# Patient Record
Sex: FEMALE | Race: WHITE | NOT HISPANIC OR LATINO | Employment: OTHER | URBAN - METROPOLITAN AREA
[De-identification: names, ages, dates, MRNs, and addresses within clinical notes are randomized per-mention and may not be internally consistent; named-entity substitution may affect disease eponyms.]

---

## 2017-05-22 ENCOUNTER — OFFICE VISIT (OUTPATIENT)
Dept: SLEEP MEDICINE | Facility: CLINIC | Age: 72
End: 2017-05-22
Attending: INTERNAL MEDICINE
Payer: COMMERCIAL

## 2017-05-22 VITALS
SYSTOLIC BLOOD PRESSURE: 141 MMHG | WEIGHT: 220.3 LBS | HEIGHT: 63 IN | DIASTOLIC BLOOD PRESSURE: 60 MMHG | BODY MASS INDEX: 39.04 KG/M2 | HEART RATE: 89 BPM | OXYGEN SATURATION: 95 %

## 2017-05-22 DIAGNOSIS — G47.33 OSA ON CPAP: Primary | ICD-10-CM

## 2017-05-22 NOTE — PROGRESS NOTES
DATE OF SLEEP CLINIC VISIT:  05/22/2017      SLEEP MEDICINE FOLLOWUP VISIT NOTE      CHIEF COMPLAINT AND PURPOSE OF VISIT:  Followup of MAYKEL.      HISTORY OF PRESENT ILLNESS:  Ms. Reba Juarez is a 72-year-old female who presents to the Sleep Clinic today for a followup visit of MAYKEL.  She was last seen by me at the sleep clinic in 2015.      She reports using her CPAP device regularly during sleep. Her current CPAP device is her replacement equipment which she obtained through [facility] in 09/2013.  It has been at least 2 years that she has had her supplies replaced.  The current mask that she is using does not fit well.  She reports dryness in the mouth upon awakening.  She sleeps alone and hence there is not anyone to report whether or not she snores while she is using the CPAP device. She denies awakenings due to gasping for air or choking sensation with the CPAP.      She denies any concerns about drowsiness while driving.  She has had 2 episodes of pneumonia since she was last seen at our sleep clinic and for one of the episodes she was hospitalized at [facility].       She goes to bed between 9:00 and 9:30 p.m. and she wakes up between 5:30 and 7:30 a.m.  On an average she gets 7-1/2 to 8 hours of sleep and she does report feeling refreshed upon awakening from sleep and denies excessive daytime sleepiness or fatigue.  Her Nelson Sleepiness Score is 4/24.  She weighed 212 pounds during her previous sleep clinic visit and current weight is 220 pounds.     Downloadable compliance data from the CPAP could not be retrieved.     She was recently seen by her PCP at [facility] and also saw cardiologist . She underwent carotid USS and stress echo.    Carotid US:  4 vessel study done with mild to moderate diffuse plaque in carotid distribution without flow limiting stenosis.   Stress Echo: Poor effort tolerance secondary to profound deconditioning.  Adequate stress for interpretation with patient achieving  "100 percent of predicted maximum HR.  No subjective, hemodynamic or ECG evidence of inducible ischemia.  No echocardiographic evidence of ischemia.  Comments: There is a fall off in exercise tolerance since her previous stress echo in 2015. This is not due to her aortic valve, but rather significant deconditioning.       Current meds:  Current Outpatient Prescriptions   Medication Sig Dispense Refill     SIMVASTATIN PO        lisinopril (PRINIVIL,ZESTRIL) 10 MG tablet Take 1 tablet (10 mg) by mouth daily 90 tablet 3     ORDER FOR DME Use your CPAP device as directed by your provider.       aspirin 81 MG tablet Take 1 tablet by mouth daily.       Past medical history:  Past Medical History:   Diagnosis Date     Aortic valve sclerosis     echo 1-24-11 mild     Coronary artery disease 1995    PCI at [facility]. Repeat angiogram in 2007     Gastric ulcer 1990     H/O exercise stress test 3/21/2011    No ischemia     Hyperlipidemia LDL goal < 70     identified 1990's     Hypertension     onset age 50-55 approx     Sleep apnea      Patient Active Problem List   Diagnosis     Hyperlipidemia LDL goal <70     Coronary heart disease     Past surgical history:  Past Surgical History:   Procedure Laterality Date     COLONOSCOPY  2010     EYE SURGERY        Allergies:  Allergies   Allergen Reactions     Pravastatin      Myalgia       Simvastatin      myalgia     Atorvastatin Calcium      Myalgia        Social history:  Social History   Substance Use Topics     Smoking status: Former Smoker     Smokeless tobacco: Never Used     Alcohol use No      Family history:  Family History   Problem Relation Age of Onset     Myocardial Infarction Mother 51     HTN onset age 30, was a smoker     Myocardial Infarction Father 63     smoker     Myocardial Infarction Brother 39       PHYSICAL EXAMINATION:   VITAL SIGNS:/60  Pulse 89  Ht 1.6 m (5' 3\")  Wt 99.9 kg (220 lb 4.8 oz)  SpO2 95%  BMI 39.02 kg/m2  GENERAL " "APPEARANCE:  Above ideal body weight in no apparent cardiopulmonary distress.   CARDIOVASCULAR:  Regular S1, S2.  No gallops . Ejection systolic murmur of grade 2/6 audible at  the upper right sternal border.   RESPIRATORY:  Clear to auscultation bilaterally with no rales or rhonchi.   EXTREMITIES:  Trace bilateral lower extremity edema without any calf tenderness.      IMPRESSION/REPORT/ PLAN:  Previously diagnosed severe obstructive sleep apnea.  The patient reports that she is compliant with the CPAP device and continues to report positive benefit from the device usage; however,  objective evidence of CPAP compliance  was not available since  the compliance data could not be retrieved today.  A new SD card was inserted into the CPAP device and she was instructed to stop at our clinic in the next couple of weeks for which she does not need an appointment  , but she will bring her CPAP device with her to the clinic so that one of our staff can download the compliance measures for me to review.  I will communicate with the patient if there are any concerns or revisions that I need to make based on the compliance measures, but for now I have renewed the prescription for the CPAP supplies for the next ensuing year and discussed it with our DME provider, Ms. Annie Louie, who informed that the CPAP supplies will be mailed to her home address.  She was recommended to use the device regularly during sleep and I also have provided her with instruction sheet about how often each of the supplies have to be replaced.  She was instructed not to drive if drowsy or sleepy to prevent accidents. We discussed weight management via diet and exercise.    \"I spent a total of 25 minutes face to face with Reba Juarez during today's office  visit. Over 50% of this time was spent counseling the patient and coordinating care regarding sleep apnea and CPAP treatment\".         JAMES SAUCEDO MD             D: " 2017 17:06   T: 2017 18:03   MT:       Name:     NENA ORCKWELL   MRN:      [id number]        Account:      [id number]   :      1945           Visit Date:   2017      Document: [id number]

## 2017-05-22 NOTE — MR AVS SNAPSHOT
"              After Visit Summary   2017    Reba Juarez    MRN: [id number]           Patient Information     Date Of Birth          1945        Visit Information        Provider Department      2017 2:00 PM [facility]        Today's Diagnoses     MAYKEL on CPAP    -  1       Follow-ups after your visit        Follow-up notes from your care team     Return in about 1 year (around 2018).      Who to contact     If you have questions or need follow up information about today's clinic visit or your schedule please contact [facility] directly at [phone].  Normal or non-critical lab and imaging results will be communicated to you by MyChart, letter or phone within 4 business days after the clinic has received the results. If you do not hear from us within 7 days, please contact the clinic through [facility] or phone. If you have a critical or abnormal lab result, we will notify you by phone as soon as possible.  Submit refill requests through [facility] or call your pharmacy and they will forward the refill request to us. Please allow 3 business days for your refill to be completed.          Additional Information About Your Visit        MyChart Information     DianDian lets you send messages to your doctor, view your test results, renew your prescriptions, schedule appointments and more. To sign up, go to [email or web address] . Click on \"Log in\" on the left side of the screen, which will take you to the Welcome page. Then click on \"Sign up Now\" on the right side of the page.     You will be asked to enter the access code listed below, as well as some personal information. Please follow the directions to create your username and password.     Your access code is: [email or web address]  Expires: 2017  8:03 PM     Your access code will  in 90 days. If you need help or a new code, please call your [facility] clinic or " "[phone].        Care EveryWhere ID     This is your Care EveryWhere ID. This could be used by other organizations to access your [facility] medical records  [id number]        Your Vitals Were     Pulse Height Pulse Oximetry BMI (Body Mass Index)          89 1.6 m (5' 3\") 95% 39.02 kg/m2         Blood Pressure from Last 3 Encounters:   05/22/17 141/60   07/14/15 162/56   10/02/13 121/64    Weight from Last 3 Encounters:   05/22/17 99.9 kg (220 lb 4.8 oz)   07/14/15 96.2 kg (212 lb)   10/02/13 93.9 kg (207 lb)              We Performed the Following     Comprehensive DME          Today's Medication Changes          These changes are accurate as of: 5/22/17  8:03 PM.  If you have any questions, ask your nurse or doctor.               Stop taking these medicines if you haven't already. Please contact your care team if you have questions.     MELATONIN PO           Multi-vitamin Tabs tablet                    Primary Care Provider Office Phone # Fax #    ANGELA Samuel [phone]       [address] [address]        Thank you!     Thank you for choosing [facility], SLEEP STUDY  for your care. Our goal is always to provide you with excellent care. Hearing back from our patients is one way we can continue to improve our services. Please take a few minutes to complete the written survey that you may receive in the mail after your visit with us. Thank you!             Your Updated Medication List - Protect others around you: Learn how to safely use, store and throw away your medicines at www.disposemymeds.org.          This list is accurate as of: 5/22/17  8:03 PM.  Always use your most recent med list.                   Brand Name Dispense Instructions for use    aspirin 81 MG tablet      Take 1 tablet by mouth daily.       lisinopril 10 MG tablet    PRINIVIL/ZESTRIL    90 tablet    Take 1 tablet (10 mg) by mouth daily       order for DME      Use your CPAP " device as directed by your provider.       SIMVASTATIN PO

## 2017-05-23 NOTE — PROGRESS NOTES
DICTATED THE SLEEP CLINIC VISIT NOTE FOR TODAY.  Ines Jang MD   of Medicine,  Division of Pulmonary/Sleep Medicine  [facility].

## 2019-11-12 ENCOUNTER — AMBULATORY - HEALTHEAST (OUTPATIENT)
Dept: ADMINISTRATIVE | Facility: CLINIC | Age: 74
End: 2019-11-12

## 2019-11-12 DIAGNOSIS — G47.30 SLEEP APNEA: ICD-10-CM

## 2019-11-24 DIAGNOSIS — G47.33 OSA ON CPAP: Primary | ICD-10-CM

## 2020-03-05 ENCOUNTER — OFFICE VISIT (OUTPATIENT)
Dept: SLEEP MEDICINE | Facility: CLINIC | Age: 75
End: 2020-03-05
Payer: COMMERCIAL

## 2020-03-05 DIAGNOSIS — I10 ESSENTIAL HYPERTENSION: ICD-10-CM

## 2020-03-05 DIAGNOSIS — G47.33 OSA (OBSTRUCTIVE SLEEP APNEA): Primary | ICD-10-CM

## 2020-03-05 DIAGNOSIS — E66.01 CLASS 2 SEVERE OBESITY DUE TO EXCESS CALORIES WITH SERIOUS COMORBIDITY AND BODY MASS INDEX (BMI) OF 35.0 TO 35.9 IN ADULT (H): ICD-10-CM

## 2020-03-05 DIAGNOSIS — E66.812 CLASS 2 SEVERE OBESITY DUE TO EXCESS CALORIES WITH SERIOUS COMORBIDITY AND BODY MASS INDEX (BMI) OF 35.0 TO 35.9 IN ADULT (H): ICD-10-CM

## 2020-03-05 PROCEDURE — 99214 OFFICE O/P EST MOD 30 MIN: CPT | Performed by: INTERNAL MEDICINE

## 2020-03-05 RX ORDER — ROSUVASTATIN CALCIUM 40 MG/1
40 TABLET, COATED ORAL DAILY
COMMUNITY
Start: 2019-08-05

## 2020-03-05 RX ORDER — LISINOPRIL/HYDROCHLOROTHIAZIDE 10-12.5 MG
1 TABLET ORAL DAILY
COMMUNITY
Start: 2018-08-20 | End: 2020-10-14

## 2020-03-05 RX ORDER — NITROGLYCERIN 0.4 MG/1
0.4 TABLET SUBLINGUAL EVERY 5 MIN PRN
COMMUNITY
Start: 2018-06-04

## 2020-03-05 ASSESSMENT — MIFFLIN-ST. JEOR: SCORE: 1375.27

## 2020-03-05 NOTE — NURSING NOTE
Per request pt will be follow up in [place] 2021.  IAN reports and 2007 sleep study have been given to Ginger Shirley to scan into [facility] records.      GIBSON Lee

## 2020-03-05 NOTE — PATIENT INSTRUCTIONS
1.  Continue CPAP every night, for all hours that you are sleeping.  If you nap use CPAP.  As always, try to get at least 8 hours of sleep or more each day, and avoid sleep deprivation. Avoid alcohol.    2.  Reasons that you might need a change to your pressure therapy would be weight gain or loss, waking having inadvertently removed your CPAP overnight, having previously felt refreshed by sleep with CPAP use and now waking un-refreshed, and return of daytime sleepiness. Also, the development of new medical problems can sometimes affect breathing at night-heart failure, stroke, medications such as narcotics.    3.  Please bring CPAP with you if you are hospitalized.  If anticipating surgery be sure to discuss with your surgeon that you have sleep apnea and use PAP therapy.      4.  Maintain your equipment as recommended which includes routine cleaning and replacement of supplies.  Call DME for any questions regarding supplies or maintenance.    [facility], [facility] [phone]    5.  Do not drive on engage in potentially dangerous activities if feeling sleepy.    6.  Please see me again in 12 months and bring your machine and card to your follow-up visit.                    Tips for your CPAP and BIPAP use-    Mask fitting tips  Mask fitting exercise:    To improve your mask seal and your mobility at night, put mask on and secure in place.  Lie down in bed with full pressure and roll to one side, adjust headgear while in that position to eliminate any leaks. Repeat process rolling to other side.     The mask seal does not have to be perfect:   CPAP machines are designed to make up for small leaks. However, you will not tolerate leaks blowing in your eyes so you will need to adjust.   Any leak should only be near or at the bottom of the mask.  We expect your mask to leak slightly at night.    Do not over-tighten the headgear straps, tighter IS NOT better, we expect  minimal leak.    First try re-positioning the mask or headgear before tightening the headgear straps.  Mask leaks are expected due to changing sleeping positions. Try pulling the mask away from your skin allowing the cushion to re-inflate will minimize the leak.  If you struggle for a good fit, try turning the CPAP off and then readjust the mask by pulling it away from your face and then turning back on the CPAP.        Humidifier tips  Humidifiers can be adjusted to increase or decrease the amount of moisture according to your comfort level. You may need to adjust this frequently at first, but then might only change it with seasonal weather changes.     Try INCREASING the humidity if:  You experience a dry, irritated nasal passage or throat.  You have a runny, drippy nose or sneezing fits after using CPAP.  You experience nasal congestion during or after CPAP use.    Try DECREASING the humidity if:  You have excessive condensation or  rain out  in the tubing or mask.  Otherwise keep the tubing warm during the night by running it underneath the blankets or pillow.      Clinic visit after initial CPAP and BIPAP set-up   Bring your equipment with you to your 4 week follow up clinic visit.  We will be extracting your data from the machine.        Travel  Always take your equipment with you.  If you fly with your equipment bring it on with you as a carry on.  Medical equipment does not count as a carry on.    If you travel international the machines take 110-240.  The only adapter needed is the adapter that will fit into the receptacle (outlet).    You may also want to bring an extension cord as many hotel rooms have limited outlets at the bedside.  Do not travel with water in your humidifier chamber.     Cleaning and Maintenance Guidelines    Equipment Frequency Cleaning Method   Mask First Day    Daily      Weekly Soak mask in hot soapy water for 30 minutes, rinse and air dry.  Wipe nasal cushion with a hot soapy  (Ivory, baby shampoo) cloth and rinse.  Baby wipes may also be used.  Do not use anti-bacterial soaps,Cleo  liquid soap, rubbing alcohol, bleach or ammonia.  Wash frame in hot soapy water (Ivory, baby shampoo) rinse and let air dry   Headgear Biweekly Wash in hot soapy water, rinse and air dry   Reusable Gray Filter Weekly Wash in hot soapy water, rinse, put in towel squeeze moisture out, let air dry   Disposable White Filter Check Weekly Replace when brown or gray in color; at least every 2 to 3 months   Humidifier Chamber Daily    Weekly Empty distilled water from humidifier and let air dry    Hand wash in hot soapy water, rinse and air dry   Tubing Weekly Wash in hot soapy water, rinse and let air dry   Mask, Tubing and Humidifier Chamber As needed Disinfect: Soak in 1 part distilled white vinegar to 3 parts hot water for 30 minutes, rinse well and air dry  Not the material headgear        MASK AND SUPPLY REORDERING and EQUIPMENT NEEDS through your DME and per your insurance  Reminder: Most insurance companies will allow for a new mask, headgear, tubing, and reusable gray filter every six months.  Disposable white ultra-fine filters are covered monthly.      HOME AND SAFETY INSTRUCTIONS    Do not use frayed or cracked electrical cords, multi plug adaptors, or switched receptacles    Do not immerse electrical equipment into water    Assure that electrical cords do not become a tripping hazard

## 2020-03-05 NOTE — PROGRESS NOTES
Chief complaint: Follow-up of severe obstructive sleep apnea, concerns about CPAP    History of Present Illness: 75-year-old female with history of coronary artery disease, hypertension, obesity and severe obstructive sleep apnea.  She is here today for follow-up.  She reports that she has been noticing waking up with a sound.  She thinks that she might be snoring through the mask or is having an apnea.  She does not think it is leak because immediately upon awakening it seems to resolve.  She does not think she is dreaming.  She denies waking up with a sense of shortness of breath or gasping.  Later in the visit she admits fear about dying at night related to this.  Bedroom is otherwise dark and quiet.  It can be excessively warm however.  She denies significant issues with sleep initiation.  She typically gets into bed 10-11 listens to a book for half an hour or less.  She does not use any over-the-counter or prescribed sleep aids.  She usually gets up once a night or last to go to the bathroom.  Rise time varies from 4 30-6 30.  She is not taking any naps during the day denies daytime sleepiness.  She admits to long history of drinking coffee, typically 3 cups in the morning 1 in the afternoon and 1 in the evening.  She does not think it affects her sleep and is not interested in cutting back.    She has been having some right shoulder pain that can disrupt her sleep.  She may be sleeping more in her back.  She had some significant weight loss but is now gaining again and is frustrated by this.  She does not drink alcohol in the evenings.  She is does not use medical cannabis or recreational marijuana.  She is remote history of smoking.    There is been no major new medical problems or upcoming procedures planned.    Blood pressure is elevated today.  She usually takes her medications at night.  She states that she got lost on the way here.  She is interested in transferring care closer to home.  He gets her  supplies mailed out from [facility].  She brought a commercially available cleaning device.  She uses a nasal mask.      Total score - [place]: 6 (3/5/2020 11:00 AM) (Less than 10 normal)    RON Total Score: 6 (normal 0-7, mild 8-14, moderate 15-21, severe 22-28)    Past Medical History:   Diagnosis Date     Aortic valve sclerosis     echo 11 mild     Coronary artery disease     PCI at [facility]. Repeat angiogram in      Gastric ulcer      H/O exercise stress test 3/21/2011    No ischemia     Hyperlipidemia LDL goal < 70     identified      Hypertension     onset age 50-55 approx     MAYKEL (obstructive sleep apnea)        Allergies   Allergen Reactions     Pravastatin      Myalgia       Simvastatin      myalgia     Atorvastatin Calcium      Myalgia       Calcium Citrate Unknown       Current Outpatient Medications   Medication     aspirin 81 MG tablet     lisinopril-hydrochlorothiazide (ZESTORETIC) 10-12.5 MG tablet     metFORMIN (GLUCOPHAGE) 500 MG tablet     nitroGLYcerin (NITROSTAT) 0.4 MG sublingual tablet     ORDER FOR DME     rosuvastatin (CRESTOR) 40 MG tablet     No current facility-administered medications for this visit.        Social History     Socioeconomic History     Marital status:      Spouse name: Not on file     Number of children: Not on file     Years of education: Not on file     Highest education level: Not on file   Occupational History     Occupation: retired     Employer: [employer]     Comment:  retired full time, still works a few days as biochemical pharmacist   Social Needs     Financial resource strain: Not on file     Food insecurity:     Worry: Not on file     Inability: Not on file     Transportation needs:     Medical: Not on file     Non-medical: Not on file   Tobacco Use     Smoking status: Former Smoker     Last attempt to quit: 3/5/1992     Years since quittin.0     Smokeless tobacco: Never Used   Substance  "and Sexual Activity     Alcohol use: No     Drug use: No     Sexual activity: Not on file   Lifestyle     Physical activity:     Days per week: Not on file     Minutes per session: Not on file     Stress: Not on file   Relationships     Social connections:     Talks on phone: Not on file     Gets together: Not on file     Attends Ep[employer]alian service: Not on file     Active member of club or organization: Not on file     Attends meetings of clubs or organizations: Not on file     Relationship status: Not on file     Intimate partner violence:     Fear of current or ex partner: Not on file     Emotionally abused: Not on file     Physically abused: Not on file     Forced sexual activity: Not on file   Other Topics Concern     Parent/sibling w/ CABG, MI or angioplasty before 65F 55M? Not Asked   Social History Narrative    Single and lives with her son and brother.       Family History   Problem Relation Age of Onset     Myocardial Infarction Mother 51        HTN onset age 30, was a smoker     Myocardial Infarction Father 63        smoker     Myocardial Infarction Brother 39         EXAM: Pleasant and alert no distress  BP (!) (P) 155/77   Pulse (P) 80   Resp (P) 16   Ht (P) 1.6 m (5' 2.99\")   Wt (P) 91.6 kg (202 lb)   SpO2 (P) 94%   BMI (P) 35.79 kg/m    Psychiatric: Mood and affect appear normal    PSG 10/11/2007  BMI 39.1  AHI 55.2, REM AHI 96.6 Lowest O2 SAT 79%    Overnight oximetry performed on CPAP 10/2/2013  Time below 88% 0.9 minutes    Overnight oximetry performed on CPAP 7/22/2015  Time below 88% 0.6 minutes    PAP download from 2/4/2020 to 3/4/2020 reviewed:  Per cent of days used greater than 4 Hours 90 % (minimum goal greater than 70%)  Average use on days used: 6 hours 52 min  Settings: CPAP 11 cmH2O     Average AHI 1.1 events per hour (goal less than 5)  Leak acceptable    ASSESSMENT:  75-year-old female with history of severe obstructive sleep apnea overall getting good clinical benefit with the use " of CPAP.  She is experiencing middle the night awakenings associated with the sound that could be REM related apnea given her history of REM predominance.  Download however does not support inadequately treated sleep apnea.  She does have an auto titrating capable device.  Elevated blood pressure today on medications.  Remains obese.    PLAN:  We will try to put her device in auto titrating mode keep the min pressure at 11 max up to 14.  If this does not solve the problem patient should notify me consider titration study.  Prescription generated keep her supplies up-to-date.  Patient is interested in transferring care to [facility].  Informed her that at this point the medical records are not yet merged.  She should obtain records to assist in transfer.  Patient was counseled on the importance of taking her device with her to procedures.  She was counseled about the risks associated with untreated sleep apnea or the use of sedatives, anesthesia narcotics on sleep apnea.  I did try to reassure her that the risk of dying from sleep apnea with her current usage is low.  She is encouraged to continue her efforts at weight control.  She should follow-up with primary care regarding blood pressure management.  Please see patient structures for further details of counseling provided.    Twenty-five minutes spent with patient, >50% spent counseling and coordinating care.      Cindy Ness M.D.  Pulmonary/Critical Care/Sleep Medicine    [facility]   Floor 1, [address]   [address] [place] [address]. [place] [address]   Appointments: [phone]    The above note was dictated using voice recognition software and may include typographical errors. Please contact the author for any clarifications.

## 2020-03-10 ENCOUNTER — AMBULATORY - HEALTHEAST (OUTPATIENT)
Dept: SLEEP MEDICINE | Facility: CLINIC | Age: 75
End: 2020-03-10

## 2020-06-26 ENCOUNTER — TELEPHONE (OUTPATIENT)
Dept: SLEEP MEDICINE | Facility: CLINIC | Age: 75
End: 2020-06-26

## 2020-06-26 ENCOUNTER — TRANSFERRED RECORDS (OUTPATIENT)
Dept: HEALTH INFORMATION MANAGEMENT | Facility: CLINIC | Age: 75
End: 2020-06-26

## 2020-06-26 DIAGNOSIS — G47.33 OSA (OBSTRUCTIVE SLEEP APNEA): Primary | ICD-10-CM

## 2020-06-26 NOTE — TELEPHONE ENCOUNTER
Pt calling states she called [facility] to get new cpap machine and they state they only have orders for supplies, not new device. Please advise

## 2020-07-01 ENCOUNTER — TRANSFERRED RECORDS (OUTPATIENT)
Dept: HEALTH INFORMATION MANAGEMENT | Facility: CLINIC | Age: 75
End: 2020-07-01

## 2020-07-14 ENCOUNTER — TELEPHONE (OUTPATIENT)
Dept: SLEEP MEDICINE | Facility: CLINIC | Age: 75
End: 2020-07-14

## 2020-07-21 NOTE — TELEPHONE ENCOUNTER
CALLED PT TO INFORM HER OF NEEDING FOLLOW-UP VISIT DISCUSSING NEED FOR REPLACEMENT MACHINE. PT STATED SHE HAD A VISIT ALREADY IN MARCH. I INFORMED PT THAT INSURANCE REQUIRES FOLLOW-UP VISIT DISCUSSING WHY REPLACEMENT MACHINE IS NEEDED. PT STATED SHE WOULD CALL HER DR AND HAVE THEM CALL US TO TELL US WE HAD TO FILL THE RX. I TRIED TO EXPLAIN TO PT THAT INSURANCE WOULD NOT COVER A REPLACEMENT MACHINE WITHOUT A FOLLOW-UP VISIT. SHE STATED SHE WOULD HAVE DR CALL  US AND HUNG UP.

## 2020-07-29 ENCOUNTER — DOCUMENTATION ONLY (OUTPATIENT)
Dept: SLEEP MEDICINE | Facility: CLINIC | Age: 75
End: 2020-07-29

## 2020-07-29 NOTE — PROGRESS NOTES
Patient was offered choice of vendor and chose [facility].  Patient Reba Juarez was set up at [facility] on July 29, 2020. Patient received a Resmed AirSense 10 Auto. Pressures were set at 11-14 cm H2O.   Patient s ramp is 5 cm H2O for Auto and FLEX/EPR is 2.  Patient received a Resmed Mask name: MIR[employer] FX FOR HER  Nasal mask size Medium, heated tubing and heated humidifier.  Patient does need to meet compliance. Patient has a follow up on TBD with Dr. Ness.    Minnie Zambrano

## 2020-08-04 ENCOUNTER — DOCUMENTATION ONLY (OUTPATIENT)
Dept: SLEEP MEDICINE | Facility: CLINIC | Age: 75
End: 2020-08-04

## 2020-08-04 NOTE — PROGRESS NOTES
3 DAY STM VISIT    Diagnostic AHI: 55    PSG    Patient contacted for 3 day STM visit    Confirmed with patient at time of call- N/A Patient is still interested in STM service     Subjective measures:  Pt states things are going well and has no issues or complaints.  Pt is benefiting from therapy.      Replacement device: Yes  STM ordered by provider: Yes     Device type: Auto-CPAP  PAP settings from order::  CPAP min 11 cm  H20       CPAP max 14 cm  H20        Mask type:    Nasal Mask     Device settings from machine      Min CPAP 11.0            Max CPAP 14.0            Assessment: Nightly usage over four hours.  Action plan: Patient removed from STM, STM not required or ordered. . Patient has a follow up visit scheduled:   yes within 31-90 days of set up.     Total time spent on accessing and  interpreting remote patient PAP therapy data  10 minutes  Total time spent counseling, coaching  and reviewing PAP therapy data with patient  15 minutes  [id number] no

## 2020-10-14 RX ORDER — VALSARTAN AND HYDROCHLOROTHIAZIDE 80; 12.5 MG/1; MG/1
1 TABLET, FILM COATED ORAL DAILY
COMMUNITY
End: 2021-05-27 | Stop reason: ALTCHOICE

## 2020-10-14 RX ORDER — BUDESONIDE AND FORMOTEROL FUMARATE DIHYDRATE 80; 4.5 UG/1; UG/1
2 AEROSOL RESPIRATORY (INHALATION) 2 TIMES DAILY
COMMUNITY
End: 2022-12-22

## 2020-10-14 NOTE — PATIENT INSTRUCTIONS
For general sleep health questions: http://sleepeducation.org    For tips about PAP and COVID -19:  https://www.[facility]acic.org/patients/patient-resources/resources/covid-19-and-home-pap-therapy.pdf        Continue PAP therapy every night, for all hours that you are sleeping.  If you nap use CPAP.  As always, try to get at least 8 hours of sleep or more each day, keep a regular sleep schedule, and avoid sleep deprivation. Avoid alcohol.    Reasons that you might need a change to your pressure therapy would be weight gain or loss, waking having inadvertently removed your PAP overnight, having previously felt refreshed by sleep with CPAP use and now waking un-refreshed, and return of daytime sleepiness. Also, the development of new medical problems  (such as heart failure, stroke, medications such as narcotics) can sometimes affect breathing at night and change your PAP therapy needs.    Please bring PAP with you if you are hospitalized.  If anticipating surgery be sure to discuss with your surgeon that you have sleep apnea and use PAP therapy.      Maintain your equipment as recommended which includes routine cleaning and replacement of supplies.      Call DME for any questions regarding supplies or maintenance.    [facility], [facility] [phone]      Do not drive on engage in potentially dangerous activities if feeling sleepy.      Tips for your PAP use-    Mask fitting tips  Mask fitting exercise:    To improve your mask seal and your mobility at night, put mask on and secure in place.  Lie down in bed with full pressure and roll to one side, adjust headgear while in that position to eliminate any leaks. Repeat process rolling to other side.     The mask seal does not have to be perfect:   CPAP machines are designed to make up for small leaks. However, you will not tolerate leaks blowing in your eyes so you will need to adjust.   Any leak should only be near or at the  bottom of the mask.  We expect your mask to leak slightly at night.    Do not over-tighten the headgear straps, tighter IS NOT better, we expect minimal leak.    First try re-positioning the mask or headgear before tightening the headgear straps.  Mask leaks are expected due to changing sleeping positions. Try pulling the mask away from your skin allowing the cushion to re-inflate will minimize the leak.  If you struggle for a good fit, try turning the CPAP off and then readjust the mask by pulling it away from your face and then turning back on the CPAP.        Humidifier tips  Humidifiers can be adjusted to increase or decrease the amount of moisture according to your comfort level. You may need to adjust this frequently at first, but then might only change it with seasonal weather changes.     Try INCREASING the humidity if:  You experience a dry, irritated nasal passage or throat.  You have a runny, drippy nose or sneezing fits after using CPAP.  You experience nasal congestion during or after CPAP use.    Try DECREASING the humidity if:  You have excessive condensation or  rain out  in the tubing or mask.  Otherwise keep the tubing warm during the night by running it underneath the blankets or pillow.      Clinic visit after initial PAP set-up   Bring your equipment with you to your 4 week follow up clinic visit.  We will be extracting your data from the machine.        Travel  Always take your equipment with you.  If you fly with your equipment bring it on with you as a carry on.  Medical equipment does not count as a carry on.    If you travel international the machines take 110-240.  The only adapter needed is the adapter that will fit into the receptacle (outlet).    You may also want to bring an extension cord as many hotel rooms have limited outlets at the bedside.  Do not travel with water in your humidifier chamber.     Cleaning and Maintenance Guidelines    Equipment Frequency Cleaning Method   Mask  First Day    Daily      Weekly Soak mask in hot soapy water for 30 minutes, rinse and air dry.  Wipe nasal cushion with a hot soapy (Ivory, baby shampoo) cloth and rinse.  Baby wipes may also be used.  Do not use anti-bacterial soaps,Cleo  liquid soap, rubbing alcohol, bleach or ammonia.  Wash frame in hot soapy water (Ivory, baby shampoo) rinse and let air dry   Headgear Biweekly Wash in hot soapy water, rinse and air dry   Reusable Gray Filter Weekly Wash in hot soapy water, rinse, put in towel squeeze moisture out, let air dry   Disposable White Filter Check Weekly Replace when brown or gray in color; at least every 2 to 3 months   Humidifier Chamber Daily    Weekly Empty distilled water from humidifier and let air dry    Hand wash in hot soapy water, rinse and air dry   Tubing Weekly Wash in hot soapy water, rinse and let air dry   Mask, Tubing and Humidifier Chamber As needed Disinfect: Soak in 1 part distilled white vinegar to 3 parts hot water for 30 minutes, rinse well and air dry  Not the material headgear        MASK AND SUPPLY REORDERING and EQUIPMENT NEEDS through your DME and per your insurance  Reminder: Most insurance companies will allow for a new mask, headgear, tubing, and reusable gray filter every six months.  Disposable white ultra-fine filters are covered monthly.      HOME AND SAFETY INSTRUCTIONS    Do not use frayed or cracked electrical cords, multi plug adaptors, or switched receptacles    Do not immerse electrical equipment into water    Assure that electrical cords do not become a tripping hazard    Your BMI is There is no height or weight on file to calculate BMI.  Weight management is a personal decision.  If you are interested in exploring weight loss strategies, the following discussion covers the approaches that may be successful. Body mass index (BMI) is one way to tell whether you are at a healthy weight, overweight, or obese. It measures your weight in relation to your height.  A  BMI of 18.5 to 24.9 is in the healthy range. A person with a BMI of 25 to 29.9 is considered overweight, and someone with a BMI of 30 or greater is considered obese. More than two-thirds of American adults are considered overweight or obese.  Being overweight or obese increases the risk for further weight gain. Excess weight may lead to heart disease and diabetes.  Creating and following plans for healthy eating and physical activity may help you improve your health.  Weight control is part of healthy lifestyle and includes exercise, emotional health, and healthy eating habits. Careful eating habits lifelong are the mainstay of weight control. Though there are significant health benefits from weight loss, long-term weight loss with diet alone may be very difficult to achieve- studies show long-term success with dietary management in less than 10% of people. Attaining a healthy weight may be especially difficult to achieve in those with severe obesity. In some cases, medications, devices and surgical management might be considered.  What can you do?  If you are overweight or obese and are interested in methods for weight loss, you should discuss this with your provider.     Consider reducing daily calorie intake by 500 calories.     Keep a food journal.     Avoiding skipping meals, consider cutting portions instead.    Diet combined with exercise helps maintain muscle while optimizing fat loss. Strength training is particularly important for building and maintaining muscle mass. Exercise helps reduce stress, increase energy, and improves fitness. Increasing exercise without diet control, however, may not burn enough calories to loose weight.       Start walking three days a week 10-20 minutes at a time    Work towards walking thirty minutes five days a week     Eventually, increase the speed of your walking for 1-2 minutes at time      Weight management plan: Patient was referred to their PCP to discuss a diet and  exercise plan.

## 2020-10-14 NOTE — PROGRESS NOTES
"Reba Juarez is a 75 year old female who is being evaluated via a billable telephone visit.      The patient has been notified of following:     \"This telephone visit will be conducted via a call between you and your physician/provider. We have found that certain health care needs can be provided without the need for a physical exam.  This service lets us provide the care you need with a short phone conversation.  If a prescription is necessary we can send it directly to your pharmacy.  If lab work is needed we can place an order for that and you can then stop by our lab to have the test done at a later time.    Telephone visits are billed at different rates depending on your insurance coverage. During this emergency period, for some insurers they may be billed the same as an in-person visit.  Please reach out to your insurance provider with any questions.    If during the course of the call the physician/provider feels a telephone visit is not appropriate, you will not be charged for this service.\"    Patient has given verbal consent for Telephone visit?  Yes    What phone number would you like to be contacted at? [phone]    [facility]    Phone call duration: 28 minutes    Cindy Ness MD     Chief complaint: Follow-up new CPAP machine    History of Present Illness: 75-year-old female with history of coronary artery disease, hypertension, obstructive sleep apnea with hypoxemia.  She recently received a replacement CPAP machine.  She reports that she is getting more restful sleep.  She likes at current settings.  She is happy with the new machine.  She does have some nights where she has difficulty returning to sleep after middle of the night awakenings.  Is happening a couple times a month.  She does take naps on some days up to an hour in length.  She also drinks 3 caffeinated beverages in the morning and usually a fourth and the fifth in the afternoon and " evening.  She does not think it affects her sleep.  She is not using any over-the-counter or prescribed sleep aids.  She does not drink alcohol.      She has an oximeter at home and sometimes checks her oxygen saturations and finds they can go as low as 87%.  She has been suffering from dyspnea with exertion and cough.  Cough is improved after changing blood pressure medication.  Recently dyspnea improved after empiric trial of Symbicort.  She has been off of it now for a few days however.  She does have a remote history of smoking.    She has been using a ozone based commercial APAP supply .    Total score - [place]: 4 (10/14/2020 10:00 AM) (Less than 10 normal)    RON Total Score: 3 (normal 0-7, mild 8-14, moderate 15-21, severe 22-28)    Past Medical History:   Diagnosis Date     Aortic valve sclerosis     echo 1-24-11 mild     Coronary artery disease 1995    PCI at [facility]. Repeat angiogram in 2007     Gastric ulcer 1990     H/O exercise stress test 3/21/2011    No ischemia     Hyperlipidemia LDL goal < 70     identified 1990's     Hypertension     onset age 50-55 approx     MAYKEL (obstructive sleep apnea) 2007       Allergies   Allergen Reactions     Pravastatin      Myalgia       Simvastatin      myalgia     Atorvastatin Calcium      Myalgia       Calcium Citrate Unknown       Current Outpatient Medications   Medication     aspirin 81 MG tablet     budesonide-formoterol (SYMBICORT) 80-4.5 MCG/ACT Inhaler     metFORMIN (GLUCOPHAGE) 500 MG tablet     nitroGLYcerin (NITROSTAT) 0.4 MG sublingual tablet     ORDER FOR DME     rosuvastatin (CRESTOR) 40 MG tablet     valsartan-hydrochlorothiazide (DIOVAN HCT) 80-12.5 MG tablet     No current facility-administered medications for this visit.        Social History     Socioeconomic History     Marital status:      Spouse name: Not on file     Number of children: Not on file     Years of education: Not on file     Highest education level: Not  on file   Occupational History     Occupation: retired     Employer: [employer]     Comment:  retired full time, still works a few days as biochemical pharmacist   Social Needs     Financial resource strain: Not on file     Food insecurity     Worry: Not on file     Inability: Not on file     Transportation needs     Medical: Not on file     Non-medical: Not on file   Tobacco Use     Smoking status: Former Smoker     Quit date: 3/5/1992     Years since quittin.6     Smokeless tobacco: Never Used   Substance and Sexual Activity     Alcohol use: No     Drug use: No     Sexual activity: Not on file   Lifestyle     Physical activity     Days per week: Not on file     Minutes per session: Not on file     Stress: Not on file   Relationships     Social connections     Talks on phone: Not on file     Gets together: Not on file     Attends Sabianist service: Not on file     Active member of club or organization: Not on file     Attends meetings of clubs or organizations: Not on file     Relationship status: Not on file     Intimate partner violence     Fear of current or ex partner: Not on file     Emotionally abused: Not on file     Physically abused: Not on file     Forced sexual activity: Not on file   Other Topics Concern     Parent/sibling w/ CABG, MI or angioplasty before 65F 55M? Not Asked   Social History Narrative    Single and lives with her son and brother.       Family History   Problem Relation Age of Onset     Myocardial Infarction Mother 51        HTN onset age 30, was a smoker     Myocardial Infarction Father 63        smoker     Myocardial Infarction Brother 39         EXAM:  There were no vitals taken for this visit.  Pulmonary: Speaking full sentences, no audible wheezing and cough  Psych: Alert,  alert    PSG 10/11/2007  BMI 39.1  AHI 55.2, REM AHI 96.6 Lowest O2 SAT 79%     Overnight oximetry performed on CPAP 10/2/2013  Time below 88% 0.9 minutes     Overnight oximetry performed on CPAP  7/22/2015  Time below 88% 0.6 minutes    ResMed   Auto-PAP 11.0 - 14.0 cmH2O 30 day usage data:    90% of days with > 4 hours of use. 0/30 days with no use.   Average use 419 minutes per day.   95%ile Leak 25.2 L/min.   CPAP 95% pressure 12.9 cm.   AHI 1.08 events per hour.     ASSESSMENT:  75-year-old female with history of coronary artery disease, hypertension, obstructive sleep apnea with hypoxemia getting good clinical benefit and meeting compliance goals with her replacement machine.  Dyspnea with exertion improved with changing blood pressure medication and an empiric trial of Symbicort.  No pulmonary function testing has been performed.  Think would be important to further evaluate etiology of her dyspnea.  Patient has intermittent middle the night awakenings with sleep maintenance insomnia not yet become habitual.    PLAN:  Prescription generated to keep her CPAP supplies up-to-date, no pressure change recommended.  Patient is encouraged to avoid taking long naps and to consider cutting back on evening and then afternoon caffeine.  This may help improve sleep consolidation and ability to return to sleep after middle the night awakenings.  Recommended comprehensive pulmonary function testing while off of the inhalers to get a better assessment of underlying lung function and etiology of chronic dyspnea.  Once that has been completed okay to resume the trial of Symbicort as prescribed by primary care.  Patient should follow up with me to review those pulmonary function testing results however.  She is agreeable with this plan        Cindy Ness M.D.  Pulmonary/Critical Care/Sleep Medicine    [facility]   Floor [address], [address]   [address] [place], [place] [address]. [place] [address]   Appointments: [phone]    The above note was dictated using voice recognition software and may include typographical errors. Please contact the author for any  clarifications.

## 2020-10-15 ENCOUNTER — VIRTUAL VISIT (OUTPATIENT)
Dept: SLEEP MEDICINE | Facility: CLINIC | Age: 75
End: 2020-10-15
Payer: COMMERCIAL

## 2020-10-15 DIAGNOSIS — R05.9 COUGH: ICD-10-CM

## 2020-10-15 DIAGNOSIS — R06.09 DOE (DYSPNEA ON EXERTION): ICD-10-CM

## 2020-10-15 DIAGNOSIS — G47.33 OSA (OBSTRUCTIVE SLEEP APNEA): Primary | ICD-10-CM

## 2020-10-15 DIAGNOSIS — Z87.891 FORMER SMOKER: ICD-10-CM

## 2020-10-15 PROCEDURE — 99214 OFFICE O/P EST MOD 30 MIN: CPT | Mod: 95 | Performed by: INTERNAL MEDICINE

## 2020-10-22 NOTE — NURSING NOTE
Patient called and connected with the PFT lab to schedule PFT's and 6 minute walk test. Patient will call back to schedule follow once done.    Bianka Ochoa [facility] ~[place]

## 2021-01-29 NOTE — TELEPHONE ENCOUNTER
Action    Action Taken 1-29: Requested echo 7-1-20 from [facility]  1-29: Requested holter monitor 7-10-20 and EKG 6-28-20 from   2-2: sent EKG to scanning, sent holter to [facility] and urgent scanning

## 2021-02-02 ENCOUNTER — PRE VISIT (OUTPATIENT)
Dept: CARDIOLOGY | Facility: CLINIC | Age: 76
End: 2021-02-02

## 2021-02-02 ENCOUNTER — OFFICE VISIT (OUTPATIENT)
Dept: CARDIOLOGY | Facility: CLINIC | Age: 76
End: 2021-02-02
Attending: INTERNAL MEDICINE
Payer: COMMERCIAL

## 2021-02-02 ENCOUNTER — ANCILLARY PROCEDURE (OUTPATIENT)
Dept: CARDIOLOGY | Facility: CLINIC | Age: 76
End: 2021-02-02
Payer: COMMERCIAL

## 2021-02-02 VITALS
HEIGHT: 63 IN | HEART RATE: 87 BPM | SYSTOLIC BLOOD PRESSURE: 162 MMHG | BODY MASS INDEX: 37.03 KG/M2 | DIASTOLIC BLOOD PRESSURE: 80 MMHG | WEIGHT: 209 LBS | OXYGEN SATURATION: 96 %

## 2021-02-02 DIAGNOSIS — E66.812 CLASS 2 SEVERE OBESITY DUE TO EXCESS CALORIES WITH SERIOUS COMORBIDITY AND BODY MASS INDEX (BMI) OF 35.0 TO 35.9 IN ADULT (H): ICD-10-CM

## 2021-02-02 DIAGNOSIS — E11.59 TYPE 2 DIABETES MELLITUS WITH OTHER CIRCULATORY COMPLICATION, WITHOUT LONG-TERM CURRENT USE OF INSULIN (H): ICD-10-CM

## 2021-02-02 DIAGNOSIS — G47.33 OSA (OBSTRUCTIVE SLEEP APNEA): ICD-10-CM

## 2021-02-02 DIAGNOSIS — I25.10 CORONARY ARTERY DISEASE INVOLVING NATIVE CORONARY ARTERY OF NATIVE HEART WITHOUT ANGINA PECTORIS: ICD-10-CM

## 2021-02-02 DIAGNOSIS — R00.2 PALPITATIONS: ICD-10-CM

## 2021-02-02 DIAGNOSIS — E66.01 CLASS 2 SEVERE OBESITY DUE TO EXCESS CALORIES WITH SERIOUS COMORBIDITY AND BODY MASS INDEX (BMI) OF 35.0 TO 35.9 IN ADULT (H): ICD-10-CM

## 2021-02-02 DIAGNOSIS — R00.2 PALPITATIONS: Primary | ICD-10-CM

## 2021-02-02 DIAGNOSIS — I10 ESSENTIAL HYPERTENSION: ICD-10-CM

## 2021-02-02 DIAGNOSIS — I35.0 AORTIC VALVE STENOSIS, ETIOLOGY OF CARDIAC VALVE DISEASE UNSPECIFIED: ICD-10-CM

## 2021-02-02 DIAGNOSIS — C91.10 CLL (CHRONIC LYMPHOCYTIC LEUKEMIA) (H): ICD-10-CM

## 2021-02-02 LAB — INTERPRETATION ECG - MUSE: NORMAL

## 2021-02-02 PROCEDURE — 93005 ELECTROCARDIOGRAM TRACING: CPT

## 2021-02-02 PROCEDURE — 99204 OFFICE O/P NEW MOD 45 MIN: CPT | Performed by: INTERNAL MEDICINE

## 2021-02-02 PROCEDURE — G0463 HOSPITAL OUTPT CLINIC VISIT: HCPCS | Mod: 25

## 2021-02-02 ASSESSMENT — PAIN SCALES - GENERAL: PAINLEVEL: NO PAIN (0)

## 2021-02-02 ASSESSMENT — MIFFLIN-ST. JEOR: SCORE: 1407.15

## 2021-02-02 NOTE — LETTER
2/2/2021      RE: Reba Juarez  1974 [address] [facility] [id number]       Dear Colleague,    Thank you for the opportunity to participate in the care of your patient, Reba Juarez, at the [facility] [place] [facility]. Please see a copy of my visit note below.    I am delighted to see Reba Juarez in consultation.The primary encounter diagnosis was Palpitations. Diagnoses of Type 2 diabetes mellitus with other circulatory complication, without long-term current use of insulin (H), Coronary artery disease involving native coronary artery of native heart without angina pectoris, Essential hypertension, CLL (chronic lymphocytic leukemia) (H), Class 2 severe obesity due to excess calories with serious comorbidity and body mass index (BMI) of 35.0 to 35.9 in adult (H), MAYKEL (obstructive sleep apnea), and Aortic valve stenosis, etiology of cardiac valve disease unspecified were also pertinent to this visit.   As you know, the patient is a 75 year old  female. She   has a past medical history of Aortic valve sclerosis, Coronary artery disease (1995), Diabetes (H), Gastric ulcer (1990), H/O exercise stress test (3/21/2011), Hyperlipidemia, Hyperlipidemia LDL goal < 70, Hypertension, and MAYKEL (obstructive sleep apnea) (2007)..    On this visit, the patient states that she has occasional palpitations.  The patient denies chest pressure/discomfort, dyspnea, near-syncope, syncope, orthopnea, paroxysmal nocturnal dyspnea and lower extermity edema.    The patient's cardiovascular risk factors include known cardiac disease, arrhythmia, hypertension, high cholesterol, positive family history and diabetes mellitus.    The following portions of the patient's history were reviewed and updated as appropriate: allergies, current medications, past family history, past medical history, past social history, past surgical history, and the problem  list.    PMH: The patient's past medical history includes:    Past Medical History:   Diagnosis Date     Aortic valve sclerosis     echo 1-24-11 mild     Coronary artery disease 1995    PCI at [facility]. Repeat angiogram in 2007     Diabetes (H)      Gastric ulcer 1990     H/O exercise stress test 3/21/2011    No ischemia     Hyperlipidemia      Hyperlipidemia LDL goal < 70     identified 1990's     Hypertension     onset age 50-55 approx     MAYKEL (obstructive sleep apnea) 2007      Past Surgical History:   Procedure Laterality Date     ANGIOPLASTY  early 1990s     COLONOSCOPY  2010     EYE SURGERY         The patient's medications as of the current encounter are:     Current Outpatient Medications   Medication Sig Dispense Refill     aspirin 81 MG tablet Take 1 tablet by mouth daily.       metFORMIN (GLUCOPHAGE) 500 MG tablet Take 1,500 mg by mouth        nitroGLYcerin (NITROSTAT) 0.4 MG sublingual tablet Place 0.4 mg under the tongue       ORDER FOR DME Use your CPAP device as directed by your provider.       rosuvastatin (CRESTOR) 40 MG tablet Take 40 mg by mouth       valsartan-hydrochlorothiazide (DIOVAN HCT) 80-12.5 MG tablet Take 1 tablet by mouth daily       budesonide-formoterol (SYMBICORT) 80-4.5 MCG/ACT Inhaler Inhale 2 puffs into the lungs 2 times daily         Labs:     Orders Only on 05/15/2013   Component Date Value Ref Range Status     Cholesterol 05/15/2013 196  0 - 200 mg/dL Final    Comment: LDL Cholesterol is the primary guide to therapy.                            The NCEP recommends further evaluation of: patients with cholesterol greater                            than 200 mg/dL if additional risk factors are present, cholesterol greater                            than                            240 mg/dL, triglycerides greater than 150 mg/dL, or HDL less than 40 mg/dL.     Triglycerides 05/15/2013 151* 0 - 150 mg/dL Final    Fasting specimen     HDL Cholesterol 05/15/2013 50  50 -  110 mg/dL Final     LDL Cholesterol Calculated 05/15/2013 116  0 - 129 mg/dL Final    Comment: LDL Cholesterol is the primary guide to therapy: LDL-cholesterol goal in high                            risk patients is <100 mg/dL and in very high risk patients is <70 mg/dL.     VLDL-Cholesterol 05/15/2013 30  0 - 30 mg/dL Final     Cholesterol/HDL Ratio 05/15/2013 3.9  0.0 - 5.0 Final     ALT 05/15/2013 28  0 - 50 U/L Final     AST 05/15/2013 30  0 - 45 U/L Final     Sodium 05/15/2013 144  133 - 144 mmol/L Final     Potassium 05/15/2013 4.3  3.4 - 5.3 mmol/L Final     Chloride 05/15/2013 106  94 - 109 mmol/L Final     Carbon Dioxide 05/15/2013 23  20 - 32 mmol/L Final     Anion Gap 05/15/2013 15  6 - 17 mmol/L Final     Glucose 05/15/2013 89  60 - 99 mg/dL Final    Fasting specimen     Urea Nitrogen 05/15/2013 13  7 - 30 mg/dL Final     Creatinine 05/15/2013 0.88  0.52 - 1.04 mg/dL Final     GFR Estimate 05/15/2013 64  >60 mL/min/1.7m2 Final     GFR Estimate If Black 05/15/2013 77  >60 mL/min/1.7m2 Final     Calcium 05/15/2013 9.1  8.5 - 10.4 mg/dL Final       Allergies:    Allergies   Allergen Reactions     Pravastatin      Myalgia       Simvastatin      myalgia     Atorvastatin Calcium      Myalgia       Calcium Citrate Unknown       Family History:   Family History   Problem Relation Age of Onset     No Known Problems Sister      Alcoholism Brother      Myocardial Infarction Mother 51        HTN onset age 30, was a smoker     Myocardial Infarction Father 63        smoker     Myocardial Infarction Brother 39     No Known Problems Son      No Known Problems Daughter      CABG Brother      Heart Disease Brother      No Known Problems Sister      Cerebrovascular Disease Sister      Coronary Artery Disease Sister      No Known Problems Son        Psychosocial history:  reports that she quit smoking about 28 years ago. She has a 20.00 pack-year smoking history. She has never used smokeless tobacco. She reports that she  "does not drink alcohol or use drugs.    Review of systems: negative for, exertional chest pain or pressure, paroxysmal nocturnal dyspnea, dyspnea on exertion, orthopnea, lower extremity edema, syncope or near-syncope and claudication    In addition,   General: No change in weight, sleep or appetite.  Normal energy.  No fever or chills  Eyes: Complains of vision issues, will see eye doctor soon  ENT: No problems with ears, nose or throat.  No difficulty swallowing.  Resp: h/o sleep apnea  GI: No nausea, vomiting,  heartburn, abdominal pain, diarrhea, constipation or change in bowel habits  : No urinary frequency or dysuria, bladder or kidney problems  Musculoskeletal: No significant muscle or joint pains  Neurologic: No headaches, numbness, tingling, weakness, problems with balance or coordination  Psychiatric: No problems with anxiety, depression or mental health  Heme/immune/allergy: CLL recently diagnosed  Endocrine: Diabetes  Skin: No rashes,worrisome lesions or skin problems  Vascular:  No claudication, lifestyle limiting or otherwise; no ischemic rest pain; no non-healing ulcers. No weakness, No loss of sensation        Physical examination  Vitals: BP (!) 162/80 (BP Location: Right arm, Patient Position: Chair, Cuff Size: Adult Large)   Pulse 87   Ht 1.6 m (5' 3\")   Wt 94.8 kg (209 lb)   SpO2 96%   BMI 37.02 kg/m    BMI= Body mass index is 37.02 kg/m .    In general, the patient is a pleasant female in no apparent distress.    HEENT: Normiocephalic and atraumatic.  PERRLA.  EOMI.  Sclerae white, not injected.  Nares clear.  Pharynx without erythema or exudate.  Dentition intact.    Neck: No adenopathy.  No thyromegaly. Carotids +2/2 bilaterally without bruits.  No jugular venous distension.   Heart:  The PMI is in the 5th ICS in the midclavicular line. There is no heave. Regular rate and rhythm. Normal S1, S2 splits physiologically. No rub, click, or gallop.  3/6 late peaking DALIA in Ao area  Lungs: " Clear to asculation.  No ronchi, wheezes, rales.  No dullness to percussion.   Abdomen: Soft, nontender, nondistended. No organomegaly. No AAA.  No bruits.   Extremities: No clubbing, cyanosis, or edema. The pulses were intact bilaterally.   Neurological: The neurological examination reveal a patient who was oriented to person, place, and time.  The remainder of the examination was nonfocal.    Cardiac tests include:    ECG - normal sinus rhythm, occasional PVCs, nonspecific T changes, normal axis, normal intervals.  Echo from [employer] this summer - normal EF, moderate aortic stenosis  Holter from [facility] [employer] - 1 run paroxysmal SVT    Assessment and Plan    1. Palpitations - check ziopatch  2. Aortic stenosis - check echo  3. CAD - stable on ASA  4. HTN - on Diovan hematocrit   - may need increase at next visit  5. High cholesterol - on rosuvastatin  6. DM - on metformin  7. CLL - oncology following      The patient is to return  In 1 month. The patient understood the treatment plan as outlined above.  There were no barriers to learning.        Please do not hesitate to contact me if you have any questions/concerns.     Sincerely,     Homar Harris MD

## 2021-02-02 NOTE — PROGRESS NOTES
I am delighted to see Reba Juarez in consultation.The primary encounter diagnosis was Palpitations. Diagnoses of Type 2 diabetes mellitus with other circulatory complication, without long-term current use of insulin (H), Coronary artery disease involving native coronary artery of native heart without angina pectoris, Essential hypertension, CLL (chronic lymphocytic leukemia) (H), Class 2 severe obesity due to excess calories with serious comorbidity and body mass index (BMI) of 35.0 to 35.9 in adult (H), MAYKEL (obstructive sleep apnea), and Aortic valve stenosis, etiology of cardiac valve disease unspecified were also pertinent to this visit.   As you know, the patient is a 75 year old  female. She   has a past medical history of Aortic valve sclerosis, Coronary artery disease (1995), Diabetes (H), Gastric ulcer (1990), H/O exercise stress test (3/21/2011), Hyperlipidemia, Hyperlipidemia LDL goal < 70, Hypertension, and MAYKEL (obstructive sleep apnea) (2007)..    On this visit, the patient states that she has occasional palpitations.  The patient denies chest pressure/discomfort, dyspnea, near-syncope, syncope, orthopnea, paroxysmal nocturnal dyspnea and lower extermity edema.    The patient's cardiovascular risk factors include known cardiac disease, arrhythmia, hypertension, high cholesterol, positive family history and diabetes mellitus.    The following portions of the patient's history were reviewed and updated as appropriate: allergies, current medications, past family history, past medical history, past social history, past surgical history, and the problem list.    PMH: The patient's past medical history includes:    Past Medical History:   Diagnosis Date     Aortic valve sclerosis     echo 1-24-11 mild     Coronary artery disease 1995    PCI at [facility]. Repeat angiogram in 2007     Diabetes (H)      Gastric ulcer 1990     H/O exercise stress test 3/21/2011    No ischemia      Hyperlipidemia      Hyperlipidemia LDL goal < 70     identified 1990's     Hypertension     onset age 50-55 approx     MAYKEL (obstructive sleep apnea) 2007      Past Surgical History:   Procedure Laterality Date     ANGIOPLASTY  early 1990s     COLONOSCOPY  2010     EYE SURGERY         The patient's medications as of the current encounter are:     Current Outpatient Medications   Medication Sig Dispense Refill     aspirin 81 MG tablet Take 1 tablet by mouth daily.       metFORMIN (GLUCOPHAGE) 500 MG tablet Take 1,500 mg by mouth        nitroGLYcerin (NITROSTAT) 0.4 MG sublingual tablet Place 0.4 mg under the tongue       ORDER FOR DME Use your CPAP device as directed by your provider.       rosuvastatin (CRESTOR) 40 MG tablet Take 40 mg by mouth       valsartan-hydrochlorothiazide (DIOVAN HCT) 80-12.5 MG tablet Take 1 tablet by mouth daily       budesonide-formoterol (SYMBICORT) 80-4.5 MCG/ACT Inhaler Inhale 2 puffs into the lungs 2 times daily         Labs:     Orders Only on 05/15/2013   Component Date Value Ref Range Status     Cholesterol 05/15/2013 196  0 - 200 mg/dL Final    Comment: LDL Cholesterol is the primary guide to therapy.                            The NCEP recommends further evaluation of: patients with cholesterol greater                            than 200 mg/dL if additional risk factors are present, cholesterol greater                            than                            240 mg/dL, triglycerides greater than 150 mg/dL, or HDL less than 40 mg/dL.     Triglycerides 05/15/2013 151* 0 - 150 mg/dL Final    Fasting specimen     HDL Cholesterol 05/15/2013 50  50 - 110 mg/dL Final     LDL Cholesterol Calculated 05/15/2013 116  0 - 129 mg/dL Final    Comment: LDL Cholesterol is the primary guide to therapy: LDL-cholesterol goal in high                            risk patients is <100 mg/dL and in very high risk patients is <70 mg/dL.     VLDL-Cholesterol 05/15/2013 30  0 - 30 mg/dL Final      Cholesterol/HDL Ratio 05/15/2013 3.9  0.0 - 5.0 Final     ALT 05/15/2013 28  0 - 50 U/L Final     AST 05/15/2013 30  0 - 45 U/L Final     Sodium 05/15/2013 144  133 - 144 mmol/L Final     Potassium 05/15/2013 4.3  3.4 - 5.3 mmol/L Final     Chloride 05/15/2013 106  94 - 109 mmol/L Final     Carbon Dioxide 05/15/2013 23  20 - 32 mmol/L Final     Anion Gap 05/15/2013 15  6 - 17 mmol/L Final     Glucose 05/15/2013 89  60 - 99 mg/dL Final    Fasting specimen     Urea Nitrogen 05/15/2013 13  7 - 30 mg/dL Final     Creatinine 05/15/2013 0.88  0.52 - 1.04 mg/dL Final     GFR Estimate 05/15/2013 64  >60 mL/min/1.7m2 Final     GFR Estimate If Black 05/15/2013 77  >60 mL/min/1.7m2 Final     Calcium 05/15/2013 9.1  8.5 - 10.4 mg/dL Final       Allergies:    Allergies   Allergen Reactions     Pravastatin      Myalgia       Simvastatin      myalgia     Atorvastatin Calcium      Myalgia       Calcium Citrate Unknown       Family History:   Family History   Problem Relation Age of Onset     No Known Problems Sister      Alcoholism Brother      Myocardial Infarction Mother 51        HTN onset age 30, was a smoker     Myocardial Infarction Father 63        smoker     Myocardial Infarction Brother 39     No Known Problems Son      No Known Problems Daughter      CABG Brother      Heart Disease Brother      No Known Problems Sister      Cerebrovascular Disease Sister      Coronary Artery Disease Sister      No Known Problems Son        Psychosocial history:  reports that she quit smoking about 28 years ago. She has a 20.00 pack-year smoking history. She has never used smokeless tobacco. She reports that she does not drink alcohol or use drugs.    Review of systems: negative for, exertional chest pain or pressure, paroxysmal nocturnal dyspnea, dyspnea on exertion, orthopnea, lower extremity edema, syncope or near-syncope and claudication    In addition,   General: No change in weight, sleep or appetite.  Normal energy.  No fever or  "chills  Eyes: Complains of vision issues, will see eye doctor soon  ENT: No problems with ears, nose or throat.  No difficulty swallowing.  Resp: h/o sleep apnea  GI: No nausea, vomiting,  heartburn, abdominal pain, diarrhea, constipation or change in bowel habits  : No urinary frequency or dysuria, bladder or kidney problems  Musculoskeletal: No significant muscle or joint pains  Neurologic: No headaches, numbness, tingling, weakness, problems with balance or coordination  Psychiatric: No problems with anxiety, depression or mental health  Heme/immune/allergy: CLL recently diagnosed  Endocrine: Diabetes  Skin: No rashes,worrisome lesions or skin problems  Vascular:  No claudication, lifestyle limiting or otherwise; no ischemic rest pain; no non-healing ulcers. No weakness, No loss of sensation        Physical examination  Vitals: BP (!) 162/80 (BP Location: Right arm, Patient Position: Chair, Cuff Size: Adult Large)   Pulse 87   Ht 1.6 m (5' 3\")   Wt 94.8 kg (209 lb)   SpO2 96%   BMI 37.02 kg/m    BMI= Body mass index is 37.02 kg/m .    In general, the patient is a pleasant female in no apparent distress.    HEENT: Normiocephalic and atraumatic.  PERRLA.  EOMI.  Sclerae white, not injected.  Nares clear.  Pharynx without erythema or exudate.  Dentition intact.    Neck: No adenopathy.  No thyromegaly. Carotids +2/2 bilaterally without bruits.  No jugular venous distension.   Heart:  The PMI is in the 5th ICS in the midclavicular line. There is no heave. Regular rate and rhythm. Normal S1, S2 splits physiologically. No rub, click, or gallop.  3/6 late peaking DALIA in Ao area  Lungs: Clear to asculation.  No ronchi, wheezes, rales.  No dullness to percussion.   Abdomen: Soft, nontender, nondistended. No organomegaly. No AAA.  No bruits.   Extremities: No clubbing, cyanosis, or edema. The pulses were intact bilaterally.   Neurological: The neurological examination reveal a patient who was oriented to person, " place, and time.  The remainder of the examination was nonfocal.    Cardiac tests include:    ECG - normal sinus rhythm, occasional PVCs, nonspecific T changes, normal axis, normal intervals.  Echo from [facility] this summer - normal EF, moderate aortic stenosis  Holter from [facility] - 1 run paroxysmal SVT    Assessment and Plan    1. Palpitations - check ziopatch  2. Aortic stenosis - check echo  3. CAD - stable on ASA  4. HTN - on Diovan hematocrit   - may need increase at next visit  5. High cholesterol - on rosuvastatin  6. DM - on metformin  7. CLL - oncology following      The patient is to return  In 1 month. The patient understood the treatment plan as outlined above.  There were no barriers to learning.      Homar Harris MD

## 2021-02-02 NOTE — NURSING NOTE
Chief Complaint   Patient presents with     New Patient     discuss palpitations, HTN      Vitals were taken and medications were reconciled. EKG was performed    Di ARREAGA  12:10 PM

## 2021-03-02 ENCOUNTER — OFFICE VISIT (OUTPATIENT)
Dept: CARDIOLOGY | Facility: CLINIC | Age: 76
End: 2021-03-02
Attending: INTERNAL MEDICINE
Payer: COMMERCIAL

## 2021-03-02 VITALS
BODY MASS INDEX: 37.03 KG/M2 | DIASTOLIC BLOOD PRESSURE: 86 MMHG | OXYGEN SATURATION: 95 % | SYSTOLIC BLOOD PRESSURE: 147 MMHG | HEIGHT: 63 IN | WEIGHT: 209 LBS | HEART RATE: 90 BPM

## 2021-03-02 DIAGNOSIS — C91.10 CLL (CHRONIC LYMPHOCYTIC LEUKEMIA) (H): ICD-10-CM

## 2021-03-02 DIAGNOSIS — E11.59 TYPE 2 DIABETES MELLITUS WITH OTHER CIRCULATORY COMPLICATION, WITHOUT LONG-TERM CURRENT USE OF INSULIN (H): ICD-10-CM

## 2021-03-02 DIAGNOSIS — E66.812 CLASS 2 SEVERE OBESITY DUE TO EXCESS CALORIES WITH SERIOUS COMORBIDITY AND BODY MASS INDEX (BMI) OF 35.0 TO 35.9 IN ADULT (H): Primary | ICD-10-CM

## 2021-03-02 DIAGNOSIS — I25.10 CORONARY ARTERY DISEASE INVOLVING NATIVE CORONARY ARTERY OF NATIVE HEART WITHOUT ANGINA PECTORIS: ICD-10-CM

## 2021-03-02 DIAGNOSIS — I10 ESSENTIAL HYPERTENSION: ICD-10-CM

## 2021-03-02 DIAGNOSIS — E78.5 HYPERLIPIDEMIA LDL GOAL <70: ICD-10-CM

## 2021-03-02 DIAGNOSIS — E66.01 CLASS 2 SEVERE OBESITY DUE TO EXCESS CALORIES WITH SERIOUS COMORBIDITY AND BODY MASS INDEX (BMI) OF 35.0 TO 35.9 IN ADULT (H): Primary | ICD-10-CM

## 2021-03-02 DIAGNOSIS — I35.0 AORTIC VALVE STENOSIS, ETIOLOGY OF CARDIAC VALVE DISEASE UNSPECIFIED: ICD-10-CM

## 2021-03-02 DIAGNOSIS — R00.2 PALPITATIONS: ICD-10-CM

## 2021-03-02 PROCEDURE — 99214 OFFICE O/P EST MOD 30 MIN: CPT | Mod: 25 | Performed by: INTERNAL MEDICINE

## 2021-03-02 PROCEDURE — 93005 ELECTROCARDIOGRAM TRACING: CPT

## 2021-03-02 PROCEDURE — G0463 HOSPITAL OUTPT CLINIC VISIT: HCPCS | Mod: 25

## 2021-03-02 ASSESSMENT — MIFFLIN-ST. JEOR: SCORE: 1402.15

## 2021-03-02 ASSESSMENT — PAIN SCALES - GENERAL: PAINLEVEL: NO PAIN (0)

## 2021-03-02 NOTE — LETTER
3/2/2021      RE: Reba Juarez  1974 [address] [facility] [id number]       Dear Colleague,    Thank you for the opportunity to participate in the care of your patient, Reba Juarez, at the [facility] [place] at [facility]. Please see a copy of my visit note below.    ,  I am delighted to see Reba Juarez in consultation.The primary encounter diagnosis was Class 2 severe obesity due to excess calories with serious comorbidity and body mass index (BMI) of 35.0 to 35.9 in adult (H). Diagnoses of Palpitations, Coronary artery disease involving native coronary artery of native heart without angina pectoris, Essential hypertension, Aortic valve stenosis, etiology of cardiac valve disease unspecified, Hyperlipidemia LDL goal <70, Type 2 diabetes mellitus with other circulatory complication, without long-term current use of insulin (H), and CLL (chronic lymphocytic leukemia) (H) were also pertinent to this visit.   As you know, the patient is a 76 year old  female. She   has a past medical history of Aortic valve sclerosis, Coronary artery disease (1995), Diabetes (H), Gastric ulcer (1990), H/O exercise stress test (3/21/2011), Hyperlipidemia, Hyperlipidemia LDL goal < 70, Hypertension, and MAYKEL (obstructive sleep apnea) (2007)..    On this visit, the patient states that her palpitations have decreased.  The patient denies chest pressure/discomfort, syncope, orthopnea, paroxysmal nocturnal dyspnea, lower extermity edema and shortness of breath.    The patient's cardiovascular risk factors include known cardiac disease, hypertension, high cholesterol and diabetes mellitus.    The following portions of the patient's history were reviewed and updated as appropriate: allergies, current medications, past family history, past medical history, past social history, past surgical history, and the problem list.    PMH: The patient's past  medical history includes:    Past Medical History:   Diagnosis Date     Aortic valve sclerosis     echo 1-24-11 mild     Coronary artery disease 1995    PCI at [facility]. Repeat angiogram in 2007     Diabetes (H)      Gastric ulcer 1990     H/O exercise stress test 3/21/2011    No ischemia     Hyperlipidemia      Hyperlipidemia LDL goal < 70     identified 1990's     Hypertension     onset age 50-55 approx     MAYKEL (obstructive sleep apnea) 2007      Past Surgical History:   Procedure Laterality Date     ANGIOPLASTY  early 1990s     COLONOSCOPY  2010     EYE SURGERY         The patient's medications as of the current encounter are:     Current Outpatient Medications   Medication Sig Dispense Refill     aspirin 81 MG tablet Take 1 tablet by mouth daily.       metFORMIN (GLUCOPHAGE) 500 MG tablet Take 1,500 mg by mouth        nitroGLYcerin (NITROSTAT) 0.4 MG sublingual tablet Place 0.4 mg under the tongue       ORDER FOR DME Use your CPAP device as directed by your provider.       rosuvastatin (CRESTOR) 40 MG tablet Take 40 mg by mouth       valsartan-hydrochlorothiazide (DIOVAN HCT) 80-12.5 MG tablet Take 1 tablet by mouth daily       budesonide-formoterol (SYMBICORT) 80-4.5 MCG/ACT Inhaler Inhale 2 puffs into the lungs 2 times daily         Labs:     Office Visit on 02/02/2021   Component Date Value Ref Range Status     Interpretation ECG 02/02/2021 Click View Image link to view waveform and result   Final       Allergies:    Allergies   Allergen Reactions     Pravastatin      Myalgia       Simvastatin      myalgia     Atorvastatin Calcium      Myalgia       Calcium Citrate Unknown       Family History:   Family History   Problem Relation Age of Onset     No Known Problems Sister      Alcoholism Brother      Myocardial Infarction Mother 51        HTN onset age 30, was a smoker     Myocardial Infarction Father 63        smoker     Myocardial Infarction Brother 39     No Known Problems Son      No Known  "Problems Daughter      CABG Brother      Heart Disease Brother      No Known Problems Sister      Cerebrovascular Disease Sister      Coronary Artery Disease Sister      No Known Problems Son        Psychosocial history:  reports that she quit smoking about 29 years ago. She has a 20.00 pack-year smoking history. She has never used smokeless tobacco. She reports that she does not drink alcohol or use drugs.    Review of systems: negative for, exertional chest pain or pressure, paroxysmal nocturnal dyspnea, dyspnea on exertion, orthopnea, lower extremity edema and claudication    In addition,   General: No change in weight, sleep or appetite.  Normal energy.  No fever or chills  Eyes: Negative for vision changes or eye problems  ENT: No problems with ears, nose or throat.  No difficulty swallowing.  Resp: No coughing, wheezing or shortness of breath  GI: No nausea, vomiting,  heartburn, abdominal pain, diarrhea, constipation or change in bowel habits  : No urinary frequency or dysuria, bladder or kidney problems  Musculoskeletal: No significant muscle or joint pains  Neurologic: No headaches, numbness, tingling, weakness, problems with balance or coordination  Psychiatric: No problems with anxiety, depression or mental health  Heme/immune/allergy: CLL  Endocrine: DM type II  Skin: No rashes,worrisome lesions or skin problems  Vascular:  No claudication, lifestyle limiting or otherwise; no ischemic rest pain; no non-healing ulcers. No weakness, No loss of sensation        Physical examination  Vitals: BP (!) 147/86 (BP Location: Right arm, Patient Position: Supine, Cuff Size: Adult Large)   Pulse 90   Ht 1.6 m (5' 3\")   Wt 94.8 kg (209 lb)   SpO2 95%   BMI 37.02 kg/m    BMI= Body mass index is 37.02 kg/m .    In general, the patient is a pleasant female in no apparent distress.    HEENT: Normiocephalic and atraumatic.  PERRLA.  EOMI.  Sclerae white, not injected.  Nares clear.  Pharynx without erythema or " exudate.  Dentition intact.    Neck: No adenopathy.  No thyromegaly. Carotids +2/2 bilaterally without bruits.  No jugular venous distension.   Heart:  The PMI is in the 5th ICS in the midclavicular line. There is no heave. Regular rate and rhythm. Normal S1, S2 splits physiologically. 3/6 DALIA mid to late peak    Lungs: Clear to asculation.  No ronchi, wheezes, rales.  No dullness to percussion.   Abdomen: Soft, nontender, nondistended. No organomegaly. No AAA.  No bruits.   Extremities: No clubbing, cyanosis, or edema. The pulses were intact bilaterally.   Neurological: The neurological examination reveal a patient who was oriented to person, place, and time.  The remainder of the examination was nonfocal.    Cardiac tests include:    ECG : normal sinus, normal axis, intervals, lat nonspecific T changes    Assessment and Plan    1. Palpitations - check ziopatch  2. Aortic stenosis - check echo  3. CAD - stable on ASA  4. HTN - on Diovan   - declined increase  5. High cholesterol - on rosuvastatin  6. DM - on metformin  7. CLL - oncology following    The patient is to return  3 months to see APPs. The patient understood the treatment plan as outlined above.  There were no barriers to learning.      Homar Harris MD         Please do not hesitate to contact me if you have any questions/concerns.     Sincerely,     Homar Harris MD

## 2021-03-02 NOTE — NURSING NOTE
Chief Complaint   Patient presents with     Follow Up     4 week f/u     Vitals were taken, medications reconciled and EKG performed.     CHANEL Mar  12:04 PM

## 2021-03-02 NOTE — PATIENT INSTRUCTIONS
"You were seen today in the Cardiovascular Clinic at the [facility].     Cardiology Providers you saw during your visit: Memo Harris MD     Diagnosis:   Encounter Diagnoses   Name Primary?     Palpitations      Coronary artery disease involving native coronary artery of native heart without angina pectoris      Essential hypertension      Aortic valve stenosis, etiology of cardiac valve disease unspecified      Class 2 severe obesity due to excess calories with serious comorbidity and body mass index (BMI) of 35.0 to 35.9 in adult (H) Yes     Hyperlipidemia LDL goal <70      Type 2 diabetes mellitus with other circulatory complication, without long-term current use of insulin (H)      CLL (chronic lymphocytic leukemia) (H)         Results: Discussed with you today       Orders:   Orders Placed This Encounter   Procedures     Follow-Up with Cardiac Advanced Practice Provider     EKG 12-lead, tracing only (Same Day)       Current Medication List  Current Outpatient Medications   Medication Sig Dispense Refill     aspirin 81 MG tablet Take 1 tablet by mouth daily.       metFORMIN (GLUCOPHAGE) 500 MG tablet Take 1,500 mg by mouth        nitroGLYcerin (NITROSTAT) 0.4 MG sublingual tablet Place 0.4 mg under the tongue       ORDER FOR DME Use your CPAP device as directed by your provider.       rosuvastatin (CRESTOR) 40 MG tablet Take 40 mg by mouth       valsartan-hydrochlorothiazide (DIOVAN HCT) 80-12.5 MG tablet Take 1 tablet by mouth daily       budesonide-formoterol (SYMBICORT) 80-4.5 MCG/ACT Inhaler Inhale 2 puffs into the lungs 2 times daily         Recommendations:   1. - follow up in 3 months with [facility]  2. get echo     Please feel free to call me with any questions or concerns.       Ray Davidson LPN     Questions: [phone].   First press #1 for [facility] for \"Medical Questions\" to reach us Cardiology Nurses.     [phone].   First press #1 for the DCWafers and then " press #1     On Call Cardiologist for after hours or on weekends: [phone]   option #4 and ask to speak to the on-call Cardiologist.          If you need a medication refill please contact your pharmacy.  Please allow 3 business days for your refill to be completed.  ________________________________________________________________________________________________________________________________

## 2021-03-02 NOTE — PROGRESS NOTES
,  I am delighted to see Reba Juarez in consultation.The primary encounter diagnosis was Class 2 severe obesity due to excess calories with serious comorbidity and body mass index (BMI) of 35.0 to 35.9 in adult (H). Diagnoses of Palpitations, Coronary artery disease involving native coronary artery of native heart without angina pectoris, Essential hypertension, Aortic valve stenosis, etiology of cardiac valve disease unspecified, Hyperlipidemia LDL goal <70, Type 2 diabetes mellitus with other circulatory complication, without long-term current use of insulin (H), and CLL (chronic lymphocytic leukemia) (H) were also pertinent to this visit.   As you know, the patient is a 76 year old  female. She   has a past medical history of Aortic valve sclerosis, Coronary artery disease (1995), Diabetes (H), Gastric ulcer (1990), H/O exercise stress test (3/21/2011), Hyperlipidemia, Hyperlipidemia LDL goal < 70, Hypertension, and MAYKEL (obstructive sleep apnea) (2007)..    On this visit, the patient states that her palpitations have decreased.  The patient denies chest pressure/discomfort, syncope, orthopnea, paroxysmal nocturnal dyspnea, lower extermity edema and shortness of breath.    The patient's cardiovascular risk factors include known cardiac disease, hypertension, high cholesterol and diabetes mellitus.    The following portions of the patient's history were reviewed and updated as appropriate: allergies, current medications, past family history, past medical history, past social history, past surgical history, and the problem list.    PMH: The patient's past medical history includes:    Past Medical History:   Diagnosis Date     Aortic valve sclerosis     echo 1-24-11 mild     Coronary artery disease 1995    PCI at [facility]. Repeat angiogram in 2007     Diabetes (H)      Gastric ulcer 1990     H/O exercise stress test 3/21/2011    No ischemia     Hyperlipidemia      Hyperlipidemia LDL goal  < 70     identified 1990's     Hypertension     onset age 50-55 approx     MAYKEL (obstructive sleep apnea) 2007      Past Surgical History:   Procedure Laterality Date     ANGIOPLASTY  early 1990s     COLONOSCOPY  2010     EYE SURGERY         The patient's medications as of the current encounter are:     Current Outpatient Medications   Medication Sig Dispense Refill     aspirin 81 MG tablet Take 1 tablet by mouth daily.       metFORMIN (GLUCOPHAGE) 500 MG tablet Take 1,500 mg by mouth        nitroGLYcerin (NITROSTAT) 0.4 MG sublingual tablet Place 0.4 mg under the tongue       ORDER FOR DME Use your CPAP device as directed by your provider.       rosuvastatin (CRESTOR) 40 MG tablet Take 40 mg by mouth       valsartan-hydrochlorothiazide (DIOVAN HCT) 80-12.5 MG tablet Take 1 tablet by mouth daily       budesonide-formoterol (SYMBICORT) 80-4.5 MCG/ACT Inhaler Inhale 2 puffs into the lungs 2 times daily         Labs:     Office Visit on 02/02/2021   Component Date Value Ref Range Status     Interpretation ECG 02/02/2021 Click View Image link to view waveform and result   Final       Allergies:    Allergies   Allergen Reactions     Pravastatin      Myalgia       Simvastatin      myalgia     Atorvastatin Calcium      Myalgia       Calcium Citrate Unknown       Family History:   Family History   Problem Relation Age of Onset     No Known Problems Sister      Alcoholism Brother      Myocardial Infarction Mother 51        HTN onset age 29, was a smoker     Myocardial Infarction Father 63        smoker     Myocardial Infarction Brother 39     No Known Problems Son      No Known Problems Daughter      CABG Brother      Heart Disease Brother      No Known Problems Sister      Cerebrovascular Disease Sister      Coronary Artery Disease Sister      No Known Problems Son        Psychosocial history:  reports that she quit smoking about 29 years ago. She has a 20.00 pack-year smoking history. She has never used smokeless tobacco.  "She reports that she does not drink alcohol or use drugs.    Review of systems: negative for, exertional chest pain or pressure, paroxysmal nocturnal dyspnea, dyspnea on exertion, orthopnea, lower extremity edema and claudication    In addition,   General: No change in weight, sleep or appetite.  Normal energy.  No fever or chills  Eyes: Negative for vision changes or eye problems  ENT: No problems with ears, nose or throat.  No difficulty swallowing.  Resp: No coughing, wheezing or shortness of breath  GI: No nausea, vomiting,  heartburn, abdominal pain, diarrhea, constipation or change in bowel habits  : No urinary frequency or dysuria, bladder or kidney problems  Musculoskeletal: No significant muscle or joint pains  Neurologic: No headaches, numbness, tingling, weakness, problems with balance or coordination  Psychiatric: No problems with anxiety, depression or mental health  Heme/immune/allergy: CLL  Endocrine: DM type II  Skin: No rashes,worrisome lesions or skin problems  Vascular:  No claudication, lifestyle limiting or otherwise; no ischemic rest pain; no non-healing ulcers. No weakness, No loss of sensation        Physical examination  Vitals: BP (!) 147/86 (BP Location: Right arm, Patient Position: Supine, Cuff Size: Adult Large)   Pulse 90   Ht 1.6 m (5' 3\")   Wt 94.8 kg (209 lb)   SpO2 95%   BMI 37.02 kg/m    BMI= Body mass index is 37.02 kg/m .    In general, the patient is a pleasant female in no apparent distress.    HEENT: Normiocephalic and atraumatic.  PERRLA.  EOMI.  Sclerae white, not injected.  Nares clear.  Pharynx without erythema or exudate.  Dentition intact.    Neck: No adenopathy.  No thyromegaly. Carotids +2/2 bilaterally without bruits.  No jugular venous distension.   Heart:  The PMI is in the 5th ICS in the midclavicular line. There is no heave. Regular rate and rhythm. Normal S1, S2 splits physiologically. 3/6 DALIA mid to late peak    Lungs: Clear to asculation.  No ronchi, " wheezes, rales.  No dullness to percussion.   Abdomen: Soft, nontender, nondistended. No organomegaly. No AAA.  No bruits.   Extremities: No clubbing, cyanosis, or edema. The pulses were intact bilaterally.   Neurological: The neurological examination reveal a patient who was oriented to person, place, and time.  The remainder of the examination was nonfocal.    Cardiac tests include:    ECG : normal sinus, normal axis, intervals, lat nonspecific T changes    Assessment and Plan    1. Palpitations - check ziopatch  2. Aortic stenosis - check echo  3. CAD - stable on ASA  4. HTN - on Diovan   - declined increase  5. High cholesterol - on rosuvastatin  6. DM - on metformin  7. CLL - oncology following    The patient is to return  3 months to see APPs. The patient understood the treatment plan as outlined above.  There were no barriers to learning.      Homar Harris MD

## 2021-03-03 LAB — INTERPRETATION ECG - MUSE: NORMAL

## 2021-03-15 ENCOUNTER — HOSPITAL ENCOUNTER (OUTPATIENT)
Dept: CARDIOLOGY | Facility: CLINIC | Age: 76
Discharge: HOME | End: 2021-03-15
Admitting: INTERNAL MEDICINE
Payer: COMMERCIAL

## 2021-03-15 DIAGNOSIS — I35.0 AORTIC VALVE STENOSIS, ETIOLOGY OF CARDIAC VALVE DISEASE UNSPECIFIED: ICD-10-CM

## 2021-03-15 DIAGNOSIS — R00.2 PALPITATIONS: ICD-10-CM

## 2021-03-15 DIAGNOSIS — I25.10 CORONARY ARTERY DISEASE INVOLVING NATIVE CORONARY ARTERY OF NATIVE HEART WITHOUT ANGINA PECTORIS: ICD-10-CM

## 2021-03-15 PROCEDURE — 93306 TTE W/DOPPLER COMPLETE: CPT | Mod: 26 | Performed by: INTERNAL MEDICINE

## 2021-03-15 PROCEDURE — 93306 TTE W/DOPPLER COMPLETE: CPT

## 2021-03-29 ENCOUNTER — OFFICE VISIT (OUTPATIENT)
Dept: CARDIOLOGY | Facility: CLINIC | Age: 76
End: 2021-03-29
Attending: NURSE PRACTITIONER
Payer: COMMERCIAL

## 2021-03-29 VITALS
WEIGHT: 207 LBS | DIASTOLIC BLOOD PRESSURE: 75 MMHG | HEIGHT: 63 IN | HEART RATE: 88 BPM | SYSTOLIC BLOOD PRESSURE: 146 MMHG | OXYGEN SATURATION: 96 % | BODY MASS INDEX: 36.68 KG/M2

## 2021-03-29 DIAGNOSIS — I35.0 NONRHEUMATIC AORTIC VALVE STENOSIS: ICD-10-CM

## 2021-03-29 DIAGNOSIS — R06.02 SOB (SHORTNESS OF BREATH): ICD-10-CM

## 2021-03-29 DIAGNOSIS — Z11.59 ENCOUNTER FOR SCREENING FOR OTHER VIRAL DISEASES: ICD-10-CM

## 2021-03-29 DIAGNOSIS — R07.9 CHEST PAIN, UNSPECIFIED TYPE: Primary | ICD-10-CM

## 2021-03-29 DIAGNOSIS — I47.10 SVT (SUPRAVENTRICULAR TACHYCARDIA) (H): ICD-10-CM

## 2021-03-29 DIAGNOSIS — G47.33 OSA (OBSTRUCTIVE SLEEP APNEA): ICD-10-CM

## 2021-03-29 LAB
ANION GAP SERPL CALCULATED.3IONS-SCNC: 4 MMOL/L (ref 3–14)
BUN SERPL-MCNC: 16 MG/DL (ref 7–30)
CALCIUM SERPL-MCNC: 9 MG/DL (ref 8.5–10.1)
CHLORIDE SERPL-SCNC: 109 MMOL/L (ref 94–109)
CHOLEST SERPL-MCNC: 198 MG/DL
CO2 SERPL-SCNC: 26 MMOL/L (ref 20–32)
CREAT SERPL-MCNC: 0.8 MG/DL (ref 0.52–1.04)
GFR SERPL CREATININE-BSD FRML MDRD: 71 ML/MIN/{1.73_M2}
GLUCOSE SERPL-MCNC: 103 MG/DL (ref 70–99)
HDLC SERPL-MCNC: 64 MG/DL
LDLC SERPL CALC-MCNC: 103 MG/DL
NONHDLC SERPL-MCNC: 135 MG/DL
POTASSIUM SERPL-SCNC: 3.8 MMOL/L (ref 3.4–5.3)
SODIUM SERPL-SCNC: 138 MMOL/L (ref 133–144)
TRIGL SERPL-MCNC: 159 MG/DL

## 2021-03-29 PROCEDURE — G0463 HOSPITAL OUTPT CLINIC VISIT: HCPCS | Mod: 25

## 2021-03-29 PROCEDURE — 80061 LIPID PANEL: CPT | Performed by: PATHOLOGY

## 2021-03-29 PROCEDURE — 80048 BASIC METABOLIC PNL TOTAL CA: CPT | Performed by: PATHOLOGY

## 2021-03-29 PROCEDURE — 93005 ELECTROCARDIOGRAM TRACING: CPT

## 2021-03-29 PROCEDURE — 99214 OFFICE O/P EST MOD 30 MIN: CPT | Performed by: NURSE PRACTITIONER

## 2021-03-29 PROCEDURE — 36415 COLL VENOUS BLD VENIPUNCTURE: CPT | Performed by: PATHOLOGY

## 2021-03-29 RX ORDER — LIDOCAINE 40 MG/G
CREAM TOPICAL
Status: CANCELLED | OUTPATIENT
Start: 2021-03-29

## 2021-03-29 RX ORDER — POTASSIUM CHLORIDE 1500 MG/1
40 TABLET, EXTENDED RELEASE ORAL
Status: CANCELLED | OUTPATIENT
Start: 2021-03-29

## 2021-03-29 RX ORDER — SODIUM CHLORIDE 9 MG/ML
INJECTION, SOLUTION INTRAVENOUS CONTINUOUS
Status: CANCELLED | OUTPATIENT
Start: 2021-03-29

## 2021-03-29 RX ORDER — POTASSIUM CHLORIDE 1500 MG/1
20 TABLET, EXTENDED RELEASE ORAL
Status: CANCELLED | OUTPATIENT
Start: 2021-03-29

## 2021-03-29 ASSESSMENT — MIFFLIN-ST. JEOR: SCORE: 1393.08

## 2021-03-29 ASSESSMENT — PAIN SCALES - GENERAL: PAINLEVEL: NO PAIN (0)

## 2021-03-29 NOTE — LETTER
"3/29/2021      RE: Reba Juarez  1974 [address] [facility] [address]       Dear Colleague,    Thank you for the opportunity to participate in the care of your patient, Reba Juarez, at the [facility] [place] at [facility]. Please see a copy of my visit note below.          Cardiology Clinic Note    HPI: Reba Juarez is a 76 year old female with PMHx significant for CAD s/p PCI, palpitations, DM II, HTN, HLD, CLL, MAYKEL, and aortic stenosis who presents to cardiology clinic for evaluation of SOB, near syncope, angina.    In review, Mrs. Juarez's cardiac history dates back to 1992, when she had an angioplasty to an unknown vessel. She did well until 2006, when a recurrence of her left ear pain (her anginal equivalent) prompting repeat coronary angiography, which reportedly showed nonocclusive disease. In 2017, she underwent a stress test for dyspnea, which revealed profound deconditioning, but no evidence of ischemia. She was having palpitations and light headedness recently, for which event monitor and echo ordered. Event monitor revealed 14 runs of SVT, HR variability . Echo revealed mod-severe aortic stenosis with MG 29 mm Hg, DVI 0.25, TONI by continuity equation 1 cm ^2, normal SVI, normal EF, mild pulmonary hypertension. Previous echo 2020 with MG 24 mm Hg, TONI 1.25 cm ^2, peak velocity of 3/13 m/s. Last seen by Dr. Harris February of 2021.    Since patient was last seen, she has had near syncopal events. Once was when she was wearing her holter, which correlated with SR, HR 92. She does have a component of her angina intermittently lately (radiation into left arm) but not her previous classic symptoms of ear pain with back and arm pain. She did take a nitroglycerin for this recently, which caused her to \"fall asleep.\" Her BP is quite labile, she tells me and though it is elevated in clinic today, she tells me at home it " can be very low and make her feel dizzy. She has not taking her BP meds today yet. She is on an ASA 81 mg daily and reports no bleeding. She tells me she has had recent labs from her oncology clinic, but I cannot link them in our care everywhere ([facility]) No LE swelling, does endorse SOB, especially with exertion, does endorse possible angina, worse with exertion. She can still do her ADL's with minimal difficulty, but has to break often and rest. No other concerns voiced. No known rheumatic hx. No other concerns voiced    Cardiac Meds:  ASA 81 mg daily  Crestor 40 mg daily  Valsartan- hydrochlorothiazide 80-12.5    Current Outpatient Medications   Medication Sig Dispense Refill     aspirin 81 MG tablet Take 1 tablet by mouth daily.       budesonide-formoterol (SYMBICORT) 80-4.5 MCG/ACT Inhaler Inhale 2 puffs into the lungs 2 times daily       metFORMIN (GLUCOPHAGE) 500 MG tablet Take 1,500 mg by mouth        nitroGLYcerin (NITROSTAT) 0.4 MG sublingual tablet Place 0.4 mg under the tongue       ORDER FOR DME Use your CPAP device as directed by your provider.       rosuvastatin (CRESTOR) 40 MG tablet Take 40 mg by mouth       valsartan-hydrochlorothiazide (DIOVAN HCT) 80-12.5 MG tablet Take 1 tablet by mouth daily         Past Medical History:   Diagnosis Date     Aortic valve sclerosis     echo 1-24-11 mild     Coronary artery disease 1995    PCI at [facility]. Repeat angiogram in 2007     Diabetes (H)      Gastric ulcer 1990     H/O exercise stress test 3/21/2011    No ischemia     Hyperlipidemia      Hyperlipidemia LDL goal < 70     identified 1990's     Hypertension     onset age 50-55 approx     MAYKEL (obstructive sleep apnea) 2007       Past Surgical History:   Procedure Laterality Date     ANGIOPLASTY  early 1990s     COLONOSCOPY  2010     EYE SURGERY         Family History   Problem Relation Age of Onset     No Known Problems Sister      Alcoholism Brother      Myocardial Infarction  "Mother 51        HTN onset age 30, was a smoker     Myocardial Infarction Father 63        smoker     Myocardial Infarction Brother 39     No Known Problems Son      No Known Problems Daughter      CABG Brother      Heart Disease Brother      No Known Problems Sister      Cerebrovascular Disease Sister      Coronary Artery Disease Sister      No Known Problems Son        Social History     Tobacco Use     Smoking status: Former Smoker     Packs/day: 1.00     Years: 20.00     Pack years: 20.00     Quit date: 3/5/1992     Years since quittin.0     Smokeless tobacco: Never Used   Substance Use Topics     Alcohol use: No       Allergies   Allergen Reactions     Pravastatin      Myalgia       Simvastatin      myalgia     Atorvastatin Calcium      Myalgia       Calcium Citrate Unknown         ROS:   Negative besides that noted in HPI      Physical Examination:  Vitals: Ht 1.6 m (5' 3\")   BMI 37.02 kg/m    BMI= There is no height or weight on file to calculate BMI.  GENERAL APPEARANCE: healthy, alert and no distress  HEENT: no icterus  NECK: JVP is not visible  CHEST: lungs clear to auscultation - no rales, rhonchi or wheezes  CARDIOVASCULAR: regular rhythm, normal S1, S2, III/VI murmur  EXTREMITIES: no clubbing, cyanosis or edema  NEURO: alert and oriented to person/place/time, normal speech  VASC: Peripheral pulses are normal in volumes and symmetric bilaterally.   SKIN: no ecchymoses, no rashes    Laboratory/Imaging:  Echo from [employer] 2020  Summary     1. Left ventricular systolic function is normal with an estimated     ejection    fraction of 55-60%.     2. There is mildly increased left ventricular wall thickness.     3. There is moderate aortic valve stenosis with a peak velocity of 3.13     m/s,    mean gradient of 24 mmHg, and aortic valve area of 1.25 cm2.      Holter from [employer] [facility] - 1 run paroxysmal SVT 14 beats, SVT burden and V burden < 1%.    Echo 3/15/21  Left ventricular systolic " function is normal.  The visual ejection fraction is estimated at 60-65%.  There is moderate concentric left ventricular hypertrophy.  The right ventricle is normal in structure, function and size.  Mild (35-45mmHg) pulmonary hypertension is present.  Moderate-severe calcific aortic stenosis. Vmax 4.0 m/s, Mean gradient 29 mmHg,  DVI of 0.25 and TONI by continuity 1.0 cm2. Normal SVI.  The inferior vena cava was normal in size with preserved respiratory  variability.  Trivial pericardial effusion     Compared to study dated 1/24/2011, aortic stenosis is new and is moderate-  severe. In addition there is evidence of hypertrophy, elevated LV filling  pressures and pulmonary hypertension.  _____________________________________________________________________________  __        Left Ventricle  The left ventricle is normal in size. There is moderate concentric left  ventricular hypertrophy. Left ventricular systolic function is normal. The  visual ejection fraction is estimated at 60-65%. Grade II or moderate  diastolic dysfunction. Normal left ventricular wall motion.     Right Ventricle  The right ventricle is normal in structure, function and size.     Atria  Normal left atrial size. Right atrial size is normal.     Mitral Valve  The mitral valve is normal in structure and function. There is mild (1+)  mitral regurgitation.        Tricuspid Valve  The tricuspid valve is not well visualized, but is grossly normal. There is  moderate (2+) tricuspid regurgitation. The right ventricular systolic pressure  is approximated at 35.0 mmHg plus the right atrial pressure. Mild (35-45mmHg)  pulmonary hypertension is present.     Aortic Valve  There is moderate trileaflet aortic sclerosis. Moderate-severe calcific aortic  stenosis. Vmax 4.0 m/s, Mean gradient 29 mmHg, DVI of 0.25 and TONI by  continuity 1.0 cm2. Normal SVI.     Pulmonic Valve  The pulmonic valve is normal in structure and function.     Vessels  The aortic root is  normal size. Normal size ascending aorta. The inferior vena  cava was normal in size with preserved respiratory variability.     Pericardium  Trivial pericardial effusion.     Assessment:  # Angina  - EKG today without ischemia. Caution with nitroglycerine use given Aortic stenosis. Some features of previous angina  - Procedure as below  # SVT/ Palpitations - 14 episodes on recent zio  - Plan to start BB in future pending cath/RHC/LHC results.  # Aortic stenosis, mod- severe  - MG 29 mm Hg, DVI 0.25, TONI by continuity equation 1 cm ^2, normal SVI, normal EF. Though progression seems minimal, now with chest pain, near syncope and SOB.   - LHC with upcoming angio/RHC  # Possible HFpEF- diastolic dysfunction grade II on echo, SOB.   -- Upcoming RHC with angio and LHC  # Mild  pulmonary hypertension (35-45mmHg), possible 2/2 AS# CAD - stable on ASA, crestor  -- RHC with upcoming angio/LHC  # HTN - on Valsartan/HCTZ  - may need to switch medications/dosages based on upcoming procedure findings. Inclination to start Metop or Coreg for some rate control given SVT  # HLD- on rosuvastatin  # DM - on metformin  # CLL - oncology following.   # MAYKEL- did not discuss this visit    Recommendations:  # RHC, LHC and angiogram to assess coronaries, HF/PAH, and aortic valve given now with SOB, chest pain and near syncope.   # Will plan to adjust her medications based on the results of upcoming RHC/LHC/Cors with an inclination to start BB preferably for some HR blunting given SVT on recent monitor.   # Probable TAVR referral in future  # Needs updated labs  # Did discuss with patient that she needs to use caution with nitroglycerin/afterload reduction given her aortic stenosis. She understood this  # Follow up pending results of procedure    Mireille Edwards, APRN, CNP  [facility]  [phone]

## 2021-03-29 NOTE — PROGRESS NOTES
"      Cardiology Clinic Note    HPI: Reba Juarez is a 76 year old female with PMHx significant for CAD s/p PCI, palpitations, DM II, HTN, HLD, CLL, MAYKEL, and aortic stenosis who presents to cardiology clinic for evaluation of SOB, near syncope, angina.    In review, Mrs. Juarez's cardiac history dates back to 1992, when she had an angioplasty to an unknown vessel. She did well until 2006, when a recurrence of her left ear pain (her anginal equivalent) prompting repeat coronary angiography, which reportedly showed nonocclusive disease. In 2017, she underwent a stress test for dyspnea, which revealed profound deconditioning, but no evidence of ischemia. She was having palpitations and light headedness recently, for which event monitor and echo ordered. Event monitor revealed 14 runs of SVT, HR variability . Echo revealed mod-severe aortic stenosis with MG 29 mm Hg, DVI 0.25, TONI by continuity equation 1 cm ^2, normal SVI, normal EF, mild pulmonary hypertension. Previous echo 2020 with MG 24 mm Hg, TONI 1.25 cm ^2, peak velocity of 3/13 m/s. Last seen by Dr. Harris February of 2021.    Since patient was last seen, she has had near syncopal events. Once was when she was wearing her holter, which correlated with SR, HR 92. She does have a component of her angina intermittently lately (radiation into left arm) but not her previous classic symptoms of ear pain with back and arm pain. She did take a nitroglycerin for this recently, which caused her to \"fall asleep.\" Her BP is quite labile, she tells me and though it is elevated in clinic today, she tells me at home it can be very low and make her feel dizzy. She has not taking her BP meds today yet. She is on an ASA 81 mg daily and reports no bleeding. She tells me she has had recent labs from her oncology clinic, but I cannot link them in our care everywhere ([facility]) No LE swelling, does endorse SOB, especially with exertion, does endorse possible " angina, worse with exertion. She can still do her ADL's with minimal difficulty, but has to break often and rest. No other concerns voiced. No known rheumatic hx. No other concerns voiced    Cardiac Meds:  ASA 81 mg daily  Crestor 40 mg daily  Valsartan- hydrochlorothiazide 80-12.5    Current Outpatient Medications   Medication Sig Dispense Refill     aspirin 81 MG tablet Take 1 tablet by mouth daily.       budesonide-formoterol (SYMBICORT) 80-4.5 MCG/ACT Inhaler Inhale 2 puffs into the lungs 2 times daily       metFORMIN (GLUCOPHAGE) 500 MG tablet Take 1,500 mg by mouth        nitroGLYcerin (NITROSTAT) 0.4 MG sublingual tablet Place 0.4 mg under the tongue       ORDER FOR DME Use your CPAP device as directed by your provider.       rosuvastatin (CRESTOR) 40 MG tablet Take 40 mg by mouth       valsartan-hydrochlorothiazide (DIOVAN HCT) 80-12.5 MG tablet Take 1 tablet by mouth daily         Past Medical History:   Diagnosis Date     Aortic valve sclerosis     echo 1-24-11 mild     Coronary artery disease 1995    PCI at [facility]. Repeat angiogram in 2007     Diabetes (H)      Gastric ulcer 1990     H/O exercise stress test 3/21/2011    No ischemia     Hyperlipidemia      Hyperlipidemia LDL goal < 70     identified 1990's     Hypertension     onset age 50-55 approx     MAYKEL (obstructive sleep apnea) 2007       Past Surgical History:   Procedure Laterality Date     ANGIOPLASTY  early 1990s     COLONOSCOPY  2010     EYE SURGERY         Family History   Problem Relation Age of Onset     No Known Problems Sister      Alcoholism Brother      Myocardial Infarction Mother 51        HTN onset age 29, was a smoker     Myocardial Infarction Father 63        smoker     Myocardial Infarction Brother 39     No Known Problems Son      No Known Problems Daughter      CABG Brother      Heart Disease Brother      No Known Problems Sister      Cerebrovascular Disease Sister      Coronary Artery Disease Sister      No Known  "Problems Son        Social History     Tobacco Use     Smoking status: Former Smoker     Packs/day: 1.00     Years: 20.00     Pack years: 20.00     Quit date: 3/5/1992     Years since quittin.0     Smokeless tobacco: Never Used   Substance Use Topics     Alcohol use: No       Allergies   Allergen Reactions     Pravastatin      Myalgia       Simvastatin      myalgia     Atorvastatin Calcium      Myalgia       Calcium Citrate Unknown         ROS:   Negative besides that noted in HPI      Physical Examination:  Vitals: Ht 1.6 m (5' 3\")   BMI 37.02 kg/m    BMI= There is no height or weight on file to calculate BMI.  GENERAL APPEARANCE: healthy, alert and no distress  HEENT: no icterus  NECK: JVP is not visible  CHEST: lungs clear to auscultation - no rales, rhonchi or wheezes  CARDIOVASCULAR: regular rhythm, normal S1, S2, III/VI murmur  EXTREMITIES: no clubbing, cyanosis or edema  NEURO: alert and oriented to person/place/time, normal speech  VASC: Peripheral pulses are normal in volumes and symmetric bilaterally.   SKIN: no ecchymoses, no rashes    Laboratory/Imaging:  Echo from [employer] 2020  Summary     1. Left ventricular systolic function is normal with an estimated     ejection    fraction of 55-60%.     2. There is mildly increased left ventricular wall thickness.     3. There is moderate aortic valve stenosis with a peak velocity of 3.13     m/s,    mean gradient of 24 mmHg, and aortic valve area of 1.25 cm2.      Holter from [facility] - 1 run paroxysmal SVT 14 beats, SVT burden and V burden < 1%.    Echo 3/15/21  Left ventricular systolic function is normal.  The visual ejection fraction is estimated at 60-65%.  There is moderate concentric left ventricular hypertrophy.  The right ventricle is normal in structure, function and size.  Mild (35-45mmHg) pulmonary hypertension is present.  Moderate-severe calcific aortic stenosis. Vmax 4.0 m/s, Mean gradient 29 mmHg,  DVI of 0.25 and TONI by " continuity 1.0 cm2. Normal SVI.  The inferior vena cava was normal in size with preserved respiratory  variability.  Trivial pericardial effusion     Compared to study dated 1/24/2011, aortic stenosis is new and is moderate-  severe. In addition there is evidence of hypertrophy, elevated LV filling  pressures and pulmonary hypertension.  _____________________________________________________________________________  __        Left Ventricle  The left ventricle is normal in size. There is moderate concentric left  ventricular hypertrophy. Left ventricular systolic function is normal. The  visual ejection fraction is estimated at 60-65%. Grade II or moderate  diastolic dysfunction. Normal left ventricular wall motion.     Right Ventricle  The right ventricle is normal in structure, function and size.     Atria  Normal left atrial size. Right atrial size is normal.     Mitral Valve  The mitral valve is normal in structure and function. There is mild (1+)  mitral regurgitation.        Tricuspid Valve  The tricuspid valve is not well visualized, but is grossly normal. There is  moderate (2+) tricuspid regurgitation. The right ventricular systolic pressure  is approximated at 35.0 mmHg plus the right atrial pressure. Mild (35-45mmHg)  pulmonary hypertension is present.     Aortic Valve  There is moderate trileaflet aortic sclerosis. Moderate-severe calcific aortic  stenosis. Vmax 4.0 m/s, Mean gradient 29 mmHg, DVI of 0.25 and TONI by  continuity 1.0 cm2. Normal SVI.     Pulmonic Valve  The pulmonic valve is normal in structure and function.     Vessels  The aortic root is normal size. Normal size ascending aorta. The inferior vena  cava was normal in size with preserved respiratory variability.     Pericardium  Trivial pericardial effusion.     Assessment:  # Angina  - EKG today without ischemia. Caution with nitroglycerine use given Aortic stenosis. Some features of previous angina  - Procedure as below  # SVT/  Palpitations - 14 episodes on recent zio  - Plan to start BB in future pending cath/RHC/LHC results.  # Aortic stenosis, mod- severe  - MG 29 mm Hg, DVI 0.25, TONI by continuity equation 1 cm ^2, normal SVI, normal EF. Though progression seems minimal, now with chest pain, near syncope and SOB.   - LHC with upcoming angio/RHC  # Possible HFpEF- diastolic dysfunction grade II on echo, SOB.   -- Upcoming RHC with angio and LHC  # Mild  pulmonary hypertension (35-45mmHg), possible 2/2 AS# CAD - stable on ASA, crestor  -- RHC with upcoming angio/LHC  # HTN - on Valsartan/HCTZ  - may need to switch medications/dosages based on upcoming procedure findings. Inclination to start Metop or Coreg for some rate control given SVT  # HLD- on rosuvastatin  # DM - on metformin  # CLL - oncology following.   # MAYKEL- did not discuss this visit    Recommendations:  # RHC, LHC and angiogram to assess coronaries, HF/PAH, and aortic valve given now with SOB, chest pain and near syncope.   # Will plan to adjust her medications based on the results of upcoming RHC/LHC/Cors with an inclination to start BB preferably for some HR blunting given SVT on recent monitor.   # Probable TAVR referral in future  # Needs updated labs  # Did discuss with patient that she needs to use caution with nitroglycerin/afterload reduction given her aortic stenosis. She understood this  # Follow up pending results of procedure    Mireille Edwards, ANGELA, CNP  [facility]  [phone]

## 2021-03-29 NOTE — PATIENT INSTRUCTIONS
It was a pleasure to meet you today    Please schedule your procedure at your convenience    Please get your labs drawn today    I will follow up with your after your procedure

## 2021-03-30 LAB — INTERPRETATION ECG - MUSE: NORMAL

## 2021-04-05 ENCOUNTER — AMBULATORY - HEALTHEAST (OUTPATIENT)
Dept: LAB | Facility: CLINIC | Age: 76
End: 2021-04-05

## 2021-04-05 DIAGNOSIS — Z11.59 SCREENING EXAMINATION FOR ARTHROPOD-BORNE VIRAL DISEASE: ICD-10-CM

## 2021-04-06 ENCOUNTER — AMBULATORY - HEALTHEAST (OUTPATIENT)
Dept: LAB | Facility: CLINIC | Age: 76
End: 2021-04-06

## 2021-04-06 DIAGNOSIS — Z11.59 SCREENING EXAMINATION FOR ARTHROPOD-BORNE VIRAL DISEASE: ICD-10-CM

## 2021-04-07 LAB
SARS-COV-2 PCR COMMENT: NORMAL
SARS-COV-2 RNA SPEC QL NAA+PROBE: NEGATIVE
SARS-COV-2 VIRUS SPECIMEN SOURCE: NORMAL

## 2021-04-08 ENCOUNTER — TELEPHONE (OUTPATIENT)
Dept: CARDIOLOGY | Facility: CLINIC | Age: 76
End: 2021-04-08

## 2021-04-08 ENCOUNTER — COMMUNICATION - HEALTHEAST (OUTPATIENT)
Dept: SCHEDULING | Facility: CLINIC | Age: 76
End: 2021-04-08

## 2021-04-08 NOTE — TELEPHONE ENCOUNTER
Left voicemail for patient to complete Travel Screen for Cardiac Cath Lab appointment on 4/9/21 and inform patient of updated Visitor Policy.  COVID negative.  Rebekah Bui RN

## 2021-04-09 ENCOUNTER — APPOINTMENT (OUTPATIENT)
Dept: MEDSURG UNIT | Facility: CLINIC | Age: 76
End: 2021-04-09
Attending: INTERNAL MEDICINE
Payer: COMMERCIAL

## 2021-04-09 ENCOUNTER — APPOINTMENT (OUTPATIENT)
Dept: LAB | Facility: CLINIC | Age: 76
End: 2021-04-09
Attending: INTERNAL MEDICINE
Payer: COMMERCIAL

## 2021-04-09 ENCOUNTER — HOSPITAL ENCOUNTER (OUTPATIENT)
Facility: CLINIC | Age: 76
Discharge: HOME | End: 2021-04-09
Attending: INTERNAL MEDICINE | Admitting: INTERNAL MEDICINE
Payer: COMMERCIAL

## 2021-04-09 VITALS
SYSTOLIC BLOOD PRESSURE: 123 MMHG | RESPIRATION RATE: 18 BRPM | DIASTOLIC BLOOD PRESSURE: 59 MMHG | TEMPERATURE: 97.9 F | WEIGHT: 207 LBS | BODY MASS INDEX: 36.68 KG/M2 | HEART RATE: 76 BPM | HEIGHT: 63 IN | OXYGEN SATURATION: 95 %

## 2021-04-09 DIAGNOSIS — I35.0 NONRHEUMATIC AORTIC VALVE STENOSIS: Primary | ICD-10-CM

## 2021-04-09 DIAGNOSIS — I35.0 NONRHEUMATIC AORTIC VALVE STENOSIS: ICD-10-CM

## 2021-04-09 LAB
ANION GAP SERPL CALCULATED.3IONS-SCNC: 5 MMOL/L (ref 3–14)
BUN SERPL-MCNC: 15 MG/DL (ref 7–30)
CALCIUM SERPL-MCNC: 9.3 MG/DL (ref 8.5–10.1)
CHLORIDE SERPL-SCNC: 109 MMOL/L (ref 94–109)
CO2 SERPL-SCNC: 26 MMOL/L (ref 20–32)
CREAT SERPL-MCNC: 0.87 MG/DL (ref 0.52–1.04)
ERYTHROCYTE [DISTWIDTH] IN BLOOD BY AUTOMATED COUNT: 15 % (ref 10–15)
GFR SERPL CREATININE-BSD FRML MDRD: 64 ML/MIN/{1.73_M2}
GLUCOSE SERPL-MCNC: 92 MG/DL (ref 70–99)
HCT VFR BLD AUTO: 37.8 % (ref 35–47)
HGB BLD-MCNC: 11.9 G/DL (ref 11.7–15.7)
HGB BLD-MCNC: 12.4 G/DL (ref 11.7–15.7)
MCH RBC QN AUTO: 27.7 PG (ref 26.5–33)
MCHC RBC AUTO-ENTMCNC: 32.8 G/DL (ref 31.5–36.5)
MCV RBC AUTO: 85 FL (ref 78–100)
OXYHGB MFR BLD: 69 % (ref 92–100)
PLATELET # BLD AUTO: 219 10E9/L (ref 150–450)
POTASSIUM SERPL-SCNC: 4.6 MMOL/L (ref 3.4–5.3)
RBC # BLD AUTO: 4.47 10E12/L (ref 3.8–5.2)
SODIUM SERPL-SCNC: 139 MMOL/L (ref 133–144)
WBC # BLD AUTO: 12.4 10E9/L (ref 4–11)

## 2021-04-09 PROCEDURE — 36415 COLL VENOUS BLD VENIPUNCTURE: CPT | Performed by: NURSE PRACTITIONER

## 2021-04-09 PROCEDURE — 99152 MOD SED SAME PHYS/QHP 5/>YRS: CPT | Performed by: INTERNAL MEDICINE

## 2021-04-09 PROCEDURE — C1769 GUIDE WIRE: HCPCS | Performed by: INTERNAL MEDICINE

## 2021-04-09 PROCEDURE — C1894 INTRO/SHEATH, NON-LASER: HCPCS | Performed by: INTERNAL MEDICINE

## 2021-04-09 PROCEDURE — 250N000011 HC RX IP 250 OP 636: Performed by: INTERNAL MEDICINE

## 2021-04-09 PROCEDURE — 272N000001 HC OR GENERAL SUPPLY STERILE: Performed by: INTERNAL MEDICINE

## 2021-04-09 PROCEDURE — 258N000003 HC RX IP 258 OP 636: Performed by: NURSE PRACTITIONER

## 2021-04-09 PROCEDURE — 99153 MOD SED SAME PHYS/QHP EA: CPT | Performed by: INTERNAL MEDICINE

## 2021-04-09 PROCEDURE — 250N000009 HC RX 250: Performed by: NURSE PRACTITIONER

## 2021-04-09 PROCEDURE — 93460 R&L HRT ART/VENTRICLE ANGIO: CPT | Performed by: INTERNAL MEDICINE

## 2021-04-09 PROCEDURE — C1887 CATHETER, GUIDING: HCPCS | Performed by: INTERNAL MEDICINE

## 2021-04-09 PROCEDURE — 999N000142 HC STATISTIC PROCEDURE PREP ONLY

## 2021-04-09 PROCEDURE — 93010 ELECTROCARDIOGRAM REPORT: CPT | Performed by: INTERNAL MEDICINE

## 2021-04-09 PROCEDURE — 80048 BASIC METABOLIC PNL TOTAL CA: CPT | Performed by: NURSE PRACTITIONER

## 2021-04-09 PROCEDURE — 250N000009 HC RX 250: Performed by: INTERNAL MEDICINE

## 2021-04-09 PROCEDURE — 82810 BLOOD GASES O2 SAT ONLY: CPT

## 2021-04-09 PROCEDURE — 255N000002 HC RX 255 OP 636: Performed by: INTERNAL MEDICINE

## 2021-04-09 PROCEDURE — 85018 HEMOGLOBIN: CPT

## 2021-04-09 PROCEDURE — 999N000054 HC STATISTIC EKG NON-CHARGEABLE

## 2021-04-09 PROCEDURE — 85027 COMPLETE CBC AUTOMATED: CPT | Performed by: NURSE PRACTITIONER

## 2021-04-09 PROCEDURE — 93005 ELECTROCARDIOGRAM TRACING: CPT

## 2021-04-09 RX ORDER — LIDOCAINE 40 MG/G
CREAM TOPICAL
Status: DISCONTINUED | OUTPATIENT
Start: 2021-04-09 | End: 2021-04-09 | Stop reason: HOSPADM

## 2021-04-09 RX ORDER — LIDOCAINE 40 MG/G
CREAM TOPICAL ONCE
Status: COMPLETED | OUTPATIENT
Start: 2021-04-09 | End: 2021-04-09

## 2021-04-09 RX ORDER — POTASSIUM CHLORIDE 750 MG/1
20 TABLET, EXTENDED RELEASE ORAL
Status: DISCONTINUED | OUTPATIENT
Start: 2021-04-09 | End: 2021-04-09 | Stop reason: HOSPADM

## 2021-04-09 RX ORDER — TIROFIBAN HYDROCHLORIDE 50 UG/ML
0.07 INJECTION INTRAVENOUS CONTINUOUS PRN
Status: DISCONTINUED | OUTPATIENT
Start: 2021-04-09 | End: 2021-04-09 | Stop reason: HOSPADM

## 2021-04-09 RX ORDER — IODIXANOL 320 MG/ML
INJECTION, SOLUTION INTRAVASCULAR
Status: DISCONTINUED | OUTPATIENT
Start: 2021-04-09 | End: 2021-04-09 | Stop reason: HOSPADM

## 2021-04-09 RX ORDER — EPTIFIBATIDE 2 MG/ML
2 INJECTION, SOLUTION INTRAVENOUS CONTINUOUS PRN
Status: DISCONTINUED | OUTPATIENT
Start: 2021-04-09 | End: 2021-04-09 | Stop reason: HOSPADM

## 2021-04-09 RX ORDER — DOBUTAMINE HYDROCHLORIDE 200 MG/100ML
2-20 INJECTION INTRAVENOUS CONTINUOUS PRN
Status: DISCONTINUED | OUTPATIENT
Start: 2021-04-09 | End: 2021-04-09 | Stop reason: HOSPADM

## 2021-04-09 RX ORDER — NITROGLYCERIN 20 MG/100ML
10-200 INJECTION INTRAVENOUS CONTINUOUS PRN
Status: DISCONTINUED | OUTPATIENT
Start: 2021-04-09 | End: 2021-04-09 | Stop reason: HOSPADM

## 2021-04-09 RX ORDER — NITROGLYCERIN 5 MG/ML
VIAL (ML) INTRAVENOUS
Status: DISCONTINUED | OUTPATIENT
Start: 2021-04-09 | End: 2021-04-09 | Stop reason: HOSPADM

## 2021-04-09 RX ORDER — POTASSIUM CHLORIDE 750 MG/1
40 TABLET, EXTENDED RELEASE ORAL
Status: DISCONTINUED | OUTPATIENT
Start: 2021-04-09 | End: 2021-04-09 | Stop reason: HOSPADM

## 2021-04-09 RX ORDER — ATROPINE SULFATE 0.1 MG/ML
0.5 INJECTION INTRAVENOUS
Status: DISCONTINUED | OUTPATIENT
Start: 2021-04-09 | End: 2021-04-09 | Stop reason: HOSPADM

## 2021-04-09 RX ORDER — SODIUM CHLORIDE 9 MG/ML
INJECTION, SOLUTION INTRAVENOUS CONTINUOUS
Status: DISCONTINUED | OUTPATIENT
Start: 2021-04-09 | End: 2021-04-09 | Stop reason: HOSPADM

## 2021-04-09 RX ORDER — NALOXONE HYDROCHLORIDE 0.4 MG/ML
0.2 INJECTION, SOLUTION INTRAMUSCULAR; INTRAVENOUS; SUBCUTANEOUS
Status: CANCELLED | OUTPATIENT
Start: 2021-04-09

## 2021-04-09 RX ORDER — NALOXONE HYDROCHLORIDE 0.4 MG/ML
0.4 INJECTION, SOLUTION INTRAMUSCULAR; INTRAVENOUS; SUBCUTANEOUS
Status: CANCELLED | OUTPATIENT
Start: 2021-04-09

## 2021-04-09 RX ORDER — ARGATROBAN 1 MG/ML
150 INJECTION, SOLUTION INTRAVENOUS
Status: DISCONTINUED | OUTPATIENT
Start: 2021-04-09 | End: 2021-04-09 | Stop reason: HOSPADM

## 2021-04-09 RX ORDER — FENTANYL CITRATE 50 UG/ML
25-50 INJECTION, SOLUTION INTRAMUSCULAR; INTRAVENOUS
Status: ACTIVE | OUTPATIENT
Start: 2021-04-09 | End: 2021-04-09

## 2021-04-09 RX ORDER — HEPARIN SODIUM 1000 [USP'U]/ML
INJECTION, SOLUTION INTRAVENOUS; SUBCUTANEOUS
Status: DISCONTINUED | OUTPATIENT
Start: 2021-04-09 | End: 2021-04-09 | Stop reason: HOSPADM

## 2021-04-09 RX ORDER — FLUMAZENIL 0.1 MG/ML
0.2 INJECTION, SOLUTION INTRAVENOUS
Status: DISCONTINUED | OUTPATIENT
Start: 2021-04-09 | End: 2021-04-09 | Stop reason: HOSPADM

## 2021-04-09 RX ORDER — NALOXONE HYDROCHLORIDE 0.4 MG/ML
0.2 INJECTION, SOLUTION INTRAMUSCULAR; INTRAVENOUS; SUBCUTANEOUS
Status: DISCONTINUED | OUTPATIENT
Start: 2021-04-09 | End: 2021-04-09 | Stop reason: HOSPADM

## 2021-04-09 RX ORDER — NALOXONE HYDROCHLORIDE 0.4 MG/ML
0.4 INJECTION, SOLUTION INTRAMUSCULAR; INTRAVENOUS; SUBCUTANEOUS
Status: DISCONTINUED | OUTPATIENT
Start: 2021-04-09 | End: 2021-04-09 | Stop reason: HOSPADM

## 2021-04-09 RX ORDER — LIDOCAINE 40 MG/G
CREAM TOPICAL
Status: CANCELLED | OUTPATIENT
Start: 2021-04-09

## 2021-04-09 RX ORDER — EPTIFIBATIDE 2 MG/ML
180 INJECTION, SOLUTION INTRAVENOUS EVERY 10 MIN PRN
Status: DISCONTINUED | OUTPATIENT
Start: 2021-04-09 | End: 2021-04-09 | Stop reason: HOSPADM

## 2021-04-09 RX ORDER — ARGATROBAN 1 MG/ML
350 INJECTION, SOLUTION INTRAVENOUS
Status: DISCONTINUED | OUTPATIENT
Start: 2021-04-09 | End: 2021-04-09 | Stop reason: HOSPADM

## 2021-04-09 RX ORDER — HEPARIN SODIUM 10000 [USP'U]/100ML
100-1000 INJECTION, SOLUTION INTRAVENOUS CONTINUOUS PRN
Status: DISCONTINUED | OUTPATIENT
Start: 2021-04-09 | End: 2021-04-09 | Stop reason: HOSPADM

## 2021-04-09 RX ORDER — VERAPAMIL HYDROCHLORIDE 2.5 MG/ML
INJECTION, SOLUTION INTRAVENOUS
Status: DISCONTINUED | OUTPATIENT
Start: 2021-04-09 | End: 2021-04-09 | Stop reason: HOSPADM

## 2021-04-09 RX ORDER — DOPAMINE HYDROCHLORIDE 160 MG/100ML
2-20 INJECTION, SOLUTION INTRAVENOUS CONTINUOUS PRN
Status: DISCONTINUED | OUTPATIENT
Start: 2021-04-09 | End: 2021-04-09 | Stop reason: HOSPADM

## 2021-04-09 RX ORDER — FENTANYL CITRATE 50 UG/ML
INJECTION, SOLUTION INTRAMUSCULAR; INTRAVENOUS
Status: DISCONTINUED | OUTPATIENT
Start: 2021-04-09 | End: 2021-04-09 | Stop reason: HOSPADM

## 2021-04-09 RX ADMIN — LIDOCAINE: 40 CREAM TOPICAL at 13:46

## 2021-04-09 RX ADMIN — SODIUM CHLORIDE: 9 INJECTION, SOLUTION INTRAVENOUS at 13:24

## 2021-04-09 ASSESSMENT — EJECTION FRACTION
EF_VALUE: .23
EF_VALUE: .3

## 2021-04-09 ASSESSMENT — MIFFLIN-ST. JEOR: SCORE: 1393.08

## 2021-04-09 NOTE — PROGRESS NOTES
D/I/A: Pt roomed on 3C in bay 3A.  Arrived via litter and accompanied by CCL RN  On/Off: On monitor.  VSSA.  Rhythm upon arrival Sinus rhythm  on monitor.  Denies pain or sob.  Reviewed activity restrictions and when to notify RN, ie-changes to breathing or increased chest pressure or chest pain.  CCL access:  Right radial site. TR Band with 12 ml of air, will start deflating at 18:00.   P: Continue to monitor status.  Discharge to home once meeting criteria.

## 2021-04-09 NOTE — PROGRESS NOTES
Pt arrives to  for CORS/RHC/LHC. H&P is up to date. Consent needs to be signed. Pt took her daily 81mg aspirin last night, no need for additional aspirin pre procedure per GARO Ford. Labs completed, WBC 12.4 (Liliana NP aware).     Pt reports her daughter will be coming to pick her up post procedure, of note daughter is about 40 minutes away from hospital.

## 2021-04-09 NOTE — PROGRESS NOTES
D/I/A:  Patient is tolerating liquids and foods, ambulating, urinating, puncture sites are stable ( no bleeding and no hematoma) and patient has a .  A+O x4 and making needs known.  CCL access sites C/D/I; no bleeding or hematoma; CMS intact.  VSSA.  Sinus Rhythm  on monitor.  IV access removed.  Education completed and outlined in AVS or handout: medications reviewed with patient.  Questions answered prior to discharge.  Belongings returned to patient at discharge.    P: Discharged to self care.  Patient to follow up with appts as per discharge instruction.

## 2021-04-09 NOTE — PRE-PROCEDURE
GENERAL PRE-PROCEDURE:   Procedure:  Right heart catheterization, left heart catheterization, coronary angiogram with possible PCI  Date/Time:  4/9/2021 1:44 PM    Verbal consent obtained?: Yes    Written consent obtained?: Yes    Risks and benefits: Risks, benefits and alternatives were discussed    DC Plan: Appropriate discharge home plan in place for patients who are going home after procedure   Consent given by:  Patient  Patient states understanding of procedure being performed: Yes    Patient's understanding of procedure matches consent: Yes    Procedure consent matches procedure scheduled: Yes    Expected level of sedation:  Moderate  Appropriately NPO:  Yes  ASA Class:  Class 2- mild systemic disease, no acute problems, no functional limitations  Mallampati  :  Grade 3- soft palate visible, posterior pharyngeal wall not visible  Lungs:  Lungs clear with good breath sounds bilaterally  Heart:  Normal heart sounds and rate and systolic murmur  History & Physical reviewed:  History and physical reviewed and no updates needed  Statement of review:  I have reviewed the lab findings, diagnostic data, medications, and the plan for sedation    ANGELA Hoover, CNP  [facility]

## 2021-04-10 LAB — INTERPRETATION ECG - MUSE: NORMAL

## 2021-04-10 NOTE — DISCHARGE INSTRUCTIONS
Going Home after an Angioplasty or Stent Placement (Cardiac)  ______________________________________________      After you go home:    Have an adult stay with you for 24 hours.    Drink plenty of fluids.    You may eat your normal diet, unless your doctor tells you otherwise.    For 24 hours:    Relax and take it easy.    Do NOT smoke.    Do NOT make any important or legal decisions.    Do NOT drive or operate machines at home or at work.    Do NOT drink alcohol.    Remove the Band-Aid after 24 hours. If there is minor oozing, apply another Band-aid and remove it after 12 hours.    For 2 days, do NOT have sex or do any heavy exercise.    Do NOT take a bath, or use a hot tub or pool for at least 3 days. You may shower.    Care of wrist or arm site  It is normal to have soreness at the puncture site and mild tingling in your hand for up to 3 days.    For 2 days, do not use your hand or arm to support your weight (such as rising from a chair) or bend your wrist (such as lifting a garage door).    For 2 days, do not lift more than 5 pounds or exercise your arm (tennis, golf or bowling).    If you start bleeding from the site in your arm:    Sit down and press firmly on the site with your fingers for 10 minutes. Call your doctor as soon as you can.    If the bleeding stops, sit still and keep your wrist straight for 2 hours.    Medicines    If you have started taking Plavix or Effient, do not stop taking it until you talk to your heart doctor (cardiologist).    If you are on metformin (Glucophage), do not restart it until you have blood tests (within 2 to 3 days after discharge). When your doctor tells you it is safe, you may restart the metformin.    If you have stopped any other medicines, check with your nurse or provider about when to restart them.    Call 911 right away if you have bleeding that is heavy or does not stop.    Call your doctor if:    You have a large or growing hard lump around the site.    The  site is red, swollen, hot or tender.    Blood or fluid is draining from the site.    You have chills or a fever greater than 101 F (38 C).    Your leg or arm feels numb or cool.    You have hives, a rash or unusual itching.      [facility] [place]:  [phone] (7 days a week)

## 2021-04-15 ENCOUNTER — TELEPHONE (OUTPATIENT)
Dept: CARDIOLOGY | Facility: CLINIC | Age: 76
End: 2021-04-15

## 2021-04-15 NOTE — TELEPHONE ENCOUNTER
[facility] Call Center    Phone Message    May a detailed message be left on voicemail: yes     Reason for Call: Other: pt is wanting a call back to schedule atrial valve replacement procedure, please call merari, thank you     Action Taken: Message routed to:  [facility] heart    Travel Screening: Not Applicable

## 2021-04-16 DIAGNOSIS — R09.89 CAROTID BRUIT: ICD-10-CM

## 2021-04-16 DIAGNOSIS — I35.0 SEVERE AORTIC STENOSIS: Primary | ICD-10-CM

## 2021-04-16 NOTE — TELEPHONE ENCOUNTER
Message sent to rachael bowers requesting referral be placed to TAVR clinic then patient will be called by clinic coordinators.

## 2021-05-05 NOTE — PROGRESS NOTES
[facility] HEART CARE  CARDIOVASCULAR DIVISION    VALVE CLINIC CONSULTATION    PRIMARY CARDIOLOGIST: Mireille Edwards CNP      PERTINENT CLINICAL HISTORY & REASON FOR CONSULTATION:     Reba Juarez is a very pleasant 76 year old female with severe aortic valvular stenosis referred to our clinic for evaluation and consideration for potential transcatheter aortic valve replacement (TAVR).  Her severe aortic stenosis is characterized with an area of 1.0cm2, mean gradient 29 mmHg and peak velocity of 4.0 m/sec with LVEF 60% by echocardiogram 3/15/21. Her additional past medical history is notable for HTN, HLD, type II DM, CAD s/p POBA of unknown vessel in 1992 w/recurrent angina in 2006 s/p angiogram with nonobstructive disease and recent cath 4/9/21 w/no hemodynamically significant stenosis, MAYKEL, CLL and gastric ulcer.     Adamaris has a history of moderate aortic stenosis. She was recently evaluated in cardiology clinic with complaints of palpitations, presyncope, chest pain and exertional dyspnea. A ZioPatch showed brief runs of paroxysmal SVT, otherwise no significant arrhythmias. ECHO showed progression of her aortic stenosis from moderate to severe. She was referred for coronary angiogram and RHC/LHC which showed moderate nonobstructive coronary artery disease and severe aortic stenosis with a TONI of 0.83 cm2 and mean PG of 39 mmHg.      Over the past few months Adamaris has been experiencing lightheadedness, fatigue and exertional shortness of breath. She is still able to perform ADL's but has to rest often due to dyspnea and fatigue. She says her lightheadedness is worse with position changes and after eating big meals. She had a severe episode of presyncope about a month ago when she stood up out of bed in the morning and felt so lightheaded she fell back into her bed. She had another episode last weekend when she was driving her brother home after they had gone out to [facility]. She says she overall feels much  "better when she is fasting and has now been fasting for 90+ hours. She denies any recent chest pain or left arm pain radiating up the neck/jaw/ear which was her prior anginal equivalent. No orthopnea, PND, leg swelling or weight gain. She did feel like she was going to have to go to the ER a couple of nights last week due to a feeling of \"unease\". She is anxious and hoping to get her valve replaced sooner rather than later.      PAST MEDICAL HISTORY:     Past Medical History:   Diagnosis Date     Aortic valve sclerosis     echo 1-24-11 mild     Coronary artery disease 1995    PCI at [facility]. Repeat angiogram in 2007     Diabetes (H)      Gastric ulcer 1990     H/O exercise stress test 3/21/2011    No ischemia     Hyperlipidemia      Hyperlipidemia LDL goal < 70     identified 1990's     Hypertension     onset age 50-55 approx     MAYKEL (obstructive sleep apnea) 2007        PAST SURGICAL HISTORY:     Past Surgical History:   Procedure Laterality Date     ANGIOPLASTY  early 1990s     COLONOSCOPY  2010     CV CORONARY ANGIOGRAM N/A 4/9/2021    Procedure: CV CORONARY ANGIOGRAM;  Surgeon: Júnior Santos MD;  Location:  HEART CARDIAC CATH LAB     CV LEFT HEART CATH N/A 4/9/2021    Procedure: Left Heart Cath;  Surgeon: Júnior Santos MD;  Location:  HEART CARDIAC CATH LAB     CV RIGHT HEART CATH MEASUREMENTS RECORDED N/A 4/9/2021    Procedure: Right Heart Cath;  Surgeon: Júnior Santos MD;  Location:  HEART CARDIAC CATH LAB     EYE SURGERY          CURRENT MEDICATIONS:     Current Outpatient Medications   Medication Sig Dispense Refill     aspirin 81 MG tablet Take 1 tablet by mouth daily.       budesonide-formoterol (SYMBICORT) 80-4.5 MCG/ACT Inhaler Inhale 2 puffs into the lungs 2 times daily       metFORMIN (GLUCOPHAGE) 500 MG tablet Take 1,500 mg by mouth        nitroGLYcerin (NITROSTAT) 0.4 MG sublingual tablet Place 0.4 mg under the tongue       ORDER FOR DME Use your CPAP device as " directed by your provider.       rosuvastatin (CRESTOR) 40 MG tablet Take 40 mg by mouth       valsartan-hydrochlorothiazide (DIOVAN HCT) 80-12.5 MG tablet Take 1 tablet by mouth daily          ALLERGIES:     Allergies   Allergen Reactions     Pravastatin      Myalgia       Simvastatin      myalgia     Atorvastatin Calcium      Myalgia       Calcium Citrate Unknown     Bloody nose per pt        FAMILY HISTORY:     Family History   Problem Relation Age of Onset     No Known Problems Sister      Alcoholism Brother      Myocardial Infarction Mother 51        HTN onset age 30, was a smoker     Myocardial Infarction Father 63        smoker     Myocardial Infarction Brother 39     No Known Problems Son      No Known Problems Daughter      CABG Brother      Heart Disease Brother      No Known Problems Sister      Cerebrovascular Disease Sister      Coronary Artery Disease Sister      No Known Problems Son         SOCIAL HISTORY:     Social History     Socioeconomic History     Marital status:      Spouse name: Not on file     Number of children: Not on file     Years of education: Not on file     Highest education level: Not on file   Occupational History     Occupation: retired     Employer: [employer]     Comment:  retired full time, still works a few days as biochemical pharmacist   Social Needs     Financial resource strain: Not on file     Food insecurity     Worry: Not on file     Inability: Not on file     Transportation needs     Medical: Not on file     Non-medical: Not on file   Tobacco Use     Smoking status: Former Smoker     Packs/day: 1.00     Years: 20.00     Pack years: 20.00     Quit date: 3/5/1992     Years since quittin.1     Smokeless tobacco: Never Used   Substance and Sexual Activity     Alcohol use: No     Drug use: No     Sexual activity: Not on file   Lifestyle     Physical activity     Days per week: Not on file     Minutes per session: Not on file     Stress: Not on file  "  Relationships     Social connections     Talks on phone: Not on file     Gets together: Not on file     Attends Anabaptism service: Not on file     Active member of club or organization: Not on file     Attends meetings of clubs or organizations: Not on file     Relationship status: Not on file     Intimate partner violence     Fear of current or ex partner: Not on file     Emotionally abused: Not on file     Physically abused: Not on file     Forced sexual activity: Not on file   Other Topics Concern     Parent/sibling w/ CABG, MI or angioplasty before 65F 55M? Not Asked   Social History Narrative    Single and lives with her son and brother.        REVIEW OF SYSTEMS:     Constitutional: No fevers or chills  Skin: No new rash or itching  Eyes: No acute change in vision  Ears/Nose/Throat: No purulent rhinorrhea, new hearing loss, or new vertigo  Respiratory: No cough or hemoptysis  Cardiovascular: See HPI  Gastrointestinal: No change in appetite, vomiting, hematemesis or diarrhea  Genitourinary: No dysuria or hematuria  Musculoskeletal: No new back pain, neck pain or muscle pain  Neurologic: No new headaches, focal weakness or behavior changes  Psychiatric: No hallucinations, excessive alcohol consumption or illegal drug usage  Hematologic/Lymphatic/Immunologic: No bleeding, chills, fever, night sweats or weight loss  Endocrine: No new cold intolerance, heat intolerance, polyphagia, polydipsia or polyuria      PHYSICAL EXAMINATION:     /80 (BP Location: Right arm, Patient Position: Chair, Cuff Size: Adult Large)   Pulse 96   Ht 1.6 m (5' 3\")   Wt 90.9 kg (200 lb 4.8 oz)   SpO2 96%   BMI 35.48 kg/m      GENERAL: No acute distress.  HEENT: EOMI. Sclerae white, not injected. Nares clear. Pharynx without erythema or exudate.   Neck: No adenopathy. No thyromegaly. No jugular venous distension.   Heart: Regular rate and rhythm. 3/6 DALIA RUSB.   Lungs: Clear to auscultation. No ronchi, wheezes, rales.   Abdomen: " Soft, nontender, nondistended. Bowel sounds present.  Extremities: No clubbing, cyanosis, or edema.   Neurologic: Alert and oriented to person/place/time, normal speech and affect. No focal deficits.  Skin: No petechiae, purpura or rash.     LABORATORY DATA:   Personally reviewed and interpreted.   LIPID RESULTS:  Lab Results   Component Value Date    CHOL 198 03/29/2021    HDL 64 03/29/2021     (H) 03/29/2021    TRIG 159 (H) 03/29/2021    CHOLHDLRATIO 3.9 05/15/2013     CBC RESULTS:  Lab Results   Component Value Date    WBC 8.9 05/06/2021    RBC 4.85 05/06/2021    HGB 13.8 05/06/2021    HCT 41.6 05/06/2021    MCV 86 05/06/2021    MCH 28.5 05/06/2021    MCHC 33.2 05/06/2021    RDW 14.3 05/06/2021     05/06/2021       BMP RESULTS:  Lab Results   Component Value Date     05/06/2021    POTASSIUM 4.1 05/06/2021    CHLORIDE 103 05/06/2021    CO2 25 05/06/2021    ANIONGAP 7 05/06/2021    GLC 92 05/06/2021    BUN 13 05/06/2021    CR 0.87 05/06/2021    GFRESTIMATED 64 05/06/2021    GFRESTBLACK 74 05/06/2021    TERRI 9.4 05/06/2021        PROCEDURES & FURTHER ASSESSMENTS:     ECG dated today:   Personally reviewed and interpreted by me  NSR HR 92    TAVR CT today:  IMPRESSION:     1.  See below for TAVR related aortic annular and vascular  measurements.   2.  No acute aortic dissection, intramural hematoma or contained  rupture noted.  3.  Please review detailed radiology report, to follow separately, for  incidental non-cardiovascular findings.     FINDINGS:     1.  Severely calcified bicuspid aortic valve with partial fusion of  the right and left coronary cusps. Aortic valve calcium score is 2414.  The LVOT is not calcified. The ascending aorta is mildly calcified,  aortic arch is  moderately calcified, the descending thoracic aorta is  moderately calcified. There is no mitral annular calcification.  2.  There are no adverse aortic root features, ie coronary heights are  adequate and there is no  significant aortic root calcification.  3.  There are significant native coronary calcifications.   4.  The arch vessel branching pattern is normal.   5.  The suprarenal abdominal aorta is severely calcified; infrarenal  abdominal aorta is severely calcified  6.  Widely patent origins of celiac trunk, superior mesenteric artery  and inferior mesenteric artery.  Accessory right renal artery is  present, all renal arteries have widely patent origins.   7.  There is no acute aortic pathology, such as dissection, intramural  hematoma, or contained rupture.      MEASUREMENTS:   Representative dimensions of the thoracoabdominal aorta are as  follows:     1. AORTIC ANNULUS MEASUREMENTS:     1.  The average aortic annulus diameter is 22.6 mm, (long diameter is  24.9 mm and short diameter is 21.2 mm)  2.  Aortic annulus area is 4.02 cm2  3.  Aortic annulus perimeter is 73 mm  4.  Suggested 3-cusp coaxial angle for aortic valve is (Latvian 4 , CAU 0)  and alternate A-P coaxial angle is (LORD 0 , CAU 5 ), these  angles  will vary depending upon the patient's body position  5.  Aortic root angle is 48.2  6.  Left main - Annulus distance: 10.7 mm, RCA - Annulus distance:  12.8 mm     2. LVOT MEASUREMENTS:     1.  The average LVOT diameter (measured 4 mm below annular plane) is  23.4 mm  2.  LVOT area is 4.29 cm2  3.  LVOT perimeter is 74.6 mm     3. AORTA MEASUREMENTS     1.  The aortic root at the sinuses of Valsalva: 30.4 mm x  28.4 mm x  27.1 mm  2.  The sinotubular junction:  24 mm x 22 mm  3.  Sinotubular junction height: 20.6 mm x 18.3 mm  4.  Proximal ascending aorta: 30 mm x 29 mm  5.  Distal abdominal aorta proximal to the bifurcation: 14.2 mm x 12.4  mm  6.  Innominate artery 3 cm from ostium: 12 mm x 10.4 mm  7.  Left common carotid artery 3 cm from ostium: 7.9 mm x 7.3 mm     4. ILIOFEMORAL MEASUREMENTS:     RIGHT:  1.  Right common iliac artery: 9.9 mm x 8.3 mm   2.  Right external iliac artery: 6.8 mm x 5.5 mm   3.   Right common femoral artery: 7.7 mm x 5.7 mm  4.  Tortuosity: mild   5.  Calcification: severe      LEFT:  1.  Left common iliac artery: 7.9 mm x  6.9 mm  2.  Left external iliac artery: 7.7 mm x 6.9 mm  3.  Left common femoral artery: 6.0 mm x 4.0 mm, there is a densely  calcific plaque in the common femoral at the proposed site of access.  4.  Tortuosity: mild   5.  Calcification: severe      5. SUBCLAVIAN MEASUREMENTS:     RIGHT:  1. Minimal luminal diameter: 8.6 mm x  7.9 mm  2. Tortuosity: mild   3. Calcification: mild      LEFT:  1. Minimal luminal diameter: 9.1 mm x  8.1 mm  2. Tortuosity: mild   3. Calcification: mild      OTHER FINDINGS:   1.  Please review radiology review for incidental non-cardiovascular  findings including lungs, abdominal organs, bone, soft tissue, nodes  to follow separately      CORS/RHC/LHC 4/9/21:  Reba Juarez is a 76 year old female with a PMHx of moderate-severe aortic stenosis, CAD s/p remote PODA, DMII, HTN, HLD and MAYKEL who presents for RHC/LHC/coronary angiogram to assess etiology of pre syncope.   Pre Procedure Diagnosis    moderate-severe aortic stenosis   pre-syncope     Post Procedure Diagnosis    severe aortic stenosis   non-obstructive coronary artery disease       Conclusion      Severe aortic stenosis (Mean gradient 39 mm Hg, TONI 0.83 cm2)    No gradient across mitral valve (PCW-LVEDP)    Non-obstructive coronary artery disease    >>> 25% proximal LMCA stenosis    >>> 50% proximal LAD, 20% mid/distal LAD stenoses    >>> 25% proximal and mid LCx stenoses          Plan      Continued medical management and lifestyle modifications for cardiovascular risk factor optimizations.    Discharge today per protocol      Follow up for consideration of TAVR.  Case discussed with Dr. uQick.     Hemodynamics Right sided filling pressures are normal. Left sided filling pressures are normal. Mild elevated pulmonary hypertension. Normal cardiac output level.   Mitral Valve  Mitral mean gradient stenosis is 4 mm/Hg.   Aortic Valve AV mean gradient is 39 mmHg. AV peak gradient is 36 mmHg. There is severe aortic valve stenosis. No calcification found in the aortic valve. TONI 0.83 cm2.     ECHO 4/16/21:  Impression:     1. Right side:         Degree of stenosis of the internal carotid artery: Less than 50 %.  With predominantly echogenic and irregular plaque.     2. Left side:          Degree of stenosis of the internal carotid artery: 50 to 69%.  Velocities in the proximal and mid ICA have increased since the  comparison exam in 2011.       Echocardiogram dated 3/15/21:    Interpretation Summary     Left ventricular systolic function is normal.  The visual ejection fraction is estimated at 60-65%.  There is moderate concentric left ventricular hypertrophy.  The right ventricle is normal in structure, function and size.  Mild (35-45mmHg) pulmonary hypertension is present.  Moderate-severe calcific aortic stenosis. Vmax 4.0 m/s, Mean gradient 29 mmHg,  DVI of 0.25 and TONI by continuity 1.0 cm2. Normal SVI.  The inferior vena cava was normal in size with preserved respiratory  variability.  Trivial pericardial effusion     Compared to study dated 1/24/2011, aortic stenosis is new and is moderate-  severe. In addition there is evidence of hypertrophy, elevated LV filling  pressures and pulmonary hypertension.    STS RISK SCORE: 2.5%  FRAILTY SCORE: 0/4  NYHA CLASS: II-III     CLINICAL IMPRESSION:     Reba Juarez is a very pleasant 76 year old female with severe aortic valvular stenosis referred to our clinic for evaluation and consideration for potential transcatheter aortic valve replacement (TAVR).  Her severe aortic stenosis is characterized with an area of 0.83cm2 and mean gradient 39 mmHg by invasive valve assessment 4/9/21. CT TAVR today shows severely calcified bicuspid aortic valve with partial fusion of the right and left coronary cusps and aortic valve calcium score is 2414.  She is symptomatic with exertional dyspnea, lightheadedness and fatigue.     We discussed the natural history of severe aortic stenosis, associated symptoms and available treatment options including transcatheter aortic valve replacement vs surgical aortic valve replacement. Patient is a appropriate candidate for transcatheter aortic valve replacement based on age, frailty, co-morbidities and surgical mortality risk estimation of 2.5% based on the STS score.      Plan Summary:  1) Severe Aortic Stenosis:  - TAVR work-up is complete, patient wishes to proceed   - Will arrange TAVR in a fairly urgent manner given worsening symptoms  - She was seen by cardiovascular surgery today and is a candidate for open sternotomy in the event of a complication    Patient was seen and evaluated in clinic with IC Dr. Quick and CV surgeon Dr. Hwang.    ANGELA Elmore, CNP  [facility] Cardiology Team    CC  Patient Care Team:  Dora Chaap APRN CNP as PCP - General (Nurse Practitioner)  Cindy Ness MD as Assigned Pulmonology Provider  Homar Harris MD as Assigned Heart and Vascular Provider  SELF, REFERRED

## 2021-05-06 ENCOUNTER — HOSPITAL ENCOUNTER (OUTPATIENT)
Dept: CT IMAGING | Facility: CLINIC | Age: 76
End: 2021-05-06
Attending: NURSE PRACTITIONER
Payer: COMMERCIAL

## 2021-05-06 ENCOUNTER — OFFICE VISIT (OUTPATIENT)
Dept: CARDIOLOGY | Facility: CLINIC | Age: 76
End: 2021-05-06
Attending: NURSE PRACTITIONER
Payer: COMMERCIAL

## 2021-05-06 ENCOUNTER — HOSPITAL ENCOUNTER (OUTPATIENT)
Dept: ULTRASOUND IMAGING | Facility: CLINIC | Age: 76
End: 2021-05-06
Attending: NURSE PRACTITIONER
Payer: COMMERCIAL

## 2021-05-06 VITALS
HEART RATE: 96 BPM | DIASTOLIC BLOOD PRESSURE: 80 MMHG | OXYGEN SATURATION: 96 % | SYSTOLIC BLOOD PRESSURE: 135 MMHG | WEIGHT: 200.3 LBS | HEIGHT: 63 IN | BODY MASS INDEX: 35.49 KG/M2

## 2021-05-06 DIAGNOSIS — R06.02 SOB (SHORTNESS OF BREATH): ICD-10-CM

## 2021-05-06 DIAGNOSIS — I25.10 CORONARY ARTERY DISEASE INVOLVING NATIVE CORONARY ARTERY OF NATIVE HEART WITHOUT ANGINA PECTORIS: ICD-10-CM

## 2021-05-06 DIAGNOSIS — R55 PRE-SYNCOPE: ICD-10-CM

## 2021-05-06 DIAGNOSIS — I35.0 SEVERE AORTIC STENOSIS: ICD-10-CM

## 2021-05-06 DIAGNOSIS — R00.2 PALPITATIONS: ICD-10-CM

## 2021-05-06 DIAGNOSIS — I35.0 AORTIC VALVE STENOSIS, ETIOLOGY OF CARDIAC VALVE DISEASE UNSPECIFIED: Primary | ICD-10-CM

## 2021-05-06 DIAGNOSIS — I35.0 SEVERE AORTIC STENOSIS: Primary | ICD-10-CM

## 2021-05-06 DIAGNOSIS — R09.89 CAROTID BRUIT: ICD-10-CM

## 2021-05-06 LAB
ALBUMIN SERPL-MCNC: 4 G/DL (ref 3.4–5)
ALP SERPL-CCNC: 101 U/L (ref 40–150)
ALT SERPL W P-5'-P-CCNC: 34 U/L (ref 0–50)
ANION GAP SERPL CALCULATED.3IONS-SCNC: 7 MMOL/L (ref 3–14)
AST SERPL W P-5'-P-CCNC: 34 U/L (ref 0–45)
BILIRUB SERPL-MCNC: 0.5 MG/DL (ref 0.2–1.3)
BUN SERPL-MCNC: 13 MG/DL (ref 7–30)
CALCIUM SERPL-MCNC: 9.4 MG/DL (ref 8.5–10.1)
CHLORIDE SERPL-SCNC: 103 MMOL/L (ref 94–109)
CO2 SERPL-SCNC: 25 MMOL/L (ref 20–32)
CREAT SERPL-MCNC: 0.87 MG/DL (ref 0.52–1.04)
ERYTHROCYTE [DISTWIDTH] IN BLOOD BY AUTOMATED COUNT: 14.3 % (ref 10–15)
GFR SERPL CREATININE-BSD FRML MDRD: 64 ML/MIN/{1.73_M2}
GLUCOSE SERPL-MCNC: 92 MG/DL (ref 70–99)
HCT VFR BLD AUTO: 41.6 % (ref 35–47)
HGB BLD-MCNC: 13.8 G/DL (ref 11.7–15.7)
MCH RBC QN AUTO: 28.5 PG (ref 26.5–33)
MCHC RBC AUTO-ENTMCNC: 33.2 G/DL (ref 31.5–36.5)
MCV RBC AUTO: 86 FL (ref 78–100)
PLATELET # BLD AUTO: 246 10E9/L (ref 150–450)
POTASSIUM SERPL-SCNC: 4.1 MMOL/L (ref 3.4–5.3)
PROT SERPL-MCNC: 7.8 G/DL (ref 6.8–8.8)
RBC # BLD AUTO: 4.85 10E12/L (ref 3.8–5.2)
SODIUM SERPL-SCNC: 136 MMOL/L (ref 133–144)
WBC # BLD AUTO: 8.9 10E9/L (ref 4–11)

## 2021-05-06 PROCEDURE — 71275 CT ANGIOGRAPHY CHEST: CPT

## 2021-05-06 PROCEDURE — 80053 COMPREHEN METABOLIC PANEL: CPT | Performed by: NURSE PRACTITIONER

## 2021-05-06 PROCEDURE — 36415 COLL VENOUS BLD VENIPUNCTURE: CPT | Performed by: NURSE PRACTITIONER

## 2021-05-06 PROCEDURE — 71275 CT ANGIOGRAPHY CHEST: CPT | Mod: 26 | Performed by: STUDENT IN AN ORGANIZED HEALTH CARE EDUCATION/TRAINING PROGRAM

## 2021-05-06 PROCEDURE — 93880 EXTRACRANIAL BILAT STUDY: CPT

## 2021-05-06 PROCEDURE — 93005 ELECTROCARDIOGRAM TRACING: CPT

## 2021-05-06 PROCEDURE — 93880 EXTRACRANIAL BILAT STUDY: CPT | Mod: 26 | Performed by: RADIOLOGY

## 2021-05-06 PROCEDURE — 99215 OFFICE O/P EST HI 40 MIN: CPT | Performed by: NURSE PRACTITIONER

## 2021-05-06 PROCEDURE — 85027 COMPLETE CBC AUTOMATED: CPT | Performed by: NURSE PRACTITIONER

## 2021-05-06 PROCEDURE — G0463 HOSPITAL OUTPT CLINIC VISIT: HCPCS | Mod: 25

## 2021-05-06 PROCEDURE — 250N000011 HC RX IP 250 OP 636: Performed by: STUDENT IN AN ORGANIZED HEALTH CARE EDUCATION/TRAINING PROGRAM

## 2021-05-06 PROCEDURE — 74174 CTA ABD&PLVS W/CONTRAST: CPT | Mod: 26 | Performed by: STUDENT IN AN ORGANIZED HEALTH CARE EDUCATION/TRAINING PROGRAM

## 2021-05-06 PROCEDURE — 99204 OFFICE O/P NEW MOD 45 MIN: CPT | Performed by: THORACIC SURGERY (CARDIOTHORACIC VASCULAR SURGERY)

## 2021-05-06 RX ORDER — IOPAMIDOL 755 MG/ML
120 INJECTION, SOLUTION INTRAVASCULAR ONCE
Status: COMPLETED | OUTPATIENT
Start: 2021-05-06 | End: 2021-05-06

## 2021-05-06 RX ADMIN — IOPAMIDOL 120 ML: 755 INJECTION, SOLUTION INTRAVENOUS at 10:00

## 2021-05-06 ASSESSMENT — PAIN SCALES - GENERAL: PAINLEVEL: NO PAIN (0)

## 2021-05-06 ASSESSMENT — MIFFLIN-ST. JEOR: SCORE: 1362.68

## 2021-05-06 NOTE — NURSING NOTE
Chief Complaint   Patient presents with     Follow Up     Discuss TAVR procedure      Vitals were taken and medications were reconciled. EKG was performed    Di ARREAGA  11:58 AM

## 2021-05-06 NOTE — LETTER
5/6/2021       RE: Reba Juarez  1974 [address] [facility] [address]       Dear Colleague,    Thank you for the opportunity to participate in the care of your patient, Reba Juarez, at the [facility]ART [facility] [place] [facility]. Please see a copy of my visit note below.    [facility] CARDIOTHORACIC SURGERY CONSULT  Patient Name: Reba Juarez  Medical Record Number: [id number]  YOB: 1945  Room Number: Room/bed info not found  Referring Physician: Dr. Quick    CC: Severe aortic stenosis - evaluate for TAVR    History of Present Illness: Reba Juarez is a 76 year old female with HTN, HLD, DM, CAD, MAYKEL, gastric ulcer.  She was recently seen for palpitations, presyncope, chest pain and dyspnea with exertion.  ECHO showed moderate-severe aortic stenosis.  She was referred to TAVR clinic for evaluation.    Assessment and Plan:  Reba Juarez is a 76 year old female with severe aortic stenosis  1) Recommend TAVR  2) Surgical bailout - YES   3) Please call with questions or concerns.     Thank you for the opportunity to participate in the care of this patient.    Thom Hwang MD  Cardiothoracic Surgery  [phone]    Past Medical History:  Past Medical History:   Diagnosis Date     Aortic valve sclerosis     echo 1-24-11 mild     Coronary artery disease 1995    PCI at [facility]. Repeat angiogram in 2007     Diabetes (H)      Gastric ulcer 1990     H/O exercise stress test 3/21/2011    No ischemia     Hyperlipidemia      Hyperlipidemia LDL goal < 70     identified 1990's     Hypertension     onset age 50-55 approx     MAYKEL (obstructive sleep apnea) 2007       Past Surgical History:  Past Surgical History:   Procedure Laterality Date     ANGIOPLASTY  early 1990s     COLONOSCOPY  2010     CV CORONARY ANGIOGRAM N/A 4/9/2021    Procedure: CV CORONARY ANGIOGRAM;  Surgeon: Júnior Santos MD;  Location:  [facility]     CV LEFT HEART CATH N/A 2021    Procedure: Left Heart Cath;  Surgeon: Júnior Santos MD;  Location: [facility]ATH LAB     CV RIGHT HEART CATH MEASUREMENTS RECORDED N/A 2021    Procedure: Right Heart Cath;  Surgeon: Júnior Santos MD;  Location: [facility]ARDI[facility] CATH LAB     EYE SURGERY          Family History:   Family History   Problem Relation Age of Onset     No Known Problems Sister      Alcoholism Brother      Myocardial Infarction Mother 51        HTN onset age 30, was a smoker     Myocardial Infarction Father 63        smoker     Myocardial Infarction Brother 39     No Known Problems Son      No Known Problems Daughter      CABG Brother      Heart Disease Brother      No Known Problems Sister      Cerebrovascular Disease Sister      Coronary Artery Disease Sister      No Known Problems Son        Social History:  Social History     Socioeconomic History     Marital status:      Spouse name: Not on file     Number of children: Not on file     Years of education: Not on file     Highest education level: Not on file   Occupational History     Occupation: retired     Employer: [employer]     Comment:  retired full time, still works a few days as biochemical pharmacist   Social Needs     Financial resource strain: Not on file     Food insecurity     Worry: Not on file     Inability: Not on file     Transportation needs     Medical: Not on file     Non-medical: Not on file   Tobacco Use     Smoking status: Former Smoker     Packs/day: 1.00     Years: 20.00     Pack years: 20.00     Quit date: 3/5/1992     Years since quittin.1     Smokeless tobacco: Never Used   Substance and Sexual Activity     Alcohol use: No     Drug use: No     Sexual activity: Not on file   Lifestyle     Physical activity     Days per week: Not on file     Minutes per session: Not on file     Stress: Not on file   Relationships     Social connections     Talks on phone: Not  on file     Gets together: Not on file     Attends Yarsani service: Not on file     Active member of club or organization: Not on file     Attends meetings of clubs or organizations: Not on file     Relationship status: Not on file     Intimate partner violence     Fear of current or ex partner: Not on file     Emotionally abused: Not on file     Physically abused: Not on file     Forced sexual activity: Not on file   Other Topics Concern     Parent/sibling w/ CABG, MI or angioplasty before 65F 55M? Not Asked   Social History Narrative    Single and lives with her son and brother.       Allergies:     Allergies   Allergen Reactions     Pravastatin      Myalgia       Simvastatin      myalgia     Atorvastatin Calcium      Myalgia       Calcium Citrate Unknown     Bloody nose per pt       Medications:    Current Outpatient Medications   Medication     aspirin 81 MG tablet     budesonide-formoterol (SYMBICORT) 80-4.5 MCG/ACT Inhaler     metFORMIN (GLUCOPHAGE) 500 MG tablet     nitroGLYcerin (NITROSTAT) 0.4 MG sublingual tablet     ORDER FOR DME     rosuvastatin (CRESTOR) 40 MG tablet     valsartan-hydrochlorothiazide (DIOVAN HCT) 80-12.5 MG tablet     No current facility-administered medications for this visit.        Review of Systems:   A 10 point ROS was performed and is negative other than HPI.    Physical Exam:  Pulse:  [96] 96  BP: (135)/(80) 135/80  SpO2:  [96 %] 96 %    Gen: NAD, resting comfortably in chair   Lungs: CTAB, non-labored breathing   CV: regular rhythm, normal rate   Abd: Soft, not tender, not distended   Ext: Motor, sensation, pulses intact   Neuro: AOx3    Labs:  Lab Results   Component Value Date    WBC 8.9 05/06/2021     Lab Results   Component Value Date    RBC 4.85 05/06/2021     Lab Results   Component Value Date    HGB 13.8 05/06/2021     Lab Results   Component Value Date    HCT 41.6 05/06/2021     No components found for: MCT  Lab Results   Component Value Date    MCV 86 05/06/2021      Lab Results   Component Value Date    MCH 28.5 05/06/2021     Lab Results   Component Value Date    MCHC 33.2 05/06/2021     Lab Results   Component Value Date    RDW 14.3 05/06/2021     Lab Results   Component Value Date     05/06/2021     Last Comprehensive Metabolic Panel:  Sodium   Date Value Ref Range Status   05/06/2021 136 133 - 144 mmol/L Final     Potassium   Date Value Ref Range Status   05/06/2021 4.1 3.4 - 5.3 mmol/L Final     Chloride   Date Value Ref Range Status   05/06/2021 103 94 - 109 mmol/L Final     Carbon Dioxide   Date Value Ref Range Status   05/06/2021 25 20 - 32 mmol/L Final     Anion Gap   Date Value Ref Range Status   05/06/2021 7 3 - 14 mmol/L Final     Glucose   Date Value Ref Range Status   05/06/2021 92 70 - 99 mg/dL Final     Urea Nitrogen   Date Value Ref Range Status   05/06/2021 13 7 - 30 mg/dL Final     Creatinine   Date Value Ref Range Status   05/06/2021 0.87 0.52 - 1.04 mg/dL Final     GFR Estimate   Date Value Ref Range Status   05/06/2021 64 >60 mL/min/[1.73_m2] Final     Comment:     Non  GFR Calc  Starting 12/18/2018, serum creatinine based estimated GFR (eGFR) will be   calculated using the Chronic Kidney Disease Epidemiology Collaboration   (CKD-EPI) equation.       Calcium   Date Value Ref Range Status   05/06/2021 9.4 8.5 - 10.1 mg/dL Final       Imaging:  Transthoracic echocardiogram (3/15/21):  Interpretation Summary     Left ventricular systolic function is normal.  The visual ejection fraction is estimated at 60-65%.  There is moderate concentric left ventricular hypertrophy.  The right ventricle is normal in structure, function and size.  Mild (35-45mmHg) pulmonary hypertension is present.  Moderate-severe calcific aortic stenosis. Vmax 4.0 m/s, Mean gradient 29 mmHg,  DVI of 0.25 and TONI by continuity 1.0 cm2. Normal SVI.  The inferior vena cava was normal in size with preserved respiratory  variability.  Trivial pericardial effusion      Compared to study dated 1/24/2011, aortic stenosis is new and is moderate-  severe. In addition there is evidence of hypertrophy, elevated LV filling  pressures and pulmonary hypertension.    Coronary angiogram (4/9/21):  Conclusion      Severe aortic stenosis (Mean gradient 39 mm Hg, TONI 0.83 cm2)    No gradient across mitral valve (PCW-LVEDP)    Non-obstructive coronary artery disease    >>> 25% proximal LMCA stenosis    >>> 50% proximal LAD, 20% mid/distal LAD stenoses    >>> 25% proximal and mid LCx stenoses       Please do not hesitate to contact me if you have any questions/concerns.     Sincerely,     Thom Hwang MD

## 2021-05-06 NOTE — PATIENT INSTRUCTIONS
You were seen today in the Cardiovascular Clinic at the [facility].      Cardiology Providers you saw during your visit:  Dr. Quick and Carmel Weston CNP     Recommendations:  I will work on scheduling you for the TAVR procedure.    Check in to the hospital 3C at [facility].    Nothing to eat after midnight; water, apple juice or 7up/Sprite is OK up to two hours prior to your scheduled procedure.  You may take your medications in the morning with a sip of water.    Take a shower, with antibacterial soap, the night before the procedure, and the morning of the procedure.      Diagnosis: Severe aortic stenosis  Plan:  1. TAVR procedure scheduled for [facility].    2. Medications changes:  -- Please continue to take your Aspirin 81 mg daily.  -- Please hold your metformin    If any questions please contact:  Bianka Sutherland RN  Structural Heart Care Coordinator  [facility]  Office: [phone]      Thank you for your visit today!     Bianka Sutherland RN  Structural Heart Care Coordinator   TAVR, MitraClip and Watchman Programs  [facility]    Mckenna Reese, [facility] Office: [phone]      Clinics and Surgery Center  [address] [place] [address]  Cardiology Clinic [id number]  [place] [address]

## 2021-05-06 NOTE — PROGRESS NOTES
[facility] CARDIOTHORACIC SURGERY CONSULT  Patient Name: Reba Juarez  Medical Record Number: [id number]  YOB: 1945  Room Number: Room/bed info not found  Referring Physician: Dr. Quick    CC: Severe aortic stenosis - evaluate for TAVR    History of Present Illness: Reba Juarez is a 76 year old female with HTN, HLD, DM, CAD, MAYKEL, gastric ulcer.  She was recently seen for palpitations, presyncope, chest pain and dyspnea with exertion.  ECHO showed moderate-severe aortic stenosis.  She was referred to TAVR clinic for evaluation.    Assessment and Plan:  Reba Juarez is a 76 year old female with severe aortic stenosis  1) Recommend TAVR  2) Surgical bailout - YES   3) Please call with questions or concerns.     Thank you for the opportunity to participate in the care of this patient.    Thom Hwang MD  Cardiothoracic Surgery  [phone]    Past Medical History:  Past Medical History:   Diagnosis Date     Aortic valve sclerosis     echo 1-24-11 mild     Coronary artery disease 1995    PCI at [facility]. Repeat angiogram in 2007     Diabetes (H)      Gastric ulcer 1990     H/O exercise stress test 3/21/2011    No ischemia     Hyperlipidemia      Hyperlipidemia LDL goal < 70     identified 1990's     Hypertension     onset age 50-55 approx     MAYKEL (obstructive sleep apnea) 2007       Past Surgical History:  Past Surgical History:   Procedure Laterality Date     ANGIOPLASTY  early 1990s     COLONOSCOPY  2010     CV CORONARY ANGIOGRAM N/A 4/9/2021    Procedure: CV CORONARY ANGIOGRAM;  Surgeon: Júnior Santos MD;  Location:  HEART CARDIAC CATH LAB     CV LEFT HEART CATH N/A 4/9/2021    Procedure: Left Heart Cath;  Surgeon: Júnior Santos MD;  Location:  HEART CARDIAC CATH LAB     CV RIGHT HEART CATH MEASUREMENTS RECORDED N/A 4/9/2021    Procedure: Right Heart Cath;  Surgeon: Júnior Santos MD;  Location:  HEART CARDIAC CATH LAB     EYE SURGERY           Family History:   Family History   Problem Relation Age of Onset     No Known Problems Sister      Alcoholism Brother      Myocardial Infarction Mother 51        HTN onset age 30, was a smoker     Myocardial Infarction Father 63        smoker     Myocardial Infarction Brother 39     No Known Problems Son      No Known Problems Daughter      CABG Brother      Heart Disease Brother      No Known Problems Sister      Cerebrovascular Disease Sister      Coronary Artery Disease Sister      No Known Problems Son        Social History:  Social History     Socioeconomic History     Marital status:      Spouse name: Not on file     Number of children: Not on file     Years of education: Not on file     Highest education level: Not on file   Occupational History     Occupation: retired     Employer: [employer]     Comment:  retired full time, still works a few days as biochemical pharmacist   Social Needs     Financial resource strain: Not on file     Food insecurity     Worry: Not on file     Inability: Not on file     Transportation needs     Medical: Not on file     Non-medical: Not on file   Tobacco Use     Smoking status: Former Smoker     Packs/day: 1.00     Years: 20.00     Pack years: 20.00     Quit date: 3/5/1992     Years since quittin.1     Smokeless tobacco: Never Used   Substance and Sexual Activity     Alcohol use: No     Drug use: No     Sexual activity: Not on file   Lifestyle     Physical activity     Days per week: Not on file     Minutes per session: Not on file     Stress: Not on file   Relationships     Social connections     Talks on phone: Not on file     Gets together: Not on file     Attends Islam service: Not on file     Active member of club or organization: Not on file     Attends meetings of clubs or organizations: Not on file     Relationship status: Not on file     Intimate partner violence     Fear of current or ex partner: Not on file     Emotionally abused: Not on file      Physically abused: Not on file     Forced sexual activity: Not on file   Other Topics Concern     Parent/sibling w/ CABG, MI or angioplasty before 65F 55M? Not Asked   Social History Narrative    Single and lives with her son and brother.       Allergies:     Allergies   Allergen Reactions     Pravastatin      Myalgia       Simvastatin      myalgia     Atorvastatin Calcium      Myalgia       Calcium Citrate Unknown     Bloody nose per pt       Medications:    Current Outpatient Medications   Medication     aspirin 81 MG tablet     budesonide-formoterol (SYMBICORT) 80-4.5 MCG/ACT Inhaler     metFORMIN (GLUCOPHAGE) 500 MG tablet     nitroGLYcerin (NITROSTAT) 0.4 MG sublingual tablet     ORDER FOR DME     rosuvastatin (CRESTOR) 40 MG tablet     valsartan-hydrochlorothiazide (DIOVAN HCT) 80-12.5 MG tablet     No current facility-administered medications for this visit.        Review of Systems:   A 10 point ROS was performed and is negative other than HPI.    Physical Exam:  Pulse:  [96] 96  BP: (135)/(80) 135/80  SpO2:  [96 %] 96 %    Gen: NAD, resting comfortably in chair   Lungs: CTAB, non-labored breathing   CV: regular rhythm, normal rate   Abd: Soft, not tender, not distended   Ext: Motor, sensation, pulses intact   Neuro: AOx3    Labs:  Lab Results   Component Value Date    WBC 8.9 05/06/2021     Lab Results   Component Value Date    RBC 4.85 05/06/2021     Lab Results   Component Value Date    HGB 13.8 05/06/2021     Lab Results   Component Value Date    HCT 41.6 05/06/2021     No components found for: MCT  Lab Results   Component Value Date    MCV 86 05/06/2021     Lab Results   Component Value Date    MCH 28.5 05/06/2021     Lab Results   Component Value Date    MCHC 33.2 05/06/2021     Lab Results   Component Value Date    RDW 14.3 05/06/2021     Lab Results   Component Value Date     05/06/2021     Last Comprehensive Metabolic Panel:  Sodium   Date Value Ref Range Status   05/06/2021 136 133 - 144  mmol/L Final     Potassium   Date Value Ref Range Status   05/06/2021 4.1 3.4 - 5.3 mmol/L Final     Chloride   Date Value Ref Range Status   05/06/2021 103 94 - 109 mmol/L Final     Carbon Dioxide   Date Value Ref Range Status   05/06/2021 25 20 - 32 mmol/L Final     Anion Gap   Date Value Ref Range Status   05/06/2021 7 3 - 14 mmol/L Final     Glucose   Date Value Ref Range Status   05/06/2021 92 70 - 99 mg/dL Final     Urea Nitrogen   Date Value Ref Range Status   05/06/2021 13 7 - 30 mg/dL Final     Creatinine   Date Value Ref Range Status   05/06/2021 0.87 0.52 - 1.04 mg/dL Final     GFR Estimate   Date Value Ref Range Status   05/06/2021 64 >60 mL/min/[1.73_m2] Final     Comment:     Non  GFR Calc  Starting 12/18/2018, serum creatinine based estimated GFR (eGFR) will be   calculated using the Chronic Kidney Disease Epidemiology Collaboration   (CKD-EPI) equation.       Calcium   Date Value Ref Range Status   05/06/2021 9.4 8.5 - 10.1 mg/dL Final       Imaging:  Transthoracic echocardiogram (3/15/21):  Interpretation Summary     Left ventricular systolic function is normal.  The visual ejection fraction is estimated at 60-65%.  There is moderate concentric left ventricular hypertrophy.  The right ventricle is normal in structure, function and size.  Mild (35-45mmHg) pulmonary hypertension is present.  Moderate-severe calcific aortic stenosis. Vmax 4.0 m/s, Mean gradient 29 mmHg,  DVI of 0.25 and TONI by continuity 1.0 cm2. Normal SVI.  The inferior vena cava was normal in size with preserved respiratory  variability.  Trivial pericardial effusion     Compared to study dated 1/24/2011, aortic stenosis is new and is moderate-  severe. In addition there is evidence of hypertrophy, elevated LV filling  pressures and pulmonary hypertension.    Coronary angiogram (4/9/21):  Conclusion      Severe aortic stenosis (Mean gradient 39 mm Hg, TONI 0.83 cm2)    No gradient across mitral valve  (PCW-LVEDP)    Non-obstructive coronary artery disease    >>> 25% proximal LMCA stenosis    >>> 50% proximal LAD, 20% mid/distal LAD stenoses    >>> 25% proximal and mid LCx stenoses

## 2021-05-06 NOTE — LETTER
5/6/2021      RE: Reba Juarez  1974 [address] [facility] [address]       Dear Colleague,    Thank you for the opportunity to participate in the care of your patient, Reba Juarez, at the [facility] [place] at [facility]. Please see a copy of my visit note below.      [facility] HEART CARE  CARDIOVASCULAR DIVISION    VALVE CLINIC CONSULTATION    PRIMARY CARDIOLOGIST: Mireille Edwards CNP      PERTINENT CLINICAL HISTORY & REASON FOR CONSULTATION:     Reba Juarez is a very pleasant 76 year old female with severe aortic valvular stenosis referred to our clinic for evaluation and consideration for potential transcatheter aortic valve replacement (TAVR).  Her severe aortic stenosis is characterized with an area of 1.0cm2, mean gradient 29 mmHg and peak velocity of 4.0 m/sec with LVEF 60% by echocardiogram 3/15/21. Her additional past medical history is notable for HTN, HLD, type II DM, CAD s/p POBA of unknown vessel in 1992 w/recurrent angina in 2006 s/p angiogram with nonobstructive disease and recent cath 4/9/21 w/no hemodynamically significant stenosis, MAYKEL, CLL and gastric ulcer.     Adamaris has a history of moderate aortic stenosis. She was recently evaluated in cardiology clinic with complaints of palpitations, presyncope, chest pain and exertional dyspnea. A ZioPatch showed brief runs of paroxysmal SVT, otherwise no significant arrhythmias. ECHO showed progression of her aortic stenosis from moderate to severe. She was referred for coronary angiogram and RHC/LHC which showed moderate nonobstructive coronary artery disease and severe aortic stenosis with a TONI of 0.83 cm2 and mean PG of 39 mmHg.      Over the past few months Adamaris has been experiencing lightheadedness, fatigue and exertional shortness of breath. She is still able to perform ADL's but has to rest often due to dyspnea and fatigue. She says her lightheadedness is  "worse with position changes and after eating big meals. She had a severe episode of presyncope about a month ago when she stood up out of bed in the morning and felt so lightheaded she fell back into her bed. She had another episode last weekend when she was driving her brother home after they had gone out to [employer]. She says she overall feels much better when she is fasting and has now been fasting for 90+ hours. She denies any recent chest pain or left arm pain radiating up the neck/jaw/ear which was her prior anginal equivalent. No orthopnea, PND, leg swelling or weight gain. She did feel like she was going to have to go to the ER a couple of nights last week due to a feeling of \"unease\". She is anxious and hoping to get her valve replaced sooner rather than later.      PAST MEDICAL HISTORY:     Past Medical History:   Diagnosis Date     Aortic valve sclerosis     echo 1-24-11 mild     Coronary artery disease 1995    PCI at [facility]. Repeat angiogram in 2007     Diabetes (H)      Gastric ulcer 1990     H/O exercise stress test 3/21/2011    No ischemia     Hyperlipidemia      Hyperlipidemia LDL goal < 70     identified 1990's     Hypertension     onset age 50-55 approx     MAYKEL (obstructive sleep apnea) 2007        PAST SURGICAL HISTORY:     Past Surgical History:   Procedure Laterality Date     ANGIOPLASTY  early 1990s     COLONOSCOPY  2010     CV CORONARY ANGIOGRAM N/A 4/9/2021    Procedure: CV CORONARY ANGIOGRAM;  Surgeon: Júnior Santos MD;  Location:  HEART CARDIAC CATH LAB     CV LEFT HEART CATH N/A 4/9/2021    Procedure: Left Heart Cath;  Surgeon: Júnior Santos MD;  Location:  HEART CARDIAC CATH LAB     CV RIGHT HEART CATH MEASUREMENTS RECORDED N/A 4/9/2021    Procedure: Right Heart Cath;  Surgeon: Júnior Santos MD;  Location:  HEART CARDIAC CATH LAB     EYE SURGERY          CURRENT MEDICATIONS:     Current Outpatient Medications   Medication Sig Dispense Refill     " aspirin 81 MG tablet Take 1 tablet by mouth daily.       budesonide-formoterol (SYMBICORT) 80-4.5 MCG/ACT Inhaler Inhale 2 puffs into the lungs 2 times daily       metFORMIN (GLUCOPHAGE) 500 MG tablet Take 1,500 mg by mouth        nitroGLYcerin (NITROSTAT) 0.4 MG sublingual tablet Place 0.4 mg under the tongue       ORDER FOR DME Use your CPAP device as directed by your provider.       rosuvastatin (CRESTOR) 40 MG tablet Take 40 mg by mouth       valsartan-hydrochlorothiazide (DIOVAN HCT) 80-12.5 MG tablet Take 1 tablet by mouth daily          ALLERGIES:     Allergies   Allergen Reactions     Pravastatin      Myalgia       Simvastatin      myalgia     Atorvastatin Calcium      Myalgia       Calcium Citrate Unknown     Bloody nose per pt        FAMILY HISTORY:     Family History   Problem Relation Age of Onset     No Known Problems Sister      Alcoholism Brother      Myocardial Infarction Mother 51        HTN onset age 30, was a smoker     Myocardial Infarction Father 63        smoker     Myocardial Infarction Brother 39     No Known Problems Son      No Known Problems Daughter      CABG Brother      Heart Disease Brother      No Known Problems Sister      Cerebrovascular Disease Sister      Coronary Artery Disease Sister      No Known Problems Son         SOCIAL HISTORY:     Social History     Socioeconomic History     Marital status:      Spouse name: Not on file     Number of children: Not on file     Years of education: Not on file     Highest education level: Not on file   Occupational History     Occupation: retired     Employer: [employer]     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, still works a few days as biochemical pharmacist   Social Needs     Financial resource strain: Not on file     Food insecurity     Worry: Not on file     Inability: Not on file     Transportation needs     Medical: Not on file     Non-medical: Not on file   Tobacco Use     Smoking status: Former Smoker     Packs/day: 1.00     Years:  "20.00     Pack years: 20.00     Quit date: 3/5/1992     Years since quittin.1     Smokeless tobacco: Never Used   Substance and Sexual Activity     Alcohol use: No     Drug use: No     Sexual activity: Not on file   Lifestyle     Physical activity     Days per week: Not on file     Minutes per session: Not on file     Stress: Not on file   Relationships     Social connections     Talks on phone: Not on file     Gets together: Not on file     Attends [employer] service: Not on file     Active member of club or organization: Not on file     Attends meetings of clubs or organizations: Not on file     Relationship status: Not on file     Intimate partner violence     Fear of current or ex partner: Not on file     Emotionally abused: Not on file     Physically abused: Not on file     Forced sexual activity: Not on file   Other Topics Concern     Parent/sibling w/ CABG, MI or angioplasty before 65F 55M? Not Asked   Social History Narrative    Single and lives with her son and brother.        REVIEW OF SYSTEMS:     Constitutional: No fevers or chills  Skin: No new rash or itching  Eyes: No acute change in vision  Ears/Nose/Throat: No purulent rhinorrhea, new hearing loss, or new vertigo  Respiratory: No cough or hemoptysis  Cardiovascular: See HPI  Gastrointestinal: No change in appetite, vomiting, hematemesis or diarrhea  Genitourinary: No dysuria or hematuria  Musculoskeletal: No new back pain, neck pain or muscle pain  Neurologic: No new headaches, focal weakness or behavior changes  Psychiatric: No hallucinations, excessive alcohol consumption or illegal drug usage  Hematologic/Lymphatic/Immunologic: No bleeding, chills, fever, night sweats or weight loss  Endocrine: No new cold intolerance, heat intolerance, polyphagia, polydipsia or polyuria      PHYSICAL EXAMINATION:     /80 (BP Location: Right arm, Patient Position: Chair, Cuff Size: Adult Large)   Pulse 96   Ht 1.6 m (5' 3\")   Wt 90.9 kg (200 lb 4.8 " oz)   SpO2 96%   BMI 35.48 kg/m      GENERAL: No acute distress.  HEENT: EOMI. Sclerae white, not injected. Nares clear. Pharynx without erythema or exudate.   Neck: No adenopathy. No thyromegaly. No jugular venous distension.   Heart: Regular rate and rhythm. 3/6 DALIA RUSB.   Lungs: Clear to auscultation. No ronchi, wheezes, rales.   Abdomen: Soft, nontender, nondistended. Bowel sounds present.  Extremities: No clubbing, cyanosis, or edema.   Neurologic: Alert and oriented to person/place/time, normal speech and affect. No focal deficits.  Skin: No petechiae, purpura or rash.     LABORATORY DATA:   Personally reviewed and interpreted.   LIPID RESULTS:  Lab Results   Component Value Date    CHOL 198 03/29/2021    HDL 64 03/29/2021     (H) 03/29/2021    TRIG 159 (H) 03/29/2021    CHOLHDLRATIO 3.9 05/15/2013     CBC RESULTS:  Lab Results   Component Value Date    WBC 8.9 05/06/2021    RBC 4.85 05/06/2021    HGB 13.8 05/06/2021    HCT 41.6 05/06/2021    MCV 86 05/06/2021    MCH 28.5 05/06/2021    MCHC 33.2 05/06/2021    RDW 14.3 05/06/2021     05/06/2021       BMP RESULTS:  Lab Results   Component Value Date     05/06/2021    POTASSIUM 4.1 05/06/2021    CHLORIDE 103 05/06/2021    CO2 25 05/06/2021    ANIONGAP 7 05/06/2021    GLC 92 05/06/2021    BUN 13 05/06/2021    CR 0.87 05/06/2021    GFRESTIMATED 64 05/06/2021    GFRESTBLACK 74 05/06/2021    TERRI 9.4 05/06/2021        PROCEDURES & FURTHER ASSESSMENTS:     ECG dated today:   Personally reviewed and interpreted by me  NSR HR 92    TAVR CT today:  IMPRESSION:     1.  See below for TAVR related aortic annular and vascular  measurements.   2.  No acute aortic dissection, intramural hematoma or contained  rupture noted.  3.  Please review detailed radiology report, to follow separately, for  incidental non-cardiovascular findings.     FINDINGS:     1.  Severely calcified bicuspid aortic valve with partial fusion of  the right and left coronary cusps.  Aortic valve calcium score is 2414.  The LVOT is not calcified. The ascending aorta is mildly calcified,  aortic arch is  moderately calcified, the descending thoracic aorta is  moderately calcified. There is no mitral annular calcification.  2.  There are no adverse aortic root features, ie coronary heights are  adequate and there is no significant aortic root calcification.  3.  There are significant native coronary calcifications.   4.  The arch vessel branching pattern is normal.   5.  The suprarenal abdominal aorta is severely calcified; infrarenal  abdominal aorta is severely calcified  6.  Widely patent origins of celiac trunk, superior mesenteric artery  and inferior mesenteric artery.  Accessory right renal artery is  present, all renal arteries have widely patent origins.   7.  There is no acute aortic pathology, such as dissection, intramural  hematoma, or contained rupture.      MEASUREMENTS:   Representative dimensions of the thoracoabdominal aorta are as  follows:     1. AORTIC ANNULUS MEASUREMENTS:     1.  The average aortic annulus diameter is 22.6 mm, (long diameter is  24.9 mm and short diameter is 21.2 mm)  2.  Aortic annulus area is 4.02 cm2  3.  Aortic annulus perimeter is 73 mm  4.  Suggested 3-cusp coaxial angle for aortic valve is (Azeri 4 , CAU 0)  and alternate A-P coaxial angle is (LORD 0 , CAU 5 ), these  angles  will vary depending upon the patient's body position  5.  Aortic root angle is 48.2  6.  Left main - Annulus distance: 10.7 mm, RCA - Annulus distance:  12.8 mm     2. LVOT MEASUREMENTS:     1.  The average LVOT diameter (measured 4 mm below annular plane) is  23.4 mm  2.  LVOT area is 4.29 cm2  3.  LVOT perimeter is 74.6 mm     3. AORTA MEASUREMENTS     1.  The aortic root at the sinuses of Valsalva: 30.4 mm x  28.4 mm x  27.1 mm  2.  The sinotubular junction:  24 mm x 22 mm  3.  Sinotubular junction height: 20.6 mm x 18.3 mm  4.  Proximal ascending aorta: 30 mm x 29 mm  5.  Distal  abdominal aorta proximal to the bifurcation: 14.2 mm x 12.4  mm  6.  Innominate artery 3 cm from ostium: 12 mm x 10.4 mm  7.  Left common carotid artery 3 cm from ostium: 7.9 mm x 7.3 mm     4. ILIOFEMORAL MEASUREMENTS:     RIGHT:  1.  Right common iliac artery: 9.9 mm x 8.3 mm   2.  Right external iliac artery: 6.8 mm x 5.5 mm   3.  Right common femoral artery: 7.7 mm x 5.7 mm  4.  Tortuosity: mild   5.  Calcification: severe      LEFT:  1.  Left common iliac artery: 7.9 mm x  6.9 mm  2.  Left external iliac artery: 7.7 mm x 6.9 mm  3.  Left common femoral artery: 6.0 mm x 4.0 mm, there is a densely  calcific plaque in the common femoral at the proposed site of access.  4.  Tortuosity: mild   5.  Calcification: severe      5. SUBCLAVIAN MEASUREMENTS:     RIGHT:  1. Minimal luminal diameter: 8.6 mm x  7.9 mm  2. Tortuosity: mild   3. Calcification: mild      LEFT:  1. Minimal luminal diameter: 9.1 mm x  8.1 mm  2. Tortuosity: mild   3. Calcification: mild      OTHER FINDINGS:   1.  Please review radiology review for incidental non-cardiovascular  findings including lungs, abdominal organs, bone, soft tissue, nodes  to follow separately      CORS/RHC/LHC 4/9/21:  Reba Juarez is a 76 year old female with a PMHx of moderate-severe aortic stenosis, CAD s/p remote PODA, DMII, HTN, HLD and MAYKEL who presents for RHC/LHC/coronary angiogram to assess etiology of pre syncope.   Pre Procedure Diagnosis    moderate-severe aortic stenosis   pre-syncope     Post Procedure Diagnosis    severe aortic stenosis   non-obstructive coronary artery disease       Conclusion      Severe aortic stenosis (Mean gradient 39 mm Hg, TONI 0.83 cm2)    No gradient across mitral valve (PCW-LVEDP)    Non-obstructive coronary artery disease    >>> 25% proximal LMCA stenosis    >>> 50% proximal LAD, 20% mid/distal LAD stenoses    >>> 25% proximal and mid LCx stenoses          Plan      Continued medical management and lifestyle modifications for  cardiovascular risk factor optimizations.    Discharge today per protocol      Follow up for consideration of TAVR.  Case discussed with Dr. Quick.     Hemodynamics Right sided filling pressures are normal. Left sided filling pressures are normal. Mild elevated pulmonary hypertension. Normal cardiac output level.   Mitral Valve Mitral mean gradient stenosis is 4 mm/Hg.   Aortic Valve AV mean gradient is 39 mmHg. AV peak gradient is 36 mmHg. There is severe aortic valve stenosis. No calcification found in the aortic valve. TONI 0.83 cm2.     ECHO 4/16/21:  Impression:     1. Right side:         Degree of stenosis of the internal carotid artery: Less than 50 %.  With predominantly echogenic and irregular plaque.     2. Left side:          Degree of stenosis of the internal carotid artery: 50 to 69%.  Velocities in the proximal and mid ICA have increased since the  comparison exam in 2011.       Echocardiogram dated 3/15/21:    Interpretation Summary     Left ventricular systolic function is normal.  The visual ejection fraction is estimated at 60-65%.  There is moderate concentric left ventricular hypertrophy.  The right ventricle is normal in structure, function and size.  Mild (35-45mmHg) pulmonary hypertension is present.  Moderate-severe calcific aortic stenosis. Vmax 4.0 m/s, Mean gradient 29 mmHg,  DVI of 0.25 and TONI by continuity 1.0 cm2. Normal SVI.  The inferior vena cava was normal in size with preserved respiratory  variability.  Trivial pericardial effusion     Compared to study dated 1/24/2011, aortic stenosis is new and is moderate-  severe. In addition there is evidence of hypertrophy, elevated LV filling  pressures and pulmonary hypertension.    STS RISK SCORE: 2.5%  FRAILTY SCORE: Pending   NYHA CLASS: II-III     CLINICAL IMPRESSION:     Reba Juarez is a very pleasant 76 year old female with severe aortic valvular stenosis referred to our clinic for evaluation and consideration for  potential transcatheter aortic valve replacement (TAVR).  Her severe aortic stenosis is characterized with an area of 0.83cm2 and mean gradient 39 mmHg by invasive valve assessment 4/9/21. CT TAVR today shows severely calcified bicuspid aortic valve with partial fusion of the right and left coronary cusps and aortic valve calcium score is 2414. She is symptomatic with exertional dyspnea, lightheadedness and fatigue.     We discussed the natural history of severe aortic stenosis, associated symptoms and available treatment options including transcatheter aortic valve replacement vs surgical aortic valve replacement. Patient is a appropriate candidate for transcatheter aortic valve replacement based on age, frailty, co-morbidities and surgical mortality risk estimation of 2.5% based on the STS score.      Plan Summary:  1) Severe Aortic Stenosis:  - TAVR work-up is complete, patient wishes to proceed   - Will arrange TAVR in a fairly urgent manner given worsening symptoms  - She was seen by cardiovascular surgery today and is a candidate for open sternotomy in the event of a complication    Patient was seen and evaluated in clinic with IC Dr. Quick and CV surgeon Dr. Hwang.    ANGELA Elmore, CNP  [facility] Cardiology Team    CC  Patient Care Team:  Dora Chapa APRN CNP as PCP - General (Nurse Practitioner)  Cindy Ness MD as Assigned Pulmonology Provider  Homar Harris MD as Assigned Heart and Vascular Provider

## 2021-05-07 LAB — INTERPRETATION ECG - MUSE: NORMAL

## 2021-05-10 ENCOUNTER — VIRTUAL VISIT (OUTPATIENT)
Dept: CARDIOLOGY | Facility: CLINIC | Age: 76
End: 2021-05-10
Attending: NURSE PRACTITIONER
Payer: COMMERCIAL

## 2021-05-10 DIAGNOSIS — I35.0 NONRHEUMATIC AORTIC VALVE STENOSIS: ICD-10-CM

## 2021-05-10 DIAGNOSIS — R06.02 SOB (SHORTNESS OF BREATH): ICD-10-CM

## 2021-05-10 DIAGNOSIS — Z01.818 PRE-OPERATIVE EXAMINATION: Primary | ICD-10-CM

## 2021-05-10 LAB
ABO + RH BLD: NORMAL
ABO + RH BLD: NORMAL
ANION GAP SERPL CALCULATED.3IONS-SCNC: 6 MMOL/L (ref 3–14)
BLD GP AB SCN SERPL QL: NORMAL
BLOOD BANK CMNT PATIENT-IMP: NORMAL
BUN SERPL-MCNC: 14 MG/DL (ref 7–30)
CALCIUM SERPL-MCNC: 9 MG/DL (ref 8.5–10.1)
CHLORIDE SERPL-SCNC: 107 MMOL/L (ref 94–109)
CO2 SERPL-SCNC: 27 MMOL/L (ref 20–32)
CREAT SERPL-MCNC: 0.85 MG/DL (ref 0.52–1.04)
ERYTHROCYTE [DISTWIDTH] IN BLOOD BY AUTOMATED COUNT: 14.5 % (ref 10–15)
GFR SERPL CREATININE-BSD FRML MDRD: 66 ML/MIN/{1.73_M2}
GLUCOSE SERPL-MCNC: 107 MG/DL (ref 70–99)
HCT VFR BLD AUTO: 38.8 % (ref 35–47)
HGB BLD-MCNC: 12.3 G/DL (ref 11.7–15.7)
MCH RBC QN AUTO: 27.7 PG (ref 26.5–33)
MCHC RBC AUTO-ENTMCNC: 31.7 G/DL (ref 31.5–36.5)
MCV RBC AUTO: 87 FL (ref 78–100)
PLATELET # BLD AUTO: 211 10E9/L (ref 150–450)
POTASSIUM SERPL-SCNC: 4.1 MMOL/L (ref 3.4–5.3)
RBC # BLD AUTO: 4.44 10E12/L (ref 3.8–5.2)
SODIUM SERPL-SCNC: 140 MMOL/L (ref 133–144)
SPECIMEN EXP DATE BLD: NORMAL
WBC # BLD AUTO: 11.8 10E9/L (ref 4–11)

## 2021-05-10 PROCEDURE — 86900 BLOOD TYPING SEROLOGIC ABO: CPT | Performed by: PATHOLOGY

## 2021-05-10 PROCEDURE — 86850 RBC ANTIBODY SCREEN: CPT | Performed by: PATHOLOGY

## 2021-05-10 PROCEDURE — 85027 COMPLETE CBC AUTOMATED: CPT | Performed by: PATHOLOGY

## 2021-05-10 PROCEDURE — 36415 COLL VENOUS BLD VENIPUNCTURE: CPT | Performed by: PATHOLOGY

## 2021-05-10 PROCEDURE — 99214 OFFICE O/P EST MOD 30 MIN: CPT | Mod: 95 | Performed by: NURSE PRACTITIONER

## 2021-05-10 PROCEDURE — 86901 BLOOD TYPING SEROLOGIC RH(D): CPT | Performed by: PATHOLOGY

## 2021-05-10 PROCEDURE — 80048 BASIC METABOLIC PNL TOTAL CA: CPT | Performed by: PATHOLOGY

## 2021-05-10 NOTE — PATIENT INSTRUCTIONS
You were seen today in the Structural Heart Clinic at the [facility].    Cardiology provider you saw during your visit: Carmel Weston NP    Summary and Plan:  - Proceed with TAVR, date to be determined  - Our nurse coordinators will contact you with details regarding date/time and when to have labs/covid testing/frailty testing    Medication Recommendations pre-procedure:  -- Continue Aspirin 81 mg daily without interruption - take morning of procedure   -- Hold valsartan/HCTZ and metformin the night before and/or morning of procedure  -- Hold supplements the morning of procedure      Questions and scheduling:   First call: Structural Heart  Mckenna Reese [phone]    General scheduling line: [phone].   First press #1 for the University and then press #3 for Medical Questions to reach the Cardiology triage nurse.     On Call Cardiologist for after hours or on weekends: [phone], press option #4 and ask to speak to the on-call Cardiologist.

## 2021-05-10 NOTE — PROGRESS NOTES
"Adamaris is a 76 year old who is being evaluated via a billable video visit.      How would you like to obtain your AVS? MyChart  If the video visit is dropped, the invitation should be resent by: Text to cell phone: [phone]  Will anyone else be joining your video visit? No      Vitals - Patient Reported  Weight (Patient Reported): 90.7 kg (200 lb)  Height (Patient Reported): 160 cm (5' 3\")  BMI (Based on Pt Reported Ht/Wt): 35.43  Pain Score: No Pain (0)  Pain Loc: Chest        "

## 2021-05-10 NOTE — LETTER
5/10/2021      RE: Reba Juarez  1974 [address] [facility] [id number]       Dear Colleague,    Thank you for the opportunity to participate in the care of your patient, Reba Juarez, at the [facility] [place] at [facility]. Please see a copy of my visit note below.      STRUCTURAL HEART CLINIC  H&P    Referring Provider: Dr. Quick  Primary Cardiologist: Mireille Edwards NP     History of Present Illness  Reba Juarez is a 76 year old female with severe aortic stenosis who presents for pre-operative H&P in preparation for a transcatheter aortic valve replacement at [facility], date to be determined.     Her additional past medical history is notable for HTN, HLD, type II DM, CAD s/p POBA of unknown vessel in 1992 w/recurrent angina in 2006 s/p angiogram with nonobstructive disease and recent cath 4/9/21 w/no hemodynamically significant stenosis, CLL, MAYKEL and gastric ulcer.      Adamaris has a history of moderate aortic stenosis. She was recently evaluated in cardiology clinic with complaints of palpitations, presyncope, chest pain and exertional dyspnea. A ZioPatch showed brief runs of paroxysmal SVT, otherwise no significant arrhythmias. ECHO showed progression of her aortic stenosis from moderate to severe. She was referred for coronary angiogram and RHC/LHC which showed moderate nonobstructive coronary artery disease and severe aortic stenosis with a TONI of 0.83 cm2 and mean PG of 39 mmHg. She has been evaluated by our structural team has been deemed an appropriate TAVR candidate.    Today Adamaris says she is feeling pretty well. She has not had any severe episodes of lightheadedness or shortness of breath over the past few days. She did experience some fleeting lightheadedness and shortness of breath yesterday while working outdoors with her brother. Her symptoms were mild and resolved with rest. She  otherwise denies chest pain, palpitations, orthopnea, PND, leg swelling or weight gain. She is no longer fasting and has been eating small meals throughout the day. She says that eating big meals exacerbates her symptoms. She denies fever, chills, sputum production, dysuria. She has not received her COVID-19 vaccine.     History of blood transfusions/reactions: No  History of abnormal bleeding/anti-platelet use: No bleeding, currently on ASA 81 mg   Steroid use in the last year: No  Personal or family history with difficulty with anesthesia: No     Current Medications:  Current Outpatient Medications   Medication Sig Dispense Refill     aspirin 81 MG tablet Take 1 tablet by mouth daily.       budesonide-formoterol (SYMBICORT) 80-4.5 MCG/ACT Inhaler Inhale 2 puffs into the lungs 2 times daily       metFORMIN (GLUCOPHAGE) 500 MG tablet Take 1,500 mg by mouth        nitroGLYcerin (NITROSTAT) 0.4 MG sublingual tablet Place 0.4 mg under the tongue       ORDER FOR DME Use your CPAP device as directed by your provider.       rosuvastatin (CRESTOR) 40 MG tablet Take 40 mg by mouth       valsartan-hydrochlorothiazide (DIOVAN HCT) 80-12.5 MG tablet Take 1 tablet by mouth daily         Allergies:     Allergies   Allergen Reactions     Pravastatin      Myalgia       Simvastatin      myalgia     Atorvastatin Calcium      Myalgia       Calcium Citrate Unknown     Bloody nose per pt       Past Medical History:  Past Medical History:   Diagnosis Date     Aortic valve sclerosis     echo 1-24-11 mild     Coronary artery disease 1995    PCI at [facility]. Repeat angiogram in 2007     Diabetes (H)      Gastric ulcer 1990     H/O exercise stress test 3/21/2011    No ischemia     Hyperlipidemia      Hyperlipidemia LDL goal < 70     identified 1990's     Hypertension     onset age 50-55 approx     MAYKEL (obstructive sleep apnea) 2007       Past Surgical History:  Past Surgical History:   Procedure Laterality Date     ANGIOPLASTY   early      COLONOSCOPY       CV CORONARY ANGIOGRAM N/A 2021    Procedure: CV CORONARY ANGIOGRAM;  Surgeon: Júnior Santos MD;  Location: [facility]     CV LEFT HEART CATH N/A 2021    Procedure: Left Heart Cath;  Surgeon: Júnior Santos MD;  Location: [facility]     CV RIGHT HEART CATH MEASUREMENTS RECORDED N/A 2021    Procedure: Right Heart Cath;  Surgeon: Júnior Santos MD;  Location: [facility]     EYE SURGERY         Family History:  Family History   Problem Relation Age of Onset     No Known Problems Sister      Alcoholism Brother      Myocardial Infarction Mother 51        HTN onset age 30, was a smoker     Myocardial Infarction Father 63        smoker     Myocardial Infarction Brother 39     No Known Problems Son      No Known Problems Daughter      CABG Brother      Heart Disease Brother      No Known Problems Sister      Cerebrovascular Disease Sister      Coronary Artery Disease Sister      No Known Problems Son        Social History:  Social History     Socioeconomic History     Marital status:      Spouse name: Not on file     Number of children: Not on file     Years of education: Not on file     Highest education level: Not on file   Occupational History     Occupation: retired     Employer: [employer]     Comment:  retired full time, still works a few days as biochemical pharmacist   Social Needs     Financial resource strain: Not on file     Food insecurity     Worry: Not on file     Inability: Not on file     Transportation needs     Medical: Not on file     Non-medical: Not on file   Tobacco Use     Smoking status: Former Smoker     Packs/day: 1.00     Years: 20.00     Pack years: 20.00     Quit date: 3/5/1992     Years since quittin.2     Smokeless tobacco: Never Used   Substance and Sexual Activity     Alcohol use: No     Drug use: No     Sexual activity: Not on file   Lifestyle     Physical activity      Days per week: Not on file     Minutes per session: Not on file     Stress: Not on file   Relationships     Social connections     Talks on phone: Not on file     Gets together: Not on file     Attends [employer] service: Not on file     Active member of club or organization: Not on file     Attends meetings of clubs or organizations: Not on file     Relationship status: Not on file     Intimate partner violence     Fear of current or ex partner: Not on file     Emotionally abused: Not on file     Physically abused: Not on file     Forced sexual activity: Not on file   Other Topics Concern     Parent/sibling w/ CABG, MI or angioplasty before 65F 55M? Not Asked   Social History Narrative    Single and lives with her son and brother.       Review of Systems:    Functional status: Independent in ADL's. Unable to achieve METS > 4, limited by dyspnea, fatigue, lightheadedness.    Constitutional: No fever, chills, or sweats. No weight gain/loss   ENT: No visual disturbance, ear ache, epistaxis, sore throat  Allergies/Immunologic: Negative.   Respiratory: No cough, hemoptysia. + ESPINAL  Cardiovascular: As per HPI  GI: No nausea, vomiting, hematemesis, melena, or hematochezia  : No urinary frequency, dysuria, or hematuria  Integument: Negative  Psychiatric: Negative  Neuro: Negative  Endocrinology: Negative   Musculoskeletal: Negative      Physical Exam:  Vitals: There were no vitals taken for this visit. due to virtual visit.   General: NAD  HEENT:  Dentition intact.    Heart: No JVD  Lungs: No wheezing  Abdomen: Soft, nontender, nondistended.   Extremities: No clubbing, cyanosis, or edema.   Neurologic: Alert and oriented to person/place/time, normal speech, gait and affect  Skin: No petechiae, purpura or rash.    Diagnostic Studies:    ECG 5/6/21:   Personally reviewed and interpreted by me     NSR HR 92     CORS/RHC/LHC 4/9/21:      Reba Juarez is a 76 year old female with a PMHx of moderate-severe aortic  stenosis, CAD s/p remote PODA, DMII, HTN, HLD and MAYKEL who presents for RHC/LHC/coronary angiogram to assess etiology of pre syncope.   Pre Procedure Diagnosis     moderate-severe aortic stenosis   pre-syncope     Post Procedure Diagnosis     severe aortic stenosis   non-obstructive coronary artery disease       Conclusion           Severe aortic stenosis (Mean gradient 39 mm Hg, TONI 0.83 cm2)    No gradient across mitral valve (PCW-LVEDP)    Non-obstructive coronary artery disease    >>> 25% proximal LMCA stenosis    >>> 50% proximal LAD, 20% mid/distal LAD stenoses    >>> 25% proximal and mid LCx stenoses            Plan       Continued medical management and lifestyle modifications for cardiovascular risk factor optimizations.    Discharge today per protocol      Follow up for consideration of TAVR.  Case discussed with Dr. Quick.      Hemodynamics Right sided filling pressures are normal. Left sided filling pressures are normal. Mild elevated pulmonary hypertension. Normal cardiac output level.   Mitral Valve Mitral mean gradient stenosis is 4 mm/Hg.   Aortic Valve AV mean gradient is 39 mmHg. AV peak gradient is 36 mmHg. There is severe aortic valve stenosis. No calcification found in the aortic valve. TONI 0.83 cm2.      ECHO 4/16/21:  Impression:     1. Right side:         Degree of stenosis of the internal carotid artery: Less than 50 %.  With predominantly echogenic and irregular plaque.     2. Left side:          Degree of stenosis of the internal carotid artery: 50 to 69%.  Velocities in the proximal and mid ICA have increased since the  comparison exam in 2011.        Echocardiogram dated 3/15/21:     Interpretation Summary     Left ventricular systolic function is normal.  The visual ejection fraction is estimated at 60-65%.  There is moderate concentric left ventricular hypertrophy.  The right ventricle is normal in structure, function and size.  Mild (35-45mmHg) pulmonary hypertension is  present.  Moderate-severe calcific aortic stenosis. Vmax 4.0 m/s, Mean gradient 29 mmHg,  DVI of 0.25 and TONI by continuity 1.0 cm2. Normal SVI.  The inferior vena cava was normal in size with preserved respiratory  variability.  Trivial pericardial effusion     Compared to study dated 1/24/2011, aortic stenosis is new and is moderate-  severe. In addition there is evidence of hypertrophy, elevated LV filling  pressures and pulmonary hypertension.     Laboratory Studies:  Personally reviewed and interpreted by me.    LIPID RESULTS:  Lab Results   Component Value Date    CHOL 198 03/29/2021    HDL 64 03/29/2021     (H) 03/29/2021    TRIG 159 (H) 03/29/2021    CHOLHDLRATIO 3.9 05/15/2013       LIVER ENZYME RESULTS:  Lab Results   Component Value Date    AST 34 05/06/2021    ALT 34 05/06/2021       CBC RESULTS:  Lab Results   Component Value Date    WBC 8.9 05/06/2021    RBC 4.85 05/06/2021    HGB 13.8 05/06/2021    HCT 41.6 05/06/2021    MCV 86 05/06/2021    MCH 28.5 05/06/2021    MCHC 33.2 05/06/2021    RDW 14.3 05/06/2021     05/06/2021       BMP RESULTS:  Lab Results   Component Value Date     05/06/2021    POTASSIUM 4.1 05/06/2021    CHLORIDE 103 05/06/2021    CO2 25 05/06/2021    ANIONGAP 7 05/06/2021    GLC 92 05/06/2021    BUN 13 05/06/2021    CR 0.87 05/06/2021    GFRESTIMATED 64 05/06/2021    GFRESTBLACK 74 05/06/2021    TERRI 9.4 05/06/2021        Assessment and Plan     Reba Juarez is a 76 year old female with severe aortic stenosis who presents for pre-operative H&P in preparation for a transcatheter aortic valve replacement at [facility], date to be determined.     She has the following specific operative considerations:   - RCRI score: 2 corresponding with 2.1% perioperative risk of MACE  - MAYKEL: Has known MAYKEL on CPAP   - VTE risk = 5. If equal to or > 4, pharmacologic prophylaxis is indicated  - Risk of PONV score = 2.  If > 2, anti-emetic intervention  recommended    1. Severe aortic valve stenosis: Patient reports worsening lightheadedness and exertional dyspnea over the past 4 months. Her echo shows progression of her aortic stenosis from moderate to severe. This was confirmed by invasive valve assessment and CT TAVR. She has been evaluated by our structural heart team and has been deemed an approproate candidate for transcatheter aortic valve replacement. We discussed the procedure in detail, including pre and post-procedure care, restrictions and follow-up.    All questions were answered   Type and screen ordered  COVID test ordered  Supplies for scrubbing provided  No known contrast dye allergy  No trouble with anesthesia in the past  No s/s of infection    2. CAD: Hx of POBA of unknown vessel in 1992. Her pre-TAVR coronary angiogram showed nonobstructive CAD. She denies angina which previously presented as left arm pain radiating up to the jaw/neck/ear. Plan to continue ASA 81 mg and statin therapy.     3. HTN: Continue valsartan/HCTZ. Hold DOS.     4. HLD: Continue Crestor.    5. Type II DM: On metformin, hold DOS.     Patient is optimized and is acceptable candidate for the proposed procedure.  No further diagnostic evaluation is needed. Pre-procedure instructions provided in written format.     Medication Recommendations:  Acei/ARB: Hold valsartan DOS  Diuretic: Hold hydrochlorothiazide DOS  Antiplatelet: Continue ASA 81 mg perioperatively   Coumadin/NOAC: N/A  BB: N/A  Antidiabetics: Hold oral antidiabetics including metformin DOS  Supplements: Hold morning of procedure    ANGELA Elmore, CNP  Structural Heart Care Nurse Practitioner  [facility]  Clinic: [phone]  Pager: [phone]      Adamaris is a 76 year old who is being evaluated via a billable video visit.      How would you like to obtain your AVS? MyChart  If the video visit is dropped, the invitation should be resent by: Text to cell phone: [phone]  Will  "anyone else be joining your video visit? No      Vitals - Patient Reported  Weight (Patient Reported): 90.7 kg (200 lb)  Height (Patient Reported): 160 cm (5' 3\")  BMI (Based on Pt Reported Ht/Wt): 35.43  Pain Score: No Pain (0)  Pain Loc: Chest            Please do not hesitate to contact me if you have any questions/concerns.     Sincerely,     Carmel Weston NP    "

## 2021-05-10 NOTE — PROGRESS NOTES
Reba Juarez is a 76 year old year old who is being evaluated via a billable video visit.      Video-Visit Details    Type of service:  Video Visit    Video Start Time: 10:44 AM    Video End Time (time video stopped): 11:10    Originating Location (pt. Location): Home    Distant Location (provider location):  [facility]     Mode of Communication:  Video Conference via [facility] [place]          STRUCTURAL HEART CLINIC  H&P    Referring Provider: Dr. Quick  Primary Cardiologist: Mireille Edwards NP     History of Present Illness  Reba Juarez is a 76 year old female with severe aortic stenosis who presents for pre-operative H&P in preparation for a transcatheter aortic valve replacement at [facility], date to be determined.     Her additional past medical history is notable for HTN, HLD, type II DM, CAD s/p POBA of unknown vessel in 1992 w/recurrent angina in 2006 s/p angiogram with nonobstructive disease and recent cath 4/9/21 w/no hemodynamically significant stenosis, CLL, MAYKEL and gastric ulcer.      Adamaris has a history of moderate aortic stenosis. She was recently evaluated in cardiology clinic with complaints of palpitations, presyncope, chest pain and exertional dyspnea. A ZioPatch showed brief runs of paroxysmal SVT, otherwise no significant arrhythmias. ECHO showed progression of her aortic stenosis from moderate to severe. She was referred for coronary angiogram and RHC/LHC which showed moderate nonobstructive coronary artery disease and severe aortic stenosis with a TONI of 0.83 cm2 and mean PG of 39 mmHg. She has been evaluated by our structural team has been deemed an appropriate TAVR candidate.    Today Adamaris says she is feeling pretty well. She has not had any severe episodes of lightheadedness or shortness of breath over the past few days. She did experience some fleeting lightheadedness and shortness of breath yesterday while working outdoors with her brother.  Her symptoms were mild and resolved with rest. She otherwise denies chest pain, palpitations, orthopnea, PND, leg swelling or weight gain. She is no longer fasting and has been eating small meals throughout the day. She says that eating big meals exacerbates her symptoms. She denies fever, chills, sputum production, dysuria. She has not received her COVID-19 vaccine.     History of blood transfusions/reactions: No  History of abnormal bleeding/anti-platelet use: No bleeding, currently on ASA 81 mg   Steroid use in the last year: No  Personal or family history with difficulty with anesthesia: No     Current Medications:  Current Outpatient Medications   Medication Sig Dispense Refill     aspirin 81 MG tablet Take 1 tablet by mouth daily.       budesonide-formoterol (SYMBICORT) 80-4.5 MCG/ACT Inhaler Inhale 2 puffs into the lungs 2 times daily       metFORMIN (GLUCOPHAGE) 500 MG tablet Take 1,500 mg by mouth        nitroGLYcerin (NITROSTAT) 0.4 MG sublingual tablet Place 0.4 mg under the tongue       ORDER FOR DME Use your CPAP device as directed by your provider.       rosuvastatin (CRESTOR) 40 MG tablet Take 40 mg by mouth       valsartan-hydrochlorothiazide (DIOVAN HCT) 80-12.5 MG tablet Take 1 tablet by mouth daily         Allergies:     Allergies   Allergen Reactions     Pravastatin      Myalgia       Simvastatin      myalgia     Atorvastatin Calcium      Myalgia       Calcium Citrate Unknown     Bloody nose per pt       Past Medical History:  Past Medical History:   Diagnosis Date     Aortic valve sclerosis     echo 1-24-11 mild     Coronary artery disease 1995    PCI at [facility]. Repeat angiogram in 2007     Diabetes (H)      Gastric ulcer 1990     H/O exercise stress test 3/21/2011    No ischemia     Hyperlipidemia      Hyperlipidemia LDL goal < 70     identified 1990's     Hypertension     onset age 50-55 approx     MAYKEL (obstructive sleep apnea) 2007       Past Surgical History:  Past Surgical  History:   Procedure Laterality Date     ANGIOPLASTY  early      COLONOSCOPY       CV CORONARY ANGIOGRAM N/A 2021    Procedure: CV CORONARY ANGIOGRAM;  Surgeon: Júnior Santos MD;  [facility]     CV LEFT HEART CATH N/A 2021    Procedure: Left Heart Cath;  Surgeon: Júnior Santos MD;  [facility]     CV RIGHT HEART CATH MEASUREMENTS RECORDED N/A 2021    Procedure: Right Heart Cath;  Surgeon: Júnior Santos MD;  [facility]     EYE SURGERY         Family History:  Family History   Problem Relation Age of Onset     No Known Problems Sister      Alcoholism Brother      Myocardial Infarction Mother 51        HTN onset age 30, was a smoker     Myocardial Infarction Father 63        smoker     Myocardial Infarction Brother 39     No Known Problems Son      No Known Problems Daughter      CABG Brother      Heart Disease Brother      No Known Problems Sister      Cerebrovascular Disease Sister      Coronary Artery Disease Sister      No Known Problems Son        Social History:  Social History     Socioeconomic History     Marital status:      Spouse name: Not on file     Number of children: Not on file     Years of education: Not on file     Highest education level: Not on file   Occupational History     Occupation: retired     Employer: [employer]     Comment:  retired full time, still works a few days as biochemical pharmacist   Social Needs     Financial resource strain: Not on file     Food insecurity     Worry: Not on file     Inability: Not on file     Transportation needs     Medical: Not on file     Non-medical: Not on file   Tobacco Use     Smoking status: Former Smoker     Packs/day: 1.00     Years: 20.00     Pack years: 20.00     Quit date: 3/5/1992     Years since quittin.2     Smokeless tobacco: Never Used   Substance and Sexual Activity     Alcohol use: No     Drug use: No     Sexual  activity: Not on file   Lifestyle     Physical activity     Days per week: Not on file     Minutes per session: Not on file     Stress: Not on file   Relationships     Social connections     Talks on phone: Not on file     Gets together: Not on file     Attends [employer] service: Not on file     Active member of club or organization: Not on file     Attends meetings of clubs or organizations: Not on file     Relationship status: Not on file     Intimate partner violence     Fear of current or ex partner: Not on file     Emotionally abused: Not on file     Physically abused: Not on file     Forced sexual activity: Not on file   Other Topics Concern     Parent/sibling w/ CABG, MI or angioplasty before 65F 55M? Not Asked   Social History Narrative    Single and lives with her son and brother.       Review of Systems:    Functional status: Independent in ADL's. Unable to achieve METS > 4, limited by dyspnea, fatigue, lightheadedness.    Constitutional: No fever, chills, or sweats. No weight gain/loss   ENT: No visual disturbance, ear ache, epistaxis, sore throat  Allergies/Immunologic: Negative.   Respiratory: No cough, hemoptysia. + ESPINAL  Cardiovascular: As per HPI  GI: No nausea, vomiting, hematemesis, melena, or hematochezia  : No urinary frequency, dysuria, or hematuria  Integument: Negative  Psychiatric: Negative  Neuro: Negative  Endocrinology: Negative   Musculoskeletal: Negative      Physical Exam:  Vitals: There were no vitals taken for this visit. due to virtual visit.   General: NAD  HEENT:  Dentition intact.    Heart: No JVD  Lungs: No wheezing  Abdomen: Soft, nontender, nondistended.   Extremities: No clubbing, cyanosis, or edema.   Neurologic: Alert and oriented to person/place/time, normal speech, gait and affect  Skin: No petechiae, purpura or rash.    Diagnostic Studies:    ECG 5/6/21:   Personally reviewed and interpreted by me     NSR HR 92     CORS/RHC/LHC 4/9/21:      Reba Juarez is a 76  year old female with a PMHx of moderate-severe aortic stenosis, CAD s/p remote PODA, DMII, HTN, HLD and MAYKEL who presents for RHC/LHC/coronary angiogram to assess etiology of pre syncope.   Pre Procedure Diagnosis     moderate-severe aortic stenosis   pre-syncope     Post Procedure Diagnosis     severe aortic stenosis   non-obstructive coronary artery disease       Conclusion           Severe aortic stenosis (Mean gradient 39 mm Hg, TONI 0.83 cm2)    No gradient across mitral valve (PCW-LVEDP)    Non-obstructive coronary artery disease    >>> 25% proximal LMCA stenosis    >>> 50% proximal LAD, 20% mid/distal LAD stenoses    >>> 25% proximal and mid LCx stenoses            Plan       Continued medical management and lifestyle modifications for cardiovascular risk factor optimizations.    Discharge today per protocol      Follow up for consideration of TAVR.  Case discussed with Dr. Quick.      Hemodynamics Right sided filling pressures are normal. Left sided filling pressures are normal. Mild elevated pulmonary hypertension. Normal cardiac output level.   Mitral Valve Mitral mean gradient stenosis is 4 mm/Hg.   Aortic Valve AV mean gradient is 39 mmHg. AV peak gradient is 36 mmHg. There is severe aortic valve stenosis. No calcification found in the aortic valve. TONI 0.83 cm2.      ECHO 4/16/21:  Impression:     1. Right side:         Degree of stenosis of the internal carotid artery: Less than 50 %.  With predominantly echogenic and irregular plaque.     2. Left side:          Degree of stenosis of the internal carotid artery: 50 to 69%.  Velocities in the proximal and mid ICA have increased since the  comparison exam in 2011.        Echocardiogram dated 3/15/21:     Interpretation Summary     Left ventricular systolic function is normal.  The visual ejection fraction is estimated at 60-65%.  There is moderate concentric left ventricular hypertrophy.  The right ventricle is normal in structure, function and  size.  Mild (35-45mmHg) pulmonary hypertension is present.  Moderate-severe calcific aortic stenosis. Vmax 4.0 m/s, Mean gradient 29 mmHg,  DVI of 0.25 and TONI by continuity 1.0 cm2. Normal SVI.  The inferior vena cava was normal in size with preserved respiratory  variability.  Trivial pericardial effusion     Compared to study dated 1/24/2011, aortic stenosis is new and is moderate-  severe. In addition there is evidence of hypertrophy, elevated LV filling  pressures and pulmonary hypertension.     Laboratory Studies:  Personally reviewed and interpreted by me.    LIPID RESULTS:  Lab Results   Component Value Date    CHOL 198 03/29/2021    HDL 64 03/29/2021     (H) 03/29/2021    TRIG 159 (H) 03/29/2021    CHOLHDLRATIO 3.9 05/15/2013       LIVER ENZYME RESULTS:  Lab Results   Component Value Date    AST 34 05/06/2021    ALT 34 05/06/2021       CBC RESULTS:  Lab Results   Component Value Date    WBC 8.9 05/06/2021    RBC 4.85 05/06/2021    HGB 13.8 05/06/2021    HCT 41.6 05/06/2021    MCV 86 05/06/2021    MCH 28.5 05/06/2021    MCHC 33.2 05/06/2021    RDW 14.3 05/06/2021     05/06/2021       BMP RESULTS:  Lab Results   Component Value Date     05/06/2021    POTASSIUM 4.1 05/06/2021    CHLORIDE 103 05/06/2021    CO2 25 05/06/2021    ANIONGAP 7 05/06/2021    GLC 92 05/06/2021    BUN 13 05/06/2021    CR 0.87 05/06/2021    GFRESTIMATED 64 05/06/2021    GFRESTBLACK 74 05/06/2021    TERRI 9.4 05/06/2021        Assessment and Plan     Reba Juarez is a 76 year old female with severe aortic stenosis who presents for pre-operative H&P in preparation for a transcatheter aortic valve replacement at [facility], date to be determined.     She has the following specific operative considerations:   - RCRI score: 2 corresponding with 2.1% perioperative risk of MACE  - MAYKEL: Has known MAYKEL on CPAP   - VTE risk = 5. If equal to or > 4, pharmacologic prophylaxis is indicated  - Risk of  PONV score = 2.  If > 2, anti-emetic intervention recommended    1. Severe aortic valve stenosis: Patient reports worsening lightheadedness and exertional dyspnea over the past 4 months. Her echo shows progression of her aortic stenosis from moderate to severe. This was confirmed by invasive valve assessment and CT TAVR. She has been evaluated by our structural heart team and has been deemed an approproate candidate for transcatheter aortic valve replacement. We discussed the procedure in detail, including pre and post-procedure care, restrictions and follow-up.    All questions were answered   Type and screen ordered  COVID test ordered  Supplies for scrubbing provided  No known contrast dye allergy  No trouble with anesthesia in the past  No s/s of infection    2. CAD: Hx of POBA of unknown vessel in 1992. Her pre-TAVR coronary angiogram showed nonobstructive CAD. She denies angina which previously presented as left arm pain radiating up to the jaw/neck/ear. Plan to continue ASA 81 mg and statin therapy.     3. HTN: Continue valsartan/HCTZ. Hold DOS.     4. HLD: Continue Crestor.    5. Type II DM: On metformin, hold DOS.     Patient is optimized and is acceptable candidate for the proposed procedure.  No further diagnostic evaluation is needed. Pre-procedure instructions provided in written format.     Medication Recommendations:  Acei/ARB: Hold valsartan DOS  Diuretic: Hold hydrochlorothiazide DOS  Antiplatelet: Continue ASA 81 mg perioperatively   Coumadin/NOAC: N/A  BB: N/A  Antidiabetics: Hold oral antidiabetics including metformin DOS  Supplements: Hold morning of procedure    ANGELA Elmore, CNP  Structural Heart Care Nurse Practitioner  [facility]  Clinic: [phone]  Pager: [phone]

## 2021-05-13 DIAGNOSIS — Z11.59 ENCOUNTER FOR SCREENING FOR OTHER VIRAL DISEASES: ICD-10-CM

## 2021-05-13 DIAGNOSIS — I35.0 SEVERE AORTIC STENOSIS: Primary | ICD-10-CM

## 2021-05-13 NOTE — PROGRESS NOTES
Date: 5/13/2021    Time of Call: 2:42 PM     Diagnosis:  Severe aortic stenosis     [ TORB ] Ordering provider: Harvinder Quick MD  Order: TAVR scheduling orders, ECHO     Order received by: Bianka Sutherland RN     Follow-up/additional notes:

## 2021-05-14 DIAGNOSIS — Z01.818 PRE-OPERATIVE EXAMINATION: Primary | ICD-10-CM

## 2021-05-17 DIAGNOSIS — Z11.59 ENCOUNTER FOR SCREENING FOR OTHER VIRAL DISEASES: ICD-10-CM

## 2021-05-17 DIAGNOSIS — Z01.818 PRE-OPERATIVE EXAMINATION: ICD-10-CM

## 2021-05-17 DIAGNOSIS — I35.0 SEVERE AORTIC STENOSIS: ICD-10-CM

## 2021-05-17 DIAGNOSIS — Z01.818 PRE-OPERATIVE EXAMINATION: Primary | ICD-10-CM

## 2021-05-17 LAB
ALBUMIN SERPL-MCNC: 3.8 G/DL (ref 3.4–5)
ALP SERPL-CCNC: 87 U/L (ref 40–150)
ALT SERPL W P-5'-P-CCNC: 27 U/L (ref 0–50)
ANION GAP SERPL CALCULATED.3IONS-SCNC: 6 MMOL/L (ref 3–14)
AST SERPL W P-5'-P-CCNC: 17 U/L (ref 0–45)
BILIRUB SERPL-MCNC: 0.3 MG/DL (ref 0.2–1.3)
BUN SERPL-MCNC: 18 MG/DL (ref 7–30)
CALCIUM SERPL-MCNC: 9.5 MG/DL (ref 8.5–10.1)
CHLORIDE SERPL-SCNC: 110 MMOL/L (ref 94–109)
CO2 SERPL-SCNC: 26 MMOL/L (ref 20–32)
CREAT SERPL-MCNC: 0.88 MG/DL (ref 0.52–1.04)
ERYTHROCYTE [DISTWIDTH] IN BLOOD BY AUTOMATED COUNT: 14.6 % (ref 10–15)
GFR SERPL CREATININE-BSD FRML MDRD: 63 ML/MIN/{1.73_M2}
GLUCOSE SERPL-MCNC: 100 MG/DL (ref 70–99)
HCT VFR BLD AUTO: 38.2 % (ref 35–47)
HGB BLD-MCNC: 12.3 G/DL (ref 11.7–15.7)
INR PPP: 1.04 (ref 0.86–1.14)
LABORATORY COMMENT REPORT: NORMAL
MCH RBC QN AUTO: 27.2 PG (ref 26.5–33)
MCHC RBC AUTO-ENTMCNC: 32.2 G/DL (ref 31.5–36.5)
MCV RBC AUTO: 85 FL (ref 78–100)
PLATELET # BLD AUTO: 227 10E9/L (ref 150–450)
POTASSIUM SERPL-SCNC: 4.6 MMOL/L (ref 3.4–5.3)
PROT SERPL-MCNC: 7.6 G/DL (ref 6.8–8.8)
RBC # BLD AUTO: 4.52 10E12/L (ref 3.8–5.2)
SARS-COV-2 RNA RESP QL NAA+PROBE: NEGATIVE
SARS-COV-2 RNA RESP QL NAA+PROBE: NORMAL
SODIUM SERPL-SCNC: 142 MMOL/L (ref 133–144)
SPECIMEN SOURCE: NORMAL
SPECIMEN SOURCE: NORMAL
WBC # BLD AUTO: 12.5 10E9/L (ref 4–11)

## 2021-05-17 PROCEDURE — 85027 COMPLETE CBC AUTOMATED: CPT | Performed by: PATHOLOGY

## 2021-05-17 PROCEDURE — 86900 BLOOD TYPING SEROLOGIC ABO: CPT | Performed by: PATHOLOGY

## 2021-05-17 PROCEDURE — 86850 RBC ANTIBODY SCREEN: CPT | Performed by: PATHOLOGY

## 2021-05-17 PROCEDURE — 86901 BLOOD TYPING SEROLOGIC RH(D): CPT | Performed by: PATHOLOGY

## 2021-05-17 PROCEDURE — 86923 COMPATIBILITY TEST ELECTRIC: CPT | Mod: 90 | Performed by: PATHOLOGY

## 2021-05-17 PROCEDURE — U0003 INFECTIOUS AGENT DETECTION BY NUCLEIC ACID (DNA OR RNA); SEVERE ACUTE RESPIRATORY SYNDROME CORONAVIRUS 2 (SARS-COV-2) (CORONAVIRUS DISEASE [COVID-19]), AMPLIFIED PROBE TECHNIQUE, MAKING USE OF HIGH THROUGHPUT TECHNOLOGIES AS DESCRIBED BY CMS-2020-01-R: HCPCS | Mod: 90 | Performed by: PATHOLOGY

## 2021-05-17 PROCEDURE — 85610 PROTHROMBIN TIME: CPT | Performed by: PATHOLOGY

## 2021-05-17 PROCEDURE — 36415 COLL VENOUS BLD VENIPUNCTURE: CPT | Performed by: PATHOLOGY

## 2021-05-17 PROCEDURE — 80053 COMPREHEN METABOLIC PANEL: CPT | Performed by: PATHOLOGY

## 2021-05-17 PROCEDURE — U0005 INFEC AGEN DETEC AMPLI PROBE: HCPCS | Mod: 90 | Performed by: PATHOLOGY

## 2021-05-17 RX ORDER — ASPIRIN 81 MG/1
81 TABLET ORAL DAILY
Status: CANCELLED | OUTPATIENT
Start: 2021-05-17

## 2021-05-17 RX ORDER — SODIUM CHLORIDE 9 MG/ML
INJECTION, SOLUTION INTRAVENOUS CONTINUOUS
Status: CANCELLED | OUTPATIENT
Start: 2021-05-17

## 2021-05-17 RX ORDER — LIDOCAINE 40 MG/G
CREAM TOPICAL
Status: CANCELLED | OUTPATIENT
Start: 2021-05-17

## 2021-05-17 RX ORDER — CEFAZOLIN SODIUM 2 G/50ML
2 SOLUTION INTRAVENOUS
Status: CANCELLED | OUTPATIENT
Start: 2021-05-17

## 2021-05-17 NOTE — H&P
[facility] History and Physicial  Reba Juarez MRN: [id number]  1945  Date of Admission:(Not on file)  Primary care provider: Dora Chapa      Assessment and Plan:     Reba Juarez is a 76 year old female with a past medical history significant for HTN, HLD, T2DM, CAD with remote POBA history, non-obstructive CAD per angiogram 4/2021, and severe symptomatic aortic stenosis.     # Severe symptomatic aortic stenosis  ## Chronic diastolic heart failure secondary to valvular disease, resolved  Patient presenting with progressive dyspnea and lightheadedness. Found to have moderate-severe aortic stenosis by TTE, later confirmed as severe by invasive valve assessment w/mean gradient of 39 mmHg and TONI 0.83 cm2 and aortic valve calcium score of 2414. Now s/p Transcatheter Aortic Valve Replacement with a 23 mm Little Ellie 3 Ultra. Intraoperative LVEDP 16. Post-procedure echo with mean PG of 4 mmHg without PVL. Neuro's intact. Sheath sites soft without hematoma. No arrhythmias.   - Bedrest per protocol - 4 hours.    - Neuro checks, per protocol.  - Echocardiogram tomorrow.   - Monitor groin sites.   - EKG in morning.   - Ceballos can be removed once patient is out of bed.   - ASA 81 mg daily for anti-platelet therapy.   - Lifelong SBE prophylaxis  - Cardiac rehab/PT/OT.  - Daily weights and strict intake/output.  - Diurese as needed  - F/up structural KIN in 1 week and 1 month       #Non-obstructive CAD  #HTN  #HLD  - continue PTA aspirin  - continue PTA rosuvastatin 40mg   - likely resume PTA valsartan-hydrochlorothiazide 80-12.5mg in AM pending BP    #T2DM  - repeat A1C  - hold PTA metformin x 48 hours  - moderate intensity SSI for now    #MAYKEL  - CPAP with sleep    FEN: Cardiac/Diabetic diet  Prophylaxis:  DVT: Ambulation PPI: None   Lines: peripherals   Disposition: [facility].   Code Status: FULL CODE         Chief Complaint:   Shortness of breath         History of Present  Illness:   Reba Juarez is a 76 year old female with a past medical history significant for HTN, HLD, T2DM, CAD with remote POBA history, non-obstructive CAD per angiogram 4/2021, and severe symptomatic aortic stenosis.     Patient was recently seen in clinic and complained of accelerating episodes of lightheadedness, fatigue, and dyspnea on exertion. She also was experiencing recurrent pre-syncopal episodes. It was noted that her aortic stenosis progressed from moderate to severe, and thus was referred for TAVR.     Now status-post successful transfemoral TAVR with a 23 mm Little Ellie 3 Ultra. The TAVR and post-procedural course were notable for no complications or conduction abnormalities. Intra-procedure TTE showed mean PG of 4 mmHg without PVL. Right femoral arteriotomy closed with suture closure device and left closed with angioseal. Patient seen in PACU. VSS, neuro's intact, groin sites flat and dry.            Review of Systems:    10 point review of systems negative except for stated above in HPI.          Past Medical History:   Medical History reviewed.   Past Medical History:   Diagnosis Date     Aortic valve sclerosis     echo 1-24-11 mild     Coronary artery disease 1995    PCI at [facility]. Repeat angiogram in 2007     Diabetes (H)      Gastric ulcer 1990     H/O exercise stress test 3/21/2011    No ischemia     Hyperlipidemia      Hyperlipidemia LDL goal < 70     identified 1990's     Hypertension     onset age 50-55 approx     MAYKEL (obstructive sleep apnea) 2007             Past Surgical History:   Surgical History reviewed.   Past Surgical History:   Procedure Laterality Date     ANGIOPLASTY  early 1990s     COLONOSCOPY  2010     CV CORONARY ANGIOGRAM N/A 4/9/2021    Procedure: CV CORONARY ANGIOGRAM;  Surgeon: Júnior Santos MD;  Location:  HEART CARDIAC CATH LAB     CV LEFT HEART CATH N/A 4/9/2021    Procedure: Left Heart Cath;  Surgeon: Júnior Santos MD;  Location:   HEART CARDIAC CATH LAB     CV RIGHT HEART CATH MEASUREMENTS RECORDED N/A 2021    Procedure: Right Heart Cath;  Surgeon: Júnior Santos MD;  Location:  HEART CARDIAC CATH LAB     EYE SURGERY               Social History:   Social History reviewed.  Social History     Tobacco Use     Smoking status: Former Smoker     Packs/day: 1.00     Years: 20.00     Pack years: 20.00     Quit date: 3/5/1992     Years since quittin.2     Smokeless tobacco: Never Used   Substance Use Topics     Alcohol use: No             Family History:   Family History reviewed.   Family History   Problem Relation Age of Onset     No Known Problems Sister      Alcoholism Brother      Myocardial Infarction Mother 51        HTN onset age 30, was a smoker     Myocardial Infarction Father 63        smoker     Myocardial Infarction Brother 39     No Known Problems Son      No Known Problems Daughter      CABG Brother      Heart Disease Brother      No Known Problems Sister      Cerebrovascular Disease Sister      Coronary Artery Disease Sister      No Known Problems Son              Allergies:     Allergies   Allergen Reactions     Pravastatin      Myalgia       Simvastatin      myalgia     Atorvastatin Calcium      Myalgia       Calcium Citrate Unknown     Bloody nose per pt             Medications:   Medications Reviewed.   No current facility-administered medications for this encounter.      Current Outpatient Medications   Medication Sig     aspirin 81 MG tablet Take 1 tablet by mouth daily.     budesonide-formoterol (SYMBICORT) 80-4.5 MCG/ACT Inhaler Inhale 2 puffs into the lungs 2 times daily     metFORMIN (GLUCOPHAGE) 500 MG tablet Take 1,500 mg by mouth      nitroGLYcerin (NITROSTAT) 0.4 MG sublingual tablet Place 0.4 mg under the tongue     ORDER FOR DME Use your CPAP device as directed by your provider.     rosuvastatin (CRESTOR) 40 MG tablet Take 40 mg by mouth     valsartan-hydrochlorothiazide (DIOVAN HCT) 80-12.5 MG  "tablet Take 1 tablet by mouth daily             Physical Exam:   Vitals were reviewed.  Blood pressure 138/77, temperature 98.2  F (36.8  C), temperature source Oral, resp. rate 20, height 1.6 m (5' 3\"), weight 89.4 kg (197 lb 1.5 oz).    General: AAOx3, NAD  Skin: Not jaundiced, no rash, no ecchymoses  HEENT: MMM, PERRLA, EOM intact  CV: RRR, normal S1S2, no murmur, clicks, rubs  Resp: Clear to auscultation bilaterally, no wheezes, rhonchi  Abd: Soft, non-tender, BS+, no masses appreciated  Extremities: warm and well perfused, palpable pulses, no edema. Groin sites flat/dry.   Neuro: No lateralizing symptoms or focal neurologic deficits        Labs:   Routine Labs:  No results found for: TROPI, TROPONIN, TROPR, TROPN  CMP  Recent Labs   Lab 05/17/21  1246      POTASSIUM 4.6   CHLORIDE 110*   CO2 26   ANIONGAP 6   *   BUN 18   CR 0.88   GFRESTIMATED 63   GFRESTBLACK 74   TERRI 9.5   PROTTOTAL 7.6   ALBUMIN 3.8   BILITOTAL 0.3   ALKPHOS 87   AST 17   ALT 27     CBC  Recent Labs   Lab 05/17/21  1246   WBC 12.5*   RBC 4.52   HGB 12.3   HCT 38.2   MCV 85   MCH 27.2   MCHC 32.2   RDW 14.6        INR  Recent Labs   Lab 05/17/21  1246   INR 1.04           Diagnostics:      Echo result w/o MOPS: Interpretation Summary Left ventricular systolic function is normal.The visual ejection fraction is estimated at 60-65%.There is moderate concentric left ventricular hypertrophy.The right ventricle is normal in structure, function and size.Mild (35-45mmHg) pulmonary hypertension is present.Moderate-severe calcific aortic stenosis. Vmax 4.0 m/s, Mean gradient 29 mmHg,DVI of 0.25 and TONI by continuity 1.0 cm2. Normal SVI.The inferior vena cava was normal in size with preserved respiratoryvariability.Trivial pericardial effusion Compared to study dated 1/24/2011, aortic stenosis is new and is moderate-severe. In addition there is evidence of hypertrophy, elevated LV fillingpressures and pulmonary " hypertension.

## 2021-05-18 ENCOUNTER — ANESTHESIA EVENT (OUTPATIENT)
Dept: SURGERY | Facility: CLINIC | Age: 76
DRG: 267 | End: 2021-05-18
Payer: COMMERCIAL

## 2021-05-18 NOTE — ANESTHESIA PREPROCEDURE EVALUATION
Anesthesia Pre-Procedure Evaluation    Patient: Reba Juarez   MRN: [id number] : 1945        Preoperative Diagnosis: Severe aortic stenosis [I35.0]   Procedure : Procedure(s):  Transfemoral subclavian or transapical Transcatheter (Little) Aortic Valve Replacement  possible emergency open heart bypass and or balloon pump placement and any indicated procedure     Past Medical History:   Diagnosis Date     Aortic valve sclerosis     echo 11 mild     Coronary artery disease     PCI at [facility]. Repeat angiogram in      Diabetes (H)      Gastric ulcer      H/O exercise stress test 3/21/2011    No ischemia     Hyperlipidemia      Hyperlipidemia LDL goal < 70     identified      Hypertension     onset age 50-55 approx     MAYKEL (obstructive sleep apnea)       Past Surgical History:   Procedure Laterality Date     ANGIOPLASTY  early      COLONOSCOPY       CV CORONARY ANGIOGRAM N/A 2021    Procedure: CV CORONARY ANGIOGRAM;  Surgeon: Júnior Santos MD;  [facility]     CV LEFT HEART CATH N/A 2021    Procedure: Left Heart Cath;  Surgeon: Júnior Santos MD;  [facility]     CV RIGHT HEART CATH MEASUREMENTS RECORDED N/A 2021    Procedure: Right Heart Cath;  Surgeon: Júnior Santos MD;  [facility]     EYE SURGERY        Allergies   Allergen Reactions     Pravastatin      Myalgia       Simvastatin      myalgia     Atorvastatin Calcium      Myalgia       Calcium Citrate Unknown     Bloody nose per pt      Social History     Tobacco Use     Smoking status: Former Smoker     Packs/day: 1.00     Years: 20.00     Pack years: 20.00     Quit date: 3/5/1992     Years since quittin.2     Smokeless tobacco: Never Used   Substance Use Topics     Alcohol use: No      Wt Readings from Last 1 Encounters:   21 90.9 kg (200 lb 4.8 oz)        Anesthesia Evaluation             ROS/MED HX  ENT/Pulmonary:     (+) sleep apnea,     Neurologic:  - neg neurologic ROS     Cardiovascular:     (+) Dyslipidemia hypertension--CAD ---valvular problems/murmurs type: AS severe. Previous cardiac testing   Echo: Date: 3/2021 Results:  Left ventricular systolic function is normal.  The visual ejection fraction is estimated at 60-65%.  There is moderate concentric left ventricular hypertrophy.  The right ventricle is normal in structure, function and size.  Mild (35-45mmHg) pulmonary hypertension is present.  Moderate-severe calcific aortic stenosis. Vmax 4.0 m/s, Mean gradient 29 mmHg,  DVI of 0.25 and TONI by continuity 1.0 cm2. Normal SVI.  The inferior vena cava was normal in size with preserved respiratory  variability.  Trivial pericardial effusion  Stress Test: Date: Results:    ECG Reviewed: Date: Results:    Cath: Date: 4/2021 Results:  Severe aortic stenosis (Mean gradient 39 mm Hg, TONI 0.83 cm2)  No gradient across mitral valve (PCW-LVEDP)  Non-obstructive coronary artery disease  >>> 25% proximal LMCA stenosis  >>> 50% proximal LAD, 20% mid/distal LAD stenoses  >>> 25% proximal and mid LCx stenoses    METS/Exercise Tolerance:     Hematologic:     (+) anemia (12.5),     Musculoskeletal:  - neg musculoskeletal ROS     GI/Hepatic:  - neg GI/hepatic ROS     Renal/Genitourinary:  - neg Renal ROS     Endo:     (+) type II DM, Obesity,     Psychiatric/Substance Use:  - neg psychiatric ROS     Infectious Disease:  - neg infectious disease ROS     Malignancy:  - neg malignancy ROS     Other:            Physical Exam    Airway  airway exam normal           Respiratory Devices and Support         Dental  no notable dental history         Cardiovascular           (+) murmur       Pulmonary   pulmonary exam normal                OUTSIDE LABS:  CBC:   Lab Results   Component Value Date    WBC 12.5 (H) 05/17/2021    WBC 11.8 (H) 05/10/2021    HGB 12.3 05/17/2021    HGB 12.3 05/10/2021    HCT 38.2  05/17/2021    HCT 38.8 05/10/2021     05/17/2021     05/10/2021     BMP:   Lab Results   Component Value Date     05/17/2021     05/10/2021    POTASSIUM 4.6 05/17/2021    POTASSIUM 4.1 05/10/2021    CHLORIDE 110 (H) 05/17/2021    CHLORIDE 107 05/10/2021    CO2 26 05/17/2021    CO2 27 05/10/2021    BUN 18 05/17/2021    BUN 14 05/10/2021    CR 0.88 05/17/2021    CR 0.85 05/10/2021     (H) 05/17/2021     (H) 05/10/2021     COAGS:   Lab Results   Component Value Date    INR 1.04 05/17/2021     POC: No results found for: BGM, HCG, HCGS  HEPATIC:   Lab Results   Component Value Date    ALBUMIN 3.8 05/17/2021    PROTTOTAL 7.6 05/17/2021    ALT 27 05/17/2021    AST 17 05/17/2021    ALKPHOS 87 05/17/2021    BILITOTAL 0.3 05/17/2021     OTHER:   Lab Results   Component Value Date    A1C 5.6 09/20/2007    TERRI 9.5 05/17/2021    TSH 1.38 08/30/2007       Anesthesia Plan    ASA Status:  3      Anesthesia Type: MAC.         Techniques and Equipment:     - Lines/Monitors: 2nd IV     Consents    Anesthesia Plan(s) and associated risks, benefits, and realistic alternatives discussed. Questions answered and patient/representative(s) expressed understanding.     - Discussed with:  Patient      - Extended Intubation/Ventilatory Support Discussed: No.      - Patient is DNR/DNI Status: No    Use of blood products discussed: Yes.     - Discussed with: Patient.     - Consented: consented to blood products            Reason for refusal: other.     Postoperative Care            Comments:                Jose Franks DO

## 2021-05-19 ENCOUNTER — APPOINTMENT (OUTPATIENT)
Dept: GENERAL RADIOLOGY | Facility: CLINIC | Age: 76
DRG: 267 | End: 2021-05-19
Attending: INTERNAL MEDICINE
Payer: COMMERCIAL

## 2021-05-19 ENCOUNTER — HOSPITAL ENCOUNTER (INPATIENT)
Dept: CARDIOLOGY | Facility: CLINIC | Age: 76
Setting detail: SURGERY ADMIT
DRG: 267 | End: 2021-05-19
Attending: INTERNAL MEDICINE | Admitting: INTERNAL MEDICINE
Payer: COMMERCIAL

## 2021-05-19 ENCOUNTER — HOSPITAL ENCOUNTER (INPATIENT)
Facility: CLINIC | Age: 76
LOS: 1 days | Discharge: HOME | DRG: 267 | End: 2021-05-20
Attending: INTERNAL MEDICINE | Admitting: INTERNAL MEDICINE
Payer: COMMERCIAL

## 2021-05-19 ENCOUNTER — ANESTHESIA (OUTPATIENT)
Dept: SURGERY | Facility: CLINIC | Age: 76
DRG: 267 | End: 2021-05-19
Payer: COMMERCIAL

## 2021-05-19 DIAGNOSIS — I35.0 SEVERE AORTIC STENOSIS: ICD-10-CM

## 2021-05-19 DIAGNOSIS — R94.39 ABNORMAL RESULT OF OTHER CARDIOVASCULAR FUNCTION STUDY: Primary | ICD-10-CM

## 2021-05-19 DIAGNOSIS — Z95.2 S/P TAVR (TRANSCATHETER AORTIC VALVE REPLACEMENT): ICD-10-CM

## 2021-05-19 DIAGNOSIS — I10 ESSENTIAL HYPERTENSION: Primary | ICD-10-CM

## 2021-05-19 DIAGNOSIS — Z01.818 PRE-OPERATIVE EXAMINATION: ICD-10-CM

## 2021-05-19 DIAGNOSIS — R00.2 PALPITATIONS: ICD-10-CM

## 2021-05-19 PROBLEM — Z98.890 STATUS POST CORONARY ANGIOGRAM: Status: ACTIVE | Noted: 2021-05-19

## 2021-05-19 LAB
ABO + RH BLD: NORMAL
ABO + RH BLD: NORMAL
BLD GP AB SCN SERPL QL: NORMAL
BLD PROD TYP BPU: NORMAL
BLD UNIT ID BPU: 0
BLD UNIT ID BPU: 0
BLOOD BANK CMNT PATIENT-IMP: NORMAL
BLOOD PRODUCT CODE: NORMAL
BLOOD PRODUCT CODE: NORMAL
BPU ID: NORMAL
BPU ID: NORMAL
GLUCOSE BLDC GLUCOMTR-MCNC: 108 MG/DL (ref 70–99)
GLUCOSE BLDC GLUCOMTR-MCNC: 120 MG/DL (ref 70–99)
GLUCOSE BLDC GLUCOMTR-MCNC: 120 MG/DL (ref 70–99)
GLUCOSE BLDC GLUCOMTR-MCNC: 78 MG/DL (ref 70–99)
GLUCOSE BLDC GLUCOMTR-MCNC: 91 MG/DL (ref 70–99)
INTERPRETATION ECG - MUSE: NORMAL
NUM BPU REQUESTED: 4
SPECIMEN EXP DATE BLD: NORMAL
TRANSFUSION STATUS PATIENT QL: NORMAL

## 2021-05-19 PROCEDURE — 93005 ELECTROCARDIOGRAM TRACING: CPT

## 2021-05-19 PROCEDURE — 258N000003 HC RX IP 258 OP 636: Performed by: STUDENT IN AN ORGANIZED HEALTH CARE EDUCATION/TRAINING PROGRAM

## 2021-05-19 PROCEDURE — 250N000011 HC RX IP 250 OP 636: Performed by: NURSE PRACTITIONER

## 2021-05-19 PROCEDURE — 71045 X-RAY EXAM CHEST 1 VIEW: CPT

## 2021-05-19 PROCEDURE — 82962 GLUCOSE BLOOD TEST: CPT

## 2021-05-19 PROCEDURE — 250N000013 HC RX MED GY IP 250 OP 250 PS 637: Performed by: INTERNAL MEDICINE

## 2021-05-19 PROCEDURE — 71045 X-RAY EXAM CHEST 1 VIEW: CPT | Mod: 26 | Performed by: RADIOLOGY

## 2021-05-19 PROCEDURE — 710N000010 HC RECOVERY PHASE 1, LEVEL 2, PER MIN: Performed by: INTERNAL MEDICINE

## 2021-05-19 PROCEDURE — 250N000013 HC RX MED GY IP 250 OP 250 PS 637: Performed by: STUDENT IN AN ORGANIZED HEALTH CARE EDUCATION/TRAINING PROGRAM

## 2021-05-19 PROCEDURE — 370N000017 HC ANESTHESIA TECHNICAL FEE, PER MIN: Performed by: INTERNAL MEDICINE

## 2021-05-19 PROCEDURE — C1894 INTRO/SHEATH, NON-LASER: HCPCS | Performed by: INTERNAL MEDICINE

## 2021-05-19 PROCEDURE — 93306 TTE W/DOPPLER COMPLETE: CPT

## 2021-05-19 PROCEDURE — 250N000011 HC RX IP 250 OP 636: Performed by: STUDENT IN AN ORGANIZED HEALTH CARE EDUCATION/TRAINING PROGRAM

## 2021-05-19 PROCEDURE — 250N000009 HC RX 250: Performed by: STUDENT IN AN ORGANIZED HEALTH CARE EDUCATION/TRAINING PROGRAM

## 2021-05-19 PROCEDURE — C1769 GUIDE WIRE: HCPCS | Performed by: INTERNAL MEDICINE

## 2021-05-19 PROCEDURE — 999N000015 HC STATISTIC ARTERIAL MONITORING DAILY

## 2021-05-19 PROCEDURE — 410N000003 HC PER-PERFUSION 1ST 30 MIN: Performed by: INTERNAL MEDICINE

## 2021-05-19 PROCEDURE — 99222 1ST HOSP IP/OBS MODERATE 55: CPT | Mod: 25 | Performed by: NURSE PRACTITIONER

## 2021-05-19 PROCEDURE — 02RF38Z REPLACEMENT OF AORTIC VALVE WITH ZOOPLASTIC TISSUE, PERCUTANEOUS APPROACH: ICD-10-PCS | Performed by: INTERNAL MEDICINE

## 2021-05-19 PROCEDURE — 999N000054 HC STATISTIC EKG NON-CHARGEABLE

## 2021-05-19 PROCEDURE — 360N000079 HC SURGERY LEVEL 6, PER MIN: Performed by: INTERNAL MEDICINE

## 2021-05-19 PROCEDURE — 93306 TTE W/DOPPLER COMPLETE: CPT | Mod: 26 | Performed by: STUDENT IN AN ORGANIZED HEALTH CARE EDUCATION/TRAINING PROGRAM

## 2021-05-19 PROCEDURE — 272N000001 HC OR GENERAL SUPPLY STERILE: Performed by: INTERNAL MEDICINE

## 2021-05-19 PROCEDURE — 999N000157 HC STATISTIC RCP TIME EA 10 MIN

## 2021-05-19 PROCEDURE — 250N000013 HC RX MED GY IP 250 OP 250 PS 637: Performed by: NURSE PRACTITIONER

## 2021-05-19 PROCEDURE — 999N000141 HC STATISTIC PRE-PROCEDURE NURSING ASSESSMENT: Performed by: INTERNAL MEDICINE

## 2021-05-19 PROCEDURE — C1730 CATH, EP, 19 OR FEW ELECT: HCPCS | Performed by: INTERNAL MEDICINE

## 2021-05-19 PROCEDURE — 250N000011 HC RX IP 250 OP 636: Performed by: INTERNAL MEDICINE

## 2021-05-19 PROCEDURE — 250N000009 HC RX 250: Performed by: INTERNAL MEDICINE

## 2021-05-19 PROCEDURE — 93010 ELECTROCARDIOGRAM REPORT: CPT | Mod: 76 | Performed by: INTERNAL MEDICINE

## 2021-05-19 PROCEDURE — C1760 CLOSURE DEV, VASC: HCPCS | Performed by: INTERNAL MEDICINE

## 2021-05-19 DEVICE — VALVE AORTIC SAPEIN 3 UTLRA TAVR 23MM 9750TFX23A: Type: IMPLANTABLE DEVICE | Site: AORTA | Status: FUNCTIONAL

## 2021-05-19 RX ORDER — HEPARIN SODIUM 1000 [USP'U]/ML
INJECTION, SOLUTION INTRAVENOUS; SUBCUTANEOUS PRN
Status: DISCONTINUED | OUTPATIENT
Start: 2021-05-19 | End: 2021-05-19

## 2021-05-19 RX ORDER — ONDANSETRON 2 MG/ML
4 INJECTION INTRAMUSCULAR; INTRAVENOUS EVERY 30 MIN PRN
Status: DISCONTINUED | OUTPATIENT
Start: 2021-05-19 | End: 2021-05-19 | Stop reason: HOSPADM

## 2021-05-19 RX ORDER — LIDOCAINE 40 MG/G
CREAM TOPICAL
Status: DISCONTINUED | OUTPATIENT
Start: 2021-05-19 | End: 2021-05-19 | Stop reason: HOSPADM

## 2021-05-19 RX ORDER — NALOXONE HYDROCHLORIDE 0.4 MG/ML
0.2 INJECTION, SOLUTION INTRAMUSCULAR; INTRAVENOUS; SUBCUTANEOUS
Status: DISCONTINUED | OUTPATIENT
Start: 2021-05-19 | End: 2021-05-20 | Stop reason: HOSPADM

## 2021-05-19 RX ORDER — NITROGLYCERIN 10 MG/100ML
INJECTION INTRAVENOUS PRN
Status: DISCONTINUED | OUTPATIENT
Start: 2021-05-19 | End: 2021-05-19

## 2021-05-19 RX ORDER — DEXTROSE MONOHYDRATE 25 G/50ML
25-50 INJECTION, SOLUTION INTRAVENOUS
Status: CANCELLED | OUTPATIENT
Start: 2021-05-19

## 2021-05-19 RX ORDER — ASPIRIN 81 MG/1
81 TABLET ORAL DAILY
Status: DISCONTINUED | OUTPATIENT
Start: 2021-05-20 | End: 2021-05-20 | Stop reason: HOSPADM

## 2021-05-19 RX ORDER — ACETAMINOPHEN 325 MG/1
650 TABLET ORAL EVERY 4 HOURS PRN
Status: DISCONTINUED | OUTPATIENT
Start: 2021-05-19 | End: 2021-05-20 | Stop reason: HOSPADM

## 2021-05-19 RX ORDER — FENTANYL CITRATE 50 UG/ML
25-50 INJECTION, SOLUTION INTRAMUSCULAR; INTRAVENOUS
Status: DISCONTINUED | OUTPATIENT
Start: 2021-05-19 | End: 2021-05-19 | Stop reason: HOSPADM

## 2021-05-19 RX ORDER — SODIUM CHLORIDE, SODIUM GLUCONATE, SODIUM ACETATE, POTASSIUM CHLORIDE AND MAGNESIUM CHLORIDE 526; 502; 368; 37; 30 MG/100ML; MG/100ML; MG/100ML; MG/100ML; MG/100ML
INJECTION, SOLUTION INTRAVENOUS CONTINUOUS PRN
Status: DISCONTINUED | OUTPATIENT
Start: 2021-05-19 | End: 2021-05-19

## 2021-05-19 RX ORDER — NALOXONE HYDROCHLORIDE 0.4 MG/ML
0.2 INJECTION, SOLUTION INTRAMUSCULAR; INTRAVENOUS; SUBCUTANEOUS
Status: DISCONTINUED | OUTPATIENT
Start: 2021-05-19 | End: 2021-05-19

## 2021-05-19 RX ORDER — NALOXONE HYDROCHLORIDE 0.4 MG/ML
0.4 INJECTION, SOLUTION INTRAMUSCULAR; INTRAVENOUS; SUBCUTANEOUS
Status: DISCONTINUED | OUTPATIENT
Start: 2021-05-19 | End: 2021-05-20 | Stop reason: HOSPADM

## 2021-05-19 RX ORDER — IOPAMIDOL 755 MG/ML
INJECTION, SOLUTION INTRAVASCULAR PRN
Status: DISCONTINUED | OUTPATIENT
Start: 2021-05-19 | End: 2021-05-19 | Stop reason: HOSPADM

## 2021-05-19 RX ORDER — SODIUM CHLORIDE 9 MG/ML
INJECTION, SOLUTION INTRAVENOUS CONTINUOUS
Status: DISCONTINUED | OUTPATIENT
Start: 2021-05-19 | End: 2021-05-19 | Stop reason: HOSPADM

## 2021-05-19 RX ORDER — NICOTINE POLACRILEX 4 MG
15-30 LOZENGE BUCCAL
Status: CANCELLED | OUTPATIENT
Start: 2021-05-19

## 2021-05-19 RX ORDER — ONDANSETRON 2 MG/ML
4 INJECTION INTRAMUSCULAR; INTRAVENOUS EVERY 6 HOURS PRN
Status: DISCONTINUED | OUTPATIENT
Start: 2021-05-19 | End: 2021-05-20 | Stop reason: HOSPADM

## 2021-05-19 RX ORDER — NALOXONE HYDROCHLORIDE 0.4 MG/ML
0.4 INJECTION, SOLUTION INTRAMUSCULAR; INTRAVENOUS; SUBCUTANEOUS
Status: DISCONTINUED | OUTPATIENT
Start: 2021-05-19 | End: 2021-05-19

## 2021-05-19 RX ORDER — FENTANYL CITRATE 50 UG/ML
INJECTION, SOLUTION INTRAMUSCULAR; INTRAVENOUS PRN
Status: DISCONTINUED | OUTPATIENT
Start: 2021-05-19 | End: 2021-05-19

## 2021-05-19 RX ORDER — CEFAZOLIN SODIUM 2 G/100ML
2 INJECTION, SOLUTION INTRAVENOUS
Status: COMPLETED | OUTPATIENT
Start: 2021-05-19 | End: 2021-05-19

## 2021-05-19 RX ORDER — LIDOCAINE 40 MG/G
CREAM TOPICAL
Status: DISCONTINUED | OUTPATIENT
Start: 2021-05-19 | End: 2021-05-20 | Stop reason: HOSPADM

## 2021-05-19 RX ORDER — ASPIRIN 81 MG/1
81 TABLET ORAL DAILY
Status: DISCONTINUED | OUTPATIENT
Start: 2021-05-19 | End: 2021-05-19 | Stop reason: HOSPADM

## 2021-05-19 RX ORDER — ONDANSETRON 4 MG/1
4 TABLET, ORALLY DISINTEGRATING ORAL EVERY 30 MIN PRN
Status: DISCONTINUED | OUTPATIENT
Start: 2021-05-19 | End: 2021-05-19 | Stop reason: HOSPADM

## 2021-05-19 RX ORDER — NITROGLYCERIN 0.4 MG/1
0.4 TABLET SUBLINGUAL EVERY 5 MIN PRN
Status: DISCONTINUED | OUTPATIENT
Start: 2021-05-19 | End: 2021-05-20 | Stop reason: HOSPADM

## 2021-05-19 RX ORDER — PROPOFOL 10 MG/ML
INJECTION, EMULSION INTRAVENOUS PRN
Status: DISCONTINUED | OUTPATIENT
Start: 2021-05-19 | End: 2021-05-19

## 2021-05-19 RX ORDER — ROSUVASTATIN CALCIUM 40 MG/1
40 TABLET, COATED ORAL DAILY
Status: DISCONTINUED | OUTPATIENT
Start: 2021-05-20 | End: 2021-05-20 | Stop reason: HOSPADM

## 2021-05-19 RX ORDER — SODIUM CHLORIDE 9 MG/ML
INJECTION, SOLUTION INTRAVENOUS CONTINUOUS
Status: ACTIVE | OUTPATIENT
Start: 2021-05-19 | End: 2021-05-19

## 2021-05-19 RX ORDER — PROTAMINE SULFATE 10 MG/ML
INJECTION, SOLUTION INTRAVENOUS PRN
Status: DISCONTINUED | OUTPATIENT
Start: 2021-05-19 | End: 2021-05-19

## 2021-05-19 RX ORDER — HYDRALAZINE HYDROCHLORIDE 20 MG/ML
10 INJECTION INTRAMUSCULAR; INTRAVENOUS
Status: DISCONTINUED | OUTPATIENT
Start: 2021-05-19 | End: 2021-05-20 | Stop reason: HOSPADM

## 2021-05-19 RX ADMIN — ASPIRIN 81 MG: 81 TABLET, COATED ORAL at 07:01

## 2021-05-19 RX ADMIN — PHENYLEPHRINE HYDROCHLORIDE 0.3 MCG/KG/MIN: 10 INJECTION INTRAVENOUS at 08:07

## 2021-05-19 RX ADMIN — PROPOFOL 20 MG: 10 INJECTION, EMULSION INTRAVENOUS at 08:13

## 2021-05-19 RX ADMIN — NITROGLYCERIN 50 MCG: 10 INJECTION INTRAVENOUS at 08:22

## 2021-05-19 RX ADMIN — Medication 2 G: at 07:36

## 2021-05-19 RX ADMIN — SODIUM CHLORIDE, SODIUM GLUCONATE, SODIUM ACETATE, POTASSIUM CHLORIDE AND MAGNESIUM CHLORIDE: 526; 502; 368; 37; 30 INJECTION, SOLUTION INTRAVENOUS at 07:33

## 2021-05-19 RX ADMIN — NITROGLYCERIN 50 MCG: 10 INJECTION INTRAVENOUS at 08:41

## 2021-05-19 RX ADMIN — SODIUM CHLORIDE: 9 INJECTION, SOLUTION INTRAVENOUS at 09:53

## 2021-05-19 RX ADMIN — ACETAMINOPHEN 650 MG: 325 TABLET, FILM COATED ORAL at 17:58

## 2021-05-19 RX ADMIN — FENTANYL CITRATE 50 MCG: 50 INJECTION, SOLUTION INTRAMUSCULAR; INTRAVENOUS at 08:10

## 2021-05-19 RX ADMIN — PROTAMINE SULFATE 100 MG: 10 INJECTION, SOLUTION INTRAVENOUS at 08:47

## 2021-05-19 RX ADMIN — HEPARIN SODIUM 13500 UNITS: 1000 INJECTION INTRAVENOUS; SUBCUTANEOUS at 08:28

## 2021-05-19 RX ADMIN — DEXMEDETOMIDINE HYDROCHLORIDE 20 MCG: 100 INJECTION, SOLUTION INTRAVENOUS at 07:50

## 2021-05-19 RX ADMIN — FENTANYL CITRATE 25 MCG: 50 INJECTION, SOLUTION INTRAMUSCULAR; INTRAVENOUS at 07:55

## 2021-05-19 RX ADMIN — HYDRALAZINE HYDROCHLORIDE 10 MG: 20 INJECTION INTRAMUSCULAR; INTRAVENOUS at 17:22

## 2021-05-19 RX ADMIN — FENTANYL CITRATE 25 MCG: 50 INJECTION, SOLUTION INTRAMUSCULAR; INTRAVENOUS at 07:34

## 2021-05-19 RX ADMIN — HYDROCHLOROTHIAZIDE: 12.5 CAPSULE ORAL at 17:58

## 2021-05-19 RX ADMIN — DEXMEDETOMIDINE HYDROCHLORIDE 20 MCG: 100 INJECTION, SOLUTION INTRAVENOUS at 07:33

## 2021-05-19 RX ADMIN — NITROGLYCERIN 50 MCG: 10 INJECTION INTRAVENOUS at 08:24

## 2021-05-19 RX ADMIN — HYDRALAZINE HYDROCHLORIDE 10 MG: 20 INJECTION INTRAMUSCULAR; INTRAVENOUS at 16:42

## 2021-05-19 RX ADMIN — DEXMEDETOMIDINE HYDROCHLORIDE 0.4 MCG/KG/HR: 100 INJECTION, SOLUTION INTRAVENOUS at 07:33

## 2021-05-19 ASSESSMENT — ACTIVITIES OF DAILY LIVING (ADL)
DIFFICULTY_COMMUNICATING: NO
FALL_HISTORY_WITHIN_LAST_SIX_MONTHS: NO
DRESSING/BATHING_DIFFICULTY: NO
CONCENTRATING,_REMEMBERING_OR_MAKING_DECISIONS_DIFFICULTY: NO
EQUIPMENT_CURRENTLY_USED_AT_HOME: OTHER (SEE COMMENTS)
DOING_ERRANDS_INDEPENDENTLY_DIFFICULTY: NO
WALKING_OR_CLIMBING_STAIRS_DIFFICULTY: NO
HEARING_DIFFICULTY_OR_DEAF: NO
TOILETING_ISSUES: NO
DIFFICULTY_EATING/SWALLOWING: NO
PATIENT_/_FAMILY_COMMUNICATION_STYLE: SPOKEN LANGUAGE (ENGLISH OR BILINGUAL)
VISION_MANAGEMENT: READERS
WEAR_GLASSES_OR_BLIND: YES

## 2021-05-19 ASSESSMENT — MIFFLIN-ST. JEOR: SCORE: 1348.13

## 2021-05-19 NOTE — PLAN OF CARE
"Provider notification  Pt reporting visual symptoms \"sparkles and dripping paint.\" Symptoms resolved when pt layed down. No change in neuro assessment. /50.SR 90's. Discussed with Dr. Torres. Continue to monitor.  "

## 2021-05-19 NOTE — ANESTHESIA CARE TRANSFER NOTE
Patient: Reba Juarez    Procedure(s):  Transfemoral Transcatheter Aortic Valve Replacement with 23 mm Little Ellie 3 Ultra Transcatheter Heart Valve  cardiopulmonary bypass on standby    Diagnosis: Severe aortic stenosis  Diagnosis Additional Information: No value filed.    Anesthesia Type:   MAC     Note:    Oropharynx: spontaneously breathing  Level of Consciousness: awake  Oxygen Supplementation: face mask    Independent Airway: airway patency satisfactory and stable  Dentition: dentition unchanged  Vital Signs Stable: post-procedure vital signs reviewed and stable  Report to RN Given: handoff report given  Patient transferred to: PACU    Handoff Report: Identifed the Patient, Identified the Reponsible Provider, Reviewed the pertinent medical history, Discussed the surgical course, Reviewed Intra-OP anesthesia mangement and issues during anesthesia, Set expectations for post-procedure period and Allowed opportunity for questions and acknowledgement of understanding      Vitals: (Last set prior to Anesthesia Care Transfer)  CRNA VITALS  5/19/2021 0836 - 5/19/2021 0912      5/19/2021             NIBP:  97/48    Pulse:  67    SpO2:  100 %    EKG:  Sinus rhythm        Electronically Signed By: Jose Franks DO  May 19, 2021  9:12 AM

## 2021-05-19 NOTE — DISCHARGE SUMMARY
[address] [address]  p: [phone]    Discharge Summary: Cardiology Service    Reba Juarez MRN# [id number]   YOB: 1945 Age: 76 year old       Admission Date: 05/19/2021  Discharge Date: 05/20/2021    Discharge Diagnoses:  1. Hx severe aortic stenosis s/p TAVR 5/19/2021  2. Non obstructive CAD  3. HTN  4. HLD  5. T2DM  6. MAYKEL     Pertinent Procedures:  1. TAVR    Consults:  Cardiac rehab    Imaging with results:  Echocardiogram 5/20/2021:  Interpretation Summary  Global and regional left ventricular function is hyperkinetic with an EF >70%.  TAVR with Little S3 23 mm valve. No aortic regurgitation is present.The mean  gradient is elevated at 34 mmHg. The aortic valve area is normal at 1.7 cm^2.  IVC diameter <2.1 cm collapsing >50% with sniff suggests a normal RA pressure  of 3 mmHg.  No pericardial effusion is present.     TAVR gr is likely elevated due to hyperdynamic LV fxn. Valve appearance and  TONI are normal. Recommend short term echo follow-up.  ______________________________________________________________________________  Left Ventricle  Left ventricular wall thickness is normal. Left ventricular size is normal.  Global and regional left ventricular function is hyperkinetic with an EF >70%.  Left ventricular diastolic function is not assessable.     Right Ventricle  Right ventricular function, chamber size, wall motion, and thickness are  normal.     Atria  The atria cannot be assessed.     Mitral Valve  The mitral valve is normal.     Aortic Valve  No aortic regurgitation is present. The aortic valve area is 1.7 cm^2, by the  continuity equation. A bioprosthetic aortic valve is present. The mean  gradient is 34 mmHg. The peak velocity across the aortic valve is 3.8 m/sec.  Doppler interrogation of the aortic valve is abnormal.     Tricuspid Valve  The tricuspid valve is normal. The peak velocity of the tricuspid  regurgitant  jet is not obtainable.     Pulmonic Valve  The pulmonic valve cannot be assessed.     Vessels  The aorta root is normal. IVC diameter <2.1 cm collapsing >50% with sniff  suggests a normal RA pressure of 3 mmHg.     Pericardium  No pericardial effusion is present.  Brief HPI:  Reba Juarez is a 76 year old female with a past medical history significant for HTN, HLD, T2DM, CAD with remote POBA history, non-obstructive CAD per angiogram 4/2021, and severe symptomatic aortic stenosis.    Hospital Course by Diagnosis:  # Severe symptomatic aortic stenosis  ## Chronic diastolic heart failure secondary to valvular disease, resolved  Patient presenting with progressive dyspnea and lightheadedness. Found to have moderate-severe aortic stenosis by TTE, later confirmed as severe by invasive valve assessment w/mean gradient of 39 mmHg and TONI 0.83 cm2 and aortic valve calcium score of 2414. Now s/p Transcatheter Aortic Valve Replacement with a 23 mm Little Ellie 3 Ultra. Intraoperative LVEDP 16. Post-procedure echo with mean PG of 4 mmHg without PVL. Neuro's intact. Sheath sites soft without hematoma. No arrhythmias. POD 1 TTE with MG of 34mmHg, and PV of 3.8 m/s. Reviewed images with echo staff, and feel that these hemodynamics are due to hyperdynamic LV as the valve appears to open and close with ease. Patient given small IV fluid bolus, and will start metoprolol tartrate. Should get repeat echo in next 1-2 weeks.    Of note, patient did have episode of central chest burning with radiation to jaw, as well as sinus tachycardia overnight after TAVR. Cross cover evaluated, and noted improvement with reassurance and a bedside fan. EKG demonstrated sinus tach. She did not have a recurrence of this on day of discharge.  - ASA 81 mg daily for anti-platelet therapy.   - start metoprolol tartrate 12.5mg bid  - Lifelong SBE prophylaxis  - Cardiac rehab.  - F/up structural KIN in 1 week and 1 month  - repeat echo with  clinic visit       #Non-obstructive CAD  #HTN  #HLD  - continue PTA aspirin  - continue PTA rosuvastatin 40mg   - resume PTA valsartan-hydrochlorothiazide 80-12.5mg     #T2DM  - repeat A1C  - hold PTA metformin x 48 hours  - moderate intensity SSI for now     #MAYKEL  - CPAP with sleep    Condition on discharge  Temp:  [97.8  F (36.6  C)-98.2  F (36.8  C)] 98  F (36.7  C)  Pulse:  [61-70] 66  Resp:  [14-20] 14  BP: ()/(45-77) 124/64  SpO2:  [92 %-99 %] (P) 95 %  General: Alert, interactive, NAD  Eyes: sclera anicteric, EOMI  Neck: JVP not appreciably elevated  Cardiovascular: mildly tachy, normal rhythm, 1/6 DALIA loudest over aortic area  Resp: clear to auscultation bilaterally, no rales, wheezes, or rhonchi  GI: Soft, nontender, nondistended. +BS.  No HSM or masses, no rebound or guarding.  Extremities: no edema, no cyanosis or clubbing, dorsalis pedis and posterior tibialis pulses 2+ bilaterally  Skin: Warm and dry, no jaundice or rash  Neuro: CN 2-12 intact, moves all extremities equally  Psych: Alert & oriented x 3      Medication Changes:    Discharge medications:   Current Discharge Medication List      START taking these medications    Details   metoprolol tartrate (LOPRESSOR) 25 MG tablet Take 0.5 tablets (12.5 mg) by mouth 2 times daily  Qty: 30 tablet, Refills: 3    Associated Diagnoses: Essential hypertension; Palpitations         CONTINUE these medications which have NOT CHANGED    Details   aspirin 81 MG tablet Take 1 tablet by mouth daily.    Associated Diagnoses: Hyperlipidemia LDL goal <70      metFORMIN (GLUCOPHAGE) 500 MG tablet Take 1,500 mg by mouth       rosuvastatin (CRESTOR) 40 MG tablet Take 40 mg by mouth      valsartan-hydrochlorothiazide (DIOVAN HCT) 80-12.5 MG tablet Take 1 tablet by mouth daily      budesonide-formoterol (SYMBICORT) 80-4.5 MCG/ACT Inhaler Inhale 2 puffs into the lungs 2 times daily      nitroGLYcerin (NITROSTAT) 0.4 MG sublingual tablet Place 0.4 mg under the tongue       ORDER FOR DME Use your CPAP device as directed by your provider.             Labs or imaging requiring follow-up after discharge:  TTE in about 1 week      Follow-up:  Valve clinic in one week, then again in about 1 month    Code status:  FULL    55 minutes spent in discharge, including >50% in counseling and coordination of care, medication review and plan of care recommended on follow up. Questions were answered.   It was our pleasure to care for Adamaris Juarez during this hospitalization. Please do not hesitate to contact me should there be questions regarding the hospital course or discharge plan.      Moncho Louie PA-C  [facility] Cardiology Team

## 2021-05-19 NOTE — DISCHARGE INSTRUCTIONS
Going home after Transcatheter Aortic Valve Replacement (TAVR)  Femoral Access    You may have small amounts of bruising or discomfort, this is expected. If you develop worse swelling, redness and warmth that does not go away, fever and chills, Increasing numbness in your legs, worsening pain at the site then you need to contact your nurse coordinator.    You may shower, but do not soak in a bath tub or pool or apply lotions, ointments, powder, etc for 3-5 days or until sites look healed.     Activity: No lifting, pushing, pulling more than 10 pounds (examples to avoid: groceries, vacuuming, gardening, golfing): For one week with a procedure through the groin.    After the initial healing process of the access site, we recommend cardiac rehabilitation for all TAVR patients. Cardiac rehabilitation will help you:  - Rebuild stamina, strength and balance.  - Learn how to participate in activities safely, as well as help you regain confidence to do so.  - Return to activities of daily living and leisure.  Please contact their central scheduling to arrange at [phone]    We prefer you to follow up with your primary care provider within 2 weeks. You will be arranged to see the TAVR clinic in month. At that time you will have a repeat ultrasound of the heart to look at the valve.     Should you have any questions or concerns please contact your assigned TAVR nurse coordinator:  Maty [phone] (at the first prompt enter #1, second prompt enter #3, then ask the triage nurse if you can speak with Maty).

## 2021-05-19 NOTE — Clinical Note
Angio-Seal utilized for closure of sight.  Manual pressure applied by scrub person; hemostasis achieved.

## 2021-05-19 NOTE — Clinical Note
Potential access sites were evaluated for patency using ultrasound.   The right femoral artery was selected. Access was obtained under Sonosite and Fluoroscopic guidance using a standard 18 gauge needle with direct visualization of needle entry.

## 2021-05-19 NOTE — ANESTHESIA POSTPROCEDURE EVALUATION
Patient: Reba Juarez    Procedure(s):  Transfemoral Transcatheter Aortic Valve Replacement with 23 mm Little Ellie 3 Ultra Transcatheter Heart Valve  cardiopulmonary bypass on standby    Diagnosis:Severe aortic stenosis  Diagnosis Additional Information: No value filed.    Anesthesia Type:  MAC    Note:  Disposition: Admission   Postop Pain Control: Uneventful            Sign Out: Well controlled pain   PONV: No   Neuro/Psych: Uneventful            Sign Out: Acceptable/Baseline neuro status   Airway/Respiratory: Uneventful            Sign Out: Acceptable/Baseline resp. status   CV/Hemodynamics: Uneventful            Sign Out: Acceptable CV status; No obvious hypovolemia; No obvious fluid overload   Other NRE: NONE   DID A NON-ROUTINE EVENT OCCUR? No           Last vitals:  Vitals:    05/19/21 0945 05/19/21 1000 05/19/21 1015   BP: 105/53 91/50 (!) 82/54   Pulse: 64 61 63   Resp: 14 16    Temp:      SpO2: 98% 99% 99%       Last vitals prior to Anesthesia Care Transfer:  CRNA VITALS  5/19/2021 0836 - 5/19/2021 0936      5/19/2021             NIBP:  97/48    Pulse:  67    SpO2:  100 %    EKG:  Sinus rhythm          Electronically Signed By: Thom Jenkins MD  May 19, 2021  10:30 AM

## 2021-05-19 NOTE — OR NURSING
Patient off bedrest at 1300.  Assisted to BR and voided without difficulty.  Denies weakness or dizziness with ambulation.

## 2021-05-19 NOTE — OP NOTE
OPERATIVE DATE: 5/19/2021    PRE-OPERATIVE DIAGNOSIS:  1) Severe, sympotomatic aortic stenosis  2) Hypertension  3) Hyperlipidemia  4) Coronary artery disease s/p POBA  5) Obstructive sleep apnea  6) Peptic ulcer disease  7) Chronic lymphocytic leukemia    POST-OPERATIVE DIAGNOSIS:  1) Severe, sympotomatic aortic stenosis  2) Hypertension  3) Hyperlipidemia  4) Coronary artery disease s/p POBA  5) Obstructive sleep apnea  6) Peptic ulcer disease  7) Chronic lymphocytic leukemia    PROCEDURE:  1) Temporary pacemaker insertion  2) Iliofemoral artery angiography  3) Ascending aorta angiography  4) Left heart catheterization  5) Successful transfemoral transcatheter aortic valve replacement with 23 mm Little S3 Ultra valve  6) Arteriotomy closure    SURGEON: Riaz Fuller MD    CARDIOLOGIST: Harvinder Quick MD    STRUCTURAL FELLOW: Keny Duckworth MD    ANESTHESIA: Monitored anesthesia care with local analgesia    ESTIMATED BLOOD LOSS: 50 mL    INDICATIONS:  Ms. Reba Juarez is a 76 year old year old female admitted with symptoms of palpitations, pre-syncope, chest pain and dyspnea on exertion. She was found to have severe aortic stenosis on further work up. We were asked to evaluate for transcatheter aortic valve replacement. Risks and benefits of the operation were explained to the patient and their family including, but not limited to, bleeding, infection, stroke and even death. They understood these risks and agreed to proceed electively.    OPERATIVE REPORT:  The patient was transferred to the operating room and positioned supine on the OR table. Monitored anesthesia care was begun. The patients chest, abdomen and bilateral lower extremities were clipped, prepped and draped in sterile fashion. A pre-procedure time-out was performed confirming the correct patient, correct site and correct procedure.    Intravenous heparin was administered. Arterial access of the right and left femoral arteries and left  common femoral vein was performed by interventional cardiology. Two Perclose Proglide devices were deployed on the right side for future arteriotomy closure.    A temporary transvenous pacemaker was placed by the cardiology team.    A Lunderquist wire was advanced to support the Little sheath insertion. A pigtail catheter was advanced to the aortic root and angiogram performed to confirm optimal implant angle. The pigtail was placed in the non-coronary cusp.    The LV was accessed using an AL-1 catheter over a straight wire. The pigtail catheter was advanced into the LV. The pigtail catheter was used to advance a Lunderquist Safari wire into the LV and the pigtail catheter was removed.    The 23 mm S3 Ultra bioprosthetic valve was prepared on the back table, inserted and positioned across the native aortic valve and deployed using rapid pacing.    Transthoracic echocardiography showed no paravalvular insufficiency and a MG of 4 mmHg.    The deployment system and wires were removed. The femoral access was made hemostatic.    A final iliofemoral angiogram showed no extravasation or stenosis.    The patient was then transferred to the recovery area in stable condition.    All needle, sponge and instrument counts were correct at the end of the case.    Riaz Fuller MD  Cardiothoracic Surgery  [phone]

## 2021-05-19 NOTE — Clinical Note
Potential access sites were evaluated for patency using ultrasound.   The left femoral artery and left femoral vein were selected. Access was obtained under Sonosite and Fluoroscopic guidance using a standard 18 gauge needle with direct visualization of needle entry.

## 2021-05-19 NOTE — Clinical Note
Sheath prepped and inserted in the left femoral artery.   no abnormal vaginal bleeding/no vaginal discharge/no spotting

## 2021-05-20 ENCOUNTER — APPOINTMENT (OUTPATIENT)
Dept: OCCUPATIONAL THERAPY | Facility: CLINIC | Age: 76
DRG: 267 | End: 2021-05-20
Attending: STUDENT IN AN ORGANIZED HEALTH CARE EDUCATION/TRAINING PROGRAM
Payer: COMMERCIAL

## 2021-05-20 ENCOUNTER — PATIENT OUTREACH (OUTPATIENT)
Dept: CARE COORDINATION | Facility: CLINIC | Age: 76
End: 2021-05-20

## 2021-05-20 ENCOUNTER — APPOINTMENT (OUTPATIENT)
Dept: CARDIOLOGY | Facility: CLINIC | Age: 76
DRG: 267 | End: 2021-05-20
Attending: STUDENT IN AN ORGANIZED HEALTH CARE EDUCATION/TRAINING PROGRAM
Payer: COMMERCIAL

## 2021-05-20 VITALS
HEART RATE: 115 BPM | RESPIRATION RATE: 16 BRPM | HEIGHT: 63 IN | DIASTOLIC BLOOD PRESSURE: 65 MMHG | OXYGEN SATURATION: 94 % | WEIGHT: 197.1 LBS | TEMPERATURE: 99.3 F | BODY MASS INDEX: 34.92 KG/M2 | SYSTOLIC BLOOD PRESSURE: 123 MMHG

## 2021-05-20 PROBLEM — Z01.818 PRE-OPERATIVE EXAMINATION: Status: ACTIVE | Noted: 2021-05-20

## 2021-05-20 LAB
ANION GAP SERPL CALCULATED.3IONS-SCNC: 7 MMOL/L (ref 3–14)
BUN SERPL-MCNC: 15 MG/DL (ref 7–30)
CALCIUM SERPL-MCNC: 8.7 MG/DL (ref 8.5–10.1)
CHLORIDE SERPL-SCNC: 107 MMOL/L (ref 94–109)
CO2 SERPL-SCNC: 23 MMOL/L (ref 20–32)
CREAT SERPL-MCNC: 0.82 MG/DL (ref 0.52–1.04)
ERYTHROCYTE [DISTWIDTH] IN BLOOD BY AUTOMATED COUNT: 14.6 % (ref 10–15)
GFR SERPL CREATININE-BSD FRML MDRD: 69 ML/MIN/{1.73_M2}
GLUCOSE BLDC GLUCOMTR-MCNC: 117 MG/DL (ref 70–99)
GLUCOSE SERPL-MCNC: 121 MG/DL (ref 70–99)
HCT VFR BLD AUTO: 33.9 % (ref 35–47)
HGB BLD-MCNC: 11.3 G/DL (ref 11.7–15.7)
INTERPRETATION ECG - MUSE: NORMAL
MAGNESIUM SERPL-MCNC: 2.2 MG/DL (ref 1.6–2.3)
MCH RBC QN AUTO: 28.5 PG (ref 26.5–33)
MCHC RBC AUTO-ENTMCNC: 33.3 G/DL (ref 31.5–36.5)
MCV RBC AUTO: 85 FL (ref 78–100)
PHOSPHATE SERPL-MCNC: 3.4 MG/DL (ref 2.5–4.5)
PLATELET # BLD AUTO: 169 10E9/L (ref 150–450)
POTASSIUM SERPL-SCNC: 3.7 MMOL/L (ref 3.4–5.3)
RBC # BLD AUTO: 3.97 10E12/L (ref 3.8–5.2)
SODIUM SERPL-SCNC: 138 MMOL/L (ref 133–144)
WBC # BLD AUTO: 11.8 10E9/L (ref 4–11)

## 2021-05-20 PROCEDURE — 36415 COLL VENOUS BLD VENIPUNCTURE: CPT | Performed by: STUDENT IN AN ORGANIZED HEALTH CARE EDUCATION/TRAINING PROGRAM

## 2021-05-20 PROCEDURE — 93005 ELECTROCARDIOGRAM TRACING: CPT

## 2021-05-20 PROCEDURE — 93010 ELECTROCARDIOGRAM REPORT: CPT | Performed by: INTERNAL MEDICINE

## 2021-05-20 PROCEDURE — 83735 ASSAY OF MAGNESIUM: CPT | Performed by: STUDENT IN AN ORGANIZED HEALTH CARE EDUCATION/TRAINING PROGRAM

## 2021-05-20 PROCEDURE — 250N000011 HC RX IP 250 OP 636: Performed by: STUDENT IN AN ORGANIZED HEALTH CARE EDUCATION/TRAINING PROGRAM

## 2021-05-20 PROCEDURE — 250N000013 HC RX MED GY IP 250 OP 250 PS 637: Performed by: NURSE PRACTITIONER

## 2021-05-20 PROCEDURE — 214N000001 HC R&B CCU UMMC

## 2021-05-20 PROCEDURE — 97530 THERAPEUTIC ACTIVITIES: CPT | Mod: GO

## 2021-05-20 PROCEDURE — 80048 BASIC METABOLIC PNL TOTAL CA: CPT | Performed by: STUDENT IN AN ORGANIZED HEALTH CARE EDUCATION/TRAINING PROGRAM

## 2021-05-20 PROCEDURE — 85027 COMPLETE CBC AUTOMATED: CPT | Performed by: STUDENT IN AN ORGANIZED HEALTH CARE EDUCATION/TRAINING PROGRAM

## 2021-05-20 PROCEDURE — 97165 OT EVAL LOW COMPLEX 30 MIN: CPT | Mod: GO

## 2021-05-20 PROCEDURE — 93306 TTE W/DOPPLER COMPLETE: CPT | Mod: 26 | Performed by: INTERNAL MEDICINE

## 2021-05-20 PROCEDURE — 93306 TTE W/DOPPLER COMPLETE: CPT

## 2021-05-20 PROCEDURE — 250N000013 HC RX MED GY IP 250 OP 250 PS 637: Performed by: PHYSICIAN ASSISTANT

## 2021-05-20 PROCEDURE — 84100 ASSAY OF PHOSPHORUS: CPT | Performed by: STUDENT IN AN ORGANIZED HEALTH CARE EDUCATION/TRAINING PROGRAM

## 2021-05-20 PROCEDURE — 258N000003 HC RX IP 258 OP 636: Performed by: PHYSICIAN ASSISTANT

## 2021-05-20 PROCEDURE — 97110 THERAPEUTIC EXERCISES: CPT | Mod: GO

## 2021-05-20 PROCEDURE — 999N001017 HC STATISTIC GLUCOSE BY METER IP

## 2021-05-20 PROCEDURE — 250N000013 HC RX MED GY IP 250 OP 250 PS 637: Performed by: STUDENT IN AN ORGANIZED HEALTH CARE EDUCATION/TRAINING PROGRAM

## 2021-05-20 PROCEDURE — 99239 HOSP IP/OBS DSCHRG MGMT >30: CPT | Mod: 25 | Performed by: PHYSICIAN ASSISTANT

## 2021-05-20 RX ORDER — METOPROLOL TARTRATE 25 MG/1
12.5 TABLET, FILM COATED ORAL 2 TIMES DAILY
Qty: 30 TABLET | Refills: 3 | Status: SHIPPED | OUTPATIENT
Start: 2021-05-20 | End: 2021-05-27

## 2021-05-20 RX ADMIN — ROSUVASTATIN CALCIUM 40 MG: 40 TABLET, FILM COATED ORAL at 08:03

## 2021-05-20 RX ADMIN — ASPIRIN 81 MG: 81 TABLET, DELAYED RELEASE ORAL at 08:03

## 2021-05-20 RX ADMIN — HYDRALAZINE HYDROCHLORIDE 10 MG: 20 INJECTION INTRAMUSCULAR; INTRAVENOUS at 04:25

## 2021-05-20 RX ADMIN — SODIUM CHLORIDE 250 ML: 9 INJECTION, SOLUTION INTRAVENOUS at 12:58

## 2021-05-20 RX ADMIN — Medication 12.5 MG: at 12:31

## 2021-05-20 ASSESSMENT — MIFFLIN-ST. JEOR: SCORE: 1348.17

## 2021-05-20 ASSESSMENT — ACTIVITIES OF DAILY LIVING (ADL)
ADLS_ACUITY_SCORE: 15
ADLS_ACUITY_SCORE: 15
PREVIOUS_RESPONSIBILITIES: MEAL PREP;HOUSEKEEPING;LAUNDRY;SHOPPING;MEDICATION MANAGEMENT;FINANCES;DRIVING
ADLS_ACUITY_SCORE: 15

## 2021-05-20 NOTE — PLAN OF CARE
D: Admitted s/p TAVR on  5/19/2021  PMH of HTN, HLD, DM2, CAD, non-obstructive CAD per angiogram 4/2021, aortic stenosis      I: Monitored vitals and assessed pt status.     PRN: 250 ml Bolus NS     A: A0x4. Afebrile. Neuros intact. VSS. HRs 90s-110s. Sinus rhythm/tachycardia. Sats >92% on RA. Pt denies any shortness of breath, chest pain/palpitaions, nausea, dizziness, or chills. Right groin site ecchymotic, soft and tender. Left groin site WNL. Pt on regular diet. Pt up SBA. Ambulated in room.       P: Discharge this afternoon home. Continue to monitor pt status and notify CSI treatment team with any changes or concerns.

## 2021-05-20 NOTE — PROVIDER NOTIFICATION
Pt reports mid sternal burning in chest radiating to neck and L shoulder. HR in the 120's. Discussed with cardiology cross cover MD. Will obtain 12 lead EKG.

## 2021-05-20 NOTE — PROGRESS NOTES
"   05/20/21 1200   Quick Adds   Type of Visit Initial Occupational Therapy Evaluation       Present no   Language English    Living Environment   People in home child(steve), adult;sibling(s)  (brother who needs cognitive assistance )   Current Living Arrangements house   Home Accessibility stairs to enter home;stairs within home   Number of Stairs, Main Entrance 3   Stair Railings, Main Entrance railings safe and in good condition   Number of Stairs, Within Home, Primary other (see comments)  (full flight up/down to bedroom/basement )   Stair Railings, Within Home, Primary railings safe and in good condition   Transportation Anticipated family or friend will provide   Living Environment Comments Pt lives in a house w/adult son and brother and uses tub/shower combo. 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    Self-Care   Usual Activity Tolerance fair   Current Activity Tolerance moderate   Regular Exercise No   Equipment Currently Used at Home shower chair   Activity/Exercise/Self-Care Comment Pt reports IND w/ADLs at baseline    Disability/Function   Hearing Difficulty or Deaf no   Wear Glasses or Blind yes   Vision Management readers   Concentrating, Remembering or Making Decisions Difficulty no   Difficulty Communicating no   Difficulty Eating/Swallowing no   Walking or Climbing Stairs Difficulty no   Dressing/Bathing Difficulty no   Toileting issues no   Doing Errands Independently Difficulty (such as shopping) no   Fall history within last six months yes   Number of times patient has fallen within last six months 1  (LOB 2/2 fatigue)   Change in Functional Status Since Onset of Current Illness/Injury no   General Information   Onset of Illness/Injury or Date of Surgery 05/19/21   Referring Physician Carmel Weston NP   Patient/Family Therapy Goal Statement (OT) \"To go home\"   Additional Occupational Profile Info/Pertinent History of Current " Problem Per chart: Reba Juarez is a 76 year old female with a past medical history significant for HTN, HLD, T2DM, CAD with remote POBA history, non-obstructive CAD per angiogram 4/2021, and severe symptomatic aortic stenosis   Performance Patterns (Routines, Roles, 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   Existing Precautions/Restrictions other (see comments)  (TAVR)   Left Upper Extremity (Weight-bearing Status) other (see comments)  (#10 lb restriction )   Right Upper Extremity (Weight-bearing Status) other (see comments)  (#10 lb restriction )   Left Lower Extremity (Weight-bearing Status) full weight-bearing (FWB)   Right Lower Extremity (Weight-bearing Status) full weight-bearing (FWB)   Cognitive Status Examination   Orientation Status orientation to person, place and time   Affect/Mental Status (Cognitive) WFL   Visual Perception   Visual Impairment/Limitations WFL   Sensory   Sensory Quick Adds No deficits were identified   Pain Assessment   Patient Currently in Pain No   Integumentary/Edema   Integumentary/Edema no deficits were identifed   Posture   Posture not impaired   Range of Motion Comprehensive   Comment, General Range of Motion BUE grossly WFL   Strength Comprehensive (MMT)   Comment, General Manual Muscle Testing (MMT) Assessment jasmine hand  4/5   Balance   Balance Comments no LOB noted during dynamic standing    Instrumental Activities of Daily Living (IADL)   Previous Responsibilities meal prep;housekeeping;laundry;shopping;medication management;finances;driving  (caregiver of adult brother )   Clinical Impression   Criteria for Skilled Therapeutic Interventions Met (OT) yes   OT Diagnosis decreased functional endurance, which may impact ADL/IADL performance    OT Problem List-Impairments impacting ADL problems related to;activity tolerance impaired;other (see comments)   Assessment of Occupational Performance 1-3 Performance Deficits   Identified Performance Deficits home mgmt, functional  exercise   Planned Therapy Interventions (OT) progressive activity/exercise;home program guidelines;risk factor education   Clinical Decision Making Complexity (OT) low complexity   Therapy Frequency (OT) Daily   Predicted Duration of Therapy 1-2 days   Anticipated Equipment Needs Upon Discharge (OT)   (TBD)   Risk & Benefits of therapy have been explained evaluation/treatment results reviewed;care plan/treatment goals reviewed;risks/benefits reviewed;patient   Comment-Clinical Impression Pt to benefit from skilled OT to address above deficits to maximize IND in ADLs/IADls   OT Discharge Planning    OT Discharge Recommendation (DC Rec) Home with assist;home with outpatient cardiac rehab   OT Rationale for DC Rec Anticipate safe d/c home w/assist from son for heavier IADLs. WIll benefit from OP CR to maximize cardiopulmonary function and aerobic endurance.    OT Brief overview of current status  SBA   Total Evaluation Time (Minutes)   Total Evaluation Time (Minutes) 3

## 2021-05-20 NOTE — PLAN OF CARE
Admission  D-Admitted to 6C from PACU via litter, s/p TAVR for severe aortic stenosis. See flow sheets for vs and assessments. Off bedrest while in PACU at 1300. L groin ecchymotic on arrival. Post procedure pt had hypertension, visual changes, tachycardia, cp/burning radiating to neck and L shoulder and flushing. (See provider notification notes) See flow sheets for vs and assessments.  I-Pt instructed to call for assistance to get OOB. IV hydralazine X2. Home medication initiated per order. 12 lead EKG and chest xray obtained. MD at bedside to assess pt.  A-BP within parameters. Pain,visual changes and flushing resolved.  P-Continue with current poc. ECHO tomorrow. Anticipate. discharge to home tomorrow.

## 2021-05-20 NOTE — CONSULTS
Care Management Follow Up    Length of Stay (days): 1    Expected Discharge Date: 05/21/21     Concerns to be Addressed: discharge planning   Patient plan of care discussed at interdisciplinary rounds: Yes    Anticipated Discharge Disposition:  Home with assist and outpatient cardiac rehab     Anticipated Discharge Services:  Outpatient cardiac rehab  Anticipated Discharge DME:  none    Additional Information:  Pt is a 76-year-old female admitted to [facility] 5/19/21 for TAVR. CM/SW auto consulted for discharge planning. OT recommending home with assist and OP CR. No CM/SW needs identified. Will continue to follow for potential needs.    GAVI Reyna, LICSW  6C   [facility]  Pager [phone]  Phone [phone]

## 2021-05-20 NOTE — PLAN OF CARE
DISCHARGE     Discharged to: Home  Via: Automobile  Accompanied by: Family-Son  Discharge Instructions: diet, activity, medications, follow up appointments, when to call the MD, and what to watchout for (i.e. s/s of infection, increasing SOB, palpitations, chest pain,)  Prescriptions: To be filled by [facility] per pt's request; medication list reviewed & sent with pt  Follow Up Appointments: arranged; information given  Belongings: All sent with pt  IV: out  Telemetry: off  Pt exhibits understanding of above discharge instructions; all questions answered.  Discharge Paperwork: faxed

## 2021-05-20 NOTE — PROVIDER NOTIFICATION
Pt's cp returned. She is flushed and tachycardic, 120's. Dr. Pablo Marie at bedside to assess pt. Obtained order for chest xray. Continue to monitor.

## 2021-05-20 NOTE — PLAN OF CARE
D: s/p TAVR 5/19    PMHx HTN, HLD, DM2, CAD, MAYKEL, aortic stenosis     I: Monitored vitals and assessed pt status.   PRN: IV hydralazine  Tele: SR  O2: RA, home CPAP at night  Mobility: SBA     A: A0x4. Q2 neuros intact. Denied visual changes. VSS. Hypertensive at times. Afebrile. Encourage use of IS. Urinating adequately. + BM. L groin ecchymotic. R groin scant drainage on primipore. Both sites soft, palpable pulses, CMS intact. Denies pain. Able to make needs known.      P: Continue to monitor Pt status and report changes to CSI. Plan for ECHO this AM and discharge after.

## 2021-05-20 NOTE — PROGRESS NOTES
"Brief cross cover note    Called by RN given new onset of chest discomfort \" precordial burning sensation radiating to neck\" patient reported the symptoms are similar to the ones she had before revascularization.  Sensation started suddenly, and was not associated activity or nausea, vomiting.  Pain was transitory, and by the time of interview almost gone.     Patient s/p transcatheter aortic valve replacement in the morning.     Vital signs afebrile, heart rates 120s, maps over 65  Physical exam significant for euvolemic status, lungs clear to auscultation, patient oriented without focal deficits.  Noticed diaphoresis, extensive  erythema from chest to head, no individual lesions.     Of note, patient's room was warm.     A/P:   -Chest x-ray to rule out complications from procedure  -EKG  -Providing fan and reassurance      Follow-up:   Call RN for reported patient is no longer experiencing chest pain and feels better.   Review chest x-ray that shows increased in interstitial pattern likely related to atelectasis or mild pulmonary edema  EKG no ST changes    Providing incentive spirometry.     Melly Shah  IM PGY 2      "

## 2021-05-21 ENCOUNTER — TELEPHONE (OUTPATIENT)
Dept: CARDIOLOGY | Facility: CLINIC | Age: 76
End: 2021-05-21

## 2021-05-21 LAB
BLD PROD TYP BPU: NORMAL
BLD PROD TYP BPU: NORMAL
BLD UNIT ID BPU: 0
BLD UNIT ID BPU: 0
BLOOD PRODUCT CODE: NORMAL
BLOOD PRODUCT CODE: NORMAL
BPU ID: NORMAL
BPU ID: NORMAL
TRANSFUSION STATUS PATIENT QL: NORMAL

## 2021-05-21 NOTE — TELEPHONE ENCOUNTER
[facility] Call Center    Phone Message    May a detailed message be left on voicemail: yes     Reason for Call: Other: Pt called in stating that she is seeing her eye doctor today for a 'vague sensation' she has been having. The eye doctor wanted her to let her cardiology team know in case they had any questions for her.      Action Taken: Message routed to:  [facility] ([facility]): Cardio    Travel Screening: Not Applicable

## 2021-05-21 NOTE — PLAN OF CARE
Occupational Therapy Discharge Summary    Reason for therapy discharge:    Discharged to home.    Progress towards therapy goal(s). See goals on Care Plan in [facility] electronic health record for goal details.  Goals partially met.  Barriers to achieving goals:   discharge from facility.    Therapy recommendation(s):    Continued therapy is recommended.  Rationale/Recommendations:  WIll benefit from OP CR to maximize cardiopulmonary function and aerobic endurance.

## 2021-05-24 NOTE — TELEPHONE ENCOUNTER
Spoke with patient she stated that she is feeling better. The vague sensation has gone away.      All questions and concerns addressed

## 2021-05-25 DIAGNOSIS — Z95.2 S/P TAVR (TRANSCATHETER AORTIC VALVE REPLACEMENT): Primary | ICD-10-CM

## 2021-05-25 NOTE — PROGRESS NOTES
Date: 5/25/2021    Time of Call: 2:31 PM     Diagnosis:  S/p TAVR     [ TORB ] Ordering provider: Carmel Weston CNP  Order: EKG     Order received by: Bianka Sutherland RN     Follow-up/additional notes:

## 2021-05-26 ENCOUNTER — RECORDS - HEALTHEAST (OUTPATIENT)
Dept: ADMINISTRATIVE | Facility: CLINIC | Age: 76
End: 2021-05-26

## 2021-05-27 ENCOUNTER — OFFICE VISIT (OUTPATIENT)
Dept: CARDIOLOGY | Facility: CLINIC | Age: 76
End: 2021-05-27
Attending: NURSE PRACTITIONER
Payer: COMMERCIAL

## 2021-05-27 VITALS
DIASTOLIC BLOOD PRESSURE: 73 MMHG | WEIGHT: 206 LBS | HEIGHT: 62 IN | SYSTOLIC BLOOD PRESSURE: 128 MMHG | BODY MASS INDEX: 37.91 KG/M2 | HEART RATE: 63 BPM | OXYGEN SATURATION: 95 %

## 2021-05-27 DIAGNOSIS — R00.2 PALPITATIONS: ICD-10-CM

## 2021-05-27 DIAGNOSIS — I10 ESSENTIAL HYPERTENSION: ICD-10-CM

## 2021-05-27 DIAGNOSIS — Z95.2 S/P TAVR (TRANSCATHETER AORTIC VALVE REPLACEMENT): Primary | ICD-10-CM

## 2021-05-27 PROCEDURE — 93005 ELECTROCARDIOGRAM TRACING: CPT

## 2021-05-27 PROCEDURE — G0463 HOSPITAL OUTPT CLINIC VISIT: HCPCS | Mod: 25

## 2021-05-27 PROCEDURE — 99214 OFFICE O/P EST MOD 30 MIN: CPT | Performed by: NURSE PRACTITIONER

## 2021-05-27 RX ORDER — VALSARTAN 80 MG/1
80 TABLET ORAL DAILY
Qty: 90 TABLET | Refills: 0 | Status: SHIPPED | OUTPATIENT
Start: 2021-05-27 | End: 2021-06-01

## 2021-05-27 RX ORDER — AMOXICILLIN 500 MG/1
2000 CAPSULE ORAL
Qty: 4 CAPSULE | Refills: 3 | Status: SHIPPED | OUTPATIENT
Start: 2021-05-27

## 2021-05-27 RX ORDER — METOPROLOL TARTRATE 25 MG/1
25 TABLET, FILM COATED ORAL 2 TIMES DAILY
Qty: 90 TABLET | Refills: 0 | Status: SHIPPED | OUTPATIENT
Start: 2021-05-27 | End: 2021-06-07

## 2021-05-27 ASSESSMENT — PAIN SCALES - GENERAL: PAINLEVEL: NO PAIN (0)

## 2021-05-27 ASSESSMENT — MIFFLIN-ST. JEOR: SCORE: 1375.91

## 2021-05-27 NOTE — LETTER
5/27/2021      RE: Reba Juarez  [address] [facility] [id number]       Dear Colleague,    Thank you for the opportunity to participate in the care of your patient, Reba Juarez, at the [facility] [place] [facility]. Please see a copy of my visit note below.        [facility] HEART CARE  CARDIOVASCULAR DIVISION    VALVE CLINIC RETURN VISIT    PRIMARY CARDIOLOGIST: Dr. Harris/Mireille Edwards NP       PERTINENT CLINICAL HISTORY & REASON FOR CONSULTATION:     Reba Juarez is a very pleasant 76 year old female with HTN, HLD, T2DM, paroxysmal SVT, CAD with remote POBA history w/ non-obstructive CAD per angiogram 4/2021 and severe symptomatic aortic stenosis status-post TAVR with a 23 mm Little Ellie 3 Ultra on 5/19/21 by Gucci Meek and Jonny who presents for 1 week follow-up. The TAVR and post-procedural course were notable for no complications or development of conduction abnormalities. Her immediate post-TAVR echo showed mean PG of 4 mmHg without PVL; however, her POD#1 showed significantly increased mean PG of 34 mmHg thought related to hyperdynamic LV function as valve appearance and TONI were normal. She received IVF and was started on metoprolol with plans for repeat echo in 1-2 weeks. She was discharged home on ASA monotherapy.     She presents today with no specific cardiovascular concerns. She says she has been feeling well since her valve replacement. She is able to be more active and is doing things she would have avoided before. For example she is able to wash the kitchen floor, do laundry and take the dog for a walk twice a day without having to stop and rest. She denies any exertional chest pain or significant shortness of breath. Yesterday she was a little breathless w/overexertion. No orthopnea, PND, leg swelling. She has gained a few pounds and plans to start intermittent fasting again. She had a  "brief episode of palpitations last Friday and her home BP cuff read \"irregular heart beat\". This felt similar to palpitations she has experienced in the past when her cardiac monitor reported paroxysmal SVT, no a-fib. She denies any lightheadedness or syncope. She did have \"a shadow come over her eyes\" last Friday - she was considering going to her eye doctor but her symptoms resolved. No other neurological symptoms. Her home blood pressure have been labile 80s/40s - 160s/80s. Generally 120's-130s. Groin sites are tender but are starting to feel better.      PAST MEDICAL HISTORY:     Past Medical History:   Diagnosis Date     Aortic valve sclerosis     echo 1-24-11 mild     Coronary artery disease 1995    PCI at [facility]. Repeat angiogram in 2007     Diabetes (H)      Gastric ulcer 1990     H/O exercise stress test 3/21/2011    No ischemia     Hyperlipidemia      Hyperlipidemia LDL goal < 70     identified 1990's     Hypertension     onset age 50-55 approx     MAYKEL (obstructive sleep apnea) 2007        PAST SURGICAL HISTORY:     Past Surgical History:   Procedure Laterality Date     ANGIOPLASTY  early 1990s     COLONOSCOPY  2010     CV CORONARY ANGIOGRAM N/A 4/9/2021    Procedure: CV CORONARY ANGIOGRAM;  Surgeon: Júnior Santos MD;  Location:  HEART CARDIAC CATH LAB     CV LEFT HEART CATH N/A 4/9/2021    Procedure: Left Heart Cath;  Surgeon: Júnior Santos MD;  Location:  HEART CARDIAC CATH LAB     CV RIGHT HEART CATH MEASUREMENTS RECORDED N/A 4/9/2021    Procedure: Right Heart Cath;  Surgeon: Júnior Santos MD;  Location:  HEART CARDIAC CATH LAB     CV TRANSCATHETER AORTIC VALVE REPLACEMENT N/A 5/19/2021    Procedure: Transfemoral Transcatheter Aortic Valve Replacement with 23 mm Little Ellie 3 Ultra Transcatheter Heart Valve, Transthoracic echocardiogram read by Dr. Farfan;  Surgeon: Harvinder Quick MD;  Location: [facility] OR     EYE SURGERY       HEART CATH FEMORAL CANNULIZATION " WITH OPEN STANDBY REPAIR AORTIC VALVE N/A 5/19/2021    Procedure: cardiopulmonary bypass on standby;  Surgeon: Riaz Fuller MD;  Location:  OR        CURRENT MEDICATIONS:     Current Outpatient Medications   Medication Sig Dispense Refill     aspirin 81 MG tablet Take 1 tablet by mouth daily.       budesonide-formoterol (SYMBICORT) 80-4.5 MCG/ACT Inhaler Inhale 2 puffs into the lungs 2 times daily       metFORMIN (GLUCOPHAGE) 500 MG tablet Take 1,500 mg by mouth        metoprolol tartrate (LOPRESSOR) 25 MG tablet Take 0.5 tablets (12.5 mg) by mouth 2 times daily 30 tablet 3     nitroGLYcerin (NITROSTAT) 0.4 MG sublingual tablet Place 0.4 mg under the tongue       ORDER FOR DME Use your CPAP device as directed by your provider.       rosuvastatin (CRESTOR) 40 MG tablet Take 40 mg by mouth       valsartan-hydrochlorothiazide (DIOVAN HCT) 80-12.5 MG tablet Take 1 tablet by mouth daily          ALLERGIES:     Allergies   Allergen Reactions     Pravastatin      Myalgia       Simvastatin      myalgia     Calcium Citrate Unknown     Bloody nose per pt     Lipitor [Atorvastatin Calcium]      Myalgia          FAMILY HISTORY:     Family History   Problem Relation Age of Onset     No Known Problems Sister      Alcoholism Brother      Myocardial Infarction Mother 51        HTN onset age 30, was a smoker     Myocardial Infarction Father 63        smoker     Myocardial Infarction Brother 39     No Known Problems Son      No Known Problems Daughter      CABG Brother      Heart Disease Brother      No Known Problems Sister      Cerebrovascular Disease Sister      Coronary Artery Disease Sister      No Known Problems Son         SOCIAL HISTORY:     Social History     Socioeconomic History     Marital status:      Spouse name: Not on file     Number of children: Not on file     Years of education: Not on file     Highest education level: Not on file   Occupational History     Occupation: retired     Employer: [employer]      Comment:  retired full time, still works a few days as biochemical pharmacist   Social Needs     Financial resource strain: Not on file     Food insecurity     Worry: Not on file     Inability: Not on file     Transportation needs     Medical: Not on file     Non-medical: Not on file   Tobacco Use     Smoking status: Former Smoker     Packs/day: 1.00     Years: 20.00     Pack years: 20.00     Quit date: 3/5/1992     Years since quittin.2     Smokeless tobacco: Never Used   Substance and Sexual Activity     Alcohol use: No     Drug use: No     Sexual activity: Not on file   Lifestyle     Physical activity     Days per week: Not on file     Minutes per session: Not on file     Stress: Not on file   Relationships     Social connections     Talks on phone: Not on file     Gets together: Not on file     Attends Caodaism service: Not on file     Active member of club or organization: Not on file     Attends meetings of clubs or organizations: Not on file     Relationship status: Not on file     Intimate partner violence     Fear of current or ex partner: Not on file     Emotionally abused: Not on file     Physically abused: Not on file     Forced sexual activity: Not on file   Other Topics Concern     Parent/sibling w/ CABG, MI or angioplasty before 65F 55M? Not Asked   Social History Narrative    Single and lives with her son and brother.        REVIEW OF SYSTEMS:     Constitutional: No fevers or chills  Skin: No new rash or itching  Eyes: No acute change in vision  Ears/Nose/Throat: No purulent rhinorrhea, new hearing loss, or new vertigo  Respiratory: No cough or hemoptysis  Cardiovascular: See HPI  Gastrointestinal: No change in appetite, vomiting, hematemesis or diarrhea  Genitourinary: No dysuria or hematuria  Musculoskeletal: No new back pain, neck pain or muscle pain  Neurologic: No new headaches, focal weakness or behavior changes  Psychiatric: No hallucinations, excessive alcohol consumption or illegal  "drug usage  Hematologic/Lymphatic/Immunologic: No bleeding, chills, fever, night sweats or weight loss  Endocrine: No new cold intolerance, heat intolerance, polyphagia, polydipsia or polyuria      PHYSICAL EXAMINATION:     /73 (BP Location: Right arm, Patient Position: Chair, Cuff Size: Adult Large)   Pulse 63   Ht 1.58 m (5' 2.21\")   Wt 93.4 kg (206 lb)   SpO2 95%   BMI 37.43 kg/m      GENERAL: No acute distress.  HEENT: EOMI. Sclerae white, not injected. Nares clear. Pharynx without erythema or exudate.   Neck: No adenopathy. No thyromegaly. No jugular venous distension.   Heart: Regular rate and rhythm. No murmur.   Lungs: Clear to auscultation. No ronchi, wheezes, rales.   Abdomen: Soft, nontender, nondistended. Bowel sounds present.  Extremities: No clubbing, cyanosis, or edema.   Neurologic: Alert and oriented to person/place/time, normal speech and affect. No focal deficits.  Skin: No petechiae, purpura or rash.     LABORATORY DATA:   Personally reviewed and interpreted lab results.   CBC RESULTS:  Lab Results   Component Value Date    WBC 11.8 (H) 05/20/2021    RBC 3.97 05/20/2021    HGB 11.3 (L) 05/20/2021    HCT 33.9 (L) 05/20/2021    MCV 85 05/20/2021    MCH 28.5 05/20/2021    MCHC 33.3 05/20/2021    RDW 14.6 05/20/2021     05/20/2021       BMP RESULTS:  Lab Results   Component Value Date     05/20/2021    POTASSIUM 3.7 05/20/2021    CHLORIDE 107 05/20/2021    CO2 23 05/20/2021    ANIONGAP 7 05/20/2021     (H) 05/20/2021    BUN 15 05/20/2021    CR 0.82 05/20/2021    GFRESTIMATED 69 05/20/2021    GFRESTBLACK 80 05/20/2021    TERRI 8.7 05/20/2021         PROCEDURES & FURTHER ASSESSMENTS:     ECG 5/20/21:  Personally reviewed and interpreted by me  NSR HR 98, QRS 70 ms,     Echocardiogram 5/20/21:  Interpretation Summary  Global and regional left ventricular function is hyperkinetic with an EF >70%.  TAVR with Little S3 23 mm valve. No aortic regurgitation is present.The " mean  gradient is elevated at 34 mmHg. The aortic valve area is normal at 1.7 cm^2.  IVC diameter <2.1 cm collapsing >50% with sniff suggests a normal RA pressure  of 3 mmHg.  No pericardial effusion is present.     TAVR gr is likely elevated due to hyperdynamic LV fxn. Valve appearance and  TONI are normal. Recommend short term echo follow-up.    NYHA Class: I     CLINICAL IMPRESSION:     Reba Juarez is a very pleasant 76 year old female with HTN, HLD, T2DM, CAD with remote POBA history w/ non-obstructive CAD per angiogram 4/2021 and severe symptomatic aortic stenosis status-post TAVR with a 23 mm Little Ellie 3 Ultra on 5/19/21 by Gucci Meek and Jonny who presents for 1 week follow-up.     The TAVR and post-procedural course were notable for no complications or development of conduction abnormalities. Her immediate post-TAVR echo showed mean PG of 4 mmHg without PV; however, her POD#1 TTE showed significantly increased mean PG of 34 mmHg thought related to hyperdynamic LV function as valve leaflets opened well. She received IVF and was started on metoprolol with plans for repeat echo in 1-2 weeks.     Since discharge she reports feeling well with improvement in exertional dyspnea and exercise tolerance. She does continue to experience occasional palpitations related to paroxysmal SVT. Plan to increase metoprolol to 25 mg BID and discontinue hydrochlorothiazide to avoid hypotension. Plan to repeat echo next week to reassess mean PG.     Plan Summary:  1) Aspirin 81 mg daily lifelong  2) Continue valsartan 80 mg daily   3) Discontinue hydrochlorothiazide   4) Increase metoprolol to 25 mg BID   5) ECHO next week   6) Lifelong antibiotic prophylaxis prior to all dental procedures  7) F/up valve clinic 1 month with echo, CT, EKG and labs prior     Carmel ANGELA Weston, CNP  [facility] Cardiology Team      CC  Patient Care Team:  Dayana Kruse NP as PCP - General (Nurse Practitioner)  [place]  Cindy More MD as Assigned Pulmonology Provider  Homar Harris MD as Assigned Heart and Vascular Provider

## 2021-05-27 NOTE — PATIENT INSTRUCTIONS
You were seen today in the Structural Heart Clinic at the [facility].    Cardiology provider you saw during your visit: Carmel Weston NP    Instructions:   1. Continue aspirin 81 mg daily lifelong.  2. Repeat echo next week to reassess AV gradient   3. Delay any nonurgent dental procedures for the first 6 month post TAVR.  4. For all future dental cleanings and procedures you will need to take antibiotics prior - see instructions below.   5. Stop hydrochlorothiazide and increase metoprolol to 25 mg twice daily  6. Continue valsartan 80 mg daily  7. Continue to monitor home blood pressure - consider upper arm BP cuff as they tend to be more accurate  8. Contact cardiac rehab to arrange your evaluation   9. Follow-up in Valve Clinic in 1 month with echo, CT, labs and ECG prior     Prevention of Infective (Bacterial) Endocarditis:  You are at increased risk for developing adverse outcomes from infective endocarditis (IE), also known as bacterial endocarditis (BE) because of the new device in your heart. The guidelines for prevention of IE are to give patients antibiotics prior to any dental procedures that involve manipulation of gingival tissue or the periapical region of teeth, or perforation of the oral mucosa:      It is recommended to take Amoxicillin 2 gm by mouth as a single dose 30 to 60 minutes before procedure.     OR if allergic to Penicillin or Ampicillin:     Cephalexin 2 gm by mouth, or  Clindamycin 600 mg by mouth, or  Azithromycin or Clarithromycin 500 mg PO       Questions and scheduling:   First call: Structural Heart  Mckenna Hugh [phone]    General scheduling line: [phone].   First press #1 for the [place] and then press #3 for Medical Questions to reach the Cardiology triage nurse.     On Call Cardiologist for after hours or on weekends: [phone], press option #4 and ask to speak to the on-call Cardiologist.

## 2021-05-27 NOTE — NURSING NOTE
Chief Complaint   Patient presents with     Follow Up     1 week f/u s/p TAVR     Vitals were taken, medications reconciled, and EKG was performed.    Shay Gar, EMT  10:23 AM

## 2021-05-27 NOTE — PROGRESS NOTES
"    [facility] HEART CARE  CARDIOVASCULAR DIVISION    VALVE CLINIC RETURN VISIT    PRIMARY CARDIOLOGIST: Dr. Harris/Mireille Edwards NP       PERTINENT CLINICAL HISTORY & REASON FOR CONSULTATION:     Reba Juarez is a very pleasant 76 year old female with HTN, HLD, T2DM, paroxysmal SVT, CAD with remote POBA history w/ non-obstructive CAD per angiogram 4/2021 and severe symptomatic aortic stenosis status-post TAVR with a 23 mm Little Ellie 3 Ultra on 5/19/21 by Gucci Meek and Jonny who presents for 1 week follow-up. The TAVR and post-procedural course were notable for no complications or development of conduction abnormalities. Her immediate post-TAVR echo showed mean PG of 4 mmHg without PVL; however, her POD#1 showed significantly increased mean PG of 34 mmHg thought related to hyperdynamic LV function as valve appearance and TONI were normal. She received IVF and was started on metoprolol with plans for repeat echo in 1-2 weeks. She was discharged home on ASA monotherapy.     She presents today with no specific cardiovascular concerns. She says she has been feeling well since her valve replacement. She is able to be more active and is doing things she would have avoided before. For example she is able to wash the kitchen floor, do laundry and take the dog for a walk twice a day without having to stop and rest. She denies any exertional chest pain or significant shortness of breath. Yesterday she was a little breathless w/overexertion. No orthopnea, PND, leg swelling. She has gained a few pounds and plans to start intermittent fasting again. She had a brief episode of palpitations last Friday and her home BP cuff read \"irregular heart beat\". This felt similar to palpitations she has experienced in the past when her cardiac monitor reported paroxysmal SVT, no a-fib. She denies any lightheadedness or syncope. She did have \"a shadow come over her eyes\" last Friday - she was considering going to her " eye doctor but her symptoms resolved. No other neurological symptoms. Her home blood pressure have been labile 80s/40s - 160s/80s. Generally 120's-130s. Groin sites are tender but are starting to feel better.      PAST MEDICAL HISTORY:     Past Medical History:   Diagnosis Date     Aortic valve sclerosis     echo 1-24-11 mild     Coronary artery disease 1995    PCI at [facility]. Repeat angiogram in 2007     Diabetes (H)      Gastric ulcer 1990     H/O exercise stress test 3/21/2011    No ischemia     Hyperlipidemia      Hyperlipidemia LDL goal < 70     identified 1990's     Hypertension     onset age 50-55 approx     MAYKEL (obstructive sleep apnea) 2007        PAST SURGICAL HISTORY:     Past Surgical History:   Procedure Laterality Date     ANGIOPLASTY  early 1990s     COLONOSCOPY  2010     CV CORONARY ANGIOGRAM N/A 4/9/2021    Procedure: CV CORONARY ANGIOGRAM;  Surgeon: Júnior Santos MD;  Location:  HEART CARDIAC CATH LAB     CV LEFT HEART CATH N/A 4/9/2021    Procedure: Left Heart Cath;  Surgeon: Júnior Santos MD;  Location:  HEART CARDIAC CATH LAB     CV RIGHT HEART CATH MEASUREMENTS RECORDED N/A 4/9/2021    Procedure: Right Heart Cath;  Surgeon: Júnior Santos MD;  Location:  HEART CARDIAC CATH LAB     CV TRANSCATHETER AORTIC VALVE REPLACEMENT N/A 5/19/2021    Procedure: Transfemoral Transcatheter Aortic Valve Replacement with 23 mm Little Ellie 3 Ultra Transcatheter Heart Valve, Transthoracic echocardiogram read by Dr. Farfan;  Surgeon: Harvinder Quick MD;  Location:  OR     EYE SURGERY       HEART CATH FEMORAL CANNULIZATION WITH OPEN STANDBY REPAIR AORTIC VALVE N/A 5/19/2021    Procedure: cardiopulmonary bypass on standby;  Surgeon: Riaz Fuller MD;  Location:  OR        CURRENT MEDICATIONS:     Current Outpatient Medications   Medication Sig Dispense Refill     aspirin 81 MG tablet Take 1 tablet by mouth daily.       budesonide-formoterol (SYMBICORT) 80-4.5  MCG/ACT Inhaler Inhale 2 puffs into the lungs 2 times daily       metFORMIN (GLUCOPHAGE) 500 MG tablet Take 1,500 mg by mouth        metoprolol tartrate (LOPRESSOR) 25 MG tablet Take 0.5 tablets (12.5 mg) by mouth 2 times daily 30 tablet 3     nitroGLYcerin (NITROSTAT) 0.4 MG sublingual tablet Place 0.4 mg under the tongue       ORDER FOR DME Use your CPAP device as directed by your provider.       rosuvastatin (CRESTOR) 40 MG tablet Take 40 mg by mouth       valsartan-hydrochlorothiazide (DIOVAN HCT) 80-12.5 MG tablet Take 1 tablet by mouth daily          ALLERGIES:     Allergies   Allergen Reactions     Pravastatin      Myalgia       Simvastatin      myalgia     Calcium Citrate Unknown     Bloody nose per pt     Lipitor [Atorvastatin Calcium]      Myalgia          FAMILY HISTORY:     Family History   Problem Relation Age of Onset     No Known Problems Sister      Alcoholism Brother      Myocardial Infarction Mother 51        HTN onset age 30, was a smoker     Myocardial Infarction Father 63        smoker     Myocardial Infarction Brother 39     No Known Problems Son      No Known Problems Daughter      CABG Brother      Heart Disease Brother      No Known Problems Sister      Cerebrovascular Disease Sister      Coronary Artery Disease Sister      No Known Problems Son         SOCIAL HISTORY:     Social History     Socioeconomic History     Marital status:      Spouse name: Not on file     Number of children: Not on file     Years of education: Not on file     Highest education level: Not on file   Occupational History     Occupation: retired     Employer: [employer]     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, still works a few days as biochemical pharmacist   Social Needs     Financial resource strain: Not on file     Food insecurity     Worry: Not on file     Inability: Not on file     Transportation needs     Medical: Not on file     Non-medical: Not on file   Tobacco Use     Smoking status: Former Smoker      Packs/day: 1.00     Years: 20.00     Pack years: 20.00     Quit date: 3/5/1992     Years since quittin.2     Smokeless tobacco: Never Used   Substance and Sexual Activity     Alcohol use: No     Drug use: No     Sexual activity: Not on file   Lifestyle     Physical activity     Days per week: Not on file     Minutes per session: Not on file     Stress: Not on file   Relationships     Social connections     Talks on phone: Not on file     Gets together: Not on file     Attends Bahai service: Not on file     Active member of club or organization: Not on file     Attends meetings of clubs or organizations: Not on file     Relationship status: Not on file     Intimate partner violence     Fear of current or ex partner: Not on file     Emotionally abused: Not on file     Physically abused: Not on file     Forced sexual activity: Not on file   Other Topics Concern     Parent/sibling w/ CABG, MI or angioplasty before 65F 55M? Not Asked   Social History Narrative    Single and lives with her son and brother.        REVIEW OF SYSTEMS:     Constitutional: No fevers or chills  Skin: No new rash or itching  Eyes: No acute change in vision  Ears/Nose/Throat: No purulent rhinorrhea, new hearing loss, or new vertigo  Respiratory: No cough or hemoptysis  Cardiovascular: See HPI  Gastrointestinal: No change in appetite, vomiting, hematemesis or diarrhea  Genitourinary: No dysuria or hematuria  Musculoskeletal: No new back pain, neck pain or muscle pain  Neurologic: No new headaches, focal weakness or behavior changes  Psychiatric: No hallucinations, excessive alcohol consumption or illegal drug usage  Hematologic/Lymphatic/Immunologic: No bleeding, chills, fever, night sweats or weight loss  Endocrine: No new cold intolerance, heat intolerance, polyphagia, polydipsia or polyuria      PHYSICAL EXAMINATION:     /73 (BP Location: Right arm, Patient Position: Chair, Cuff Size: Adult Large)   Pulse 63   Ht 1.58 m (5'  "2.21\")   Wt 93.4 kg (206 lb)   SpO2 95%   BMI 37.43 kg/m      GENERAL: No acute distress.  HEENT: EOMI. Sclerae white, not injected. Nares clear. Pharynx without erythema or exudate.   Neck: No adenopathy. No thyromegaly. No jugular venous distension.   Heart: Regular rate and rhythm. No murmur.   Lungs: Clear to auscultation. No ronchi, wheezes, rales.   Abdomen: Soft, nontender, nondistended. Bowel sounds present.  Extremities: No clubbing, cyanosis, or edema.   Neurologic: Alert and oriented to person/place/time, normal speech and affect. No focal deficits.  Skin: No petechiae, purpura or rash.     LABORATORY DATA:   Personally reviewed and interpreted lab results.   CBC RESULTS:  Lab Results   Component Value Date    WBC 11.8 (H) 05/20/2021    RBC 3.97 05/20/2021    HGB 11.3 (L) 05/20/2021    HCT 33.9 (L) 05/20/2021    MCV 85 05/20/2021    MCH 28.5 05/20/2021    MCHC 33.3 05/20/2021    RDW 14.6 05/20/2021     05/20/2021       BMP RESULTS:  Lab Results   Component Value Date     05/20/2021    POTASSIUM 3.7 05/20/2021    CHLORIDE 107 05/20/2021    CO2 23 05/20/2021    ANIONGAP 7 05/20/2021     (H) 05/20/2021    BUN 15 05/20/2021    CR 0.82 05/20/2021    GFRESTIMATED 69 05/20/2021    GFRESTBLACK 80 05/20/2021    TERRI 8.7 05/20/2021         PROCEDURES & FURTHER ASSESSMENTS:     ECG 5/20/21:  Personally reviewed and interpreted by me  NSR HR 98, QRS 70 ms,     Echocardiogram 5/20/21:  Interpretation Summary  Global and regional left ventricular function is hyperkinetic with an EF >70%.  TAVR with Little S3 23 mm valve. No aortic regurgitation is present.The mean  gradient is elevated at 34 mmHg. The aortic valve area is normal at 1.7 cm^2.  IVC diameter <2.1 cm collapsing >50% with sniff suggests a normal RA pressure  of 3 mmHg.  No pericardial effusion is present.     TAVR gr is likely elevated due to hyperdynamic LV fxn. Valve appearance and  TONI are normal. Recommend short term echo " follow-up.    NYHA Class: I     CLINICAL IMPRESSION:     Reba Juarez is a very pleasant 76 year old female with HTN, HLD, T2DM, CAD with remote POBA history w/ non-obstructive CAD per angiogram 4/2021 and severe symptomatic aortic stenosis status-post TAVR with a 23 mm Little Ellie 3 Ultra on 5/19/21 by Gucci Meek and Jonny who presents for 1 week follow-up.     The TAVR and post-procedural course were notable for no complications or development of conduction abnormalities. Her immediate post-TAVR echo showed mean PG of 4 mmHg without PV; however, her POD#1 TTE showed significantly increased mean PG of 34 mmHg thought related to hyperdynamic LV function as valve leaflets opened well. She received IVF and was started on metoprolol with plans for repeat echo in 1-2 weeks.     Since discharge she reports feeling well with improvement in exertional dyspnea and exercise tolerance. She does continue to experience occasional palpitations related to paroxysmal SVT. Plan to increase metoprolol to 25 mg BID and discontinue hydrochlorothiazide to avoid hypotension. Plan to repeat echo next week to reassess mean PG.     Plan Summary:  1) Aspirin 81 mg daily lifelong  2) Continue valsartan 80 mg daily   3) Discontinue hydrochlorothiazide   4) Increase metoprolol to 25 mg BID   5) ECHO next week   6) Lifelong antibiotic prophylaxis prior to all dental procedures  7) F/up valve clinic 1 month with echo, CT, EKG and labs prior     Carmel ANGELA Weston, CNP  [facility] Cardiology Team      CC  Patient Care Team:  Dayana Kruse NP as PCP - General (Nurse Practitioner)  Cindy Ness MD as Assigned Pulmonology Provider  Homar Harris MD as Assigned Heart and Vascular Provider

## 2021-05-28 LAB — INTERPRETATION ECG - MUSE: NORMAL

## 2021-05-29 ENCOUNTER — RECORDS - HEALTHEAST (OUTPATIENT)
Dept: ADMINISTRATIVE | Facility: CLINIC | Age: 76
End: 2021-05-29

## 2021-05-29 ENCOUNTER — HEALTH MAINTENANCE LETTER (OUTPATIENT)
Age: 76
End: 2021-05-29

## 2021-05-29 DIAGNOSIS — I10 ESSENTIAL HYPERTENSION: ICD-10-CM

## 2021-05-29 DIAGNOSIS — R00.2 PALPITATIONS: ICD-10-CM

## 2021-05-30 ENCOUNTER — RECORDS - HEALTHEAST (OUTPATIENT)
Dept: ADMINISTRATIVE | Facility: CLINIC | Age: 76
End: 2021-05-30

## 2021-06-01 DIAGNOSIS — I10 ESSENTIAL HYPERTENSION: Primary | ICD-10-CM

## 2021-06-01 RX ORDER — VALSARTAN AND HYDROCHLOROTHIAZIDE 80; 12.5 MG/1; MG/1
1 TABLET, FILM COATED ORAL DAILY
Qty: 90 TABLET | Refills: 0 | Status: SHIPPED | OUTPATIENT
Start: 2021-06-01 | End: 2021-06-07 | Stop reason: ALTCHOICE

## 2021-06-02 RX ORDER — METOPROLOL TARTRATE 25 MG/1
TABLET, FILM COATED ORAL
Qty: 180 TABLET | Refills: 1 | OUTPATIENT
Start: 2021-06-02

## 2021-06-03 ENCOUNTER — RECORDS - HEALTHEAST (OUTPATIENT)
Dept: ADMINISTRATIVE | Facility: CLINIC | Age: 76
End: 2021-06-03

## 2021-06-03 ENCOUNTER — ANCILLARY PROCEDURE (OUTPATIENT)
Dept: CARDIOLOGY | Facility: CLINIC | Age: 76
End: 2021-06-03
Attending: NURSE PRACTITIONER
Payer: COMMERCIAL

## 2021-06-03 DIAGNOSIS — Z95.2 S/P TAVR (TRANSCATHETER AORTIC VALVE REPLACEMENT): ICD-10-CM

## 2021-06-03 PROCEDURE — 93306 TTE W/DOPPLER COMPLETE: CPT | Performed by: STUDENT IN AN ORGANIZED HEALTH CARE EDUCATION/TRAINING PROGRAM

## 2021-06-07 DIAGNOSIS — R00.2 PALPITATIONS: ICD-10-CM

## 2021-06-07 DIAGNOSIS — I10 ESSENTIAL HYPERTENSION: ICD-10-CM

## 2021-06-07 RX ORDER — VALSARTAN 80 MG/1
80 TABLET ORAL 2 TIMES DAILY
Qty: 180 TABLET | Refills: 1 | Status: SHIPPED | OUTPATIENT
Start: 2021-06-07 | End: 2021-08-11

## 2021-06-07 RX ORDER — METOPROLOL TARTRATE 25 MG/1
12.5 TABLET, FILM COATED ORAL 2 TIMES DAILY
Qty: 90 TABLET | Refills: 0 | Status: SHIPPED | OUTPATIENT
Start: 2021-06-07 | End: 2021-07-05

## 2021-06-07 RX ORDER — HYDROCHLOROTHIAZIDE 12.5 MG/1
12.5 TABLET ORAL DAILY
Qty: 90 TABLET | Refills: 0 | Status: SHIPPED | OUTPATIENT
Start: 2021-06-07 | End: 2022-01-03 | Stop reason: ALTCHOICE

## 2021-06-07 NOTE — PROGRESS NOTES
Contacted patient to review response to recent medication changes.     She said she did not tolerate metoprolol 25 mg BID due to fatigue and therefore went back down to 12.5 mg BID.    She also states that when her hydrochlorothiazide was discontinued her blood pressure increased significantly and legs swelled. She resumed hydrochlorothiazide and is currently taking Diovan/HCTZ  mg twice daily and BP have improved to 140s. Legs remains swollen, though no heart failure symptoms.     Will have patient continue Valsartan 80 mg BID, but decrease hydrochlorothiazide to 12.5 mg daily due to hyperdynamic LV function on echo and increased AV gradient. I recommended compression, elevation and Na restriction for leg swelling.     Patient agrees with the plan and will contact us if swelling worsens.

## 2021-06-08 ENCOUNTER — RECORDS - HEALTHEAST (OUTPATIENT)
Dept: ADMINISTRATIVE | Facility: OTHER | Age: 76
End: 2021-06-08

## 2021-06-08 ENCOUNTER — RECORDS - HEALTHEAST (OUTPATIENT)
Dept: ADMINISTRATIVE | Facility: REHABILITATION | Age: 76
End: 2021-06-08

## 2021-06-08 DIAGNOSIS — I35.0 SEVERE AORTIC STENOSIS: ICD-10-CM

## 2021-06-26 ENCOUNTER — HOSPITAL ENCOUNTER (EMERGENCY)
Dept: EMERGENCY MEDICINE | Facility: HOSPITAL | Age: 76
Discharge: HOME | End: 2021-06-26
Attending: EMERGENCY MEDICINE
Payer: COMMERCIAL

## 2021-06-26 DIAGNOSIS — S42.292A HUMERAL HEAD FRACTURE, LEFT, CLOSED, INITIAL ENCOUNTER: ICD-10-CM

## 2021-06-26 ASSESSMENT — MIFFLIN-ST. JEOR: SCORE: 1365.86

## 2021-07-01 NOTE — PROGRESS NOTES
[facility]  CARDIOVASCULAR DIVISION    VALVE CLINIC RETURN VISIT    PRIMARY CARDIOLOGIST: Dr. Harris/Mireille Edwards NP       PERTINENT CLINICAL HISTORY:     Reba Juarez is a very pleasant 76 year old female with HTN, HLD, T2DM, paroxysmal SVT, CAD with remote POBA history w/ non-obstructive CAD per angiogram 4/2021, HFpEF and severe aortic stenosis status-post TAVR with a 23 mm Little Ellie 3 Ultra on 5/19/21 by Alba Quick, Gucci and Jonny who presents for 1 month follow-up. The TAVR and post-procedural course were notable for no complications or development of conduction abnormalities. Her POD#1 ECHO showed significantly increased mean PG of 34 mmHg thought related to hyperdynamic LV function. She received IVF and was started on metoprolol with improvement in mean PG to 24 mmHg on follow-up echo. She was last evaluated 1 week post TAVR and reported palpitations thought related to SVT. Metoprolol was increased with improvement in palpitations.     She says she has been feeling well since her valve replacement with improvement in fatigue and shortness of breath. She says she is able to be more active. She has been working out in her garden and is walking her dog twice a day without having to stop and rest. She has also been busy caring for her brother who was recently diagnosed with renal cell carcinoma. She denies any chest pain, shortness of breath, orthopnea, PND, leg swelling. She has not had any recent palpitations since increasing her metoprolol. Her heart rate does spike briefly at night on her smart watch, but she is not aware of this. She denies lightheadedness or syncope. Her blood pressure have been fairly steady 140-150s systolic. She is only taking valsartan 80 mg once daily, not twice daily as prescribed.      PAST MEDICAL HISTORY:     Past Medical History:   Diagnosis Date     Aortic valve sclerosis     echo 1-24-11 mild     Coronary artery disease 1995    PCI at [facility]  [facility]. Repeat angiogram in 2007     Diabetes (H)      Gastric ulcer 1990     H/O exercise stress test 3/21/2011    No ischemia     Hyperlipidemia      Hyperlipidemia LDL goal < 70     identified 1990's     Hypertension     onset age 50-55 approx     MAYKEL (obstructive sleep apnea) 2007        PAST SURGICAL HISTORY:     Past Surgical History:   Procedure Laterality Date     ANGIOPLASTY  early 1990s     COLONOSCOPY  2010     CV CORONARY ANGIOGRAM N/A 4/9/2021    Procedure: CV CORONARY ANGIOGRAM;  Surgeon: Júnior Santos MD;  Location:  HEART CARDIAC CATH LAB     CV LEFT HEART CATH N/A 4/9/2021    Procedure: Left Heart Cath;  Surgeon: Júnior Santos MD;  Location:  HEART CARDIAC CATH LAB     CV RIGHT HEART CATH MEASUREMENTS RECORDED N/A 4/9/2021    Procedure: Right Heart Cath;  Surgeon: Júnior Santos MD;  Location:  HEART CARDIAC CATH LAB     CV TRANSCATHETER AORTIC VALVE REPLACEMENT N/A 5/19/2021    Procedure: Transfemoral Transcatheter Aortic Valve Replacement with 23 mm Little Ellie 3 Ultra Transcatheter Heart Valve, Transthoracic echocardiogram read by Dr. Farfan;  Surgeon: Harvinder Quick MD;  Location:  OR     EYE SURGERY       HEART CATH FEMORAL CANNULIZATION WITH OPEN STANDBY REPAIR AORTIC VALVE N/A 5/19/2021    Procedure: cardiopulmonary bypass on standby;  Surgeon: Riaz Fuller MD;  Location:  OR        CURRENT MEDICATIONS:     Current Outpatient Medications   Medication Sig Dispense Refill     amoxicillin (AMOXIL) 500 MG capsule Take 4 capsules (2,000 mg) by mouth once as needed (SBE prophylaxis - take 30-60 minutes prior to dental procedure or cleaning) 4 capsule 3     aspirin 81 MG tablet Take 1 tablet by mouth daily.       budesonide-formoterol (SYMBICORT) 80-4.5 MCG/ACT Inhaler Inhale 2 puffs into the lungs 2 times daily       hydrochlorothiazide (HYDRODIURIL) 12.5 MG tablet Take 1 tablet (12.5 mg) by mouth daily 90 tablet 0     metFORMIN (GLUCOPHAGE) 500 MG  tablet Take 1,500 mg by mouth        metoprolol tartrate (LOPRESSOR) 25 MG tablet Take 0.5 tablets (12.5 mg) by mouth 2 times daily 90 tablet 0     nitroGLYcerin (NITROSTAT) 0.4 MG sublingual tablet Place 0.4 mg under the tongue       ORDER FOR DME Use your CPAP device as directed by your provider.       rosuvastatin (CRESTOR) 40 MG tablet Take 40 mg by mouth       valsartan (DIOVAN) 80 MG tablet Take 1 tablet (80 mg) by mouth 2 times daily 180 tablet 1        ALLERGIES:     Allergies   Allergen Reactions     Pravastatin      Myalgia       Simvastatin      myalgia     Calcium Citrate Unknown     Bloody nose per pt     Lipitor [Atorvastatin Calcium]      Myalgia          FAMILY HISTORY:     Family History   Problem Relation Age of Onset     No Known Problems Sister      Alcoholism Brother      Myocardial Infarction Mother 51        HTN onset age 30, was a smoker     Myocardial Infarction Father 63        smoker     Myocardial Infarction Brother 39     No Known Problems Son      No Known Problems Daughter      CABG Brother      Heart Disease Brother      No Known Problems Sister      Cerebrovascular Disease Sister      Coronary Artery Disease Sister      No Known Problems Son         SOCIAL HISTORY:     Social History     Socioeconomic History     Marital status:      Spouse name: Not on file     Number of children: Not on file     Years of education: Not on file     Highest education level: Not on file   Occupational History     Occupation: retired     Employer: [employer]     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, still works a few days as biochemical pharmacist   Social Needs     Financial resource strain: Not on file     Food insecurity     Worry: Not on file     Inability: Not on file     Transportation needs     Medical: Not on file     Non-medical: Not on file   Tobacco Use     Smoking status: Former Smoker     Packs/day: 1.00     Years: 20.00     Pack years: 20.00     Quit date: 3/5/1992     Years since  "quittin.2     Smokeless tobacco: Never Used   Substance and Sexual Activity     Alcohol use: No     Drug use: No     Sexual activity: Not on file   Lifestyle     Physical activity     Days per week: Not on file     Minutes per session: Not on file     Stress: Not on file   Relationships     Social connections     Talks on phone: Not on file     Gets together: Not on file     Attends Orthodox service: Not on file     Active member of club or organization: Not on file     Attends meetings of clubs or organizations: Not on file     Relationship status: Not on file     Intimate partner violence     Fear of current or ex partner: Not on file     Emotionally abused: Not on file     Physically abused: Not on file     Forced sexual activity: Not on file   Other Topics Concern     Parent/sibling w/ CABG, MI or angioplasty before 65F 55M? Not Asked   Social History Narrative    Single and lives with her son and brother.        REVIEW OF SYSTEMS:     Constitutional: No fevers or chills  Skin: No new rash or itching  Eyes: No acute change in vision  Ears/Nose/Throat: No purulent rhinorrhea, new hearing loss, or new vertigo  Respiratory: No cough or hemoptysis  Cardiovascular: See HPI  Gastrointestinal: No change in appetite, vomiting, hematemesis or diarrhea  Genitourinary: No dysuria or hematuria  Musculoskeletal: No new back pain, neck pain or muscle pain  Neurologic: No new headaches, focal weakness or behavior changes  Psychiatric: No hallucinations, excessive alcohol consumption or illegal drug usage  Hematologic/Lymphatic/Immunologic: No bleeding, chills, fever, night sweats or weight loss  Endocrine: No new cold intolerance, heat intolerance, polyphagia, polydipsia or polyuria      PHYSICAL EXAMINATION:     /66 (BP Location: Right arm, Patient Position: Chair, Cuff Size: Adult Regular)   Pulse 72   Ht 1.588 m (5' 2.5\")   Wt 91.6 kg (202 lb)   SpO2 94%   BMI 36.36 kg/m      GENERAL: No acute " distress.  HEENT: EOMI. Sclerae white, not injected. Nares clear. Pharynx without erythema or exudate.   Neck: No adenopathy. No thyromegaly. No jugular venous distension.   Heart: Regular rate and rhythm. Soft 2/6 DALIA murmur RUSB.   Lungs: Clear to auscultation. No ronchi, wheezes, rales.   Abdomen: Soft, nontender, nondistended. Bowel sounds present.  Extremities: No clubbing, cyanosis, or edema.   Neurologic: Alert and oriented to person/place/time, normal speech and affect. No focal deficits.  Skin: No petechiae, purpura or rash.     LABORATORY DATA:   Personally reviewed and interpreted lab results.   CBC RESULTS:  Lab Results   Component Value Date    WBC 12.8 (H) 07/05/2021    RBC 4.25 07/05/2021    HGB 11.7 07/05/2021    HCT 35.9 07/05/2021    MCV 85 07/05/2021    MCH 27.5 07/05/2021    MCHC 32.6 07/05/2021    RDW 14.8 07/05/2021     07/05/2021       BMP RESULTS:  Lab Results   Component Value Date     05/20/2021    POTASSIUM 3.7 05/20/2021    CHLORIDE 107 05/20/2021    CO2 23 05/20/2021    ANIONGAP 7 05/20/2021     (H) 05/20/2021    BUN 15 05/20/2021    CR 0.82 05/20/2021    GFRESTIMATED 69 05/20/2021    GFRESTBLACK 80 05/20/2021    TERRI 8.7 05/20/2021         PROCEDURES & FURTHER ASSESSMENTS:   EKG today:  Personally reviewed and interpreted by me  NSR HR 76, QRS duration 76    ECHO today:  Mean gradient 11 mmHg no AI  Mean gradient 24 on previous study    CT today:  Normal leaflet opening no HALT     ECG 5/20/21:  Personally reviewed and interpreted by me  NSR HR 98, QRS 70 ms,     Echocardiogram 5/20/21:  Interpretation Summary  Global and regional left ventricular function is hyperkinetic with an EF >70%.  TAVR with Little S3 23 mm valve. No aortic regurgitation is present.The mean  gradient is elevated at 34 mmHg. The aortic valve area is normal at 1.7 cm^2.  IVC diameter <2.1 cm collapsing >50% with sniff suggests a normal RA pressure  of 3 mmHg.  No pericardial effusion is  present.     TAVR gr is likely elevated due to hyperdynamic LV fxn. Valve appearance and  TONI are normal. Recommend short term echo follow-up.    NYHA Class: I     CLINICAL IMPRESSION:     Reba Juarez is a very pleasant 76 year old female with HTN, HLD, T2DM, CAD with remote POBA history w/ non-obstructive CAD per angiogram 4/2021, HFpEF, and severe symptomatic aortic stenosis status-post TAVR with a 23 mm Little Ellie 3 Ultra on 5/19/21 by Gucci Meek and Jonny who presents for 1 month follow-up.     Aortic stenosis s/p TAVR: Doing well clinically with improvement in dyspnea, fatigue and exercise tolerance. She had significantly increased gradient of 34 mmHg on POD#1 thought related to hyperdynamic LV function, has since improved to 11 mmHg on today's echo following initiation of metoprolol. CT today shows no evidence of HALT. EKG without conduction delay. Continue ASA 81 mg lifelong and SBE prophylaxis lifelong.     CAD: No angina, continue ASA and statin.     HFpED: Likely related to aortic stenosis, now resolved. Euvolemic on exam today.     HTN; Remains above goal, increase valsartan to 80 mg BID. Continue low dose hydrochlorothiazide and metoprolol.     HLD: Continue high intensity statin therapy.    Paroxysmal SVT: Metoprolol BID.       RTC: Follow-up with Dr. Harris in 4-6 months and valve clinic 1 year with echo, EKG and labs prior     ANGELA Elmore CNP  [facility] Cardiology Team      CC  Patient Care Team:  Dayana Kruse NP as PCP - General (Nurse Practitioner)  Cindy Ness MD as Assigned Pulmonology Provider  Carmel Weston NP as Assigned Heart and Vascular Provider

## 2021-07-05 ENCOUNTER — OFFICE VISIT (OUTPATIENT)
Dept: CARDIOLOGY | Facility: CLINIC | Age: 76
End: 2021-07-05
Attending: NURSE PRACTITIONER
Payer: COMMERCIAL

## 2021-07-05 ENCOUNTER — HOSPITAL ENCOUNTER (OUTPATIENT)
Dept: CT IMAGING | Facility: CLINIC | Age: 76
End: 2021-07-05
Attending: NURSE PRACTITIONER
Payer: COMMERCIAL

## 2021-07-05 VITALS
SYSTOLIC BLOOD PRESSURE: 137 MMHG | WEIGHT: 202 LBS | OXYGEN SATURATION: 94 % | DIASTOLIC BLOOD PRESSURE: 66 MMHG | BODY MASS INDEX: 35.79 KG/M2 | HEIGHT: 63 IN | HEART RATE: 72 BPM

## 2021-07-05 DIAGNOSIS — Z95.2 S/P TAVR (TRANSCATHETER AORTIC VALVE REPLACEMENT): ICD-10-CM

## 2021-07-05 DIAGNOSIS — R00.2 PALPITATIONS: ICD-10-CM

## 2021-07-05 DIAGNOSIS — Z95.2 S/P TAVR (TRANSCATHETER AORTIC VALVE REPLACEMENT): Primary | ICD-10-CM

## 2021-07-05 DIAGNOSIS — I10 ESSENTIAL HYPERTENSION: ICD-10-CM

## 2021-07-05 DIAGNOSIS — R94.39 ABNORMAL RESULT OF OTHER CARDIOVASCULAR FUNCTION STUDY: ICD-10-CM

## 2021-07-05 DIAGNOSIS — I47.10 PAROXYSMAL SUPRAVENTRICULAR TACHYCARDIA (H): ICD-10-CM

## 2021-07-05 LAB
ANION GAP SERPL CALCULATED.3IONS-SCNC: 3 MMOL/L (ref 3–14)
BUN SERPL-MCNC: 14 MG/DL (ref 7–30)
CALCIUM SERPL-MCNC: 8.6 MG/DL (ref 8.5–10.1)
CHLORIDE SERPL-SCNC: 108 MMOL/L (ref 94–109)
CO2 SERPL-SCNC: 27 MMOL/L (ref 20–32)
CREAT SERPL-MCNC: 0.86 MG/DL (ref 0.52–1.04)
ERYTHROCYTE [DISTWIDTH] IN BLOOD BY AUTOMATED COUNT: 14.8 % (ref 10–15)
GFR SERPL CREATININE-BSD FRML MDRD: 65 ML/MIN/{1.73_M2}
GLUCOSE SERPL-MCNC: 105 MG/DL (ref 70–99)
HCT VFR BLD AUTO: 35.9 % (ref 35–47)
HGB BLD-MCNC: 11.7 G/DL (ref 11.7–15.7)
MCH RBC QN AUTO: 27.5 PG (ref 26.5–33)
MCHC RBC AUTO-ENTMCNC: 32.6 G/DL (ref 31.5–36.5)
MCV RBC AUTO: 85 FL (ref 78–100)
PLATELET # BLD AUTO: 204 10E9/L (ref 150–450)
POTASSIUM SERPL-SCNC: 4.2 MMOL/L (ref 3.4–5.3)
RADIOLOGIST FLAGS: NORMAL
RBC # BLD AUTO: 4.25 10E12/L (ref 3.8–5.2)
SODIUM SERPL-SCNC: 138 MMOL/L (ref 133–144)
WBC # BLD AUTO: 12.8 10E9/L (ref 4–11)

## 2021-07-05 PROCEDURE — 75572 CT HRT W/3D IMAGE: CPT | Mod: 26 | Performed by: STUDENT IN AN ORGANIZED HEALTH CARE EDUCATION/TRAINING PROGRAM

## 2021-07-05 PROCEDURE — 255N000002 HC RX 255 OP 636: Performed by: INTERNAL MEDICINE

## 2021-07-05 PROCEDURE — G0463 HOSPITAL OUTPT CLINIC VISIT: HCPCS | Mod: 25

## 2021-07-05 PROCEDURE — 80048 BASIC METABOLIC PNL TOTAL CA: CPT | Performed by: NURSE PRACTITIONER

## 2021-07-05 PROCEDURE — 250N000011 HC RX IP 250 OP 636: Performed by: STUDENT IN AN ORGANIZED HEALTH CARE EDUCATION/TRAINING PROGRAM

## 2021-07-05 PROCEDURE — 93306 TTE W/DOPPLER COMPLETE: CPT | Mod: 26 | Performed by: INTERNAL MEDICINE

## 2021-07-05 PROCEDURE — 93005 ELECTROCARDIOGRAM TRACING: CPT | Mod: XU

## 2021-07-05 PROCEDURE — 99214 OFFICE O/P EST MOD 30 MIN: CPT | Mod: 25 | Performed by: NURSE PRACTITIONER

## 2021-07-05 PROCEDURE — 999N000208 ECHOCARDIOGRAM COMPLETE

## 2021-07-05 PROCEDURE — 36415 COLL VENOUS BLD VENIPUNCTURE: CPT | Performed by: NURSE PRACTITIONER

## 2021-07-05 PROCEDURE — 75572 CT HRT W/3D IMAGE: CPT

## 2021-07-05 PROCEDURE — 85027 COMPLETE CBC AUTOMATED: CPT | Performed by: NURSE PRACTITIONER

## 2021-07-05 RX ORDER — IOPAMIDOL 755 MG/ML
120 INJECTION, SOLUTION INTRAVASCULAR ONCE
Status: COMPLETED | OUTPATIENT
Start: 2021-07-05 | End: 2021-07-05

## 2021-07-05 RX ORDER — METOPROLOL TARTRATE 25 MG/1
25 TABLET, FILM COATED ORAL 2 TIMES DAILY
Qty: 90 TABLET | Refills: 3 | Status: SHIPPED | OUTPATIENT
Start: 2021-07-05 | End: 2021-11-15

## 2021-07-05 RX ADMIN — IOPAMIDOL 120 ML: 755 INJECTION, SOLUTION INTRAVENOUS at 10:18

## 2021-07-05 RX ADMIN — HUMAN ALBUMIN MICROSPHERES AND PERFLUTREN 6 ML: 10; .22 INJECTION, SOLUTION INTRAVENOUS at 11:34

## 2021-07-05 ASSESSMENT — PAIN SCALES - GENERAL: PAINLEVEL: NO PAIN (0)

## 2021-07-05 ASSESSMENT — MIFFLIN-ST. JEOR: SCORE: 1362.46

## 2021-07-05 NOTE — PATIENT INSTRUCTIONS
You were seen today in the Structural Heart Clinic at the [facility].    Cardiology provider you saw during your visit: Carmel Weston NP    Summary and Plan:  1. Your ECHO shows improvement in gradient across valve and CT shows normal valve function   2. Continue aspirin 81 mg daily lifelong.  3. Delay any nonurgent dental procedures for the first 6 month post TAVR.  4. For all future dental cleanings and procedures you will need to take antibiotics prior - see instructions below.  5. Your blood pressure remains above goal, increase valsartan 80 mg twice daily  6. Continue hydrochlorothiazide 12.5 mg daily and metoprolol 25 mg twice daily  7. Continue to monitor home blood pressure - call if consistently > 130/80  8. Follow-up with Dr. Harris in 4-6 months   9. Follow-up in Valve Clinic in 1 year with echo, labs and ECG prior     Prevention of Infective (Bacterial) Endocarditis:  You are at increased risk for developing adverse outcomes from infective endocarditis (IE), also known as bacterial endocarditis (BE) because of the new device in your heart. The guidelines for prevention of IE are to give patients antibiotics prior to any dental procedures that involve manipulation of gingival tissue or the periapical region of teeth, or perforation of the oral mucosa:      It is recommended to take Amoxicillin 2 gm by mouth as a single dose 30 to 60 minutes before procedure.     OR if allergic to Penicillin or Ampicillin:     Cephalexin 2 gm by mouth, or  Clindamycin 600 mg by mouth, or  Azithromycin or Clarithromycin 500 mg PO       Questions and scheduling:   First call: Structural Heart  Mckenna Reese [phone]    General scheduling line: [phone].   First press #1 for the Compliance Science and then press #3 for Medical Questions to reach the Cardiology triage nurse.     On Call Cardiologist for after hours or on weekends: [phone], press option #4 and ask to speak to the on-call Cardiologist.

## 2021-07-05 NOTE — LETTER
7/5/2021      RE: Reba Juarez  1974 [address] [facility] [address]       Dear Colleague,    Thank you for the opportunity to participate in the care of your patient, Reba Juarez, at the [facility] HEART CLINIC [place] [facility]. Please see a copy of my visit note below.        [facility] HEART CARE  CARDIOVASCULAR DIVISION    VALVE CLINIC RETURN VISIT    PRIMARY CARDIOLOGIST: Dr. Harris/Mireille Edwards NP       PERTINENT CLINICAL HISTORY:     Reba Juarez is a very pleasant 76 year old female with HTN, HLD, T2DM, paroxysmal SVT, CAD with remote POBA history w/ non-obstructive CAD per angiogram 4/2021, HFpEF and severe aortic stenosis status-post TAVR with a 23 mm Little Ellie 3 Ultra on 5/19/21 by Gucci Meek and Jonny who presents for 1 month follow-up. The TAVR and post-procedural course were notable for no complications or development of conduction abnormalities. Her POD#1 ECHO showed significantly increased mean PG of 34 mmHg thought related to hyperdynamic LV function. She received IVF and was started on metoprolol with improvement in mean PG to 24 mmHg on follow-up echo. She was last evaluated 1 week post TAVR and reported palpitations thought related to SVT. Metoprolol was increased with improvement in palpitations.     She says she has been feeling well since her valve replacement with improvement in fatigue and shortness of breath. She says she is able to be more active. She has been working out in her garden and is walking her dog twice a day without having to stop and rest. She has also been busy caring for her brother who was recently diagnosed with renal cell carcinoma. She denies any chest pain, shortness of breath, orthopnea, PND, leg swelling. She has not had any recent palpitations since increasing her metoprolol. Her heart rate does spike briefly at night on her smart watch, but she is not aware of this. She  denies lightheadedness or syncope. Her blood pressure have been fairly steady 140-150s systolic. She is only taking valsartan 80 mg once daily, not twice daily as prescribed.      PAST MEDICAL HISTORY:     Past Medical History:   Diagnosis Date     Aortic valve sclerosis     echo 1-24-11 mild     Coronary artery disease 1995    PCI at [facility]. Repeat angiogram in 2007     Diabetes (H)      Gastric ulcer 1990     H/O exercise stress test 3/21/2011    No ischemia     Hyperlipidemia      Hyperlipidemia LDL goal < 70     identified 1990's     Hypertension     onset age 50-55 approx     MAYKEL (obstructive sleep apnea) 2007        PAST SURGICAL HISTORY:     Past Surgical History:   Procedure Laterality Date     ANGIOPLASTY  early 1990s     COLONOSCOPY  2010     CV CORONARY ANGIOGRAM N/A 4/9/2021    Procedure: CV CORONARY ANGIOGRAM;  Surgeon: Júnior Santos MD;  Location:  HEART CARDIAC CATH LAB     CV LEFT HEART CATH N/A 4/9/2021    Procedure: Left Heart Cath;  Surgeon: Júnior Santos MD;  Location:  HEART CARDIAC CATH LAB     CV RIGHT HEART CATH MEASUREMENTS RECORDED N/A 4/9/2021    Procedure: Right Heart Cath;  Surgeon: Júnior Santos MD;  Location:  HEART CARDIAC CATH LAB     CV TRANSCATHETER AORTIC VALVE REPLACEMENT N/A 5/19/2021    Procedure: Transfemoral Transcatheter Aortic Valve Replacement with 23 mm Little Ellie 3 Ultra Transcatheter Heart Valve, Transthoracic echocardiogram read by Dr. Farfan;  Surgeon: Harvinder Quick MD;  Location:  OR     EYE SURGERY       HEART CATH FEMORAL CANNULIZATION WITH OPEN STANDBY REPAIR AORTIC VALVE N/A 5/19/2021    Procedure: cardiopulmonary bypass on standby;  Surgeon: Riaz Fuller MD;  Location:  OR        CURRENT MEDICATIONS:     Current Outpatient Medications   Medication Sig Dispense Refill     amoxicillin (AMOXIL) 500 MG capsule Take 4 capsules (2,000 mg) by mouth once as needed (SBE prophylaxis - take 30-60 minutes prior to  dental procedure or cleaning) 4 capsule 3     aspirin 81 MG tablet Take 1 tablet by mouth daily.       budesonide-formoterol (SYMBICORT) 80-4.5 MCG/ACT Inhaler Inhale 2 puffs into the lungs 2 times daily       hydrochlorothiazide (HYDRODIURIL) 12.5 MG tablet Take 1 tablet (12.5 mg) by mouth daily 90 tablet 0     metFORMIN (GLUCOPHAGE) 500 MG tablet Take 1,500 mg by mouth        metoprolol tartrate (LOPRESSOR) 25 MG tablet Take 0.5 tablets (12.5 mg) by mouth 2 times daily 90 tablet 0     nitroGLYcerin (NITROSTAT) 0.4 MG sublingual tablet Place 0.4 mg under the tongue       ORDER FOR DME Use your CPAP device as directed by your provider.       rosuvastatin (CRESTOR) 40 MG tablet Take 40 mg by mouth       valsartan (DIOVAN) 80 MG tablet Take 1 tablet (80 mg) by mouth 2 times daily 180 tablet 1        ALLERGIES:     Allergies   Allergen Reactions     Pravastatin      Myalgia       Simvastatin      myalgia     Calcium Citrate Unknown     Bloody nose per pt     Lipitor [Atorvastatin Calcium]      Myalgia          FAMILY HISTORY:     Family History   Problem Relation Age of Onset     No Known Problems Sister      Alcoholism Brother      Myocardial Infarction Mother 51        HTN onset age 30, was a smoker     Myocardial Infarction Father 63        smoker     Myocardial Infarction Brother 39     No Known Problems Son      No Known Problems Daughter      CABG Brother      Heart Disease Brother      No Known Problems Sister      Cerebrovascular Disease Sister      Coronary Artery Disease Sister      No Known Problems Son         SOCIAL HISTORY:     Social History     Socioeconomic History     Marital status:      Spouse name: Not on file     Number of children: Not on file     Years of education: Not on file     Highest education level: Not on file   Occupational History     Occupation: retired     Employer: [employer]     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, still works a few days as biochemical pharmacist   Social Needs      Financial resource strain: Not on file     Food insecurity     Worry: Not on file     Inability: Not on file     Transportation needs     Medical: Not on file     Non-medical: Not on file   Tobacco Use     Smoking status: Former Smoker     Packs/day: 1.00     Years: 20.00     Pack years: 20.00     Quit date: 3/5/1992     Years since quittin.2     Smokeless tobacco: Never Used   Substance and Sexual Activity     Alcohol use: No     Drug use: No     Sexual activity: Not on file   Lifestyle     Physical activity     Days per week: Not on file     Minutes per session: Not on file     Stress: Not on file   Relationships     Social connections     Talks on phone: Not on file     Gets together: Not on file     Attends Jehovah's witness service: Not on file     Active member of club or organization: Not on file     Attends meetings of clubs or organizations: Not on file     Relationship status: Not on file     Intimate partner violence     Fear of current or ex partner: Not on file     Emotionally abused: Not on file     Physically abused: Not on file     Forced sexual activity: Not on file   Other Topics Concern     Parent/sibling w/ CABG, MI or angioplasty before 65F 55M? Not Asked   Social History Narrative    Single and lives with her son and brother.        REVIEW OF SYSTEMS:     Constitutional: No fevers or chills  Skin: No new rash or itching  Eyes: No acute change in vision  Ears/Nose/Throat: No purulent rhinorrhea, new hearing loss, or new vertigo  Respiratory: No cough or hemoptysis  Cardiovascular: See HPI  Gastrointestinal: No change in appetite, vomiting, hematemesis or diarrhea  Genitourinary: No dysuria or hematuria  Musculoskeletal: No new back pain, neck pain or muscle pain  Neurologic: No new headaches, focal weakness or behavior changes  Psychiatric: No hallucinations, excessive alcohol consumption or illegal drug usage  Hematologic/Lymphatic/Immunologic: No bleeding, chills, fever, night sweats or  "weight loss  Endocrine: No new cold intolerance, heat intolerance, polyphagia, polydipsia or polyuria      PHYSICAL EXAMINATION:     /66 (BP Location: Right arm, Patient Position: Chair, Cuff Size: Adult Regular)   Pulse 72   Ht 1.588 m (5' 2.5\")   Wt 91.6 kg (202 lb)   SpO2 94%   BMI 36.36 kg/m      GENERAL: No acute distress.  HEENT: EOMI. Sclerae white, not injected. Nares clear. Pharynx without erythema or exudate.   Neck: No adenopathy. No thyromegaly. No jugular venous distension.   Heart: Regular rate and rhythm. Soft 2/6 DALIA murmur RUSB.   Lungs: Clear to auscultation. No ronchi, wheezes, rales.   Abdomen: Soft, nontender, nondistended. Bowel sounds present.  Extremities: No clubbing, cyanosis, or edema.   Neurologic: Alert and oriented to person/place/time, normal speech and affect. No focal deficits.  Skin: No petechiae, purpura or rash.     LABORATORY DATA:   Personally reviewed and interpreted lab results.   CBC RESULTS:  Lab Results   Component Value Date    WBC 12.8 (H) 07/05/2021    RBC 4.25 07/05/2021    HGB 11.7 07/05/2021    HCT 35.9 07/05/2021    MCV 85 07/05/2021    MCH 27.5 07/05/2021    MCHC 32.6 07/05/2021    RDW 14.8 07/05/2021     07/05/2021       BMP RESULTS:  Lab Results   Component Value Date     05/20/2021    POTASSIUM 3.7 05/20/2021    CHLORIDE 107 05/20/2021    CO2 23 05/20/2021    ANIONGAP 7 05/20/2021     (H) 05/20/2021    BUN 15 05/20/2021    CR 0.82 05/20/2021    GFRESTIMATED 69 05/20/2021    GFRESTBLACK 80 05/20/2021    TERRI 8.7 05/20/2021         PROCEDURES & FURTHER ASSESSMENTS:   EKG today:  Personally reviewed and interpreted by me  NSR HR 76, QRS duration 76    ECHO today:  Mean gradient 11 mmHg no AI  Mean gradient 24 on previous study    CT today:  Normal leaflet opening no HALT     ECG 5/20/21:  Personally reviewed and interpreted by me  NSR HR 98, QRS 70 ms,     Echocardiogram 5/20/21:  Interpretation Summary  Global and regional left " ventricular function is hyperkinetic with an EF >70%.  TAVR with Little S3 23 mm valve. No aortic regurgitation is present.The mean  gradient is elevated at 34 mmHg. The aortic valve area is normal at 1.7 cm^2.  IVC diameter <2.1 cm collapsing >50% with sniff suggests a normal RA pressure  of 3 mmHg.  No pericardial effusion is present.     TAVR gr is likely elevated due to hyperdynamic LV fxn. Valve appearance and  TONI are normal. Recommend short term echo follow-up.    NYHA Class: I     CLINICAL IMPRESSION:     Reba Juarez is a very pleasant 76 year old female with HTN, HLD, T2DM, CAD with remote POBA history w/ non-obstructive CAD per angiogram 4/2021, HFpEF, and severe symptomatic aortic stenosis status-post TAVR with a 23 mm Little Ellie 3 Ultra on 5/19/21 by Gucci Meek and Jonny who presents for 1 month follow-up.     Aortic stenosis s/p TAVR: Doing well clinically with improvement in dyspnea, fatigue and exercise tolerance. She had significantly increased gradient of 34 mmHg on POD#1 thought related to hyperdynamic LV function, has since improved to 11 mmHg on today's echo following initiation of metoprolol. CT today shows no evidence of HALT. EKG without conduction delay. Continue ASA 81 mg lifelong and SBE prophylaxis lifelong.     CAD: No angina, continue ASA and statin.     HFpED: Likely related to aortic stenosis, now resolved. Euvolemic on exam today.     HTN; Remains above goal, increase valsartan to 80 mg BID. Continue low dose hydrochlorothiazide and metoprolol.     HLD: Continue high intensity statin therapy.    Paroxysmal SVT: Metoprolol BID.       RTC: Follow-up with Dr. Harris in 4-6 months and valve clinic 1 year with echo, EKG and labs prior     ANGELA Elmore, CNP  [facility] Cardiology Team      CC  Patient Care Team:  Dayana Kruse NP as PCP - General (Nurse Practitioner)  Cindy Ness MD as Assigned Pulmonology Provider  Carmel Weston NP as  Assigned Heart and Vascular Provider

## 2021-07-05 NOTE — NURSING NOTE
Chief Complaint   Patient presents with     Follow Up     30 day s/p tavr follow up     Vitals were taken and medications where reconciled.   Siddhartha William, EMT  12:44 PM

## 2021-07-06 VITALS — WEIGHT: 201 LBS | BODY MASS INDEX: 35.61 KG/M2 | HEIGHT: 63 IN

## 2021-07-06 LAB — INTERPRETATION ECG - MUSE: NORMAL

## 2021-07-07 NOTE — ED TRIAGE NOTES
Patient presents here for evaluation of left shoulder pain/injury incurred by a trip and fall. She fell sideways, landing directly on the shoulder. The injury occurred yesterday. She is not able to abduct at the joint at all.

## 2021-07-07 NOTE — ED PROVIDER NOTES
Emergency Department Encounter   NAME: Reba Juarez ; AGE: 76 y.o. female ; YOB: 1945 ; MRN: [id number] ; EVALUATION DATE & TIME: No admission date for patient encounter. ; PCP: Provider, No Primary Care   ED PROVIDER: Dahlia Tijerina PA-C    Chief Complaint   Patient presents with     Shoulder Pain       Medical Decision Making & Final Diagnosis     1. Humeral head fracture, left, closed, initial encounter     Reba Is a 76-year-old F with PMH of s/p TAVR, HTN, HLD, CAD, and COPD who presents the ED for evaluation of left shoulder pain after mechanical fall yesterday.  At rest pain is manageable but significantly worsens with abduction.  Denies paresthesias or weakness  Exam: Notable for /80 and otherwise normal vital signs in a well-appearing woman.  TTP over proximal humerus.  Radial pulse 2+ and equal bilaterally.  Sensation intact to light touch in radial, ulnar, and median nerve distribution.  No other TTP to left arm, left-sided rib cage, left hip.  No midline TTP to bony spine.  Normal gait.  No skin changes over the patient's shoulder.  Labs/EKG/Imaging: X-ray reveals a humeral head fracture with no significant dislocation.  Impression: Humeral head fracture; patient presents the ED following a mechanical fall.  She denies hitting her head or loss of consciousness.  Clearly tripped on something prior to this.  Denies presyncopal symptoms, chest pain, severe headache, or other concerning history components to suggest a pathologic source of this fall.  She is well-appearing here and has normal vital signs outside of HTN.  I do not believe there is indication to conduct any further work-up in regards to this fall.  She had no midline tenderness to bony spine.  No other evidence of significant musculoskeletal injury that would require imaging.  She does note some soreness to her thigh and calf on the left but this is reproducible and muscular in nature I believe.  No circumferential  swelling, erythema, or tenderness to suggest DVT or cellulitis or compartment syndrome with this leg pain.  She has good perfusion.  On left arm exam there is no neurovascular compromise.  She had no other bony tenderness throughout.  She is right-handed and did feel comfortable with discharge only using her right hand so I do not think there is indication to admit for ADL help.  Plan: I went over the results of the imaging at the bedside.  Patient was given instructions for symptomatic care for home with OTC anti-inflammatories, oxycodone, ice, and sling.  We did reveal needing to move at least a few times a day to avoid frozen shoulder.. We reviewed strict return precautions that would merit further evaluation in the emergency department.  Discussed the provisional nature of the diagnosis and need for close follow-up with orthopedics.  Patient was given adequate time to ask questions, received appropriate answers, and agrees with the plan as written.      ED Course   8:25 AM I met and introduced myself to the patient. I gathered initial history and performed my physical exam. We discussed plan for initial workup.   8:34 AM I discussed the plan for discharge with the patient, and patient is agreeable. We discussed supportive cares at home and reasons for return to the ER including new or worsening symptoms - all questions and concerns addressed. Patient to be discharged by RN.      PPE: Provider wore gloves, N95 mask, eye protection, surgical cap, and paper mask.   MEDICATIONS GIVEN IN THE EMERGENCY:   Medications - No data to display   NEW PRESCRIPTIONS STARTED AT TODAY'S ER VISIT:  Current Discharge Medication List      START taking these medications    Details   oxyCODONE (ROXICODONE) 5 MG immediate release tablet Take 1 tablet (5 mg total) by mouth every 6 (six) hours as needed for pain.  Qty: 12 tablet, Refills: 0    Associated Diagnoses: Humeral head fracture, left, closed, initial encounter           "  =================================================================   History   Patient information was obtained from: The patient   Use of Intrepreter: N/A  Reba Juarez is a 76 y.o. female with a relevant PMH of COPD, hyperlipidemia, hypertension, and type 2 diabetes mellitus who presents to the ED via walk-in for evaluation of a fall and associated left shoulder pain.     The patient reports that yesterday (6/25) she was in her backyard and tripped and fell sideways. She fell onto her left shoulder and side and heard a \"snap.\" She did not hit her head or lose consciousness. She is not on blood thinners but does take a baby aspirin. She currently endorses left shoulder pain and left leg muscle pain. When she is sitting still the pain is tolerable, however movement is provocative feature. She took 3 Ibuprofen at [id number] this morning without relief. She is right handed. The patient denies numbness and tingling, light headedness, diaphoresis, syncope, fever, chills, sore throat, coughing, chest pain, difficulty breathing, abdominal pain, nausea, vomiting, diarrhea, urinary symptoms, and any other symptoms or complaints at this time.     Of note, patient is concerned about how she will take care of her brother who was recently diagnosed with cancer.  ______________________________________________________________________  Past Medical History:   Diagnosis Date     CAD (coronary artery disease)      Hypertension        Relevant past surgical history reviewed with patient, unless otherwise stated in HPI, history not pertinent to this visit.    History reviewed. No pertinent family history.    Social History     Tobacco Use     Smoking status: Never Smoker   Substance Use Topics     Alcohol use: No     Drug use: No       REVIEW OF SYSTEMS:    Review of Systems   Constitutional: Negative for chills, diaphoresis and fever.   HENT: Negative for sore throat.    Eyes: Negative for visual disturbance.   Respiratory: " "Negative for cough and shortness of breath.    Cardiovascular: Negative for chest pain.   Gastrointestinal: Negative for abdominal pain, blood in stool, diarrhea, nausea and vomiting.   Genitourinary: Negative.    Musculoskeletal: Positive for arthralgias (left shoulder) and myalgias (left leg, left shoulder).   Neurological: Negative for syncope, light-headedness, numbness and headaches.   All other systems reviewed and are negative.        Physical Exam   /80 (Patient Position: Sitting)   Pulse 67   Temp 98.2  F (36.8  C) (Oral)   Resp 16   Ht 5' 3\" (1.6 m)   Wt 201 lb (91.2 kg)   SpO2 95%   BMI 35.61 kg/m      Physical Exam   Constitutional: She appears well-developed and well-nourished. No distress.   Notable for /80 and otherwise normal vital signs in a well-appearing woman.    HENT:   Head: Normocephalic and atraumatic.   Eyes: Conjunctivae are normal.   Neck: Normal range of motion. Neck supple.   Cardiovascular: Normal rate, regular rhythm, normal heart sounds and intact distal pulses.   Pulses:       Radial pulses are 2+ on the right side and 2+ on the left side.   Radial pulse 2+ and equal bilaterally.    Pulmonary/Chest: Effort normal and breath sounds normal. No respiratory distress. She has no wheezes. She has no rales.   Abdominal: Soft. She exhibits no distension. There is no abdominal tenderness. There is no rebound and no guarding.   Musculoskeletal: Normal range of motion.         General: No deformity or edema (bilateral lower extremities).      Comments: TTP over proximal humerus.  No other TTP to left arm, left-sided rib cage, left hip.  No midline TTP to bony spine.   Neurological: She is alert.   Sensation intact to light touch in radial, ulnar, and median nerve distribution. Normal gait.    Skin: Skin is warm and dry.   No skin changes over the patient's shoulder.   Psychiatric: She has a normal mood and affect. Judgment normal.   Nursing note and vitals reviewed.      Lab " Work (Reviewed and Interpreted):   No results found for this visit on 06/26/21.    Imaging (Reviewed and Interpreted):   Xr Shoulder Left 2 Or More Vws    Result Date: 6/26/2021  EXAM: XR SHOULDER LEFT 2 OR MORE VWS LOCATION: [facility] DATE/TIME: 6/26/2021 7:52 AM INDICATION: injury COMPARISON: None.     Subtle increased density at the lateral aspect of the humeral head suspicious for a nondisplaced fracture. No dislocation. Moderate left acromioclavicular joint osteoarthritis. Adjacent punctate chronic ossification.     I, Nghia Matias, am serving as a scribe to document services personally performed by Dahlia Tijerina PA-C, based on my observation and the provider's statements to me. I, Dahlia Tijerina PA-C attest that Nghia Matias is acting in a scribe capacity, has observed my performance of the services and has documented them in accordance with my direction.     Dahlia Tijerina PA-C   Emergency Medicine   [facility] EMERGENCY DEPARTMENT  [address]  [facility] [address]  Dept: [phone]  Loc: [phone]        Dahlia Tijerina PA-C  06/26/21 [id number]

## 2021-08-11 DIAGNOSIS — I10 ESSENTIAL HYPERTENSION: ICD-10-CM

## 2021-08-11 RX ORDER — VALSARTAN 80 MG/1
120 TABLET ORAL 2 TIMES DAILY
Qty: 180 TABLET | Refills: 1 | Status: SHIPPED | OUTPATIENT
Start: 2021-08-11 | End: 2021-08-20

## 2021-08-17 DIAGNOSIS — I10 ESSENTIAL HYPERTENSION: ICD-10-CM

## 2021-08-20 NOTE — TELEPHONE ENCOUNTER
VALSARTAN 80 MG TABLET      Take 1.5 tablets (120 mg) by mouth 2 times daily  Last Written Prescription Date:  8-11-21  Last Fill Quantity: 180,   # refills: 1  Last Office Visit : 7-5-21  Future Office visit:  11-16-21    Routing refill request to provider for review/approval because:  Request for 90 day: last fill dosage change, 60 day:1  ? 90 day RF

## 2021-08-26 RX ORDER — VALSARTAN 80 MG/1
120 TABLET ORAL 2 TIMES DAILY
Qty: 270 TABLET | Refills: 0 | Status: SHIPPED | OUTPATIENT
Start: 2021-08-26 | End: 2022-01-03

## 2021-09-18 ENCOUNTER — HEALTH MAINTENANCE LETTER (OUTPATIENT)
Age: 76
End: 2021-09-18

## 2021-11-13 DIAGNOSIS — R00.2 PALPITATIONS: ICD-10-CM

## 2021-11-13 DIAGNOSIS — I10 ESSENTIAL HYPERTENSION: ICD-10-CM

## 2021-11-15 RX ORDER — METOPROLOL TARTRATE 25 MG/1
25 TABLET, FILM COATED ORAL 2 TIMES DAILY
Qty: 180 TABLET | Refills: 2 | Status: SHIPPED | OUTPATIENT
Start: 2021-11-15 | End: 2022-12-07

## 2021-11-15 NOTE — TELEPHONE ENCOUNTER
METOPROLOL TARTRATE 25 MG TAB  180 Tabs 2 Refills sent to pharm   Last visit:  7/5/2021    Vera Rose RN  Central Triage Red Flags/Med Refills

## 2021-11-26 NOTE — Clinical Note
dry, intact, no bleeding and no hematoma. 7 Fr sheath removed from the RIJ vein. Manual pressure held. Hemostasis obtained. Band aid applied.  No

## 2022-01-02 NOTE — PROGRESS NOTES
[facility]  CARDIOVASCULAR DIVISION    VALVE CLINIC RETURN VISIT    PRIMARY CARDIOLOGIST: Dr. Harris/Mireille Edwards NP       PERTINENT CLINICAL HISTORY:     Reba Juarez is a very pleasant 76 year old female who presents for 6 month follow-up. She has a history of HTN, HLD, T2DM, paroxysmal SVT, CAD with remote POBA history w/ non-obstructive CAD per angiogram 4/2021, HFpEF and severe aortic stenosis status-post TAVR with a 23 mm Little Ellie 3 Ultra on 5/19/21 by Alba Quick, Gucci and Jonny. Her TAVR went well without complication. Her POD#1 ECHO showed elevated mean PG of 34 mmHg thought related to hyperdynamic LV function. She received IVF and was started on metoprolol with improvement in mean PG to 24 mmHg on follow-up echo. She was last evaluated 1 month post TAVR and echo showed mean PG 11 mmHg and Cardiac CT showed normal valve function.     Reba says she has had a stressful past few months. Her brother passed away due to kidney cancer, she was his primary caretaker. She gained quite a bit of weight during this time frame due to emotional eating and being less active. Since he passed she is feeling less stressed and less anxious. She is active around the house cooking and cleaning. She denies any chest pain, shortness of breath, orthopnea, lightheadedness, syncope, palpitations. She notes some leg swelling but is unsure if it is fluid or due to her weight gain. Her blood pressures have been 150s on average. She increased her valsartan to 160 mg twice daily. She is also taking hydrochlorothiazide 12.5 mg daily, metoprolol 25 mg twice daily, ASA and crestor.     PAST MEDICAL HISTORY:     Past Medical History:   Diagnosis Date     Aortic valve sclerosis     echo 1-24-11 mild     CAD (coronary artery disease)      Coronary artery disease 1995    PCI at [facility]. Repeat angiogram in 2007     Diabetes (H)      Gastric ulcer 1990     H/O exercise stress test 3/21/2011     No ischemia     Hyperlipidemia      Hyperlipidemia LDL goal < 70     identified 1990's     Hypertension     onset age 50-55 approx     Hypertension      MAYKEL (obstructive sleep apnea) 2007        PAST SURGICAL HISTORY:     Past Surgical History:   Procedure Laterality Date     ANGIOPLASTY  early 1990s     COLONOSCOPY  2010     CV CORONARY ANGIOGRAM N/A 4/9/2021    Procedure: CV CORONARY ANGIOGRAM;  Surgeon: Júnior Santos MD;  Location:  HEART CARDIAC CATH LAB     CV LEFT HEART CATH N/A 4/9/2021    Procedure: Left Heart Cath;  Surgeon: Júnior Santos MD;  Location:  HEART CARDIAC CATH LAB     CV RIGHT HEART CATH MEASUREMENTS RECORDED N/A 4/9/2021    Procedure: Right Heart Cath;  Surgeon: Júnior Santos MD;  Location:  HEART CARDIAC CATH LAB     CV TRANSCATHETER AORTIC VALVE REPLACEMENT N/A 5/19/2021    Procedure: Transfemoral Transcatheter Aortic Valve Replacement with 23 mm Little Ellie 3 Ultra Transcatheter Heart Valve, Transthoracic echocardiogram read by Dr. Farfan;  Surgeon: Harvinder Quick MD;  Location:  OR     EYE SURGERY       HEART CATH FEMORAL CANNULIZATION WITH OPEN STANDBY REPAIR AORTIC VALVE N/A 5/19/2021    Procedure: cardiopulmonary bypass on standby;  Surgeon: Riaz Fuller MD;  Location:  OR        CURRENT MEDICATIONS:     Current Outpatient Medications   Medication Sig Dispense Refill     amoxicillin (AMOXIL) 500 MG capsule Take 4 capsules (2,000 mg) by mouth once as needed (SBE prophylaxis - take 30-60 minutes prior to dental procedure or cleaning) 4 capsule 3     aspirin 81 MG tablet Take 1 tablet by mouth daily.       budesonide-formoterol (SYMBICORT) 80-4.5 MCG/ACT Inhaler Inhale 2 puffs into the lungs 2 times daily       hydrochlorothiazide (HYDRODIURIL) 12.5 MG tablet Take 1 tablet (12.5 mg) by mouth daily 90 tablet 0     metFORMIN (GLUCOPHAGE) 500 MG tablet Take 1,500 mg by mouth        metoprolol tartrate (LOPRESSOR) 25 MG tablet Take 1 tablet (25 mg) by  mouth 2 times daily 180 tablet 2     nitroGLYcerin (NITROSTAT) 0.4 MG sublingual tablet Place 0.4 mg under the tongue       ORDER FOR DME Use your CPAP device as directed by your provider.       rosuvastatin (CRESTOR) 40 MG tablet Take 40 mg by mouth       valsartan (DIOVAN) 80 MG tablet TAKE 1.5 TABLETS (120 MG) BY MOUTH 2 TIMES DAILY 270 tablet 0        ALLERGIES:     Allergies   Allergen Reactions     Pravastatin      Myalgia       Simvastatin      myalgia     Calcium Citrate Unknown     Bloody nose per pt     Lipitor [Atorvastatin Calcium]      Myalgia          FAMILY HISTORY:     Family History   Problem Relation Age of Onset     No Known Problems Sister      Alcoholism Brother      Myocardial Infarction Mother 51        HTN onset age 30, was a smoker     Myocardial Infarction Father 63        smoker     Myocardial Infarction Brother 39     No Known Problems Son      No Known Problems Daughter      CABG Brother      Heart Disease Brother      No Known Problems Sister      Cerebrovascular Disease Sister      Coronary Artery Disease Sister      No Known Problems Son         SOCIAL HISTORY:     Social History     Socioeconomic History     Marital status:      Spouse name: Not on file     Number of children: Not on file     Years of education: Not on file     Highest education level: Not on file   Occupational History     Occupation: retired     Employer: [employer]     Comment:  retired full time, still works a few days as biochemical pharmacist   Social Needs     Financial resource strain: Not on file     Food insecurity     Worry: Not on file     Inability: Not on file     Transportation needs     Medical: Not on file     Non-medical: Not on file   Tobacco Use     Smoking status: Former Smoker     Packs/day: 1.00     Years: 20.00     Pack years: 20.00     Quit date: 3/5/1992     Years since quittin.2     Smokeless tobacco: Never Used   Substance and Sexual Activity     Alcohol use: No     Drug use:  No     Sexual activity: Not on file   Lifestyle     Physical activity     Days per week: Not on file     Minutes per session: Not on file     Stress: Not on file   Relationships     Social connections     Talks on phone: Not on file     Gets together: Not on file     Attends [employer] service: Not on file     Active member of club or organization: Not on file     Attends meetings of clubs or organizations: Not on file     Relationship status: Not on file     Intimate partner violence     Fear of current or ex partner: Not on file     Emotionally abused: Not on file     Physically abused: Not on file     Forced sexual activity: Not on file   Other Topics Concern     Parent/sibling w/ CABG, MI or angioplasty before 65F 55M? Not Asked   Social History Narrative    Single and lives with her son and brother.        REVIEW OF SYSTEMS:     Constitutional: No fevers or chills  Skin: No new rash or itching  Eyes: No acute change in vision  Ears/Nose/Throat: No purulent rhinorrhea, new hearing loss, or new vertigo  Respiratory: No cough or hemoptysis  Cardiovascular: See HPI  Gastrointestinal: No change in appetite, vomiting, hematemesis or diarrhea  Genitourinary: No dysuria or hematuria  Musculoskeletal: No new back pain, neck pain or muscle pain  Neurologic: No new headaches, focal weakness or behavior changes  Psychiatric: No hallucinations, excessive alcohol consumption or illegal drug usage  Hematologic/Lymphatic/Immunologic: No bleeding, chills, fever, night sweats or weight loss  Endocrine: No new cold intolerance, heat intolerance, polyphagia, polydipsia or polyuria      PHYSICAL EXAMINATION:     BP (!) 150/74 (BP Location: Left arm, Patient Position: Chair, Cuff Size: Adult Large)   Pulse 64   Wt 99.2 kg (218 lb 9.6 oz)   SpO2 94%   BMI 39.35 kg/m      GENERAL: No acute distress.  HEENT: EOMI. Sclerae white, not injected. Nares clear. Pharynx without erythema or exudate.   Neck: No adenopathy. No thyromegaly. No  jugular venous distension.   Heart: Regular rate and rhythm. Soft 2/6 DALIA murmur RUSB.   Lungs: Clear to auscultation. No ronchi, wheezes, rales.   Abdomen: Soft, nontender, nondistended. Bowel sounds present.  Extremities: No clubbing, cyanosis, or edema.   Neurologic: Alert and oriented to person/place/time, normal speech and affect. No focal deficits.  Skin: No petechiae, purpura or rash.     LABORATORY DATA:   Personally reviewed and interpreted lab results.   CBC RESULTS:  Lab Results   Component Value Date    WBC 12.8 (H) 07/05/2021    RBC 4.25 07/05/2021    HGB 11.7 07/05/2021    HCT 35.9 07/05/2021    MCV 85 07/05/2021    MCH 27.5 07/05/2021    MCHC 32.6 07/05/2021    RDW 14.8 07/05/2021     07/05/2021       BMP RESULTS:  Lab Results   Component Value Date     07/05/2021    POTASSIUM 4.2 07/05/2021    CHLORIDE 108 07/05/2021    CO2 27 07/05/2021    ANIONGAP 3 07/05/2021     (H) 07/05/2021    BUN 14 07/05/2021    CR 0.86 07/05/2021    GFRESTIMATED 65 07/05/2021    GFRESTBLACK 76 07/05/2021    TERRI 8.6 07/05/2021         PROCEDURES & FURTHER ASSESSMENTS:   EKG 7/5/21:  Personally reviewed and interpreted by me  NSR HR 76, QRS duration 76    ECHO 7/5/21:  Mean gradient 11 mmHg no AI  Mean gradient 24 on previous study    CT 7/5/21:  Normal leaflet opening no HALT     ECG 5/20/21:  Personally reviewed and interpreted by me  NSR HR 98, QRS 70 ms,     Echocardiogram 5/20/21:  Interpretation Summary  Global and regional left ventricular function is hyperkinetic with an EF >70%.  TAVR with Little S3 23 mm valve. No aortic regurgitation is present.The mean  gradient is elevated at 34 mmHg. The aortic valve area is normal at 1.7 cm^2.  IVC diameter <2.1 cm collapsing >50% with sniff suggests a normal RA pressure  of 3 mmHg.  No pericardial effusion is present.     TAVR gr is likely elevated due to hyperdynamic LV fxn. Valve appearance and  TONI are normal. Recommend short term echo  follow-up.    NYHA Class: I     CLINICAL IMPRESSION:     Reba Juarez is a very pleasant 76 year old female with HTN, HLD, T2DM, CAD with remote POBA history w/ non-obstructive CAD per angiogram 4/2021, HFpEF, and severe symptomatic aortic stenosis status-post TAVR with a 23 mm Little Ellie 3 Ultra on 5/19/21 by Gucci Meek and Jonny who presents for 6 month follow-up. She is active without cardiac symptoms.     Aortic stenosis s/p TAVR: Doing well clinically with improvement in dyspnea, fatigue and exercise tolerance. She had significantly increased gradient of 34 mmHg on POD#1 thought related to hyperdynamic LV function, has since improved to 11 mmHg on today's echo following initiation of metoprolol. CT today shows no evidence of HALT. EKG without conduction delay. Continue ASA 81 mg lifelong and SBE prophylaxis lifelong.     CAD: No angina, continue ASA and statin.     HFpEF: Slightly hypervolemic today. Start Chlorthalidone as below.     HTN; Remains above goal. Continue valsartan to 160 mg BID. Stop hydrochlorothiazide and start chlorthalidone 12.5 mg. Repeat BMP in 1 week. Patient will send BP log in 2 weeks if still above goal will increase to 25 mg daily. If additional medication is needed would start amlodipine.     HLD: Continue high intensity statin therapy.    Paroxysmal SVT: Metoprolol BID.     RTC: Follow-up valve clinic in 6 months with echo, EKG and labs prior     ANGELA Elmore CNP  [facility] Cardiology Team      CC  Patient Care Team:  Dayana Kruse NP as PCP - General (Nurse Practitioner)  Cindy Ness MD as Assigned Pulmonology Provider  Carmel Weston NP as Assigned Heart and Vascular Provider

## 2022-01-03 ENCOUNTER — OFFICE VISIT (OUTPATIENT)
Dept: CARDIOLOGY | Facility: CLINIC | Age: 77
End: 2022-01-03
Attending: NURSE PRACTITIONER
Payer: COMMERCIAL

## 2022-01-03 VITALS
BODY MASS INDEX: 39.35 KG/M2 | OXYGEN SATURATION: 94 % | DIASTOLIC BLOOD PRESSURE: 74 MMHG | WEIGHT: 218.6 LBS | HEART RATE: 64 BPM | SYSTOLIC BLOOD PRESSURE: 150 MMHG

## 2022-01-03 DIAGNOSIS — I10 ESSENTIAL HYPERTENSION: ICD-10-CM

## 2022-01-03 DIAGNOSIS — Z95.2 S/P TAVR (TRANSCATHETER AORTIC VALVE REPLACEMENT): Primary | ICD-10-CM

## 2022-01-03 PROCEDURE — G0463 HOSPITAL OUTPT CLINIC VISIT: HCPCS

## 2022-01-03 PROCEDURE — 99214 OFFICE O/P EST MOD 30 MIN: CPT | Performed by: NURSE PRACTITIONER

## 2022-01-03 RX ORDER — VALSARTAN 160 MG/1
160 TABLET ORAL 2 TIMES DAILY
Qty: 180 TABLET | Refills: 1 | Status: SHIPPED | OUTPATIENT
Start: 2022-01-03 | End: 2022-08-15

## 2022-01-03 RX ORDER — CHLORTHALIDONE 25 MG/1
12.5 TABLET ORAL DAILY
Qty: 90 TABLET | Refills: 0 | Status: SHIPPED | OUTPATIENT
Start: 2022-01-03 | End: 2022-05-11

## 2022-01-03 ASSESSMENT — PAIN SCALES - GENERAL: PAINLEVEL: NO PAIN (0)

## 2022-01-03 NOTE — PATIENT INSTRUCTIONS
You were seen today in the Structural Heart Clinic at the [facility].    Cardiology provider you saw during your visit: Carmel Weston NP    Summary and Plan:  1. Continue aspirin 81 mg daily lifelong.  2. Continue valsartan 160 mg twice daily.  3. Stop hydrochlorothiazide and start chlorthalidone 12.5 mg daily  4. Have kidney function drawn next week.  5. Contact me with BP reading in 2 weeks, we may need to go up on chlorthalidone.  6. Continue heart healthy diet, watch sodium intake.  7. Call me if leg swelling worsens or weight gain continues.  8. For all future dental cleanings and procedures you will need to take antibiotics prior - see instructions below.  9. Follow-up in Valve Clinic in 4-5 months with echo, labs and ECG prior     Prevention of Infective (Bacterial) Endocarditis:  You are at increased risk for developing adverse outcomes from infective endocarditis (IE), also known as bacterial endocarditis (BE) because of the new device in your heart. The guidelines for prevention of IE are to give patients antibiotics prior to any dental procedures that involve manipulation of gingival tissue or the periapical region of teeth, or perforation of the oral mucosa:      It is recommended to take Amoxicillin 2 gm by mouth as a single dose 30 to 60 minutes before procedure.     OR if allergic to Penicillin or Ampicillin:     Cephalexin 2 gm by mouth, or  Clindamycin 600 mg by mouth, or  Azithromycin or Clarithromycin 500 mg PO       Questions and scheduling:   First call: Structural Heart  Mckenna Reese [phone]    General scheduling line: [phone].   First press #1 for the [employer] and then press #3 for Medical Questions to reach the Cardiology triage nurse.     On Call Cardiologist for after hours or on weekends: [phone], press option #4 and ask to speak to the on-call Cardiologist.

## 2022-01-03 NOTE — NURSING NOTE
Chief Complaint   Patient presents with     Follow Up     75 yo FM, here for follow-up s/p TAVR procedure in May 2021.     Vitals were taken and medications reconciled.    Kendall Hyatt, EMT  11:14 AM

## 2022-01-03 NOTE — LETTER
1/3/2022      RE: Reba Juarez  1974 [address] [facility] [id number]       Dear Colleague,    Thank you for the opportunity to participate in the care of your patient, Reba Juarez, at the [facility] [place] at [facility]. Please see a copy of my visit note below.        [facility] HEART CARE  CARDIOVASCULAR DIVISION    VALVE CLINIC RETURN VISIT    PRIMARY CARDIOLOGIST: Dr. Harris/Mireille Edwards NP       PERTINENT CLINICAL HISTORY:     Reba Juarez is a very pleasant 76 year old female who presents for 6 month follow-up. She has a history of HTN, HLD, T2DM, paroxysmal SVT, CAD with remote POBA history w/ non-obstructive CAD per angiogram 4/2021, HFpEF and severe aortic stenosis status-post TAVR with a 23 mm Little Ellie 3 Ultra on 5/19/21 by Alba Quick, Gucci and Jonny. Her TAVR went well without complication. Her POD#1 ECHO showed elevated mean PG of 34 mmHg thought related to hyperdynamic LV function. She received IVF and was started on metoprolol with improvement in mean PG to 24 mmHg on follow-up echo. She was last evaluated 1 month post TAVR and echo showed mean PG 11 mmHg and Cardiac CT showed normal valve function.     Reba says she has had a stressful past few months. Her brother passed away due to kidney cancer, she was his primary caretaker. She gained quite a bit of weight during this time frame due to emotional eating and being less active. Since he passed she is feeling less stressed and less anxious. She is active around the house cooking and cleaning. She denies any chest pain, shortness of breath, orthopnea, lightheadedness, syncope, palpitations. She notes some leg swelling but is unsure if it is fluid or due to her weight gain. Her blood pressures have been 150s on average. She increased her valsartan to 160 mg twice daily. She is also taking hydrochlorothiazide 12.5 mg daily, metoprolol 25 mg twice  daily, ASA and crestor.     PAST MEDICAL HISTORY:     Past Medical History:   Diagnosis Date     Aortic valve sclerosis     echo 1-24-11 mild     CAD (coronary artery disease)      Coronary artery disease 1995    PCI at [facility]. Repeat angiogram in 2007     Diabetes (H)      Gastric ulcer 1990     H/O exercise stress test 3/21/2011    No ischemia     Hyperlipidemia      Hyperlipidemia LDL goal < 70     identified 1990's     Hypertension     onset age 50-55 approx     Hypertension      MAYKEL (obstructive sleep apnea) 2007        PAST SURGICAL HISTORY:     Past Surgical History:   Procedure Laterality Date     ANGIOPLASTY  early 1990s     COLONOSCOPY  2010     CV CORONARY ANGIOGRAM N/A 4/9/2021    Procedure: CV CORONARY ANGIOGRAM;  Surgeon: Júnior Santos MD;  Location:  HEART CARDIAC CATH LAB     CV LEFT HEART CATH N/A 4/9/2021    Procedure: Left Heart Cath;  Surgeon: Júnior Santos MD;  Location:  HEART CARDIAC CATH LAB     CV RIGHT HEART CATH MEASUREMENTS RECORDED N/A 4/9/2021    Procedure: Right Heart Cath;  Surgeon: Júnior Santos MD;  Location:  HEART CARDIAC CATH LAB     CV TRANSCATHETER AORTIC VALVE REPLACEMENT N/A 5/19/2021    Procedure: Transfemoral Transcatheter Aortic Valve Replacement with 23 mm Little Ellie 3 Ultra Transcatheter Heart Valve, Transthoracic echocardiogram read by Dr. Farfan;  Surgeon: Harvinder Quick MD;  Location: [facility] OR     EYE SURGERY       HEART CATH FEMORAL CANNULIZATION WITH OPEN STANDBY REPAIR AORTIC VALVE N/A 5/19/2021    Procedure: cardiopulmonary bypass on standby;  Surgeon: Riaz Fuller MD;  Location: [facility]        CURRENT MEDICATIONS:     Current Outpatient Medications   Medication Sig Dispense Refill     amoxicillin (AMOXIL) 500 MG capsule Take 4 capsules (2,000 mg) by mouth once as needed (SBE prophylaxis - take 30-60 minutes prior to dental procedure or cleaning) 4 capsule 3     aspirin 81 MG tablet Take 1 tablet by mouth daily.        budesonide-formoterol (SYMBICORT) 80-4.5 MCG/ACT Inhaler Inhale 2 puffs into the lungs 2 times daily       hydrochlorothiazide (HYDRODIURIL) 12.5 MG tablet Take 1 tablet (12.5 mg) by mouth daily 90 tablet 0     metFORMIN (GLUCOPHAGE) 500 MG tablet Take 1,500 mg by mouth        metoprolol tartrate (LOPRESSOR) 25 MG tablet Take 1 tablet (25 mg) by mouth 2 times daily 180 tablet 2     nitroGLYcerin (NITROSTAT) 0.4 MG sublingual tablet Place 0.4 mg under the tongue       ORDER FOR DME Use your CPAP device as directed by your provider.       rosuvastatin (CRESTOR) 40 MG tablet Take 40 mg by mouth       valsartan (DIOVAN) 80 MG tablet TAKE 1.5 TABLETS (120 MG) BY MOUTH 2 TIMES DAILY 270 tablet 0        ALLERGIES:     Allergies   Allergen Reactions     Pravastatin      Myalgia       Simvastatin      myalgia     Calcium Citrate Unknown     Bloody nose per pt     Lipitor [Atorvastatin Calcium]      Myalgia          FAMILY HISTORY:     Family History   Problem Relation Age of Onset     No Known Problems Sister      Alcoholism Brother      Myocardial Infarction Mother 51        HTN onset age 30, was a smoker     Myocardial Infarction Father 63        smoker     Myocardial Infarction Brother 39     No Known Problems Son      No Known Problems Daughter      CABG Brother      Heart Disease Brother      No Known Problems Sister      Cerebrovascular Disease Sister      Coronary Artery Disease Sister      No Known Problems Son         SOCIAL HISTORY:     Social History     Socioeconomic History     Marital status:      Spouse name: Not on file     Number of children: Not on file     Years of education: Not on file     Highest education level: Not on file   Occupational History     Occupation: retired     Employer: [employer]     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, still works a few days as biochemical pharmacist   Social Needs     Financial resource strain: Not on file     Food insecurity     Worry: Not on file     Inability: Not  on file     Transportation needs     Medical: Not on file     Non-medical: Not on file   Tobacco Use     Smoking status: Former Smoker     Packs/day: 1.00     Years: 20.00     Pack years: 20.00     Quit date: 3/5/1992     Years since quittin.2     Smokeless tobacco: Never Used   Substance and Sexual Activity     Alcohol use: No     Drug use: No     Sexual activity: Not on file   Lifestyle     Physical activity     Days per week: Not on file     Minutes per session: Not on file     Stress: Not on file   Relationships     Social connections     Talks on phone: Not on file     Gets together: Not on file     Attends Orthodoxy service: Not on file     Active member of club or organization: Not on file     Attends meetings of clubs or organizations: Not on file     Relationship status: Not on file     Intimate partner violence     Fear of current or ex partner: Not on file     Emotionally abused: Not on file     Physically abused: Not on file     Forced sexual activity: Not on file   Other Topics Concern     Parent/sibling w/ CABG, MI or angioplasty before 65F 55M? Not Asked   Social History Narrative    Single and lives with her son and brother.        REVIEW OF SYSTEMS:     Constitutional: No fevers or chills  Skin: No new rash or itching  Eyes: No acute change in vision  Ears/Nose/Throat: No purulent rhinorrhea, new hearing loss, or new vertigo  Respiratory: No cough or hemoptysis  Cardiovascular: See HPI  Gastrointestinal: No change in appetite, vomiting, hematemesis or diarrhea  Genitourinary: No dysuria or hematuria  Musculoskeletal: No new back pain, neck pain or muscle pain  Neurologic: No new headaches, focal weakness or behavior changes  Psychiatric: No hallucinations, excessive alcohol consumption or illegal drug usage  Hematologic/Lymphatic/Immunologic: No bleeding, chills, fever, night sweats or weight loss  Endocrine: No new cold intolerance, heat intolerance, polyphagia, polydipsia or polyuria       PHYSICAL EXAMINATION:     BP (!) 150/74 (BP Location: Left arm, Patient Position: Chair, Cuff Size: Adult Large)   Pulse 64   Wt 99.2 kg (218 lb 9.6 oz)   SpO2 94%   BMI 39.35 kg/m      GENERAL: No acute distress.  HEENT: EOMI. Sclerae white, not injected. Nares clear. Pharynx without erythema or exudate.   Neck: No adenopathy. No thyromegaly. No jugular venous distension.   Heart: Regular rate and rhythm. Soft 2/6 DALIA murmur RUSB.   Lungs: Clear to auscultation. No ronchi, wheezes, rales.   Abdomen: Soft, nontender, nondistended. Bowel sounds present.  Extremities: No clubbing, cyanosis, or edema.   Neurologic: Alert and oriented to person/place/time, normal speech and affect. No focal deficits.  Skin: No petechiae, purpura or rash.     LABORATORY DATA:   Personally reviewed and interpreted lab results.   CBC RESULTS:  Lab Results   Component Value Date    WBC 12.8 (H) 07/05/2021    RBC 4.25 07/05/2021    HGB 11.7 07/05/2021    HCT 35.9 07/05/2021    MCV 85 07/05/2021    MCH 27.5 07/05/2021    MCHC 32.6 07/05/2021    RDW 14.8 07/05/2021     07/05/2021       BMP RESULTS:  Lab Results   Component Value Date     07/05/2021    POTASSIUM 4.2 07/05/2021    CHLORIDE 108 07/05/2021    CO2 27 07/05/2021    ANIONGAP 3 07/05/2021     (H) 07/05/2021    BUN 14 07/05/2021    CR 0.86 07/05/2021    GFRESTIMATED 65 07/05/2021    GFRESTBLACK 76 07/05/2021    TERRI 8.6 07/05/2021         PROCEDURES & FURTHER ASSESSMENTS:   EKG 7/5/21:  Personally reviewed and interpreted by me  NSR HR 76, QRS duration 76    ECHO 7/5/21:  Mean gradient 11 mmHg no AI  Mean gradient 24 on previous study    CT 7/5/21:  Normal leaflet opening no HALT     ECG 5/20/21:  Personally reviewed and interpreted by me  NSR HR 98, QRS 70 ms,     Echocardiogram 5/20/21:  Interpretation Summary  Global and regional left ventricular function is hyperkinetic with an EF >70%.  TAVR with Little S3 23 mm valve. No aortic regurgitation is  present.The mean  gradient is elevated at 34 mmHg. The aortic valve area is normal at 1.7 cm^2.  IVC diameter <2.1 cm collapsing >50% with sniff suggests a normal RA pressure  of 3 mmHg.  No pericardial effusion is present.     TAVR gr is likely elevated due to hyperdynamic LV fxn. Valve appearance and  TONI are normal. Recommend short term echo follow-up.    NYHA Class: I     CLINICAL IMPRESSION:     Reba Juarez is a very pleasant 76 year old female with HTN, HLD, T2DM, CAD with remote POBA history w/ non-obstructive CAD per angiogram 4/2021, HFpEF, and severe symptomatic aortic stenosis status-post TAVR with a 23 mm Little Ellie 3 Ultra on 5/19/21 by Gucci Meek and Jonny who presents for 6 month follow-up. She is active without cardiac symptoms.     Aortic stenosis s/p TAVR: Doing well clinically with improvement in dyspnea, fatigue and exercise tolerance. She had significantly increased gradient of 34 mmHg on POD#1 thought related to hyperdynamic LV function, has since improved to 11 mmHg on today's echo following initiation of metoprolol. CT today shows no evidence of HALT. EKG without conduction delay. Continue ASA 81 mg lifelong and SBE prophylaxis lifelong.     CAD: No angina, continue ASA and statin.     HFpEF: Slightly hypervolemic today. Start Chlorthalidone as below.     HTN; Remains above goal. Continue valsartan to 160 mg BID. Stop hydrochlorothiazide and start chlorthalidone 12.5 mg. Repeat BMP in 1 week. Patient will send BP log in 2 weeks if still above goal will increase to 25 mg daily. If additional medication is needed would start amlodipine.     HLD: Continue high intensity statin therapy.    Paroxysmal SVT: Metoprolol BID.     RTC: Follow-up valve clinic in 6 months with echo, EKG and labs prior     Carmel ANGELA Weston, CNP  [facility] Cardiology Team    CC  Patient Care Team:  Dayana Kruse NP as PCP - General (Nurse Practitioner)  Cindy Ness MD as Assigned  Pulmonology Provider

## 2022-01-08 ENCOUNTER — HEALTH MAINTENANCE LETTER (OUTPATIENT)
Age: 77
End: 2022-01-08

## 2022-01-24 ENCOUNTER — TELEPHONE (OUTPATIENT)
Dept: SLEEP MEDICINE | Facility: CLINIC | Age: 77
End: 2022-01-24
Payer: COMMERCIAL

## 2022-02-02 ENCOUNTER — RESEARCH ENCOUNTER (OUTPATIENT)
Dept: CARDIOLOGY | Facility: CLINIC | Age: 77
End: 2022-02-02
Payer: COMMERCIAL

## 2022-02-02 NOTE — PROGRESS NOTES
Research Consent Note:    Study Title: Aortic Annulus Dilatation after Variable Pressure Inflation on Multi Slice ComputedTomography for the balloon expandable Transcatheter Aortic Valve Replacement (ADaPT - TAVR)  IRB # [id number]    : Delgado Shine, [phone]  : Kelley Almanzar RN, [phone]     Estimated duration of study: 12 months    The IRB approved consent form was reviewed with the patient over the phone. The purpose, risks, and benefits of study participation were discussed and study requirements for participation. Confidentiality issues and release of Personal Health Information were reviewed. It was discussed that study participation is voluntary and that refusal to participate will involve no penalty or decrease benefits. The patient had the opportunity to read the entire electronic consent, ask questions, and offered sufficient time to consider the research trial. Subject verbalized understanding and was able to state what study participation involved. Patient signed the electronic consent/HIPAA form prior to study participation and was provided an electronic copy.    I attest that patient verbally reported signing consent.

## 2022-02-25 ENCOUNTER — HOME INFUSION (PRE-WILLOW HOME INFUSION) (OUTPATIENT)
Dept: PHARMACY | Facility: CLINIC | Age: 77
End: 2022-02-25
Payer: COMMERCIAL

## 2022-03-03 DIAGNOSIS — G47.33 OBSTRUCTIVE SLEEP APNEA: Primary | ICD-10-CM

## 2022-03-14 ENCOUNTER — DOCUMENTATION ONLY (OUTPATIENT)
Dept: SLEEP MEDICINE | Facility: CLINIC | Age: 77
End: 2022-03-14
Payer: COMMERCIAL

## 2022-03-14 NOTE — PROGRESS NOTES
Patient came to [place]  for mask fitting appointment on March 14, 2022. Patient requested to switch masks because oral breathing and her old (nasal) mask is leaking air. Discussed the following masks: F&P Vitera and AirFit F20.  Patient selected a Pinocular & Synapse Mask name: Vitera Full Face mask size Medium.

## 2022-03-17 ENCOUNTER — DOCUMENTATION ONLY (OUTPATIENT)
Dept: SLEEP MEDICINE | Facility: CLINIC | Age: 77
End: 2022-03-17
Payer: COMMERCIAL

## 2022-03-23 NOTE — PROGRESS NOTES
This is a recent snapshot of the patient's [facility] medical record.  For current drug dose and complete information and questions, call [phone] or In Basket pool, [facility] home infusion ([id number])  CSN Number:  [id number]

## 2022-03-28 ENCOUNTER — NURSE TRIAGE (OUTPATIENT)
Dept: NURSING | Facility: CLINIC | Age: 77
End: 2022-03-28
Payer: COMMERCIAL

## 2022-03-29 ENCOUNTER — CARE COORDINATION (OUTPATIENT)
Dept: CARDIOLOGY | Facility: CLINIC | Age: 77
End: 2022-03-29
Payer: COMMERCIAL

## 2022-03-29 NOTE — PROGRESS NOTES
Return telephone call made to Adamaris. She reviewed the BP's she had been getting on her home BP cuff.  Also, discussed the amount of fluid retention that she has been experiencing.      After speaking with Adamaris, she was going to see if she could see her PCP today and have her BP checked there.    I told her that I would save a clinic spot for her Friday in case it was needed.  Will follow-up with her tomorrow to see if she was able to get her BP's re-checked at her PCP.

## 2022-03-29 NOTE — TELEPHONE ENCOUNTER
"Pt reports high blood pressure. Pt reports \"automatic home monitor\" reading 226/106 a half hour ago. At time of call pulse 97 and oxygen 98%. Pt states her pulse feels regular but monitor is showing irregular. Pt is wondering if her monitor is working properly. Pt denies difficulty walking, vision problems, headache, new onset shortness of breath or other acute symptoms. Pt rechecks blood pressure with wrist cuff and reading is 160/86 at time of call. Pt reports she took all her blood pressure medication today. Pt sounds anxious but is able to speak in full sentences.     Advised pt to contact PCP in the morning per protocol. Call back if new or worsening symptoms. Check home blood pressure monitor at clinic.     Pt verbalizes understanding and agrees to plan.     Reason for Disposition    Systolic BP  >= 180 OR Diastolic >= 110    Additional Information    Negative: Difficult to awaken or acting confused (e.g., disoriented, slurred speech)    Negative: Severe difficulty breathing (e.g., struggling for each breath, speaks in single words)    Negative: [1] Weakness of the face, arm or leg on one side of the body AND [2] new onset    Negative: [1] Numbness (i.e., loss of sensation) of the face, arm or leg on one side of the body AND [2] new onset    Negative: [1] Chest pain lasts > 5 minutes AND [2] history of heart disease  (i.e., heart attack, bypass surgery, angina, angioplasty, CHF)    Negative: [1] Chest pain AND [2] took nitrogylcerin AND [3] pain was not relieved    Negative: Sounds like a life-threatening emergency to the triager    Negative: Symptom is main concern  (e.g., headache, chest pain)    Negative: Low blood pressure is main concern    Negative: [1] Systolic BP  >= 160 OR Diastolic >= 100 AND [2] cardiac or neurologic symptoms (e.g., chest pain, difficulty breathing, unsteady gait, blurred vision)    Negative: [1] Pregnant > 20 weeks (or postpartum < 6 weeks) AND [2] new hand or face swelling    " Negative: [1] Pregnant > 20 weeks AND [2] BP Systolic BP  >= 140 OR Diastolic >= 90    Negative: [1] Systolic BP  >= 180 OR Diastolic >= 110 AND [2] missed most recent dose of blood pressure medication    Negative: [1] Postpartum < 6 weeks AND [2] BP Systolic BP  >= 140 OR Diastolic >= 90    Negative: [1] Systolic BP  >= 200 OR Diastolic >= 120  AND [2] having NO cardiac or neurologic symptoms    Protocols used: HIGH BLOOD PRESSURE-A-AH

## 2022-04-30 ENCOUNTER — HEALTH MAINTENANCE LETTER (OUTPATIENT)
Age: 77
End: 2022-04-30

## 2022-05-10 DIAGNOSIS — I10 ESSENTIAL HYPERTENSION: ICD-10-CM

## 2022-05-11 RX ORDER — CHLORTHALIDONE 25 MG/1
TABLET ORAL
Qty: 45 TABLET | Refills: 0 | Status: SHIPPED | OUTPATIENT
Start: 2022-05-11 | End: 2022-05-16

## 2022-05-15 NOTE — PROGRESS NOTES
[facility]  CARDIOVASCULAR DIVISION    VALVE CLINIC RETURN VISIT    PRIMARY CARDIOLOGIST: Dr. Harris/Mireille Edwards NP       PERTINENT CLINICAL HISTORY:     Reba Juarez is a very pleasant 77 year old female who presents for 1 year TAVR follow-up. She has a history of HTN, HLD, T2DM, MAYKEL on CPAP, paroxysmal SVT, CAD with remote POBA history, non-obstructive CAD per angiogram 4/2021, HFpEF and severe aortic stenosis status-post TAVR with a 23 mm Little Ellie 3 Ultra on 5/19/21 by Alba Quick, Gucci and Jonny. Her TAVR went well without complication. Her POD#1 ECHO showed elevated mean PG of 34 mmHg thought related to hyperdynamic LV function. She received IVF and was started on metoprolol with improvement in mean PG to 24 mmHg on follow-up echo. She was last evaluated 1 month post TAVR and echo showed mean PG 11 mmHg and Cardiac CT showed normal valve function.     Patient says she had COVID about 2 months ago and continues to feel fatigued. She has also been struggling with her weight and has been attempting to increase her activity level. She denies any significant chest pain and pressure with activity. She reports mild shortness of breath and palpitations with activity which is better than it was pre TAVR. She attributes this to deconditioning. She has leg swelling worse since starting aleve for arthritis. No orthopnea, PND. She denies any lightheadedness, syncope, palpitations.     Last month she had a major spike in blood pressure and increased leg swelling after starting Aleve for knee pain. She saw her PCP who increased valsartan to 160 mg TID, also calibrated her BP cuff. Found that her bicep cuff was inaccurate, though wrist cuff was accurate. Has since been using her wrist cuff and BP has been 114//60s. She says her blood pressure was too low on valsartan TID so she reduced to once a day. Additional medications chlorthalidone 25 mg daily, metoprolol 25 mg BID, ASA and  statin.       PAST MEDICAL HISTORY:     Past Medical History:   Diagnosis Date     Aortic valve sclerosis     echo 1-24-11 mild     CAD (coronary artery disease)      Coronary artery disease 1995    PCI at [facility]. Repeat angiogram in 2007     Diabetes (H)      Gastric ulcer 1990     H/O exercise stress test 3/21/2011    No ischemia     Hyperlipidemia      Hyperlipidemia LDL goal < 70     identified 1990's     Hypertension     onset age 50-55 approx     Hypertension      MAYKEL (obstructive sleep apnea) 2007        PAST SURGICAL HISTORY:     Past Surgical History:   Procedure Laterality Date     ANGIOPLASTY  early 1990s     COLONOSCOPY  2010     CV CORONARY ANGIOGRAM N/A 4/9/2021    Procedure: CV CORONARY ANGIOGRAM;  Surgeon: Júnior Santos MD;  Location:  HEART CARDIAC CATH LAB     CV LEFT HEART CATH N/A 4/9/2021    Procedure: Left Heart Cath;  Surgeon: Júnior Santos MD;  Location:  HEART CARDIAC CATH LAB     CV RIGHT HEART CATH MEASUREMENTS RECORDED N/A 4/9/2021    Procedure: Right Heart Cath;  Surgeon: Júnior Santos MD;  Location:  HEART CARDIAC CATH LAB     CV TRANSCATHETER AORTIC VALVE REPLACEMENT N/A 5/19/2021    Procedure: Transfemoral Transcatheter Aortic Valve Replacement with 23 mm Little Ellie 3 Ultra Transcatheter Heart Valve, Transthoracic echocardiogram read by Dr. Farfan;  Surgeon: Harvinder Quick MD;  Location: [facility] OR     EYE SURGERY       HEART CATH FEMORAL CANNULIZATION WITH OPEN STANDBY REPAIR AORTIC VALVE N/A 5/19/2021    Procedure: cardiopulmonary bypass on standby;  Surgeon: Riaz Fuller MD;  Location:  OR        CURRENT MEDICATIONS:     Current Outpatient Medications   Medication Sig Dispense Refill     amoxicillin (AMOXIL) 500 MG capsule Take 4 capsules (2,000 mg) by mouth once as needed (SBE prophylaxis - take 30-60 minutes prior to dental procedure or cleaning) 4 capsule 3     aspirin 81 MG tablet Take 1 tablet by mouth daily.        budesonide-formoterol (SYMBICORT) 80-4.5 MCG/ACT Inhaler Inhale 2 puffs into the lungs 2 times daily       chlorthalidone (HYGROTON) 25 MG tablet Take 1 tablet (25 mg) by mouth daily 90 tablet 1     metFORMIN (GLUCOPHAGE) 500 MG tablet Take 1,500 mg by mouth        metoprolol tartrate (LOPRESSOR) 25 MG tablet Take 1 tablet (25 mg) by mouth 2 times daily 180 tablet 2     nitroGLYcerin (NITROSTAT) 0.4 MG sublingual tablet Place 0.4 mg under the tongue       ORDER FOR DME Use your CPAP device as directed by your provider.       rosuvastatin (CRESTOR) 40 MG tablet Take 40 mg by mouth       valsartan (DIOVAN) 160 MG tablet Take 1 tablet (160 mg) by mouth 2 times daily (Patient not taking: Reported on 5/16/2022) 180 tablet 1        ALLERGIES:     Allergies   Allergen Reactions     Pravastatin      Myalgia       Simvastatin      myalgia     Calcium Citrate Unknown     Bloody nose per pt     Lipitor [Atorvastatin Calcium]      Myalgia          FAMILY HISTORY:     Family History   Problem Relation Age of Onset     No Known Problems Sister      Alcoholism Brother      Myocardial Infarction Mother 51        HTN onset age 30, was a smoker     Myocardial Infarction Father 63        smoker     Myocardial Infarction Brother 39     No Known Problems Son      No Known Problems Daughter      CABG Brother      Heart Disease Brother      No Known Problems Sister      Cerebrovascular Disease Sister      Coronary Artery Disease Sister      No Known Problems Son         SOCIAL HISTORY:     Social History     Socioeconomic History     Marital status:      Spouse name: Not on file     Number of children: Not on file     Years of education: Not on file     Highest education level: Not on file   Occupational History     Occupation: retired     Employer: [employer]     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, still works a few days as biochemical pharmacist   Social Needs     Financial resource strain: Not on file     Food insecurity     Worry:  Not on file     Inability: Not on file     Transportation needs     Medical: Not on file     Non-medical: Not on file   Tobacco Use     Smoking status: Former Smoker     Packs/day: 1.00     Years: 20.00     Pack years: 20.00     Quit date: 3/5/1992     Years since quittin.2     Smokeless tobacco: Never Used   Substance and Sexual Activity     Alcohol use: No     Drug use: No     Sexual activity: Not on file   Lifestyle     Physical activity     Days per week: Not on file     Minutes per session: Not on file     Stress: Not on file   Relationships     Social connections     Talks on phone: Not on file     Gets together: Not on file     Attends Mandaen service: Not on file     Active member of club or organization: Not on file     Attends meetings of clubs or organizations: Not on file     Relationship status: Not on file     Intimate partner violence     Fear of current or ex partner: Not on file     Emotionally abused: Not on file     Physically abused: Not on file     Forced sexual activity: Not on file   Other Topics Concern     Parent/sibling w/ CABG, MI or angioplasty before 65F 55M? Not Asked   Social History Narrative    Single and lives with her son and brother.        REVIEW OF SYSTEMS:     Constitutional: No fevers or chills  Skin: No new rash or itching  Eyes: No acute change in vision  Ears/Nose/Throat: No purulent rhinorrhea, new hearing loss, or new vertigo  Respiratory: No cough or hemoptysis  Cardiovascular: See HPI  Gastrointestinal: No change in appetite, vomiting, hematemesis or diarrhea  Genitourinary: No dysuria or hematuria  Musculoskeletal: No new back pain, neck pain or muscle pain  Neurologic: No new headaches, focal weakness or behavior changes  Psychiatric: No hallucinations, excessive alcohol consumption or illegal drug usage  Hematologic/Lymphatic/Immunologic: No bleeding, chills, fever, night sweats or weight loss  Endocrine: No new cold intolerance, heat intolerance, polyphagia,  polydipsia or polyuria      PHYSICAL EXAMINATION:     BP (!) 164/77 (BP Location: Right arm, Patient Position: Chair, Cuff Size: Adult Large)   Pulse 67   Wt 101.4 kg (223 lb 9.6 oz)   SpO2 97%   BMI 40.25 kg/m      GENERAL: No acute distress.  HEENT: EOMI. Sclerae white, not injected. Nares clear. Pharynx without erythema or exudate.   Neck: No adenopathy. No thyromegaly. No jugular venous distension.   Heart: Regular rate and rhythm. Soft 2/6 DALIA murmur RUSB.   Lungs: Clear to auscultation. No ronchi, wheezes, rales.   Abdomen: Soft, nontender, nondistended. Bowel sounds present.  Extremities: No clubbing, cyanosis, or edema.   Neurologic: Alert and oriented to person/place/time, normal speech and affect. No focal deficits.  Skin: No petechiae, purpura or rash.     LABORATORY DATA:   Personally reviewed and interpreted lab results.   CBC RESULTS:  Lab Results   Component Value Date    WBC 11.7 (H) 05/16/2022    WBC 12.8 (H) 07/05/2021    RBC 3.97 05/16/2022    RBC 4.25 07/05/2021    HGB 11.2 (L) 05/16/2022    HGB 11.7 07/05/2021    HCT 35.1 05/16/2022    HCT 35.9 07/05/2021    MCV 88 05/16/2022    MCV 85 07/05/2021    MCH 28.2 05/16/2022    MCH 27.5 07/05/2021    MCHC 31.9 05/16/2022    MCHC 32.6 07/05/2021    RDW 15.8 (H) 05/16/2022    RDW 14.8 07/05/2021     05/16/2022     07/05/2021       BMP RESULTS:  Lab Results   Component Value Date     05/16/2022     07/05/2021    POTASSIUM 4.1 05/16/2022    POTASSIUM 4.2 07/05/2021    CHLORIDE 112 (H) 05/16/2022    CHLORIDE 108 07/05/2021    CO2 24 05/16/2022    CO2 27 07/05/2021    ANIONGAP 7 05/16/2022    ANIONGAP 3 07/05/2021     (H) 05/16/2022     (H) 07/05/2021    BUN 14 05/16/2022    BUN 14 07/05/2021    CR 0.96 05/16/2022    CR 0.86 07/05/2021    GFRESTIMATED 60 (L) 05/16/2022    GFRESTIMATED 65 07/05/2021    GFRESTBLACK 76 07/05/2021    TERRI 9.0 05/16/2022    TERRI 8.6 07/05/2021         PROCEDURES & FURTHER ASSESSMENTS:    ECHO 5/16/22:  Interpretation Summary  S/P TAVR with 23 ES3 Ultra valve. Mean aortic gradient 18mmHg..Trivial  paravalvular AI.Aortic mean gradient was 11mmHg during previous study but the  study before that was 24mmHg.  ______________________________________________________________________________  Left Ventricle  Global and regional left ventricular function is normal with an EF of 60-65%.  Left ventricular size is normal. Mild concentric wall thickening consistent  with left ventricular hypertrophy is present. Left ventricular diastolic  function is normal. No regional wall motion abnormalities are seen.     Right Ventricle  Right ventricular function, chamber size, wall motion, and thickness are  normal.     Atria  Both atria appear normal.     Mitral Valve  Mild mitral annular calcification is present. Mild mitral insufficiency is  present.     Aortic Valve  S/P TAVR with 23 ES3 Ultra valve. Mean aortic gradient 18mmHg..Trivial  paravalvular AI.Aortic mean gradient was 11mmHg during previous study but the  study before that was 24mmHg. A bioprosthetic aortic valve is present.     Tricuspid Valve  The tricuspid valve is normal. Mild tricuspid insufficiency is present. The  right ventricular systolic pressure is approximated at 28.0 mmHg plus the  right atrial pressure.     Pulmonic Valve  The pulmonic valve is normal.     Vessels  The thoracic aorta is normal. The pulmonary artery and bifurcation cannot be  assessed. The inferior vena cava is normal.     Pericardium  No pericardial effusion is present.     EKG 5/16/22:  Personally reviewed and interpreted  NSR HR 72    EKG 7/5/21:  Personally reviewed and interpreted by me  NSR HR 76, QRS duration 76    ECHO 7/5/21:  Mean gradient 11 mmHg no AI  Mean gradient 24 on previous study    CT 7/5/21:  Normal leaflet opening no HALT     ECG 5/20/21:  Personally reviewed and interpreted by me  NSR HR 98, QRS 70 ms,     Echocardiogram 5/20/21:  Interpretation  Summary  Global and regional left ventricular function is hyperkinetic with an EF >70%.  TAVR with Little S3 23 mm valve. No aortic regurgitation is present.The mean  gradient is elevated at 34 mmHg. The aortic valve area is normal at 1.7 cm^2.  IVC diameter <2.1 cm collapsing >50% with sniff suggests a normal RA pressure  of 3 mmHg.  No pericardial effusion is present.     TAVR gr is likely elevated due to hyperdynamic LV fxn. Valve appearance and  TONI are normal. Recommend short term echo follow-up.    NYHA Class: I     CLINICAL IMPRESSION:     Reba Juarez is a very pleasant 77 year old female with HTN, HLD, T2DM, CAD with remote POBA history w/ non-obstructive CAD per angiogram 4/2021, HFpEF, and severe symptomatic aortic stenosis status-post TAVR with a 23 mm Little Ellie 3 Ultra on 5/19/21 by Alba Quick, Gucci and Jonny who presents for 1 year f/up.     1. Aortic stenosis s/p TAVR: Doing well clinically with improvement in dyspnea, fatigue and exercise tolerance. She had significantly increased gradient of 34 mmHg on POD#1 thought related to hyperdynamic LV function --> improved to 24 mmHg 1 week post TAVR--> down to 11 mmHg at 1 month post TAVR.  ECHO today shows slightly elevated mean PG of 18 mmHg when compared to prior, but is not much different than gradients immediately post TAVR. Unlikely HALT given CT at 1 month post TAVR showed normal leaflet opening. Continue ASA 81 mg lifelong and SBE prophylaxis lifelong. Repeat echo in 1 year.     2. CAD: Remote history of POBA. No angina, continue ASA and statin.     3. HFpEF: Continue Chlorthalidone 25 mg daily. Recommend avoid use of Aleve, follow 2.3 G NA restriction and 2 L fluid restriction. Call with significant weight gain, SOB or leg swelling.     HTN: Reviewed home BP measurements, BP at goal. Continue valsartan 160 mg daily, chlorthalidone 25 and metoprolol 25 mg BID.    HLD: Continue high intensity statin therapy.    Paroxysmal SVT:  Metoprolol BID.     RTC: Follow-up 1 year with echo prior.     ANGELA Elmore, CNP  [facility] Cardiology Team      CC  Patient Care Team:  Dayana Kruse NP as PCP - General (Nurse Practitioner)  Carmel Weston NP as Assigned Heart and Vascular Provider

## 2022-05-16 ENCOUNTER — ANCILLARY PROCEDURE (OUTPATIENT)
Dept: CARDIOLOGY | Facility: CLINIC | Age: 77
End: 2022-05-16
Attending: NURSE PRACTITIONER
Payer: COMMERCIAL

## 2022-05-16 ENCOUNTER — LAB (OUTPATIENT)
Dept: LAB | Facility: CLINIC | Age: 77
End: 2022-05-16
Payer: COMMERCIAL

## 2022-05-16 VITALS
DIASTOLIC BLOOD PRESSURE: 77 MMHG | OXYGEN SATURATION: 97 % | HEART RATE: 67 BPM | WEIGHT: 223.6 LBS | BODY MASS INDEX: 40.25 KG/M2 | SYSTOLIC BLOOD PRESSURE: 164 MMHG

## 2022-05-16 DIAGNOSIS — Z95.2 S/P TAVR (TRANSCATHETER AORTIC VALVE REPLACEMENT): Primary | ICD-10-CM

## 2022-05-16 DIAGNOSIS — Z95.2 S/P TAVR (TRANSCATHETER AORTIC VALVE REPLACEMENT): ICD-10-CM

## 2022-05-16 DIAGNOSIS — I10 ESSENTIAL HYPERTENSION: ICD-10-CM

## 2022-05-16 LAB
ANION GAP SERPL CALCULATED.3IONS-SCNC: 7 MMOL/L (ref 3–14)
BUN SERPL-MCNC: 14 MG/DL (ref 7–30)
CALCIUM SERPL-MCNC: 9 MG/DL (ref 8.5–10.1)
CHLORIDE BLD-SCNC: 112 MMOL/L (ref 94–109)
CO2 SERPL-SCNC: 24 MMOL/L (ref 20–32)
CREAT SERPL-MCNC: 0.96 MG/DL (ref 0.52–1.04)
ERYTHROCYTE [DISTWIDTH] IN BLOOD BY AUTOMATED COUNT: 15.8 % (ref 10–15)
GFR SERPL CREATININE-BSD FRML MDRD: 60 ML/MIN/1.73M2
GLUCOSE BLD-MCNC: 110 MG/DL (ref 70–99)
HCT VFR BLD AUTO: 35.1 % (ref 35–47)
HGB BLD-MCNC: 11.2 G/DL (ref 11.7–15.7)
LVEF ECHO: NORMAL
MCH RBC QN AUTO: 28.2 PG (ref 26.5–33)
MCHC RBC AUTO-ENTMCNC: 31.9 G/DL (ref 31.5–36.5)
MCV RBC AUTO: 88 FL (ref 78–100)
PLATELET # BLD AUTO: 167 10E3/UL (ref 150–450)
POTASSIUM BLD-SCNC: 4.1 MMOL/L (ref 3.4–5.3)
RBC # BLD AUTO: 3.97 10E6/UL (ref 3.8–5.2)
SODIUM SERPL-SCNC: 143 MMOL/L (ref 133–144)
WBC # BLD AUTO: 11.7 10E3/UL (ref 4–11)

## 2022-05-16 PROCEDURE — 93005 ELECTROCARDIOGRAM TRACING: CPT

## 2022-05-16 PROCEDURE — G0463 HOSPITAL OUTPT CLINIC VISIT: HCPCS | Mod: 25

## 2022-05-16 PROCEDURE — 85027 COMPLETE CBC AUTOMATED: CPT | Performed by: PATHOLOGY

## 2022-05-16 PROCEDURE — 99214 OFFICE O/P EST MOD 30 MIN: CPT | Mod: 25 | Performed by: NURSE PRACTITIONER

## 2022-05-16 PROCEDURE — 36415 COLL VENOUS BLD VENIPUNCTURE: CPT | Performed by: PATHOLOGY

## 2022-05-16 PROCEDURE — 80048 BASIC METABOLIC PNL TOTAL CA: CPT | Performed by: PATHOLOGY

## 2022-05-16 PROCEDURE — 93306 TTE W/DOPPLER COMPLETE: CPT | Performed by: INTERNAL MEDICINE

## 2022-05-16 RX ORDER — CHLORTHALIDONE 25 MG/1
25 TABLET ORAL DAILY
Qty: 90 TABLET | Refills: 1 | Status: SHIPPED | OUTPATIENT
Start: 2022-05-16 | End: 2022-08-18 | Stop reason: ALTCHOICE

## 2022-05-16 ASSESSMENT — PAIN SCALES - GENERAL: PAINLEVEL: NO PAIN (0)

## 2022-05-16 NOTE — NURSING NOTE
Chief Complaint   Patient presents with     Follow Up     76 yo FM, here for one year s/p TAVR follow-up.     Vitals were taken, medications reconciled, and EKG was performed.    Shay Gar, EMT  11:03 AM

## 2022-05-16 NOTE — LETTER
5/16/2022      RE: Reba Juarez  1974 [address] [facility] [address]       Dear Colleague,    Thank you for the opportunity to participate in the care of your patient, Reba Juarez, at the [facility] HEART [facility]IN[facility] [place] [facility]. Please see a copy of my visit note below.        [facility] HEART CARE  CARDIOVASCULAR DIVISION    VALVE CLINIC RETURN VISIT    PRIMARY CARDIOLOGIST: Dr. Harris/Mireille Edwards NP       PERTINENT CLINICAL HISTORY:     Reba Juarez is a very pleasant 77 year old female who presents for 1 year TAVR follow-up. She has a history of HTN, HLD, T2DM, MAYKEL on CPAP, paroxysmal SVT, CAD with remote POBA history, non-obstructive CAD per angiogram 4/2021, HFpEF and severe aortic stenosis status-post TAVR with a 23 mm Little Ellie 3 Ultra on 5/19/21 by Gucci Meek and Jonny. Her TAVR went well without complication. Her POD#1 ECHO showed elevated mean PG of 34 mmHg thought related to hyperdynamic LV function. She received IVF and was started on metoprolol with improvement in mean PG to 24 mmHg on follow-up echo. She was last evaluated 1 month post TAVR and echo showed mean PG 11 mmHg and Cardiac CT showed normal valve function.     Patient says she had COVID about 2 months ago and continues to feel fatigued. She has also been struggling with her weight and has been attempting to increase her activity level. She denies any significant chest pain and pressure with activity. She reports mild shortness of breath and palpitations with activity which is better than it was pre TAVR. She attributes this to deconditioning. She has leg swelling worse since starting aleve for arthritis. No orthopnea, PND. She denies any lightheadedness, syncope, palpitations.     Last month she had a major spike in blood pressure and increased leg swelling after starting Aleve for knee pain. She saw her PCP who increased valsartan to 160 mg  TID, also calibrated her BP cuff. Found that her bicep cuff was inaccurate, though wrist cuff was accurate. Has since been using her wrist cuff and BP has been 114//60s. She says her blood pressure was too low on valsartan TID so she reduced to once a day. Additional medications chlorthalidone 25 mg daily, metoprolol 25 mg BID, ASA and statin.       PAST MEDICAL HISTORY:     Past Medical History:   Diagnosis Date     Aortic valve sclerosis     echo 1-24-11 mild     CAD (coronary artery disease)      Coronary artery disease 1995    PCI at [facility]. Repeat angiogram in 2007     Diabetes (H)      Gastric ulcer 1990     H/O exercise stress test 3/21/2011    No ischemia     Hyperlipidemia      Hyperlipidemia LDL goal < 70     identified 1990's     Hypertension     onset age 50-55 approx     Hypertension      MAYKEL (obstructive sleep apnea) 2007        PAST SURGICAL HISTORY:     Past Surgical History:   Procedure Laterality Date     ANGIOPLASTY  early 1990s     COLONOSCOPY  2010     CV CORONARY ANGIOGRAM N/A 4/9/2021    Procedure: CV CORONARY ANGIOGRAM;  Surgeon: Júnior Santos MD;  Location:  HEART CARDIAC CATH LAB     CV LEFT HEART CATH N/A 4/9/2021    Procedure: Left Heart Cath;  Surgeon: Júnior Santos MD;  Location:  HEART CARDIAC CATH LAB     CV RIGHT HEART CATH MEASUREMENTS RECORDED N/A 4/9/2021    Procedure: Right Heart Cath;  Surgeon: Júnior Santos MD;  Location:  HEART CARDIAC CATH LAB     CV TRANSCATHETER AORTIC VALVE REPLACEMENT N/A 5/19/2021    Procedure: Transfemoral Transcatheter Aortic Valve Replacement with 23 mm Little Ellie 3 Ultra Transcatheter Heart Valve, Transthoracic echocardiogram read by Dr. Farfan;  Surgeon: Harvinder Quick MD;  Location:  OR     EYE SURGERY       HEART CATH FEMORAL CANNULIZATION WITH OPEN STANDBY REPAIR AORTIC VALVE N/A 5/19/2021    Procedure: cardiopulmonary bypass on standby;  Surgeon: Riaz Fuller MD;  Location:  OR         CURRENT MEDICATIONS:     Current Outpatient Medications   Medication Sig Dispense Refill     amoxicillin (AMOXIL) 500 MG capsule Take 4 capsules (2,000 mg) by mouth once as needed (SBE prophylaxis - take 30-60 minutes prior to dental procedure or cleaning) 4 capsule 3     aspirin 81 MG tablet Take 1 tablet by mouth daily.       budesonide-formoterol (SYMBICORT) 80-4.5 MCG/ACT Inhaler Inhale 2 puffs into the lungs 2 times daily       chlorthalidone (HYGROTON) 25 MG tablet Take 1 tablet (25 mg) by mouth daily 90 tablet 1     metFORMIN (GLUCOPHAGE) 500 MG tablet Take 1,500 mg by mouth        metoprolol tartrate (LOPRESSOR) 25 MG tablet Take 1 tablet (25 mg) by mouth 2 times daily 180 tablet 2     nitroGLYcerin (NITROSTAT) 0.4 MG sublingual tablet Place 0.4 mg under the tongue       ORDER FOR DME Use your CPAP device as directed by your provider.       rosuvastatin (CRESTOR) 40 MG tablet Take 40 mg by mouth       valsartan (DIOVAN) 160 MG tablet Take 1 tablet (160 mg) by mouth 2 times daily (Patient not taking: Reported on 5/16/2022) 180 tablet 1        ALLERGIES:     Allergies   Allergen Reactions     Pravastatin      Myalgia       Simvastatin      myalgia     Calcium Citrate Unknown     Bloody nose per pt     Lipitor [Atorvastatin Calcium]      Myalgia          FAMILY HISTORY:     Family History   Problem Relation Age of Onset     No Known Problems Sister      Alcoholism Brother      Myocardial Infarction Mother 51        HTN onset age 30, was a smoker     Myocardial Infarction Father 63        smoker     Myocardial Infarction Brother 39     No Known Problems Son      No Known Problems Daughter      CABG Brother      Heart Disease Brother      No Known Problems Sister      Cerebrovascular Disease Sister      Coronary Artery Disease Sister      No Known Problems Son         SOCIAL HISTORY:     Social History     Socioeconomic History     Marital status:      Spouse name: Not on file     Number of children: Not  on file     Years of education: Not on file     Highest education level: Not on file   Occupational History     Occupation: retired     Employer: [employer]     Comment:  retired full time, still works a few days as biochemical pharmacist   Social Needs     Financial resource strain: Not on file     Food insecurity     Worry: Not on file     Inability: Not on file     Transportation needs     Medical: Not on file     Non-medical: Not on file   Tobacco Use     Smoking status: Former Smoker     Packs/day: 1.00     Years: 20.00     Pack years: 20.00     Quit date: 3/5/1992     Years since quittin.2     Smokeless tobacco: Never Used   Substance and Sexual Activity     Alcohol use: No     Drug use: No     Sexual activity: Not on file   Lifestyle     Physical activity     Days per week: Not on file     Minutes per session: Not on file     Stress: Not on file   Relationships     Social connections     Talks on phone: Not on file     Gets together: Not on file     Attends Restoration service: Not on file     Active member of club or organization: Not on file     Attends meetings of clubs or organizations: Not on file     Relationship status: Not on file     Intimate partner violence     Fear of current or ex partner: Not on file     Emotionally abused: Not on file     Physically abused: Not on file     Forced sexual activity: Not on file   Other Topics Concern     Parent/sibling w/ CABG, MI or angioplasty before 65F 55M? Not Asked   Social History Narrative    Single and lives with her son and brother.        REVIEW OF SYSTEMS:     Constitutional: No fevers or chills  Skin: No new rash or itching  Eyes: No acute change in vision  Ears/Nose/Throat: No purulent rhinorrhea, new hearing loss, or new vertigo  Respiratory: No cough or hemoptysis  Cardiovascular: See HPI  Gastrointestinal: No change in appetite, vomiting, hematemesis or diarrhea  Genitourinary: No dysuria or hematuria  Musculoskeletal: No new back pain, neck  pain or muscle pain  Neurologic: No new headaches, focal weakness or behavior changes  Psychiatric: No hallucinations, excessive alcohol consumption or illegal drug usage  Hematologic/Lymphatic/Immunologic: No bleeding, chills, fever, night sweats or weight loss  Endocrine: No new cold intolerance, heat intolerance, polyphagia, polydipsia or polyuria      PHYSICAL EXAMINATION:     BP (!) 164/77 (BP Location: Right arm, Patient Position: Chair, Cuff Size: Adult Large)   Pulse 67   Wt 101.4 kg (223 lb 9.6 oz)   SpO2 97%   BMI 40.25 kg/m      GENERAL: No acute distress.  HEENT: EOMI. Sclerae white, not injected. Nares clear. Pharynx without erythema or exudate.   Neck: No adenopathy. No thyromegaly. No jugular venous distension.   Heart: Regular rate and rhythm. Soft 2/6 DALIA murmur RUSB.   Lungs: Clear to auscultation. No ronchi, wheezes, rales.   Abdomen: Soft, nontender, nondistended. Bowel sounds present.  Extremities: No clubbing, cyanosis, or edema.   Neurologic: Alert and oriented to person/place/time, normal speech and affect. No focal deficits.  Skin: No petechiae, purpura or rash.     LABORATORY DATA:   Personally reviewed and interpreted lab results.   CBC RESULTS:  Lab Results   Component Value Date    WBC 11.7 (H) 05/16/2022    WBC 12.8 (H) 07/05/2021    RBC 3.97 05/16/2022    RBC 4.25 07/05/2021    HGB 11.2 (L) 05/16/2022    HGB 11.7 07/05/2021    HCT 35.1 05/16/2022    HCT 35.9 07/05/2021    MCV 88 05/16/2022    MCV 85 07/05/2021    MCH 28.2 05/16/2022    MCH 27.5 07/05/2021    MCHC 31.9 05/16/2022    MCHC 32.6 07/05/2021    RDW 15.8 (H) 05/16/2022    RDW 14.8 07/05/2021     05/16/2022     07/05/2021       BMP RESULTS:  Lab Results   Component Value Date     05/16/2022     07/05/2021    POTASSIUM 4.1 05/16/2022    POTASSIUM 4.2 07/05/2021    CHLORIDE 112 (H) 05/16/2022    CHLORIDE 108 07/05/2021    CO2 24 05/16/2022    CO2 27 07/05/2021    ANIONGAP 7 05/16/2022    ANIONGAP 3  07/05/2021     (H) 05/16/2022     (H) 07/05/2021    BUN 14 05/16/2022    BUN 14 07/05/2021    CR 0.96 05/16/2022    CR 0.86 07/05/2021    GFRESTIMATED 60 (L) 05/16/2022    GFRESTIMATED 65 07/05/2021    GFRESTBLACK 76 07/05/2021    TERRI 9.0 05/16/2022    TERRI 8.6 07/05/2021         PROCEDURES & FURTHER ASSESSMENTS:   ECHO 5/16/22:  Interpretation Summary  S/P TAVR with 23 ES3 Ultra valve. Mean aortic gradient 18mmHg..Trivial  paravalvular AI.Aortic mean gradient was 11mmHg during previous study but the  study before that was 24mmHg.  ______________________________________________________________________________  Left Ventricle  Global and regional left ventricular function is normal with an EF of 60-65%.  Left ventricular size is normal. Mild concentric wall thickening consistent  with left ventricular hypertrophy is present. Left ventricular diastolic  function is normal. No regional wall motion abnormalities are seen.     Right Ventricle  Right ventricular function, chamber size, wall motion, and thickness are  normal.     Atria  Both atria appear normal.     Mitral Valve  Mild mitral annular calcification is present. Mild mitral insufficiency is  present.     Aortic Valve  S/P TAVR with 23 ES3 Ultra valve. Mean aortic gradient 18mmHg..Trivial  paravalvular AI.Aortic mean gradient was 11mmHg during previous study but the  study before that was 24mmHg. A bioprosthetic aortic valve is present.     Tricuspid Valve  The tricuspid valve is normal. Mild tricuspid insufficiency is present. The  right ventricular systolic pressure is approximated at 28.0 mmHg plus the  right atrial pressure.     Pulmonic Valve  The pulmonic valve is normal.     Vessels  The thoracic aorta is normal. The pulmonary artery and bifurcation cannot be  assessed. The inferior vena cava is normal.     Pericardium  No pericardial effusion is present.     EKG 5/16/22:  Personally reviewed and interpreted  NSR HR 72    EKG  7/5/21:  Personally reviewed and interpreted by me  NSR HR 76, QRS duration 76    ECHO 7/5/21:  Mean gradient 11 mmHg no AI  Mean gradient 24 on previous study    CT 7/5/21:  Normal leaflet opening no HALT     ECG 5/20/21:  Personally reviewed and interpreted by me  NSR HR 98, QRS 70 ms,     Echocardiogram 5/20/21:  Interpretation Summary  Global and regional left ventricular function is hyperkinetic with an EF >70%.  TAVR with Little S3 23 mm valve. No aortic regurgitation is present.The mean  gradient is elevated at 34 mmHg. The aortic valve area is normal at 1.7 cm^2.  IVC diameter <2.1 cm collapsing >50% with sniff suggests a normal RA pressure  of 3 mmHg.  No pericardial effusion is present.     TAVR gr is likely elevated due to hyperdynamic LV fxn. Valve appearance and  TONI are normal. Recommend short term echo follow-up.    NYHA Class: I     CLINICAL IMPRESSION:     Reba Juarez is a very pleasant 77 year old female with HTN, HLD, T2DM, CAD with remote POBA history w/ non-obstructive CAD per angiogram 4/2021, HFpEF, and severe symptomatic aortic stenosis status-post TAVR with a 23 mm Little Ellie 3 Ultra on 5/19/21 by Gucci Meek and Jonny who presents for 1 year f/up.     1. Aortic stenosis s/p TAVR: Doing well clinically with improvement in dyspnea, fatigue and exercise tolerance. She had significantly increased gradient of 34 mmHg on POD#1 thought related to hyperdynamic LV function --> improved to 24 mmHg 1 week post TAVR--> down to 11 mmHg at 1 month post TAVR.  ECHO today shows slightly elevated mean PG of 18 mmHg when compared to prior, but is not much different than gradients immediately post TAVR. Unlikely HALT given CT at 1 month post TAVR showed normal leaflet opening. Continue ASA 81 mg lifelong and SBE prophylaxis lifelong. Repeat echo in 1 year.     2. CAD: Remote history of POBA. No angina, continue ASA and statin.     3. HFpEF: Continue Chlorthalidone 25 mg daily.  Recommend avoid use of Aleve, follow 2.3 G NA restriction and 2 L fluid restriction. Call with significant weight gain, SOB or leg swelling.     HTN: Reviewed home BP measurements, BP at goal. Continue valsartan 160 mg daily, chlorthalidone 25 and metoprolol 25 mg BID.    HLD: Continue high intensity statin therapy.    Paroxysmal SVT: Metoprolol BID.     RTC: Follow-up 1 year with echo prior.     ANGELA Elmore, CNP  [facility] Cardiology Team      CC  Patient Care Team:  Dayana Kruse NP as PCP - General (Nurse Practitioner)  Carmel Weston NP as Assigned Heart and Vascular Provider

## 2022-05-16 NOTE — PATIENT INSTRUCTIONS
You were seen today in the Structural Heart Clinic at the [facility].    Cardiology provider you saw during your visit: Carmel Weston NP    Instructions:   Continue aspirin 81 mg daily lifelong.  Continue current medication regimen with valsartan 160 mg daily, chlorthalidone 25 mg daily and metoprolol.  Stop taking Aleve or any NSAIDS - you are safe to take tylenol up to 3 G daily.  For all future dental cleanings and procedures you will need to take antibiotics prior - see instructions below.   Follow-up with your Primary Cardiologist in 1 year with echo, ecg and labs prior     Prevention of Infective (Bacterial) Endocarditis:  You are at increased risk for developing adverse outcomes from infective endocarditis (IE), also known as bacterial endocarditis (BE) because of the new device in your heart. The guidelines for prevention of IE are to give patients antibiotics prior to any dental procedures that involve manipulation of gingival tissue or the periapical region of teeth, or perforation of the oral mucosa:      It is recommended to take Amoxicillin 2 gm by mouth as a single dose 30 to 60 minutes before procedure.     OR if allergic to Penicillin or Ampicillin:     Cephalexin 2 gm by mouth, or  Clindamycin 600 mg by mouth, or  Azithromycin or Clarithromycin 500 mg PO       Questions and scheduling:   First call: Structural Heart  Mckenna Reese [phone]    General scheduling line: [phone].   First press #1 for the University and then press #3 for Medical Questions to reach the Cardiology triage nurse.     On Call Cardiologist for after hours or on weekends: [phone], press option #4 and ask to speak to the on-call Cardiologist.

## 2022-05-17 LAB
ATRIAL RATE - MUSE: 71 BPM
DIASTOLIC BLOOD PRESSURE - MUSE: NORMAL MMHG
INTERPRETATION ECG - MUSE: NORMAL
P AXIS - MUSE: 61 DEGREES
PR INTERVAL - MUSE: 164 MS
QRS DURATION - MUSE: 76 MS
QT - MUSE: 400 MS
QTC - MUSE: 434 MS
R AXIS - MUSE: 16 DEGREES
SYSTOLIC BLOOD PRESSURE - MUSE: NORMAL MMHG
T AXIS - MUSE: 4 DEGREES
VENTRICULAR RATE- MUSE: 71 BPM

## 2022-06-25 ENCOUNTER — HEALTH MAINTENANCE LETTER (OUTPATIENT)
Age: 77
End: 2022-06-25

## 2022-06-30 ENCOUNTER — ANCILLARY PROCEDURE (OUTPATIENT)
Dept: MAMMOGRAPHY | Facility: HOSPITAL | Age: 77
End: 2022-06-30
Attending: FAMILY MEDICINE
Payer: COMMERCIAL

## 2022-06-30 DIAGNOSIS — Z12.31 VISIT FOR SCREENING MAMMOGRAM: ICD-10-CM

## 2022-06-30 PROCEDURE — 77067 SCR MAMMO BI INCL CAD: CPT

## 2022-08-10 DIAGNOSIS — I10 ESSENTIAL HYPERTENSION: ICD-10-CM

## 2022-08-12 ENCOUNTER — MYC MEDICAL ADVICE (OUTPATIENT)
Dept: CARDIOLOGY | Facility: CLINIC | Age: 77
End: 2022-08-12

## 2022-08-12 DIAGNOSIS — I10 ESSENTIAL HYPERTENSION: Primary | ICD-10-CM

## 2022-08-15 RX ORDER — VALSARTAN 160 MG/1
160 TABLET ORAL 2 TIMES DAILY
Qty: 180 TABLET | Refills: 3 | Status: SHIPPED | OUTPATIENT
Start: 2022-08-15 | End: 2022-11-22 | Stop reason: DRUGHIGH

## 2022-08-15 NOTE — TELEPHONE ENCOUNTER
VALSARTAN 160 MG TABLET  Last Written Prescription Date:  1/3/2022  Last Fill Quantity: 180,   # refills: 1  Last Office Visit :  5/16/2022  Future Office visit:  None  180 Tabs, 3 Refills sent to pharm 8/15/2022      Vera Rose RN  Central Triage Red Flags/Med Refills

## 2022-08-18 RX ORDER — HYDROCHLOROTHIAZIDE 25 MG/1
25 TABLET ORAL DAILY
Qty: 90 TABLET | Refills: 0 | Status: SHIPPED | OUTPATIENT
Start: 2022-08-18 | End: 2022-11-17

## 2022-08-20 ENCOUNTER — HEALTH MAINTENANCE LETTER (OUTPATIENT)
Age: 77
End: 2022-08-20

## 2022-08-30 ENCOUNTER — HOME INFUSION (PRE-WILLOW HOME INFUSION) (OUTPATIENT)
Dept: PHARMACY | Facility: CLINIC | Age: 77
End: 2022-08-30

## 2022-09-08 NOTE — PROGRESS NOTES
This is a recent snapshot of the patient's [facility] medical record.  For current drug dose and complete information and questions, call [phone] or In Basket pool, [facility] home infusion ([id number])  CSN Number:  [id number]

## 2022-11-15 DIAGNOSIS — I10 ESSENTIAL HYPERTENSION: ICD-10-CM

## 2022-11-17 NOTE — TELEPHONE ENCOUNTER
HYDROCHLOROTHIAZIDE 25 MG TAB  Last Written Prescription Date:   8/12/2022  Last Fill Quantity: 90,   # refills: 0  Last Office Visit :  5/16/2022  Future Office visit:  None    Routing refill request to provider for review/approval because:  Abnormal B/P  Change med?  Changed dose?   Add med?  Follow up B/P check?  Refer to clinic for review     BP Readings from Last 3 Encounters:   05/16/22 (!) 164/77   01/03/22 (!) 150/74   07/05/21 137/66       Vera Rose RN  Central Triage Red Flags/Med Refills

## 2022-11-19 ENCOUNTER — HEALTH MAINTENANCE LETTER (OUTPATIENT)
Age: 77
End: 2022-11-19

## 2022-11-22 DIAGNOSIS — I10 HYPERTENSION: Primary | ICD-10-CM

## 2022-11-22 RX ORDER — VALSARTAN 160 MG/1
160 TABLET ORAL DAILY
Qty: 90 TABLET | Refills: 3 | Status: SHIPPED | OUTPATIENT
Start: 2022-11-22 | End: 2023-12-01

## 2022-11-22 RX ORDER — HYDROCHLOROTHIAZIDE 25 MG/1
25 TABLET ORAL DAILY
Qty: 90 TABLET | Refills: 3 | Status: SHIPPED | OUTPATIENT
Start: 2022-11-22 | End: 2024-01-31

## 2022-11-22 NOTE — PROGRESS NOTES
"Date: 11/22/2022    Time of Call: 2:09 PM     Diagnosis:    Hypertension     [ TORB ] Ordering provider: Carmel Weston CNP    Order:   Reduce valsartan to 160 mg, by mouth, daily  (This is a change from 160 mg, twice daily)       Order received by: Nara Deleon RN     Follow-up/additional notes:   Called Adamaris and confirmed that she is currently taking valsartan 160 mg, daily and she says her \"BP's are perfect.\"    Per Carmel's note, she says that Adamaris should be taking this medication daily. The pharmacy was called and informed of this correct dose and that Adamaris currently does not need any refills.       "

## 2022-12-05 DIAGNOSIS — I10 ESSENTIAL HYPERTENSION: ICD-10-CM

## 2022-12-05 DIAGNOSIS — R00.2 PALPITATIONS: ICD-10-CM

## 2022-12-07 RX ORDER — METOPROLOL TARTRATE 25 MG/1
25 TABLET, FILM COATED ORAL 2 TIMES DAILY
Qty: 180 TABLET | Refills: 1 | Status: SHIPPED | OUTPATIENT
Start: 2022-12-07 | End: 2023-06-21

## 2022-12-07 NOTE — TELEPHONE ENCOUNTER
Last Clinic Visit: 5/16/2022  [facility] [place]    Reviewed home BP measurements, BP at goal. Continue valsartan 160 mg daily, chlorthalidone 25 and metoprolol 25 mg BID.

## 2022-12-14 ENCOUNTER — MYC MEDICAL ADVICE (OUTPATIENT)
Dept: CARDIOLOGY | Facility: CLINIC | Age: 77
End: 2022-12-14

## 2022-12-14 ENCOUNTER — NURSE TRIAGE (OUTPATIENT)
Dept: NURSING | Facility: CLINIC | Age: 77
End: 2022-12-14

## 2022-12-14 DIAGNOSIS — R06.83 SNORING: Primary | ICD-10-CM

## 2022-12-14 DIAGNOSIS — I20.89 ATYPICAL ANGINA (H): ICD-10-CM

## 2022-12-14 NOTE — TELEPHONE ENCOUNTER
Pt calling with update on shoulder and elbow pain.    About 3 weeks ago, woke up with inner elbow pain as discussed in her [employer] message with Carmel Weston NP on 11/30.     She states that last night, she again woke up with pain in the same spot on her inner elbow as well as the upper shoulder blade and spine. She took nitroglycerin, as discussed with Carmel last time in her [employer] message, and said it did alleviate her symptoms. The pain in the elbow completely went away and the shoulder pain significantly decreased. Pt states that this morning, she feels great. Has no residual pain.     Will route message to update patient's Cardiology provider. No triage provided during this call.    Yohana Subramanian RN, BSN  [facility]   Triage Nurse Advisor

## 2022-12-14 NOTE — TELEPHONE ENCOUNTER
Date: 12/14/2022    Time of Call: 12:24 PM     Diagnosis:  Snoring, atypical chest pain     [ TORB ] Ordering provider: Carmel Weston  Order: Sleep study     Order received by: Bianka Sutherland RN     Follow-up/additional notes:   NM Stress Test:  A.  Do not eat or drink 3 hours prior to the test  B.  No caffeine, alcohol or smoking 12 hours prior to the test  C.  Report to the Gold Waiting room, 15 minutes prior to the start of the procedure  D.  Allow 3-4 hours for the procedure.    [facility], [address] [address]

## 2022-12-14 NOTE — TELEPHONE ENCOUNTER
Patient contacted us with reports of atypical angina, elbow pain radiating to shoulder blade, responsive to nitroglycerin.     Plan for nuclear stress test to evaluate for ischemia. If severe pain at rest, advised patient to go the ER.    ANGELA Elmore, CNP  Structural Heart Nurse Practitioner  [facility]  Clinic: [phone]  Pager: [phone]

## 2022-12-17 NOTE — TELEPHONE ENCOUNTER
Patient was contacted on 12/14 by Carmel Weston NP, who recommend outpatient stress test.    Yohana Subramanian RN, BSN  [facility]   Triage Nurse Advisor

## 2022-12-18 ENCOUNTER — HEALTH MAINTENANCE LETTER (OUTPATIENT)
Age: 77
End: 2022-12-18

## 2022-12-22 ENCOUNTER — APPOINTMENT (OUTPATIENT)
Dept: CT IMAGING | Facility: HOSPITAL | Age: 77
End: 2022-12-22
Attending: STUDENT IN AN ORGANIZED HEALTH CARE EDUCATION/TRAINING PROGRAM
Payer: COMMERCIAL

## 2022-12-22 ENCOUNTER — NURSE TRIAGE (OUTPATIENT)
Dept: NURSING | Facility: CLINIC | Age: 77
End: 2022-12-22

## 2022-12-22 ENCOUNTER — HOSPITAL ENCOUNTER (EMERGENCY)
Facility: HOSPITAL | Age: 77
Discharge: HOME | End: 2022-12-22
Attending: STUDENT IN AN ORGANIZED HEALTH CARE EDUCATION/TRAINING PROGRAM | Admitting: STUDENT IN AN ORGANIZED HEALTH CARE EDUCATION/TRAINING PROGRAM
Payer: COMMERCIAL

## 2022-12-22 VITALS
RESPIRATION RATE: 15 BRPM | DIASTOLIC BLOOD PRESSURE: 58 MMHG | SYSTOLIC BLOOD PRESSURE: 124 MMHG | TEMPERATURE: 99.6 F | HEIGHT: 63 IN | HEART RATE: 97 BPM | WEIGHT: 220 LBS | OXYGEN SATURATION: 91 % | BODY MASS INDEX: 38.98 KG/M2

## 2022-12-22 DIAGNOSIS — R00.0 TACHYCARDIA: ICD-10-CM

## 2022-12-22 LAB
ANION GAP SERPL CALCULATED.3IONS-SCNC: 13 MMOL/L (ref 7–15)
BUN SERPL-MCNC: 18.5 MG/DL (ref 8–23)
CALCIUM SERPL-MCNC: 10 MG/DL (ref 8.8–10.2)
CHLORIDE SERPL-SCNC: 100 MMOL/L (ref 98–107)
CREAT SERPL-MCNC: 1 MG/DL (ref 0.51–0.95)
DEPRECATED HCO3 PLAS-SCNC: 25 MMOL/L (ref 22–29)
GFR SERPL CREATININE-BSD FRML MDRD: 57 ML/MIN/1.73M2
GLUCOSE SERPL-MCNC: 154 MG/DL (ref 70–99)
HOLD SPECIMEN: NORMAL
HOLD SPECIMEN: NORMAL
MAGNESIUM SERPL-MCNC: 1.7 MG/DL (ref 1.7–2.3)
NT-PROBNP SERPL-MCNC: 70 PG/ML (ref 0–1800)
POTASSIUM SERPL-SCNC: 3.8 MMOL/L (ref 3.4–5.3)
SODIUM SERPL-SCNC: 138 MMOL/L (ref 136–145)
TROPONIN T SERPL HS-MCNC: 14 NG/L
TROPONIN T SERPL HS-MCNC: 30 NG/L

## 2022-12-22 PROCEDURE — 85027 COMPLETE CBC AUTOMATED: CPT | Performed by: STUDENT IN AN ORGANIZED HEALTH CARE EDUCATION/TRAINING PROGRAM

## 2022-12-22 PROCEDURE — 250N000011 HC RX IP 250 OP 636: Performed by: STUDENT IN AN ORGANIZED HEALTH CARE EDUCATION/TRAINING PROGRAM

## 2022-12-22 PROCEDURE — 83880 ASSAY OF NATRIURETIC PEPTIDE: CPT | Performed by: STUDENT IN AN ORGANIZED HEALTH CARE EDUCATION/TRAINING PROGRAM

## 2022-12-22 PROCEDURE — 71275 CT ANGIOGRAPHY CHEST: CPT

## 2022-12-22 PROCEDURE — 85007 BL SMEAR W/DIFF WBC COUNT: CPT | Performed by: STUDENT IN AN ORGANIZED HEALTH CARE EDUCATION/TRAINING PROGRAM

## 2022-12-22 PROCEDURE — 84484 ASSAY OF TROPONIN QUANT: CPT | Performed by: STUDENT IN AN ORGANIZED HEALTH CARE EDUCATION/TRAINING PROGRAM

## 2022-12-22 PROCEDURE — 250N000013 HC RX MED GY IP 250 OP 250 PS 637: Performed by: STUDENT IN AN ORGANIZED HEALTH CARE EDUCATION/TRAINING PROGRAM

## 2022-12-22 PROCEDURE — 84484 ASSAY OF TROPONIN QUANT: CPT | Mod: 91 | Performed by: STUDENT IN AN ORGANIZED HEALTH CARE EDUCATION/TRAINING PROGRAM

## 2022-12-22 PROCEDURE — 258N000003 HC RX IP 258 OP 636: Performed by: STUDENT IN AN ORGANIZED HEALTH CARE EDUCATION/TRAINING PROGRAM

## 2022-12-22 PROCEDURE — 99285 EMERGENCY DEPT VISIT HI MDM: CPT | Mod: 25

## 2022-12-22 PROCEDURE — 36415 COLL VENOUS BLD VENIPUNCTURE: CPT | Performed by: STUDENT IN AN ORGANIZED HEALTH CARE EDUCATION/TRAINING PROGRAM

## 2022-12-22 PROCEDURE — 83735 ASSAY OF MAGNESIUM: CPT | Performed by: STUDENT IN AN ORGANIZED HEALTH CARE EDUCATION/TRAINING PROGRAM

## 2022-12-22 PROCEDURE — 80048 BASIC METABOLIC PNL TOTAL CA: CPT | Performed by: STUDENT IN AN ORGANIZED HEALTH CARE EDUCATION/TRAINING PROGRAM

## 2022-12-22 PROCEDURE — 96360 HYDRATION IV INFUSION INIT: CPT | Mod: 59

## 2022-12-22 RX ORDER — IOPAMIDOL 755 MG/ML
75 INJECTION, SOLUTION INTRAVASCULAR ONCE
Status: COMPLETED | OUTPATIENT
Start: 2022-12-22 | End: 2022-12-22

## 2022-12-22 RX ORDER — ASPIRIN 81 MG/1
324 TABLET, CHEWABLE ORAL ONCE
Status: COMPLETED | OUTPATIENT
Start: 2022-12-22 | End: 2022-12-22

## 2022-12-22 RX ADMIN — ASPIRIN 81 MG CHEWABLE TABLET 324 MG: 81 TABLET CHEWABLE at 19:29

## 2022-12-22 RX ADMIN — IOPAMIDOL 75 ML: 755 INJECTION, SOLUTION INTRAVENOUS at 20:14

## 2022-12-22 RX ADMIN — SODIUM CHLORIDE, POTASSIUM CHLORIDE, SODIUM LACTATE AND CALCIUM CHLORIDE 1000 ML: 600; 310; 30; 20 INJECTION, SOLUTION INTRAVENOUS at 19:32

## 2022-12-22 ASSESSMENT — ACTIVITIES OF DAILY LIVING (ADL): ADLS_ACUITY_SCORE: 35

## 2022-12-23 LAB
BASOPHILS # BLD MANUAL: 0.2 10E3/UL (ref 0–0.2)
BASOPHILS NFR BLD MANUAL: 1 %
EOSINOPHIL # BLD MANUAL: 0 10E3/UL (ref 0–0.7)
EOSINOPHIL NFR BLD MANUAL: 0 %
ERYTHROCYTE [DISTWIDTH] IN BLOOD BY AUTOMATED COUNT: 14.8 % (ref 10–15)
HCT VFR BLD AUTO: 40.5 % (ref 35–47)
HGB BLD-MCNC: 13.3 G/DL (ref 11.7–15.7)
LYMPHOCYTES # BLD MANUAL: 7.3 10E3/UL (ref 0.8–5.3)
LYMPHOCYTES NFR BLD MANUAL: 45 %
MCH RBC QN AUTO: 27.9 PG (ref 26.5–33)
MCHC RBC AUTO-ENTMCNC: 32.8 G/DL (ref 31.5–36.5)
MCV RBC AUTO: 85 FL (ref 78–100)
MONOCYTES # BLD MANUAL: 2.9 10E3/UL (ref 0–1.3)
MONOCYTES NFR BLD MANUAL: 18 %
MYELOCYTES # BLD MANUAL: 0.2 10E3/UL
MYELOCYTES NFR BLD MANUAL: 1 %
NEUTROPHILS # BLD MANUAL: 5.7 10E3/UL (ref 1.6–8.3)
NEUTROPHILS NFR BLD MANUAL: 35 %
PATH REV: ABNORMAL
PLAT MORPH BLD: ABNORMAL
PLATELET # BLD AUTO: 216 10E3/UL (ref 150–450)
RBC # BLD AUTO: 4.77 10E6/UL (ref 3.8–5.2)
RBC MORPH BLD: ABNORMAL
WBC # BLD AUTO: 16.2 10E3/UL (ref 4–11)

## 2022-12-23 NOTE — TELEPHONE ENCOUNTER
"Patient reporting she had to cancel a chemical stress test until next Thursday.    Today was having \"arm pinches\" and pain across back.  Took a nitroglycerine that dropped BP which is back up now but HR has been 120 to 139.  When she tries to stand she gets a bit lightheaded.    Disposition recommended patient call 911.  Patient is with son who she said will take her to ED close by.  Advised paramedics would be able to start treatment if needed.  Patient feels her son can get her to ED.  Recommended again if at any time HR goes over 140 or symptoms worsen to stop and call 911.    Shahrzad Mckeon RN  [facility]      Reason for Disposition    Sounds like a life-threatening emergency to the triager    Additional Information    Negative: Passed out (i.e., lost consciousness, collapsed and was not responding)    Negative: Shock suspected (e.g., cold/pale/clammy skin, too weak to stand, low BP, rapid pulse)    Negative: Difficult to awaken or acting confused (e.g., disoriented, slurred speech)    Negative: Visible sweat on face or sweat dripping down face    Negative: Unable to walk, or can only walk with assistance (e.g., requires support)    Negative: [1] Received SHOCK from implantable cardiac defibrillator AND [2] persisting symptoms (i.e., palpitations, lightheadedness)    Negative: [1] Dizziness, lightheadedness, or weakness AND [2] heart beating very rapidly (e.g., > 140 / minute)    Negative: [1] Dizziness, lightheadedness, or weakness AND [2] heart beating very slowly (e.g., < 50 / minute)    Protocols used: HEART RATE AND HEARTBEAT [id number]      "

## 2022-12-23 NOTE — ED NOTES
Received report from Lee Ann rn, care assumed of patient. Pt here with c/o c/p, was to have chemical stress test on Wednesday, but it was canceled due to the weather. Assessment completed, pt denies c/p or sob at this time, hr on the monitor shows 140's. Dr Rivera in to see pt for evaluation and orders, iv started, blood sent to lab, chewable asa given and LR iv bolus started, awaiting creat results to send pt for CT chest w/contrast. Pt updated on plan of care.

## 2022-12-23 NOTE — PHARMACY-ADMISSION MEDICATION HISTORY
Pharmacy Note - Admission Medication History    Pertinent Provider Information: None     ______________________________________________________________________    Prior To Admission (PTA) med list completed and updated in EMR.       PTA Med List   Medication Sig Last Dose     amoxicillin (AMOXIL) 500 MG capsule Take 4 capsules (2,000 mg) by mouth once as needed (SBE prophylaxis - take 30-60 minutes prior to dental procedure or cleaning)      aspirin 81 MG tablet Take 1 tablet by mouth daily. 12/22/2022     hydrochlorothiazide (HYDRODIURIL) 25 MG tablet Take 1 tablet (25 mg) by mouth daily 12/22/2022     metoprolol tartrate (LOPRESSOR) 25 MG tablet Take 1 tablet (25 mg) by mouth 2 times daily 12/21/2022     nitroGLYcerin (NITROSTAT) 0.4 MG sublingual tablet Place 0.4 mg under the tongue every 5 minutes as needed      rosuvastatin (CRESTOR) 40 MG tablet Take 40 mg by mouth At Bedtime 12/21/2022     valsartan (DIOVAN) 160 MG tablet Take 1 tablet (160 mg) by mouth daily 12/22/2022       Information source(s): Patient and CareEverywhere/[facility]  Method of interview communication: in-person    Summary of Changes to PTA Med List  New: None  Discontinued: Symbicort, metformin  Changed: None    Patient was asked about OTC/herbal products specifically.  PTA med list reflects this.    In the past week, patient estimated taking medication this percent of the time:  greater than 90%.    Allergies were reviewed, assessed, and updated with the patient.      Patient does not use any multi-dose medications prior to admission.    The information provided in this note is only as accurate as the sources available at the time of the update(s).    Thank you for the opportunity to participate in the care of this patient.    [place], [facility]  12/22/2022 8:19 PM

## 2022-12-23 NOTE — ED TRIAGE NOTES
Pt comes in with rapid heart rate, shortness of breath, angina and hypertension started in the past 45 minutes.

## 2022-12-23 NOTE — ED PROVIDER NOTES
"  Emergency Department Encounter         FINAL IMPRESSION:  Tachycardia         ED COURSE AND MEDICAL DECISION MAKING       ED Course as of 12/22/22 2216   u Dec 22, 2022   1924 EKG is tachycardic rate of 142, suspicious for atrial flutter, nonspecific ST changes throughout with no STEMI criteria , no old EKGs for comparison.    Patient's is a 77-year-old history of TAVR, hypertension hyperlipidemia, here with sudden onset palpitations began today.  No fevers, chills, nausea or vomiting.  No syncope.  States has been having \"pinching\" of her bilateral arms intermittently over the past few weeks as well as upper back discomfort.  States she has had some palpitations however has not lasted long.  No leg swelling.  No bowel or bladder incontinence or changes.  Examination here revealing a well-appearing female.  Denies any active chest pain or trouble breathing.  Tachycardic on the heart monitor/telemetry.  Blood pressure stable.  Plan for basic labs CT PE and most likely admission.   -Patient's labs overall reassuring.  Her troponin was 14 and after 2 hours that was elevated to 30.  I discussed case with cardiology and as patient continues to be asymptomatic here, and has had complete resolution of her previous tachycardia, it was thought that this was most likely due to rate related changes.  She denies any other ACS symptoms of the past few months.  Otherwise denied any chest pain and continues to deny any chest discomfort, presyncope nausea diaphoresis upper back pain jaw pain or arm pain.  Plan for patient to continue to follow-up as an outpatient.  Otherwise no embolus found.  Plan for discharge home.    7:15 PM I met with the patient, obtained history, performed an initial exam, and discussed options and plan for diagnostics and treatment here in the ED. PPE worn: surgical mask, and nitrile gloves.   9:58 PM Patient reassessed. Her heart rate has decreased to 101 BPM. Patient states she feels improved. "   10:14 PM Spoke with Dr. Whitten, cardiology.                        Medical Decision Making    History:    Supplemental history from: Documented in HPI, if applicable    External Record(s) reviewed: Documented in HPI, if applicable.    Work Up:    Chart documentation includes differential considered and any EKGs or imaging independently interpreted by provider.    In additional to work up documented, I considered the following work up: See chart documentation, if applicable.    External consultation:    Discussion of management with another provider: See chart documentation, if applicable    Complicating factors:    Care impacted by chronic illness: Diabetes, Heart Disease, Hyperlipidemia and Hypertension    Care affected by social determinants of health: N/A    Disposition considerations: discharge                  EKG  Performed at: 22-DEC-2022 19:12  Impression: Sinus tachycardia with Premature atrial complexes. Nonspecific ST and T wave abnormality.  Rate: 142  Rhythm: sinus tachycardia with premature atrial complexes  QRS Interval: 68  QTc Interval: 418  Comparison: 16-MAY-2022 11:01, Premature atrial complexes are now Present Vent. rate has increased BY 71 BPM. ST now depressed in Inferior leads. ST now depressed in Anterior leads T wave inversion no longer evident in Inferior leads. Nonspecific T wave abnormality now evident in Lateral leads    I have independently reviewed and interpreted the EKG(s) documented above.      At the conclusion of the encounter I discussed the results of all the tests and the disposition. The questions were answered. The patient or family acknowledged understanding and was agreeable with the care plan.                  MEDICATIONS GIVEN IN THE EMERGENCY DEPARTMENT:  Medications   aspirin (ASA) chewable tablet 324 mg (324 mg Oral Given 12/22/22 1929)   lactated ringers BOLUS 1,000 mL (0 mLs Intravenous Stopped 12/22/22 2030)   iopamidol (ISOVUE-370) solution 75 mL (75 mLs  "Intravenous Given 12/22/22 2014)       NEW PRESCRIPTIONS STARTED AT TODAY'S ED VISIT:  New Prescriptions    No medications on file       HPI     Patient information obtained from: patient     Use of Interpretor: N/A    Reba Juarez is a 77 year old female with a pertinent history of CAD, hypertension, hyperlipidemia, T2DM, nonrheumatic aortic valve stenosis, s/p coronary angiogram, MAYKEL, s/p TAVR, who presents to this ED via walk in for evaluation of shortness of breath and palpitations.     Patient here with sudden onset of heart \"racing\" sensation and pain in bilateral shoulders and \"pinches\" in her inner elbows. She checked her heart rate at home which happened to be 139 at 1:45 PM which prompted her to come into ED. She had not checked her heart rate previously in the week. She has had prior symptoms in the past but no previous palpitations. She has extensive cardiac history with s/p TAVR. She does take daily asprin but she is not anticoagulated on blood thinners.     She has been waking up in the middle of the night due to shortness of breath. She does have history of sleep apnea to which she uses a cpap for. Otherwise she denies nausea, vomiting and fever. No changes in bowel or bladder habits. She is not fully vaccinated against COVID No other medical complaints at this time.         REVIEW OF SYSTEMS:  Review of Systems   Constitutional: Negative for fever, malaise  HEENT: Negative runny nose, sore throat, ear pain, neck pain  Respiratory: Negative for cough, congestion. Positive shortness of breath   Cardiovascular: Negative for chest pain, leg edema, Positive palpitations.   Gastrointestinal: Negative for abdominal distention, abdominal pain, constipation, vomiting, nausea, diarrhea  Genitourinary: Negative for dysuria and hematuria.   Integument: Negative for rash, skin breakdown  Neurological: Negative for paresthesias, weakness, headache.  Musculoskeletal: Negative for joint pain, joint swelling. " Positive shoulder pain and discomfort to bilateral arms      All other systems reviewed and are negative.          MEDICAL HISTORY     Past Medical History:   Diagnosis Date     Aortic valve sclerosis      CAD (coronary artery disease)      Coronary artery disease      Diabetes (H)      Gastric ulcer      H/O exercise stress test 3/21/2011     Hyperlipidemia      Hyperlipidemia LDL goal < 70      Hypertension      Hypertension      MAYKEL (obstructive sleep apnea)        Past Surgical History:   Procedure Laterality Date     ANGIOPLASTY  early      COLONOSCOPY       CV CORONARY ANGIOGRAM N/A 2021    Procedure: CV CORONARY ANGIOGRAM;  Surgeon: Júnior Santos MD;  Location:  HEART CARDIAC CATH LAB     CV LEFT HEART CATH N/A 2021    Procedure: Left Heart Cath;  Surgeon: Júnior Santos MD;  Location:  HE[facility] CARDI[facility] CATH LAB     CV RIGHT HEART CATH MEASUREMENTS RECORDED N/A 2021    Procedure: Right Heart Cath;  Surgeon: Júnior Santos MD;  Location:  HEART CARDIAC CATH LAB     CV TRANSCATHETER AORTIC VALVE REPLACEMENT N/A 2021    Procedure: Transfemoral Transcatheter Aortic Valve Replacement with 23 mm Little Ellie 3 Ultra Transcatheter Heart Valve, Transthoracic echocardiogram read by Dr. Farfan;  Surgeon: Harvinder Quick MD;  Location:  OR     EYE SURGERY       HEART CATH FEMORAL CANNULIZATION WITH OPEN STANDBY REPAIR AORTIC VALVE N/A 2021    Procedure: cardiopulmonary bypass on standby;  Surgeon: Riaz Fuller MD;  Location:  OR       Social History     Tobacco Use     Smoking status: Former     Packs/day: 1.00     Years: 20.00     Pack years: 20.00     Types: Cigarettes     Quit date: 3/5/1992     Years since quittin.8     Smokeless tobacco: Never   Substance Use Topics     Alcohol use: No     Drug use: No       amoxicillin (AMOXIL) 500 MG capsule  aspirin 81 MG tablet  hydrochlorothiazide (HYDRODIURIL) 25 MG tablet  metoprolol tartrate  "(LOPRESSOR) 25 MG tablet  nitroGLYcerin (NITROSTAT) 0.4 MG sublingual tablet  rosuvastatin (CRESTOR) 40 MG tablet  valsartan (DIOVAN) 160 MG tablet  ORDER FOR DME            PHYSICAL EXAM     /53   Pulse 106   Temp 99.6  F (37.6  C) (Temporal)   Resp 25   Ht 1.6 m (5' 3\")   Wt 99.8 kg (220 lb)   SpO2 93%   BMI 38.97 kg/m        PHYSICAL EXAM:     General: Patient appears well, nontoxic, comfortable  HEENT: Moist mucous membranes,  No head trauma.  No midline neck pain.  Cardiovascular: Tachycardic, normal rhythm, no extremity edema.  No appreciable murmur. Denies any active chest pain.  Respiratory: No signs of respiratory distress, lungs are clear to auscultation bilaterally with no wheezes rhonchi or rales.   Abdominal: Soft, nontender, nondistended, no palpable masses, no guarding, no rebound  Musculoskeletal: Full range of motion of joints, no deformities appreciated.  Neurological: Alert and oriented, grossly neurologically intact.  Psychological: Normal affect and mood.  Integument: No rashes appreciated        RESULTS       Labs Ordered and Resulted from Time of ED Arrival to Time of ED Departure   BASIC METABOLIC PANEL - Abnormal       Result Value    Sodium 138      Potassium 3.8      Chloride 100      Carbon Dioxide (CO2) 25      Anion Gap 13      Urea Nitrogen 18.5      Creatinine 1.00 (*)     Calcium 10.0      Glucose 154 (*)     GFR Estimate 57 (*)    CBC WITH PLATELETS AND DIFFERENTIAL - Abnormal    WBC Count 16.2 (*)     RBC Count 4.77      Hemoglobin 13.3      Hematocrit 40.5      MCV 85      MCH 27.9      MCHC 32.8      RDW 14.8      Platelet Count 216     TROPONIN T, HIGH SENSITIVITY - Abnormal    Troponin T, High Sensitivity 30 (*)    DIFFERENTIAL - Abnormal    % Neutrophils 35      % Lymphocytes 45      % Monocytes 18      % Eosinophils 0      % Basophils 1      % Myelocytes 1      Absolute Neutrophils 5.7      Absolute Lymphocytes 7.3 (*)     Absolute Monocytes 2.9 (*)     Absolute " Eosinophils 0.0      Absolute Basophils 0.2      Absolute Myelocytes 0.2 (*)     RBC Morphology Confirmed RBC Indices      Platelet Assessment        Value: Automated Count Confirmed. Platelet morphology is normal.    Pathologist Review Comments Pathologist to review slide 12/23/22 AM     TROPONIN T, HIGH SENSITIVITY - Normal    Troponin T, High Sensitivity 14     MAGNESIUM - Normal    Magnesium 1.7     NT PROBNP INPATIENT - Normal    N terminal Pro BNP Inpatient 70         CT Chest Pulmonary Embolism w Contrast   Final Result   IMPRESSION:      1.  Mild motion artifact.      2.  No obvious pulmonary embolism.      3.  Mild airway thickening could suggest airways disease or bronchitis.      4.  Subtle mosaicism which can be seen with airways disease or gas trapping.      5.  No consolidation. Minimal atelectasis.      6.  Stable pulmonary nodules.      7.  Severe LAD coronary calcifications.                           PROCEDURES:  None      I, Tiffany Oquendo am serving as a scribe to document services personally performed by Quinten Rivera DO, based on my observations and the provider's statements to me.  I, Quinten Rivera DO, attest that Tiffany Oquendo is acting in a scribe capacity, has observed my performance of the services and has documented them in accordance with my direction.    Quinten Rivera DO  Emergency Medicine  [facility] EMERGENCY DEPARTMENT     Quinten Rivera DO  12/22/22 4022

## 2022-12-29 ENCOUNTER — HOSPITAL ENCOUNTER (OUTPATIENT)
Dept: NUCLEAR MEDICINE | Facility: CLINIC | Age: 77
Setting detail: NUCLEAR MEDICINE
Discharge: HOME | End: 2022-12-29
Attending: NURSE PRACTITIONER
Payer: COMMERCIAL

## 2022-12-29 ENCOUNTER — HOSPITAL ENCOUNTER (OUTPATIENT)
Dept: CARDIOLOGY | Facility: CLINIC | Age: 77
Discharge: HOME | End: 2022-12-29
Attending: NURSE PRACTITIONER
Payer: COMMERCIAL

## 2022-12-29 VITALS — DIASTOLIC BLOOD PRESSURE: 51 MMHG | SYSTOLIC BLOOD PRESSURE: 155 MMHG | HEART RATE: 73 BPM

## 2022-12-29 DIAGNOSIS — I20.89 ATYPICAL ANGINA (H): ICD-10-CM

## 2022-12-29 LAB
CV STRESS MAX HR HE: 88
RATE PRESSURE PRODUCT: NORMAL
STRESS ECHO BASELINE DIASTOLIC HE: 51
STRESS ECHO BASELINE HR: 73 BPM
STRESS ECHO BASELINE SYSTOLIC BP: 155
STRESS ECHO CALCULATED PERCENT HR: 62 %
STRESS ECHO LAST STRESS DIASTOLIC BP: 51
STRESS ECHO LAST STRESS SYSTOLIC BP: 135
STRESS ECHO TARGET HR: 142

## 2022-12-29 PROCEDURE — 93018 CV STRESS TEST I&R ONLY: CPT | Performed by: STUDENT IN AN ORGANIZED HEALTH CARE EDUCATION/TRAINING PROGRAM

## 2022-12-29 PROCEDURE — 78452 HT MUSCLE IMAGE SPECT MULT: CPT

## 2022-12-29 PROCEDURE — 93017 CV STRESS TEST TRACING ONLY: CPT

## 2022-12-29 PROCEDURE — 250N000011 HC RX IP 250 OP 636: Performed by: STUDENT IN AN ORGANIZED HEALTH CARE EDUCATION/TRAINING PROGRAM

## 2022-12-29 PROCEDURE — 93016 CV STRESS TEST SUPVJ ONLY: CPT | Performed by: STUDENT IN AN ORGANIZED HEALTH CARE EDUCATION/TRAINING PROGRAM

## 2022-12-29 PROCEDURE — A9502 TC99M TETROFOSMIN: HCPCS | Performed by: NURSE PRACTITIONER

## 2022-12-29 PROCEDURE — 343N000001 HC RX 343: Performed by: NURSE PRACTITIONER

## 2022-12-29 PROCEDURE — 78452 HT MUSCLE IMAGE SPECT MULT: CPT | Mod: 26

## 2022-12-29 RX ORDER — AMINOPHYLLINE 25 MG/ML
50-100 INJECTION, SOLUTION INTRAVENOUS
Status: DISCONTINUED | OUTPATIENT
Start: 2022-12-29 | End: 2022-12-30 | Stop reason: HOSPADM

## 2022-12-29 RX ORDER — ACYCLOVIR 200 MG/1
0-1 CAPSULE ORAL
Status: DISCONTINUED | OUTPATIENT
Start: 2022-12-29 | End: 2022-12-30 | Stop reason: HOSPADM

## 2022-12-29 RX ORDER — REGADENOSON 0.08 MG/ML
0.4 INJECTION, SOLUTION INTRAVENOUS ONCE
Status: COMPLETED | OUTPATIENT
Start: 2022-12-29 | End: 2022-12-29

## 2022-12-29 RX ORDER — ALBUTEROL SULFATE 90 UG/1
2 AEROSOL, METERED RESPIRATORY (INHALATION) EVERY 5 MIN PRN
Status: DISCONTINUED | OUTPATIENT
Start: 2022-12-29 | End: 2022-12-30 | Stop reason: HOSPADM

## 2022-12-29 RX ORDER — CAFFEINE CITRATE 20 MG/ML
60 SOLUTION INTRAVENOUS
Status: DISCONTINUED | OUTPATIENT
Start: 2022-12-29 | End: 2022-12-30 | Stop reason: HOSPADM

## 2022-12-29 RX ADMIN — TETROFOSMIN 10.5 MCI.: 1.38 INJECTION, POWDER, LYOPHILIZED, FOR SOLUTION INTRAVENOUS at 10:26

## 2022-12-29 RX ADMIN — TETROFOSMIN 34 MCI.: 1.38 INJECTION, POWDER, LYOPHILIZED, FOR SOLUTION INTRAVENOUS at 12:19

## 2022-12-29 RX ADMIN — REGADENOSON 0.4 MG: 0.08 INJECTION, SOLUTION INTRAVENOUS at 11:16

## 2022-12-29 NOTE — PROGRESS NOTES
Pt here for Lexiscan nuclear stress test.  Medication and side effects reviewed with patient. Lung sounds clear to auscultation bilaterally.  Denied caffeine use.  Patient tolerated Lexiscan dose without any adverse reactions.  VSS.  Monitored post injection and then taken to nuclear medicine for follow up imaging.

## 2023-03-06 ENCOUNTER — OFFICE VISIT (OUTPATIENT)
Dept: SLEEP MEDICINE | Facility: CLINIC | Age: 78
End: 2023-03-06
Attending: NURSE PRACTITIONER
Payer: COMMERCIAL

## 2023-03-06 VITALS
DIASTOLIC BLOOD PRESSURE: 54 MMHG | OXYGEN SATURATION: 94 % | WEIGHT: 222.5 LBS | HEART RATE: 67 BPM | BODY MASS INDEX: 39.41 KG/M2 | SYSTOLIC BLOOD PRESSURE: 169 MMHG

## 2023-03-06 DIAGNOSIS — R06.83 SNORING: ICD-10-CM

## 2023-03-06 DIAGNOSIS — G47.33 OSA ON CPAP: Primary | ICD-10-CM

## 2023-03-06 DIAGNOSIS — I20.89 ATYPICAL ANGINA (H): ICD-10-CM

## 2023-03-06 PROCEDURE — 99215 OFFICE O/P EST HI 40 MIN: CPT | Performed by: STUDENT IN AN ORGANIZED HEALTH CARE EDUCATION/TRAINING PROGRAM

## 2023-03-06 RX ORDER — DOXYCYCLINE 100 MG/1
100 CAPSULE ORAL 2 TIMES DAILY
COMMUNITY
Start: 2023-02-06 | End: 2024-02-18

## 2023-03-06 ASSESSMENT — SLEEP AND FATIGUE QUESTIONNAIRES
HOW LIKELY ARE YOU TO NOD OFF OR FALL ASLEEP WHILE WATCHING TV: WOULD NEVER DOZE
HOW LIKELY ARE YOU TO NOD OFF OR FALL ASLEEP WHILE SITTING QUIETLY AFTER LUNCH WITHOUT ALCOHOL: MODERATE CHANCE OF DOZING
HOW LIKELY ARE YOU TO NOD OFF OR FALL ASLEEP WHILE SITTING AND READING: SLIGHT CHANCE OF DOZING
HOW LIKELY ARE YOU TO NOD OFF OR FALL ASLEEP WHILE LYING DOWN TO REST IN THE AFTERNOON WHEN CIRCUMSTANCES PERMIT: SLIGHT CHANCE OF DOZING
HOW LIKELY ARE YOU TO NOD OFF OR FALL ASLEEP IN A CAR, WHILE STOPPED FOR A FEW MINUTES IN TRAFFIC: WOULD NEVER DOZE
HOW LIKELY ARE YOU TO NOD OFF OR FALL ASLEEP WHEN YOU ARE A PASSENGER IN A CAR FOR AN HOUR WITHOUT A BREAK: WOULD NEVER DOZE
HOW LIKELY ARE YOU TO NOD OFF OR FALL ASLEEP WHILE SITTING AND TALKING TO SOMEONE: WOULD NEVER DOZE
HOW LIKELY ARE YOU TO NOD OFF OR FALL ASLEEP WHILE SITTING INACTIVE IN A PUBLIC PLACE: WOULD NEVER DOZE

## 2023-03-06 NOTE — NURSING NOTE
"Chief Complaint   Patient presents with     CPAP Follow Up     Sleep Problem       Initial BP (!) 169/54   Pulse 67   Wt 100.9 kg (222 lb 8 oz)   SpO2 94%   BMI 39.41 kg/m   Estimated body mass index is 39.41 kg/m  as calculated from the following:    Height as of 12/22/22: 1.6 m (5' 3\").    Weight as of this encounter: 100.9 kg (222 lb 8 oz).    Medication Reconciliation: complete    DME: GI[facility]ON D[facility]az  [facility] [place]      "

## 2023-03-06 NOTE — PROGRESS NOTES
[facility]     Sleep clinic follow up visit note    Date on this visit: 3/6/2023    Primary Physician: Dayana Kruse     History of present illness:  Reba Juarez is a 78 year old female patient with HTN, T2DM, HLD, CAD, severe aortic stenosis s/p TAVR, history of paroxysmal SVT who presents for CPAP Follow Up and Sleep Problem  She has severe sleep apnea with an AHI of 55.2, managed with CPAP.     Patient is presenting to sleep clinic due to sleep fragmentation primarily from migratory MSK pain and elbows and shoulders.  She has been evaluated by her cardiologist with a nuclear stress test that was unremarkable and did not reveal any underlying ischemia.  Outside PCP was concern for possible Lyme disease which she treated with doxycycline.  Patient reports mild improvement since that time.  She has some concern her MAYKEL may not be well treated resulting in hypoxemia which could be causing the palpitations and pain.    Do you use a CPAP Machine at home: Yes  [facility] home medical equipment  Overall, on a scale of 0-10 how would you rate your CPAP (0 poor, 10 great): 8  What type of mask do you use: Full Face Mask  Is your mask comfortable: Yes  Is your mask leaking: No  Do you notice snoring with mask on: Yes  Do you notice gasping arousals with mask on:    Are you having significant oral or nasal dryness: Yes  What is your typical bedtime: 10  How long does it take you to go to sleep on PAP therapy: about ten min  What time do you typically get out of bed for the day: 7  How many hours on average per night are you using PAP therapy: 6hours  How many hours are you sleeping per night: 6 hours  Do you feel well rested in the morning: Yes    ResMed   Auto-PAP 11.0 - 14.0 cmH2O 30 day usage data:    93% of days with > 4 hours of use. 0/30 days with no use.   Average use 436 minutes per day.   95%ile Leak 41.46 L/min.   CPAP 95% pressure 13.4 cm.   AHI 2.49 events per hour.     Previous sleep  studies:  PSG 10/11/2007  BMI 39.1  AHI 55.2, REM AHI 96.6 Lowest O2 SAT 79%     Overnight oximetry performed on CPAP 10/2/2013  Time below 88% 0.9 minutes     Overnight oximetry performed on CPAP 7/22/2015  Time below 88% 0.6 minutes       SCALES:  EPWORTH SLEEPINESS SCALE    [place] Sleepiness Scale ( REBECA Savage  [id number]<br>ESS - [place]/English - Final version - 21 Nov 07 - [facility] [place].) 3/6/2023   Sitting and reading Slight chance of dozing   Watching TV Would never doze   Sitting, inactive in a public place (e.g. a theatre or a meeting) Would never doze   As a passenger in a car for an hour without a break Would never doze   Lying down to rest in the afternoon when circumstances permit Slight chance of dozing   Sitting and talking to someone Would never doze   Sitting quietly after a lunch without alcohol Moderate chance of dozing   In a car, while stopped for a few minutes in traffic Would never doze   [place] Score (MC) 4   [place] Score (Sleep) 4       INSOMNIA SEVERITY INDEX (RON)    Insomnia Severity Index (RON) 3/6/2023   Difficulty falling asleep 1   Difficulty staying asleep 1   Problems waking up too early 2   How SATISFIED/DISSATISFIED are you with your CURRENT sleep pattern? 2   How NOTICEABLE to others do you think your sleep problem is in terms of impairing the quality of your life? 0   How WORRIED/DISTRESSED are you about your current sleep problem? 2   To what extent do you consider your sleep problem to INTERFERE with your daily functioning (e.g. daytime fatigue, mood, ability to function at work/daily chores, concentration, memory, mood, etc.) CURRENTLY? 2   RON Total Score 10     Guidelines for Scoring/Interpretation:  Total score categories:  0-7 = No clinically significant insomnia   8-14 = Subthreshold insomnia   15-21 = Clinical insomnia (moderate severity)  22-28 = Clinical insomnia (severe)  Used via courtesy of www.[place].gov with permission from Paul Perdomo PhD.,  [facility]      Allergies:    Allergies   Allergen Reactions     Pravastatin      Myalgia       Simvastatin      myalgia     Calcium Citrate Unknown     Bloody nose per pt     Lipitor [Atorvastatin Calcium]      Myalgia         Medications:    Current Outpatient Medications   Medication Sig Dispense Refill     amoxicillin (AMOXIL) 500 MG capsule Take 4 capsules (2,000 mg) by mouth once as needed (SBE prophylaxis - take 30-60 minutes prior to dental procedure or cleaning) 4 capsule 3     aspirin 81 MG tablet Take 1 tablet by mouth daily.       hydrochlorothiazide (HYDRODIURIL) 25 MG tablet Take 1 tablet (25 mg) by mouth daily 90 tablet 3     metoprolol tartrate (LOPRESSOR) 25 MG tablet Take 1 tablet (25 mg) by mouth 2 times daily 180 tablet 1     nitroGLYcerin (NITROSTAT) 0.4 MG sublingual tablet Place 0.4 mg under the tongue every 5 minutes as needed       ORDER FOR DME Use your CPAP device as directed by your provider.       rosuvastatin (CRESTOR) 40 MG tablet Take 40 mg by mouth At Bedtime       valsartan (DIOVAN) 160 MG tablet Take 1 tablet (160 mg) by mouth daily 90 tablet 3     doxycycline monohydrate (MONODOX) 100 MG capsule Take 100 mg by mouth 2 times daily         Problem List:  Patient Active Problem List    Diagnosis Date Noted     Pre-operative examination 05/20/2021     Priority: Medium     Aortic stenosis, severe 05/19/2021     Priority: Medium     Status post coronary angiogram 05/19/2021     Priority: Medium     Severe aortic stenosis 05/13/2021     Priority: Medium     Added automatically from request for surgery [id number]       Nonrheumatic aortic valve stenosis 03/29/2021     Priority: Medium     Added automatically from request for surgery [id number]       Type 2 diabetes mellitus with other circulatory complication, without long-term current use of insulin (H) 02/02/2021     Priority: Medium     Palpitations 02/02/2021     Priority: Medium     CLL (chronic lymphocytic leukemia) (H) 02/02/2021      Priority: Medium     Aortic valve stenosis, etiology of cardiac valve disease unspecified 02/02/2021     Priority: Medium     Hypertension      Priority: Medium     onset age 50-55 approx       Class 2 severe obesity due to excess calories with serious comorbidity and body mass index (BMI) of 35.0 to 35.9 in adult (H)      Priority: Medium     Hyperlipidemia LDL goal <70 10/18/2011     Priority: Medium     Coronary heart disease 10/18/2011     Priority: Medium     MAYKEL on CPAP 01/01/2007     Priority: Medium     10/28/2007 [facility]  BMI 39.1  AHI 55.2  REM 96.6  desats to 79%          Past Medical/Surgical History:  Past Medical History:   Diagnosis Date     Aortic valve sclerosis     echo 1-24-11 mild     CAD (coronary artery disease)      Coronary artery disease 1995    PCI at [facility]. Repeat angiogram in 2007     Diabetes (H)      Gastric ulcer 1990     H/O exercise stress test 3/21/2011    No ischemia     Hyperlipidemia      Hyperlipidemia LDL goal < 70     identified 1990's     Hypertension     onset age 50-55 approx     Hypertension      MAYKEL (obstructive sleep apnea) 2007     Past Surgical History:   Procedure Laterality Date     ANGIOPLASTY  early 1990s     COLONOSCOPY  2010     CV CORONARY ANGIOGRAM N/A 4/9/2021    Procedure: CV CORONARY ANGIOGRAM;  Surgeon: Júnior Santos MD;  Location:  HEART CARDIAC CATH LAB     CV LEFT HEART CATH N/A 4/9/2021    Procedure: Left Heart Cath;  Surgeon: Júnior Santos MD;  Location:  HEART CARDIAC CATH LAB     CV RIGHT HEART CATH MEASUREMENTS RECORDED N/A 4/9/2021    Procedure: Right Heart Cath;  Surgeon: Júnior Santos MD;  Location:  HEART CARDIAC CATH LAB     CV TRANSCATHETER AORTIC VALVE REPLACEMENT N/A 5/19/2021    Procedure: Transfemoral Transcatheter Aortic Valve Replacement with 23 mm Little Ellie 3 Ultra Transcatheter Heart Valve, Transthoracic echocardiogram read by Dr. Farfan;  Surgeon: Harvinder Qucik MD;  Location:   OR     EYE SURGERY       HEART CATH FEMORAL CANNULIZATION WITH OPEN STANDBY REPAIR AORTIC VALVE N/A 2021    Procedure: cardiopulmonary bypass on standby;  Surgeon: Riaz Fuller MD;  Location:  OR       Social History:  Social History     Socioeconomic History     Marital status:      Spouse name: Not on file     Number of children: Not on file     Years of education: Not on file     Highest education level: Not on file   Occupational History     Occupation: retired     Employer: [employer]     Comment:  retired full time, still works a few days as biochemical pharmacist   Tobacco Use     Smoking status: Former     Packs/day: 1.00     Years: 20.00     Pack years: 20.00     Types: Cigarettes     Quit date: 3/5/1992     Years since quittin.0     Smokeless tobacco: Never   Substance and Sexual Activity     Alcohol use: No     Drug use: No     Sexual activity: Not on file   Other Topics Concern     Parent/sibling w/ CABG, MI or angioplasty before 65F 55M? Not Asked   Social History Narrative    Single and lives with her son and brother.     Social Determinants of Health     Financial Resource Strain: Not on file   Food Insecurity: Not on file   Transportation Needs: Not on file   Physical Activity: Not on file   Stress: Not on file   Social Connections: Not on file   Intimate Partner Violence: Not on file   Housing Stability: Not on file       Family History:  Family History   Problem Relation Age of Onset     No Known Problems Sister      Alcoholism Brother      Myocardial Infarction Mother 51        HTN onset age 30, was a smoker     Myocardial Infarction Father 63        smoker     Myocardial Infarction Brother 39     No Known Problems Son      No Known Problems Daughter      CABG Brother      Heart Disease Brother      No Known Problems Sister      Cerebrovascular Disease Sister      Coronary Artery Disease Sister      No Known Problems Son        Review of systems  A complete review of  systems reviewed by me is negative with the exeption of what has been mentioned in the history of present illness.    Physical Examination:  Vitals: BP (!) 169/54   Pulse 67   Wt 100.9 kg (222 lb 8 oz)   SpO2 94%   BMI 39.41 kg/m    BMI= Body mass index is 39.41 kg/m .  GENERAL: Healthy, alert and no distress  EYES: Eyes grossly normal to inspection.  No discharge or erythema, or obvious scleral/conjunctival abnormalities.  RESP: No audible wheeze, cough, or visible cyanosis.  No visible retractions or increased work of breathing.    SKIN: Visible skin clear. No significant rash, abnormal pigmentation or lesions.  NEURO: Cranial nerves grossly intact.  Mentation and speech appropriate for age.  PSYCH: Mentation appears normal, affect normal/bright, judgement and insight intact, normal speech and appearance well-groomed.           Other tests/labs:   I have reviewed the labs and personally reviewed the imaging below and made my comment in the assessment and plan.    Assessment and Plan:    Problem List Items Addressed This Visit        Respiratory    MAYKEL on CPAP - Primary     Reba Juarez has Severe Sleep apnea. She is tolerating PAP well and reports adequate compliance with PAP therapy. Daytime symptoms are improved and reports positive benefits with PAP use.   MAYKEL is adequately controlled with Auto CPAP at the current settings per compliance DL.     Prescription provided for renewal of PAP supplies.    Recommended him/her to continue using the CPAP regularly during sleep and instructed  to get  the supplies for the PAP replaced regularly.      Patient instructed to remember to bring PAP with him/her if hospitalized and if anticipating procedure that requires sedation/surgery to be sure to discuss with the provider/surgeon that he/she has sleep apnea and uses PAP therapy.    Patient concerned about nocturnal palpitation is related to hypoxemia; will order overnight oximetry while on CPAP to evaluate for  sleep related hypoxemia         Relevant Orders    Overnight oximetry study    COMPREHENSIVE DME (Completed)   Other Visit Diagnoses     Atypical angina (H)        Relevant Orders    Overnight oximetry study    Snoring          Patient was strongly advised to avoid driving, operating any heavy machinery or other hazardous situations while drowsy or sleepy.  Patient was counseled on the importance of driving while alert, to pull over if drowsy, or nap before getting into the vehicle if sleepy.      Plan is for Reba GALVEZ Raynawallace to follow up in about 6 month(s).     The above note was dictated using voice recognition software. Although reviewed after completion, some word and grammatical error may remain . Please contact the author for any clarifications.    Total time spent reviewing medical records, history and physical examination, review of previous testing and interpretation as well as documentation on this date, 03/06/23: 41 minutes    Yordan Jack MD on 3/6/2023   [facility]   [address], [address]   [address] [place] [address]. [place] [address]   Appointments: [phone]    CC: Carmel Weston

## 2023-03-06 NOTE — NURSING NOTE
Return in 6 months has been scheduled. IAN order send via faxed to [facility]. AVS given to patient.     Ary Bran LEONOR  [facility]

## 2023-03-06 NOTE — PATIENT INSTRUCTIONS
MY INFORMATION ON SLEEP APNEA-  Reba Juarez    DOCTOR : Yordan Jack MD  SLEEP CENTER :      MY CONTACT NUMBER:    [facility]  [phone]  [facility]      For general sleep health questions:   http://sleepeducation.org    For tips about PAP and COVID-19:  [email or web address]://www.[email or web address].org/patients/patient-resources/resources/covid-19-and-home-pap-therapy.pdf    For general info about COVID-19 including vaccines:  https://[email or web address].org/covid19      Continue PAP therapy every night, for all hours that you are sleeping (including naps.)  As always, try to get at least 8 hours of sleep or more each day, keep a regular sleep schedule, and avoid sleep deprivation. Avoid alcohol.  Reasons that you might need a change to your pressure therapy would be weight gain or loss, waking having inadvertently removed your PAP overnight, having previously felt refreshed by sleep with CPAP use and now waking un-refreshed, and return of daytime sleepiness. Also, the development of new medical problems  (such as heart failure, stroke, medications such as narcotics) can sometimes affect breathing at night and change your PAP therapy needs.  Please bring PAP with you if you are hospitalized.  If anticipating surgery be sure to discuss with your surgeon that you have sleep apnea and use PAP therapy.    Maintain your equipment as recommended which includes routine cleaning and replacement of supplies.      Call DME for any questions regarding supplies or maintenance.    [facility], [facility] [phone]    Do not drive on engage in potentially dangerous activities if feeling sleepy.  Please follow up in sleep clinic again in 6 months.        Tips for your PAP use-    Mask fitting tips  Mask fitting exercise:    To improve your mask seal and your mobility at night, put mask on and secure in place.  Lie down in bed with full pressure and  roll to one side, adjust headgear while in that position to eliminate any leaks. Repeat process rolling to other side.     The mask seal does not have to be perfect:   CPAP machines are designed to make up for small leaks. However, you will not tolerate leaks blowing in your eyes so you will need to adjust.   Any leak should only be near or at the bottom of the mask.  We expect your mask to leak slightly at night.    Do not over-tighten the headgear straps, tighter IS NOT better, we expect minimal leak.    First try re-positioning the mask or headgear before tightening the headgear straps.  Mask leaks are expected due to changing sleeping positions. Try pulling the mask away from your skin allowing the cushion to re-inflate will minimize the leak.  If you struggle for a good fit, try turning the CPAP off and then readjust the mask by pulling it away from your face and then turning back on the CPAP.        Humidifier tips  Humidifiers can be adjusted to increase or decrease the amount of moisture according to your comfort level. You may need to adjust this frequently at first, but then might only change it with seasonal weather changes.     Try INCREASING the humidity if:  You experience a dry, irritated nasal passage or throat.  You have a runny, drippy nose or sneezing fits after using CPAP.  You experience nasal congestion during or after CPAP use.    Try DECREASING the humidity if:  You have excessive condensation or  rain out  in the tubing or mask.  Otherwise keep the tubing warm during the night by running it underneath the blankets or pillow.      Clinic visit after initial PAP set-up   Bring your equipment with you to your 5-8 week follow up clinic visit.  We will be extracting your data from the machine if not available from the cloud based [employer].        Travel  Always take your equipment with you when you travel.  If you fly with your equipment bring it on with you as a carry on.  Medical equipment does  not count as a carry on.    If you travel international the machines take 110-240v.  The only adapter needed is the adapter that will fit into the receptacle (outlet).    You may also want to bring an extension cord as many hotel rooms have limited outlets at the bedside.  Do not travel with water in your humidifier chamber.     Cleaning and Maintenance Guidelines    Equipment Frequency Cleaning Method   Mask First Day    Daily      Weekly Soak mask in hot soapy water for 30 minutes, rinse and air dry.  Wipe nasal cushion with a hot soapy (Ivory, baby shampoo) cloth and rinse.  Baby wipes may also be used.  Do not use anti-bacterial soaps,Cleo  liquid soap, rubbing alcohol, bleach or ammonia.  Wash frame in hot soapy water (Ivory, baby shampoo) rinse and let air dry   Headgear Biweekly Wash in hot soapy water, rinse and air dry   Reusable Gray Filter Weekly Wash in hot soapy water, rinse, put in towel squeeze moisture out, let air dry   Disposable White Filter Check Weekly Replace when brown or gray in color; at least every 2 to 3 months   Humidifier Chamber Daily    Weekly Empty distilled water from humidifier and let air dry    Hand wash in hot soapy water, rinse and air dry   Tubing Weekly Wash in hot soapy water, rinse and let air dry   Mask, Tubing and Humidifier Chamber As needed Disinfect: Soak in 1 part distilled white vinegar to 3 parts hot water for 30 minutes, rinse well and air dry  Not the material headgear        MASK AND SUPPLY REORDERING and EQUIPMENT NEEDS through your DME and per your insurance  Reminder: Most insurance companies will allow for a new mask, headgear, tubing, and reusable gray filter every six months.  Disposable white ultra-fine filters are covered monthly.      HOME AND SAFETY INSTRUCTIONS  Do not use frayed or cracked electrical cords, multi plug adaptors, or switched receptacles  Do not immerse electrical equipment into water  Assure that electrical cords do not become a tripping  hazard

## 2023-03-06 NOTE — ASSESSMENT & PLAN NOTE
Reba Juarez has Severe Sleep apnea. She is tolerating PAP well and reports adequate compliance with PAP therapy. Daytime symptoms are improved and reports positive benefits with PAP use.   MAYKEL is adequately controlled with Auto CPAP at the current settings per compliance DL.     Prescription provided for renewal of PAP supplies.    Recommended him/her to continue using the CPAP regularly during sleep and instructed  to get  the supplies for the PAP replaced regularly.      Patient instructed to remember to bring PAP with him/her if hospitalized and if anticipating procedure that requires sedation/surgery to be sure to discuss with the provider/surgeon that he/she has sleep apnea and uses PAP therapy.    Patient concerned about nocturnal palpitation is related to hypoxemia; will order overnight oximetry while on CPAP to evaluate for sleep related hypoxemia

## 2023-03-17 DIAGNOSIS — I20.89 ATYPICAL ANGINA (H): ICD-10-CM

## 2023-03-17 DIAGNOSIS — G47.33 OSA ON CPAP: ICD-10-CM

## 2023-03-20 ENCOUNTER — DOCUMENTATION ONLY (OUTPATIENT)
Dept: SLEEP MEDICINE | Facility: CLINIC | Age: 78
End: 2023-03-20
Payer: COMMERCIAL

## 2023-03-20 ENCOUNTER — PROCEDURE ONLY VISIT (OUTPATIENT)
Dept: SLEEP MEDICINE | Facility: CLINIC | Age: 78
End: 2023-03-20
Payer: COMMERCIAL

## 2023-03-20 DIAGNOSIS — G47.33 OSA ON CPAP: Primary | ICD-10-CM

## 2023-03-20 NOTE — PROGRESS NOTES
{ALL SMARTFIELDS MUST BE COMPLETED FOR PATIENT CARE AND BILLIN}    3/20/2023     E-Consult has been {Accepted/Denied ++Must be completed or will be sent back to the E-Consultant:[id number]}    Interprofessional consultation requested by:   Clinical Question/Purpose:      Patient assessment and information reviewed: ***    Recommendations: ***       The recommendations provided in this E-Consult are based on a review of clinical data pertinent to the clinical question presented, without a review of the patient's complete medical record or, the benefit of a comprehensive in-person or virtual patient evaluation. This consultation should not replace the clinical judgement and evaluation of the provider ordering this E-Consult. Any new clinical issues, or changes in patient status since the filing of this E-Consult will need to be taken into account when assessing these recommendations. Please contact me if you have further questions.    My total time spent reviewing clinical information and formulating assessment was {Number of Minutes:797983} minutes.    {Report sent automatically to requesting provider once signed. (Optional):[id number]}  {Reference - Charge codes - [id number] (5+ minutes) or [id number] (No charge code):[id number]}  Yordan Jack MD

## 2023-03-20 NOTE — PROCEDURES
OVERNIGHT OXIMETRY INTERPRETATION     Patient: Reba Juarez  MRN: [id number]  YOB: 1945  Study Date: 3/20/2023  Referring Provider: Dayana Kruse  Ordering Provider: Yordan Jack MD    I reviewed the overnight oximetry testing results from 3/14/2023 on 3/20/2023.  Patient was on 11-14 cm H2O without supplemental O2 bled in.  Analysis time: 8 Hours 23 minutes.  Baseline oxygen saturation 91.2%  Time at or below 88% oxygen saturation 0.2 minutes.  Lowest oxygen saturation 87%  Oxygen desaturation index 2.6      Assessment:   Sleep associated hypoxemia was not present.    Recommendations:  Continue CPAP 11-14 cm H2O for severe MAYKEL  Discontinue use of supplemental O2 while on CPAP    Diagnosis Code(s): Obstructive Sleep Apnea G47.33    Yordan Jack MD, March 20, 2023   Diplomate, American Board of Internal Medicine, Sleep Medicine

## 2023-04-15 ENCOUNTER — HEALTH MAINTENANCE LETTER (OUTPATIENT)
Age: 78
End: 2023-04-15

## 2023-05-14 NOTE — PROGRESS NOTES
[facility]  CARDIOVASCULAR DIVISION    VALVE CLINIC RETURN VISIT    PRIMARY CARDIOLOGIST: Dr. Harris/Mireille Edwards NP       PERTINENT CLINICAL HISTORY:     Reba Juarez is a very pleasant 78 year old female who presents for annual cardiology follow-up. She has a history of HTN, HLD, T2DM, MAYKEL on CPAP, paroxysmal SVT, CAD with remote POBA history, non-obstructive CAD per angiogram 4/2021, HFpEF and severe aortic stenosis status-post TAVR with a 23 mm Little Ellie 3 Ultra on 5/19/21 by Alba Quick, Gucci and Jonny. Her TAVR went well without complication. Her POD#1 ECHO showed elevated mean PG of 34 mmHg thought related to hyperdynamic LV function. She received IVF and was started on metoprolol with improvement in mean PG to 24 mmHg on follow-up echo. She was evaluated 1 month post TAVR and echo showed mean PG 11 mmHg and Cardiac CT showed normal valve function. Last echo 5/2022 showed mean PG 18 mmHg without PVL.     Interval History 5/2022:  Patient says she had COVID about 2 months ago and continues to feel fatigued. She has also been struggling with her weight and has been attempting to increase her activity level. She denies any significant chest pain and pressure with activity. She reports mild shortness of breath and palpitations with activity which is better than it was pre TAVR. She attributes this to deconditioning. She has leg swelling worse since starting aleve for arthritis. No orthopnea, PND. She denies any lightheadedness, syncope, palpitations.     Last month she had a major spike in blood pressure and increased leg swelling after starting Aleve for knee pain. She saw her PCP who increased valsartan to 160 mg TID, also calibrated her BP cuff. Found that her bicep cuff was inaccurate, though wrist cuff was accurate. Has since been using her wrist cuff and BP has been 114//60s. She says her blood pressure was too low on valsartan TID so she reduced to once a day.  Additional medications chlorthalidone 25 mg daily, metoprolol 25 mg BID, ASA and statin.      Interval History 5/2023:  Adamaris has been feeling well. She was experiencing inner right elbow pain 12/2022 - we performed a nuclear stress test which was negative for ischemia. She was seen in the ER 12/2022 for similar inner arm pain and palpitations - EKG showed HR of 142 (unavailable for review) concerning for ST vs A-flutter which resolved following a liter of IVF, no heart monitor placed. Has since undergone sleep study and is now wearing full face mask. No longer having SOB at night, inner arm pain and palpitations have resolved. She has been active gardening and performing ADLs. She denies angina. She has mild exertional dyspnea which has remained stable. She used an inhaler and nebulizer treatments which seem to help. Her oxygen saturations have been better at night. She denies any orthopnea, PND. Reports mild leg swelling, better with elevation. No lightheadedness, palpitations, syncope. Home BP have been 119/50. Currently taking valsartan 160 mg once daily, chlorthalidone 25 mg daily, metoprolol 25 mg BID, ASA and crestor.  Has not taken morning medications yet today.      PAST MEDICAL HISTORY:     Past Medical History:   Diagnosis Date     Aortic valve sclerosis     echo 1-24-11 mild     CAD (coronary artery disease)      Coronary artery disease 1995    PCI at [facility]. Repeat angiogram in 2007     Diabetes (H)      Gastric ulcer 1990     H/O exercise stress test 3/21/2011    No ischemia     Hyperlipidemia      Hyperlipidemia LDL goal < 70     identified 1990's     Hypertension     onset age 50-55 approx     Hypertension      MAYKEL (obstructive sleep apnea) 2007        PAST SURGICAL HISTORY:     Past Surgical History:   Procedure Laterality Date     ANGIOPLASTY  early 1990s     COLONOSCOPY  2010     CV CORONARY ANGIOGRAM N/A 4/9/2021    Procedure: CV CORONARY ANGIOGRAM;  Surgeon: Júnior Santos,  MD;  Location:  HEART CARDIAC CATH LAB     CV LEFT HEART CATH N/A 4/9/2021    Procedure: Left Heart Cath;  Surgeon: Júnior Santos MD;  Location:  HEART CARDIAC CATH LAB     CV RIGHT HEART CATH MEASUREMENTS RECORDED N/A 4/9/2021    Procedure: Right Heart Cath;  Surgeon: Júnior Santos MD;  Location:  HEART CARDIAC CATH LAB     CV TRANSCATHETER AORTIC VALVE REPLACEMENT N/A 5/19/2021    Procedure: Transfemoral Transcatheter Aortic Valve Replacement with 23 mm Little Ellie 3 Ultra Transcatheter Heart Valve, Transthoracic echocardiogram read by Dr. Farfan;  Surgeon: Harvinder Quick MD;  Location:  OR     EYE SURGERY       HEART CATH FEMORAL CANNULIZATION WITH OPEN STANDBY REPAIR AORTIC VALVE N/A 5/19/2021    Procedure: cardiopulmonary bypass on standby;  Surgeon: Riaz Fuller MD;  Location:  OR        CURRENT MEDICATIONS:     Current Outpatient Medications   Medication Sig Dispense Refill     amoxicillin (AMOXIL) 500 MG capsule Take 4 capsules (2,000 mg) by mouth once as needed (SBE prophylaxis - take 30-60 minutes prior to dental procedure or cleaning) 4 capsule 3     aspirin 81 MG tablet Take 1 tablet by mouth daily.       doxycycline monohydrate (MONODOX) 100 MG capsule Take 100 mg by mouth 2 times daily       hydrochlorothiazide (HYDRODIURIL) 25 MG tablet Take 1 tablet (25 mg) by mouth daily 90 tablet 3     metoprolol tartrate (LOPRESSOR) 25 MG tablet Take 1 tablet (25 mg) by mouth 2 times daily 180 tablet 1     nitroGLYcerin (NITROSTAT) 0.4 MG sublingual tablet Place 0.4 mg under the tongue every 5 minutes as needed       ORDER FOR DME Use your CPAP device as directed by your provider.       rosuvastatin (CRESTOR) 40 MG tablet Take 40 mg by mouth At Bedtime       valsartan (DIOVAN) 160 MG tablet Take 1 tablet (160 mg) by mouth daily 90 tablet 3        ALLERGIES:     Allergies   Allergen Reactions     Pravastatin      Myalgia       Simvastatin      myalgia     Calcium Citrate Unknown      Bloody nose per pt     Lipitor [Atorvastatin Calcium]      Myalgia          FAMILY HISTORY:     Family History   Problem Relation Age of Onset     No Known Problems Sister      Alcoholism Brother      Myocardial Infarction Mother 51        HTN onset age 30, was a smoker     Myocardial Infarction Father 63        smoker     Myocardial Infarction Brother 39     No Known Problems Son      No Known Problems Daughter      CABG Brother      Heart Disease Brother      No Known Problems Sister      Cerebrovascular Disease Sister      Coronary Artery Disease Sister      No Known Problems Son         SOCIAL HISTORY:     Social History     Socioeconomic History     Marital status:      Spouse name: Not on file     Number of children: Not on file     Years of education: Not on file     Highest education level: Not on file   Occupational History     Occupation: retired     Employer: [employer]     Comment:  retired full time, still works a few days as biochemical pharmacist   Social Needs     Financial resource strain: Not on file     Food insecurity     Worry: Not on file     Inability: Not on file     Transportation needs     Medical: Not on file     Non-medical: Not on file   Tobacco Use     Smoking status: Former Smoker     Packs/day: 1.00     Years: 20.00     Pack years: 20.00     Quit date: 3/5/1992     Years since quittin.2     Smokeless tobacco: Never Used   Substance and Sexual Activity     Alcohol use: No     Drug use: No     Sexual activity: Not on file   Lifestyle     Physical activity     Days per week: Not on file     Minutes per session: Not on file     Stress: Not on file   Relationships     Social connections     Talks on phone: Not on file     Gets together: Not on file     Attends Mosque service: Not on file     Active member of club or organization: Not on file     Attends meetings of clubs or organizations: Not on file     Relationship status: Not on file     Intimate partner violence      Fear of current or ex partner: Not on file     Emotionally abused: Not on file     Physically abused: Not on file     Forced sexual activity: Not on file   Other Topics Concern     Parent/sibling w/ CABG, MI or angioplasty before 65F 55M? Not Asked   Social History Narrative    Single and lives with her son and brother.        REVIEW OF SYSTEMS:     Constitutional: No fevers or chills  Skin: No new rash or itching  Eyes: No acute change in vision  Ears/Nose/Throat: No purulent rhinorrhea, new hearing loss, or new vertigo  Respiratory: No cough or hemoptysis  Cardiovascular: See HPI  Gastrointestinal: No change in appetite, vomiting, hematemesis or diarrhea  Genitourinary: No dysuria or hematuria  Musculoskeletal: No new back pain, neck pain or muscle pain  Neurologic: No new headaches, focal weakness or behavior changes  Psychiatric: No hallucinations, excessive alcohol consumption or illegal drug usage  Hematologic/Lymphatic/Immunologic: No bleeding, chills, fever, night sweats or weight loss  Endocrine: No new cold intolerance, heat intolerance, polyphagia, polydipsia or polyuria      PHYSICAL EXAMINATION:     BP (!) 153/68 (BP Location: Right arm, Patient Position: Chair, Cuff Size: Adult Large)   Pulse 69   Wt 99.6 kg (219 lb 8 oz)   SpO2 96%   BMI 38.88 kg/m      GENERAL: No acute distress.  HEENT: EOMI. Sclerae white, not injected. Nares clear. Pharynx without erythema or exudate.   Neck: No adenopathy. No thyromegaly. No jugular venous distension.   Heart: Regular rate and rhythm. Soft 2/6 DALIA murmur RUSB.   Lungs: Clear to auscultation. No ronchi, wheezes, rales.   Abdomen: Soft, nontender, nondistended. Bowel sounds present.  Extremities: No clubbing, cyanosis, or edema.   Neurologic: Alert and oriented to person/place/time, normal speech and affect. No focal deficits.  Skin: No petechiae, purpura or rash.     LABORATORY DATA:   Personally reviewed and interpreted lab results.   CBC RESULTS:  Lab  Results   Component Value Date    WBC 16.2 (H) 12/22/2022    WBC 12.8 (H) 07/05/2021    RBC 4.77 12/22/2022    RBC 4.25 07/05/2021    HGB 13.3 12/22/2022    HGB 11.7 07/05/2021    HCT 40.5 12/22/2022    HCT 35.9 07/05/2021    MCV 85 12/22/2022    MCV 85 07/05/2021    MCH 27.9 12/22/2022    MCH 27.5 07/05/2021    MCHC 32.8 12/22/2022    MCHC 32.6 07/05/2021    RDW 14.8 12/22/2022    RDW 14.8 07/05/2021     12/22/2022     07/05/2021       BMP RESULTS:  Lab Results   Component Value Date     05/30/2023     07/05/2021    POTASSIUM 4.4 05/30/2023    POTASSIUM 4.1 05/16/2022    POTASSIUM 4.2 07/05/2021    CHLORIDE 106 05/30/2023    CHLORIDE 112 (H) 05/16/2022    CHLORIDE 108 07/05/2021    CO2 25 05/30/2023    CO2 24 05/16/2022    CO2 27 07/05/2021    ANIONGAP 10 05/30/2023    ANIONGAP 7 05/16/2022    ANIONGAP 3 07/05/2021     (H) 05/30/2023     (H) 05/16/2022     (H) 07/05/2021    BUN 18.6 05/30/2023    BUN 14 05/16/2022    BUN 14 07/05/2021    CR 1.16 (H) 05/30/2023    CR 0.86 07/05/2021    GFRESTIMATED 48 (L) 05/30/2023    GFRESTIMATED 65 07/05/2021    GFRESTBLACK 76 07/05/2021    TERRI 9.8 05/30/2023    TERRI 8.6 07/05/2021         PROCEDURES & FURTHER ASSESSMENTS:     ECHO 5/30/2023:  Interpretation Summary  Global and regional left ventricular function is normal with an EF of 60-65%.  Global right ventricular function is normal.  S/P TAVR with 23 mm ES3 Ultra valve. Mean aortic gradient 15 mmHg, stable from  prior study (18 mmHg) Trivial paravalvular aortic insufficiency.  No pericardial effusion is present.  ______________________________________________________________________________  Left Ventricle  Global and regional left ventricular function is normal with an EF of 60-65%.  Left ventricular size is normal. Mild concentric wall thickening consistent  with left ventricular hypertrophy is present.     Right Ventricle  The right ventricle is normal size. Global right  ventricular function is  normal.     Atria  Both atria appear normal.     Mitral Valve  Mild mitral annular calcification is present. Mild mitral insufficiency is  present.     Aortic Valve  S/P TAVR with 23 mm ES3 Ultra valve. Mean aortic gradient 15 mmHg, stable from  prior study (18 mmHg) Trivial paravalvular aortic insufficiency. The valve  leaflets are not well visualized.     Tricuspid Valve  The tricuspid valve is normal. Mild tricuspid insufficiency is present. The  right ventricular systolic pressure is approximated at 28.9 mmHg plus the  right atrial pressure. Pulmonary artery systolic pressure is normal.     Pulmonic Valve  The pulmonic valve is normal.     Vessels  The inferior vena cava was normal in size with preserved respiratory  variability.     Pericardium  No pericardial effusion is present.     Compared to Previous Study  This study was compared with the study from 2022 . No significant changes  noted.  ______________________________________________________________________________  MMode/2D Measurements & Calculations  IVSd: 1.2 cm     LVIDd: 4.5 cm  LVIDs: 2.9 cm  LVPWd: 1.00 cm  FS: 36.9 %  LV mass(C)d: 171.7 grams  LV mass(C)dI: 86.3 grams/m2  LA dimension: 3.8 cm  asc Aorta Diam: 3.1 cm  LVOT diam: 1.9 cm  LVOT area: 2.8 cm2  LA Volume (BP): 43.0 ml  LA Volume Index (BP): 21.6 ml/m2  RWT: 0.44     Doppler Measurements & Calculations  MV E max delbert: 85.7 cm/sec  MV A max delbert: 120.8 cm/sec  MV E/A: 0.71  MV dec slope: 312.0 cm/sec2  MV dec time: 0.27 sec  Ao V2 max: 263.3 cm/sec  Ao max P.7 mmHg  Ao V2 mean: 175.9 cm/sec  Ao mean P.9 mmHg  Ao V2 VTI: 63.7 cm  TONI(I,D): 2.3 cm2  TONI(V,D): 2.2 cm2  LV V1 max P.6 mmHg  LV V1 max: 203.7 cm/sec  LV V1 VTI: 51.8 cm  SV(LVOT): 146.7 ml  SI(LVOT): 73.7 ml/m2  TR max delbert: 268.8 cm/sec  TR max P.9 mmHg  AV Delbert Ratio (DI): 0.77  TONI Index (cm2/m2): 1.2  E/E' av.0  Lateral E/e': 10.5  Medial E/e': 11.4  RV S Delbert: 12.5  cm/sec        ECHO 5/16/22:  Interpretation Summary  S/P TAVR with 23 ES3 Ultra valve. Mean aortic gradient 18mmHg..Trivial  paravalvular AI.Aortic mean gradient was 11mmHg during previous study but the  study before that was 24mmHg.  ______________________________________________________________________________  Left Ventricle  Global and regional left ventricular function is normal with an EF of 60-65%.  Left ventricular size is normal. Mild concentric wall thickening consistent  with left ventricular hypertrophy is present. Left ventricular diastolic  function is normal. No regional wall motion abnormalities are seen.     Right Ventricle  Right ventricular function, chamber size, wall motion, and thickness are  normal.     Atria  Both atria appear normal.     Mitral Valve  Mild mitral annular calcification is present. Mild mitral insufficiency is  present.     Aortic Valve  S/P TAVR with 23 ES3 Ultra valve. Mean aortic gradient 18mmHg..Trivial  paravalvular AI.Aortic mean gradient was 11mmHg during previous study but the  study before that was 24mmHg. A bioprosthetic aortic valve is present.     Tricuspid Valve  The tricuspid valve is normal. Mild tricuspid insufficiency is present. The  right ventricular systolic pressure is approximated at 28.0 mmHg plus the  right atrial pressure.     Pulmonic Valve  The pulmonic valve is normal.     Vessels  The thoracic aorta is normal. The pulmonary artery and bifurcation cannot be  assessed. The inferior vena cava is normal.     Pericardium  No pericardial effusion is present.     EKG 5/16/22:  Personally reviewed and interpreted  NSR HR 72    EKG 7/5/21:  Personally reviewed and interpreted by me  NSR HR 76, QRS duration 76    ECHO 7/5/21:  Mean gradient 11 mmHg no AI  Mean gradient 24 on previous study    CT 7/5/21:  Normal leaflet opening no HALT     ECG 5/20/21:  Personally reviewed and interpreted by me  NSR HR 98, QRS 70 ms,     Echocardiogram  5/20/21:  Interpretation Summary  Global and regional left ventricular function is hyperkinetic with an EF >70%.  TAVR with Little S3 23 mm valve. No aortic regurgitation is present.The mean  gradient is elevated at 34 mmHg. The aortic valve area is normal at 1.7 cm^2.  IVC diameter <2.1 cm collapsing >50% with sniff suggests a normal RA pressure  of 3 mmHg.  No pericardial effusion is present.     TAVR gr is likely elevated due to hyperdynamic LV fxn. Valve appearance and  TONI are normal. Recommend short term echo follow-up.    NYHA Class: I     CLINICAL IMPRESSION:     Reba Juarez is a very pleasant 78 year old female with HTN, HLD, T2DM, CAD with remote POBA history w/ non-obstructive CAD per angiogram 4/2021, HFpEF, and severe symptomatic aortic stenosis status-post TAVR with a 23 mm Little Ellie 3 Ultra on 5/19/21 by Gucci Meek and Jonny who presents for annual cardiology f/up.     1. Tachycardia/palpitations: Recent ER visit for palpitations, EKG showed HR of 142 (unavailable for review). Possible paroxysmal SVT which has been seen on previous monitor. Plan to repeat Cardiac monitor to rule out a-fib. Continue Metoprolol BID.     2. Aortic stenosis s/p TAVR: Doing well clinically with improvement in dyspnea, fatigue and exercise tolerance. She had significantly increased gradient of 34 mmHg on POD#1 thought related to hyperdynamic LV function --> improved to 24 mmHg 1 week post TAVR--> down to 11 mmHg at 1 month post TAVR.  CT did not show any evidence of leaflet thickening. ECHO today shows slightly elevated mean PG of 15 mmHg which is stable when compared to prior (18 mmHg in 2022). Continue ASA 81 mg lifelong and SBE prophylaxis lifelong. Repeat echo in 1 year.     3. CAD: Remote history of POBA. No angina, continue ASA and statin.     4. HFpEF: Continue hydrochlorothiazide 25 mg daily. Recommend avoid use of Aleve, follow 2 G NA restriction and 2 L fluid restriction. Call with  significant weight gain, SOB or leg swelling.     5. HTN: Reviewed home BP measurements, BP at goal. Continue valsartan 160 mg daily, hydrochlorothiazide 25 and metoprolol 25 mg BID.    6. HLD: Continue high intensity statin therapy.    RTC: Follow-up 1 year with echo prior.     ANGELA Elmore, CNP  [facility] Cardiology Team      CC  Patient Care Team:  Dayana Kruse NP as PCP - General (Nurse Practitioner)  Carmel Weston NP as Assigned Heart and Vascular Provider  Yordan Jack MD as Assigned PCP

## 2023-05-15 ENCOUNTER — OFFICE VISIT (OUTPATIENT)
Dept: CARDIOLOGY | Facility: CLINIC | Age: 78
End: 2023-05-15
Attending: NURSE PRACTITIONER
Payer: COMMERCIAL

## 2023-05-15 VITALS
WEIGHT: 219.5 LBS | OXYGEN SATURATION: 96 % | SYSTOLIC BLOOD PRESSURE: 153 MMHG | DIASTOLIC BLOOD PRESSURE: 68 MMHG | BODY MASS INDEX: 38.88 KG/M2 | HEART RATE: 69 BPM

## 2023-05-15 DIAGNOSIS — R00.0 TACHYCARDIA: ICD-10-CM

## 2023-05-15 DIAGNOSIS — Z95.2 S/P TAVR (TRANSCATHETER AORTIC VALVE REPLACEMENT): Primary | ICD-10-CM

## 2023-05-15 DIAGNOSIS — I25.10 CORONARY ARTERY DISEASE INVOLVING NATIVE CORONARY ARTERY OF NATIVE HEART WITHOUT ANGINA PECTORIS: ICD-10-CM

## 2023-05-15 PROCEDURE — 93010 ELECTROCARDIOGRAM REPORT: CPT | Mod: XU | Performed by: INTERNAL MEDICINE

## 2023-05-15 PROCEDURE — 99214 OFFICE O/P EST MOD 30 MIN: CPT | Performed by: NURSE PRACTITIONER

## 2023-05-15 PROCEDURE — G0463 HOSPITAL OUTPT CLINIC VISIT: HCPCS | Performed by: NURSE PRACTITIONER

## 2023-05-15 PROCEDURE — 93005 ELECTROCARDIOGRAM TRACING: CPT | Mod: XU

## 2023-05-15 ASSESSMENT — PAIN SCALES - GENERAL: PAINLEVEL: NO PAIN (0)

## 2023-05-15 NOTE — LETTER
5/15/2023      RE: Reba Juarez  1974 [address] [facility] [id number]       Dear Colleague,    Thank you for the opportunity to participate in the care of your patient, Reba Juarez, at the [facility] HEART [facility]IN[facility] [place] at [facility]. Please see a copy of my visit note below.        [facility] HEART CARE  CARDIOVASCULAR DIVISION    VALVE CLINIC RETURN VISIT    PRIMARY CARDIOLOGIST: Dr. Harris/Mireille Edwards NP       PERTINENT CLINICAL HISTORY:     Reba Juarez is a very pleasant 78 year old female who presents for annual cardiology follow-up. She has a history of HTN, HLD, T2DM, MAYKEL on CPAP, paroxysmal SVT, CAD with remote POBA history, non-obstructive CAD per angiogram 4/2021, HFpEF and severe aortic stenosis status-post TAVR with a 23 mm Little Ellie 3 Ultra on 5/19/21 by Gucci Meek and Jonny. Her TAVR went well without complication. Her POD#1 ECHO showed elevated mean PG of 34 mmHg thought related to hyperdynamic LV function. She received IVF and was started on metoprolol with improvement in mean PG to 24 mmHg on follow-up echo. She was evaluated 1 month post TAVR and echo showed mean PG 11 mmHg and Cardiac CT showed normal valve function. Last echo 5/2022 showed mean PG 18 mmHg without PVL.     Interval History 5/2022:  Patient says she had COVID about 2 months ago and continues to feel fatigued. She has also been struggling with her weight and has been attempting to increase her activity level. She denies any significant chest pain and pressure with activity. She reports mild shortness of breath and palpitations with activity which is better than it was pre TAVR. She attributes this to deconditioning. She has leg swelling worse since starting aleve for arthritis. No orthopnea, PND. She denies any lightheadedness, syncope, palpitations.     Last month she had a major spike in blood pressure and increased leg swelling after  starting Aleve for knee pain. She saw her PCP who increased valsartan to 160 mg TID, also calibrated her BP cuff. Found that her bicep cuff was inaccurate, though wrist cuff was accurate. Has since been using her wrist cuff and BP has been 114//60s. She says her blood pressure was too low on valsartan TID so she reduced to once a day. Additional medications chlorthalidone 25 mg daily, metoprolol 25 mg BID, ASA and statin.      Interval History 5/2023:  Adamaris has been feeling well. She was experiencing inner right elbow pain 12/2022 - we performed a nuclear stress test which was negative for ischemia. She was seen in the ER 12/2022 for similar inner arm pain and palpitations - EKG showed HR of 142 (unavailable for review) concerning for ST vs A-flutter which resolved following a liter of IVF, no heart monitor placed. Has since undergone sleep study and is now wearing full face mask. No longer having SOB at night, inner arm pain and palpitations have resolved. She has been active gardening and performing ADLs. She denies angina. She has mild exertional dyspnea which has remained stable. She used an inhaler and nebulizer treatments which seem to help. Her oxygen saturations have been better at night. She denies any orthopnea, PND. Reports mild leg swelling, better with elevation. No lightheadedness, palpitations, syncope. Home BP have been 119/50. Currently taking valsartan 160 mg once daily, chlorthalidone 25 mg daily, metoprolol 25 mg BID, ASA and crestor.  Has not taken morning medications yet today.      PAST MEDICAL HISTORY:     Past Medical History:   Diagnosis Date    Aortic valve sclerosis     echo 1-24-11 mild    CAD (coronary artery disease)     Coronary artery disease 1995    PCI at [facility]. Repeat angiogram in 2007    Diabetes (H)     Gastric ulcer 1990    H/O exercise stress test 3/21/2011    No ischemia    Hyperlipidemia     Hyperlipidemia LDL goal < 70     identified 1990's     Hypertension     onset age 50-55 approx    Hypertension     MAYKEL (obstructive sleep apnea) 2007        PAST SURGICAL HISTORY:     Past Surgical History:   Procedure Laterality Date    ANGIOPLASTY  early 1990s    COLONOSCOPY  2010    CV CORONARY ANGIOGRAM N/A 4/9/2021    Procedure: CV CORONARY ANGIOGRAM;  Surgeon: Júnior Santos MD;  Location:  HEART CARDIAC CATH LAB    CV LEFT HEART CATH N/A 4/9/2021    Procedure: Left Heart Cath;  Surgeon: Júnior Santos MD;  Location:  HEART CARDIAC CATH LAB    CV RIGHT HEART CATH MEASUREMENTS RECORDED N/A 4/9/2021    Procedure: Right Heart Cath;  Surgeon: Júnior Santos MD;  Location:  HEART CARDIAC CATH LAB    CV TRANSCATHETER AORTIC VALVE REPLACEMENT N/A 5/19/2021    Procedure: Transfemoral Transcatheter Aortic Valve Replacement with 23 mm Little Ellie 3 Ultra Transcatheter Heart Valve, Transthoracic echocardiogram read by Dr. Farfan;  Surgeon: Harvinder Quick MD;  Location:  OR    EYE SURGERY      HEART CATH FEMORAL CANNULIZATION WITH OPEN STANDBY REPAIR AORTIC VALVE N/A 5/19/2021    Procedure: cardiopulmonary bypass on standby;  Surgeon: Riaz Fuller MD;  Location:  OR        CURRENT MEDICATIONS:     Current Outpatient Medications   Medication Sig Dispense Refill    amoxicillin (AMOXIL) 500 MG capsule Take 4 capsules (2,000 mg) by mouth once as needed (SBE prophylaxis - take 30-60 minutes prior to dental procedure or cleaning) 4 capsule 3    aspirin 81 MG tablet Take 1 tablet by mouth daily.      doxycycline monohydrate (MONODOX) 100 MG capsule Take 100 mg by mouth 2 times daily      hydrochlorothiazide (HYDRODIURIL) 25 MG tablet Take 1 tablet (25 mg) by mouth daily 90 tablet 3    metoprolol tartrate (LOPRESSOR) 25 MG tablet Take 1 tablet (25 mg) by mouth 2 times daily 180 tablet 1    nitroGLYcerin (NITROSTAT) 0.4 MG sublingual tablet Place 0.4 mg under the tongue every 5 minutes as needed      ORDER FOR DME Use your CPAP device as directed by  your provider.      rosuvastatin (CRESTOR) 40 MG tablet Take 40 mg by mouth At Bedtime      valsartan (DIOVAN) 160 MG tablet Take 1 tablet (160 mg) by mouth daily 90 tablet 3        ALLERGIES:     Allergies   Allergen Reactions    Pravastatin      Myalgia      Simvastatin      myalgia    Calcium Citrate Unknown     Bloody nose per pt    Lipitor [Atorvastatin Calcium]      Myalgia          FAMILY HISTORY:     Family History   Problem Relation Age of Onset    No Known Problems Sister     Alcoholism Brother     Myocardial Infarction Mother 51        HTN onset age 30, was a smoker    Myocardial Infarction Father 63        smoker    Myocardial Infarction Brother 39    No Known Problems Son     No Known Problems Daughter     CABG Brother     Heart Disease Brother     No Known Problems Sister     Cerebrovascular Disease Sister     Coronary Artery Disease Sister     No Known Problems Son         SOCIAL HISTORY:     Social History     Socioeconomic History    Marital status:      Spouse name: Not on file    Number of children: Not on file    Years of education: Not on file    Highest education level: Not on file   Occupational History    Occupation: retired     Employer: [employer]     Comment:  retired full time, still works a few days as biochemical pharmacist   Social Needs    Financial resource strain: Not on file    Food insecurity     Worry: Not on file     Inability: Not on file    Transportation needs     Medical: Not on file     Non-medical: Not on file   Tobacco Use    Smoking status: Former Smoker     Packs/day: 1.00     Years: 20.00     Pack years: 20.00     Quit date: 3/5/1992     Years since quittin.2    Smokeless tobacco: Never Used   Substance and Sexual Activity    Alcohol use: No    Drug use: No    Sexual activity: Not on file   Lifestyle    Physical activity     Days per week: Not on file     Minutes per session: Not on file    Stress: Not on file   Relationships    Social connections      Talks on phone: Not on file     Gets together: Not on file     Attends Evangelical service: Not on file     Active member of club or organization: Not on file     Attends meetings of clubs or organizations: Not on file     Relationship status: Not on file    Intimate partner violence     Fear of current or ex partner: Not on file     Emotionally abused: Not on file     Physically abused: Not on file     Forced sexual activity: Not on file   Other Topics Concern    Parent/sibling w/ CABG, MI or angioplasty before 65F 55M? Not Asked   Social History Narrative    Single and lives with her son and brother.        REVIEW OF SYSTEMS:     Constitutional: No fevers or chills  Skin: No new rash or itching  Eyes: No acute change in vision  Ears/Nose/Throat: No purulent rhinorrhea, new hearing loss, or new vertigo  Respiratory: No cough or hemoptysis  Cardiovascular: See HPI  Gastrointestinal: No change in appetite, vomiting, hematemesis or diarrhea  Genitourinary: No dysuria or hematuria  Musculoskeletal: No new back pain, neck pain or muscle pain  Neurologic: No new headaches, focal weakness or behavior changes  Psychiatric: No hallucinations, excessive alcohol consumption or illegal drug usage  Hematologic/Lymphatic/Immunologic: No bleeding, chills, fever, night sweats or weight loss  Endocrine: No new cold intolerance, heat intolerance, polyphagia, polydipsia or polyuria      PHYSICAL EXAMINATION:     BP (!) 153/68 (BP Location: Right arm, Patient Position: Chair, Cuff Size: Adult Large)   Pulse 69   Wt 99.6 kg (219 lb 8 oz)   SpO2 96%   BMI 38.88 kg/m      GENERAL: No acute distress.  HEENT: EOMI. Sclerae white, not injected. Nares clear. Pharynx without erythema or exudate.   Neck: No adenopathy. No thyromegaly. No jugular venous distension.   Heart: Regular rate and rhythm. Soft 2/6 DALIA murmur RUSB.   Lungs: Clear to auscultation. No ronchi, wheezes, rales.   Abdomen: Soft, nontender, nondistended. Bowel sounds  present.  Extremities: No clubbing, cyanosis, or edema.   Neurologic: Alert and oriented to person/place/time, normal speech and affect. No focal deficits.  Skin: No petechiae, purpura or rash.     LABORATORY DATA:   Personally reviewed and interpreted lab results.   CBC RESULTS:  Lab Results   Component Value Date    WBC 16.2 (H) 12/22/2022    WBC 12.8 (H) 07/05/2021    RBC 4.77 12/22/2022    RBC 4.25 07/05/2021    HGB 13.3 12/22/2022    HGB 11.7 07/05/2021    HCT 40.5 12/22/2022    HCT 35.9 07/05/2021    MCV 85 12/22/2022    MCV 85 07/05/2021    MCH 27.9 12/22/2022    MCH 27.5 07/05/2021    MCHC 32.8 12/22/2022    MCHC 32.6 07/05/2021    RDW 14.8 12/22/2022    RDW 14.8 07/05/2021     12/22/2022     07/05/2021       BMP RESULTS:  Lab Results   Component Value Date     05/30/2023     07/05/2021    POTASSIUM 4.4 05/30/2023    POTASSIUM 4.1 05/16/2022    POTASSIUM 4.2 07/05/2021    CHLORIDE 106 05/30/2023    CHLORIDE 112 (H) 05/16/2022    CHLORIDE 108 07/05/2021    CO2 25 05/30/2023    CO2 24 05/16/2022    CO2 27 07/05/2021    ANIONGAP 10 05/30/2023    ANIONGAP 7 05/16/2022    ANIONGAP 3 07/05/2021     (H) 05/30/2023     (H) 05/16/2022     (H) 07/05/2021    BUN 18.6 05/30/2023    BUN 14 05/16/2022    BUN 14 07/05/2021    CR 1.16 (H) 05/30/2023    CR 0.86 07/05/2021    GFRESTIMATED 48 (L) 05/30/2023    GFRESTIMATED 65 07/05/2021    GFRESTBLACK 76 07/05/2021    TERRI 9.8 05/30/2023    TERRI 8.6 07/05/2021         PROCEDURES & FURTHER ASSESSMENTS:     ECHO 5/30/2023:  Interpretation Summary  Global and regional left ventricular function is normal with an EF of 60-65%.  Global right ventricular function is normal.  S/P TAVR with 23 mm ES3 Ultra valve. Mean aortic gradient 15 mmHg, stable from  prior study (18 mmHg) Trivial paravalvular aortic insufficiency.  No pericardial effusion is present.  ______________________________________________________________________________  Left  Ventricle  Global and regional left ventricular function is normal with an EF of 60-65%.  Left ventricular size is normal. Mild concentric wall thickening consistent  with left ventricular hypertrophy is present.     Right Ventricle  The right ventricle is normal size. Global right ventricular function is  normal.     Atria  Both atria appear normal.     Mitral Valve  Mild mitral annular calcification is present. Mild mitral insufficiency is  present.     Aortic Valve  S/P TAVR with 23 mm ES3 Ultra valve. Mean aortic gradient 15 mmHg, stable from  prior study (18 mmHg) Trivial paravalvular aortic insufficiency. The valve  leaflets are not well visualized.     Tricuspid Valve  The tricuspid valve is normal. Mild tricuspid insufficiency is present. The  right ventricular systolic pressure is approximated at 28.9 mmHg plus the  right atrial pressure. Pulmonary artery systolic pressure is normal.     Pulmonic Valve  The pulmonic valve is normal.     Vessels  The inferior vena cava was normal in size with preserved respiratory  variability.     Pericardium  No pericardial effusion is present.     Compared to Previous Study  This study was compared with the study from 2022 . No significant changes  noted.  ______________________________________________________________________________  MMode/2D Measurements & Calculations  IVSd: 1.2 cm     LVIDd: 4.5 cm  LVIDs: 2.9 cm  LVPWd: 1.00 cm  FS: 36.9 %  LV mass(C)d: 171.7 grams  LV mass(C)dI: 86.3 grams/m2  LA dimension: 3.8 cm  asc Aorta Diam: 3.1 cm  LVOT diam: 1.9 cm  LVOT area: 2.8 cm2  LA Volume (BP): 43.0 ml  LA Volume Index (BP): 21.6 ml/m2  RWT: 0.44     Doppler Measurements & Calculations  MV E max grayson: 85.7 cm/sec  MV A max grayson: 120.8 cm/sec  MV E/A: 0.71  MV dec slope: 312.0 cm/sec2  MV dec time: 0.27 sec  Ao V2 max: 263.3 cm/sec  Ao max P.7 mmHg  Ao V2 mean: 175.9 cm/sec  Ao mean P.9 mmHg  Ao V2 VTI: 63.7 cm  TONI(I,D): 2.3 cm2  TONI(V,D): 2.2 cm2  LV V1  max P.6 mmHg  LV V1 max: 203.7 cm/sec  LV V1 VTI: 51.8 cm  SV(LVOT): 146.7 ml  SI(LVOT): 73.7 ml/m2  TR max delbert: 268.8 cm/sec  TR max P.9 mmHg  AV Delbert Ratio (DI): 0.77  TONI Index (cm2/m2): 1.2  E/E' av.0  Lateral E/e': 10.5  Medial E/e': 11.4  RV S Delbert: 12.5 cm/sec        ECHO 22:  Interpretation Summary  S/P TAVR with 23 ES3 Ultra valve. Mean aortic gradient 18mmHg..Trivial  paravalvular AI.Aortic mean gradient was 11mmHg during previous study but the  study before that was 24mmHg.  ______________________________________________________________________________  Left Ventricle  Global and regional left ventricular function is normal with an EF of 60-65%.  Left ventricular size is normal. Mild concentric wall thickening consistent  with left ventricular hypertrophy is present. Left ventricular diastolic  function is normal. No regional wall motion abnormalities are seen.     Right Ventricle  Right ventricular function, chamber size, wall motion, and thickness are  normal.     Atria  Both atria appear normal.     Mitral Valve  Mild mitral annular calcification is present. Mild mitral insufficiency is  present.     Aortic Valve  S/P TAVR with 23 ES3 Ultra valve. Mean aortic gradient 18mmHg..Trivial  paravalvular AI.Aortic mean gradient was 11mmHg during previous study but the  study before that was 24mmHg. A bioprosthetic aortic valve is present.     Tricuspid Valve  The tricuspid valve is normal. Mild tricuspid insufficiency is present. The  right ventricular systolic pressure is approximated at 28.0 mmHg plus the  right atrial pressure.     Pulmonic Valve  The pulmonic valve is normal.     Vessels  The thoracic aorta is normal. The pulmonary artery and bifurcation cannot be  assessed. The inferior vena cava is normal.     Pericardium  No pericardial effusion is present.     EKG 22:  Personally reviewed and interpreted  NSR HR 72    EKG 21:  Personally reviewed and interpreted by des BERRY  HR 76, QRS duration 76    ECHO 7/5/21:  Mean gradient 11 mmHg no AI  Mean gradient 24 on previous study    CT 7/5/21:  Normal leaflet opening no HALT     ECG 5/20/21:  Personally reviewed and interpreted by me  NSR HR 98, QRS 70 ms,     Echocardiogram 5/20/21:  Interpretation Summary  Global and regional left ventricular function is hyperkinetic with an EF >70%.  TAVR with Little S3 23 mm valve. No aortic regurgitation is present.The mean  gradient is elevated at 34 mmHg. The aortic valve area is normal at 1.7 cm^2.  IVC diameter <2.1 cm collapsing >50% with sniff suggests a normal RA pressure  of 3 mmHg.  No pericardial effusion is present.     TAVR gr is likely elevated due to hyperdynamic LV fxn. Valve appearance and  TONI are normal. Recommend short term echo follow-up.    NYHA Class: I     CLINICAL IMPRESSION:     Reba Juarez is a very pleasant 78 year old female with HTN, HLD, T2DM, CAD with remote POBA history w/ non-obstructive CAD per angiogram 4/2021, HFpEF, and severe symptomatic aortic stenosis status-post TAVR with a 23 mm Little Ellie 3 Ultra on 5/19/21 by Gucci Meek and Jonny who presents for annual cardiology f/up.     1. Tachycardia/palpitations: Recent ER visit for palpitations, EKG showed HR of 142 (unavailable for review). Possible paroxysmal SVT which has been seen on previous monitor. Plan to repeat Cardiac monitor to rule out a-fib. Continue Metoprolol BID.     2. Aortic stenosis s/p TAVR: Doing well clinically with improvement in dyspnea, fatigue and exercise tolerance. She had significantly increased gradient of 34 mmHg on POD#1 thought related to hyperdynamic LV function --> improved to 24 mmHg 1 week post TAVR--> down to 11 mmHg at 1 month post TAVR.  CT did not show any evidence of leaflet thickening. ECHO today shows slightly elevated mean PG of 15 mmHg which is stable when compared to prior (18 mmHg in 2022). Continue ASA 81 mg lifelong and SBE prophylaxis  lifelong. Repeat echo in 1 year.     3. CAD: Remote history of POBA. No angina, continue ASA and statin.     4. HFpEF: Continue hydrochlorothiazide 25 mg daily. Recommend avoid use of Aleve, follow 2 G NA restriction and 2 L fluid restriction. Call with significant weight gain, SOB or leg swelling.     5. HTN: Reviewed home BP measurements, BP at goal. Continue valsartan 160 mg daily, hydrochlorothiazide 25 and metoprolol 25 mg BID.    6. HLD: Continue high intensity statin therapy.    RTC: Follow-up 1 year with echo prior.     ANGELA Elmore, CNP  [facility] Cardiology Team      CC  Patient Care Team:  Dayana Kruse NP as PCP - General (Nurse Practitioner)  Carmel Weston NP as Assigned Heart and Vascular Provider

## 2023-05-15 NOTE — NURSING NOTE
Chief Complaint   Patient presents with     Follow Up     78 yo FM, here for one year follow-up.     Vitals were taken, medications reconciled, and EKG was performed.    Shay Gar, EMT  10:37 AM

## 2023-05-15 NOTE — PATIENT INSTRUCTIONS
You were seen today in the Structural Heart Clinic at the [facility].    Cardiology provider you saw during your visit: Carmel Weston NP    Instructions:   Continue aspirin 81 mg daily lifelong.  Continue current medication regimen with valsartan 160 mg daily, chlorthalidone 25 mg daily and metoprolol.  Continue to avoid Aleve or any NSAIDS - you are safe to take tylenol up to 3 G daily.  Wear two week heart monitor to rule out atrial fibrillation/flutter.  Schedule echo to be performed in 2 weeks.   For all future dental cleanings and procedures you will need to take antibiotics prior - see instructions below.   Follow-up with your Primary Cardiologist in 1 year with echo, ecg and labs prior     Prevention of Infective (Bacterial) Endocarditis:  You are at increased risk for developing adverse outcomes from infective endocarditis (IE), also known as bacterial endocarditis (BE) because of the new device in your heart. The guidelines for prevention of IE are to give patients antibiotics prior to any dental procedures that involve manipulation of gingival tissue or the periapical region of teeth, or perforation of the oral mucosa:      It is recommended to take Amoxicillin 2 gm by mouth as a single dose 30 to 60 minutes before procedure.     OR if allergic to Penicillin or Ampicillin:     Cephalexin 2 gm by mouth, or  Clindamycin 600 mg by mouth, or  Azithromycin or Clarithromycin 500 mg PO       Questions and scheduling:   First call: Structural Heart  Mckenna Reese [phone]    General scheduling line: [phone].   First press #1 for the University and then press #3 for Medical Questions to reach the Cardiology triage nurse.     On Call Cardiologist for after hours or on weekends: [phone], press option #4 and ask to speak to the on-call Cardiologist.

## 2023-05-16 LAB
ATRIAL RATE - MUSE: 65 BPM
DIASTOLIC BLOOD PRESSURE - MUSE: NORMAL MMHG
INTERPRETATION ECG - MUSE: NORMAL
P AXIS - MUSE: 20 DEGREES
PR INTERVAL - MUSE: 136 MS
QRS DURATION - MUSE: 76 MS
QT - MUSE: 396 MS
QTC - MUSE: 411 MS
R AXIS - MUSE: 10 DEGREES
SYSTOLIC BLOOD PRESSURE - MUSE: NORMAL MMHG
T AXIS - MUSE: -7 DEGREES
VENTRICULAR RATE- MUSE: 65 BPM

## 2023-05-30 ENCOUNTER — ANCILLARY PROCEDURE (OUTPATIENT)
Dept: CARDIOLOGY | Facility: CLINIC | Age: 78
End: 2023-05-30
Attending: NURSE PRACTITIONER
Payer: COMMERCIAL

## 2023-05-30 ENCOUNTER — LAB (OUTPATIENT)
Dept: LAB | Facility: CLINIC | Age: 78
End: 2023-05-30
Payer: COMMERCIAL

## 2023-05-30 DIAGNOSIS — Z95.2 S/P TAVR (TRANSCATHETER AORTIC VALVE REPLACEMENT): ICD-10-CM

## 2023-05-30 DIAGNOSIS — I25.10 CORONARY ARTERY DISEASE INVOLVING NATIVE CORONARY ARTERY OF NATIVE HEART WITHOUT ANGINA PECTORIS: ICD-10-CM

## 2023-05-30 DIAGNOSIS — R00.0 TACHYCARDIA: ICD-10-CM

## 2023-05-30 LAB
ANION GAP SERPL CALCULATED.3IONS-SCNC: 10 MMOL/L (ref 7–15)
BUN SERPL-MCNC: 18.6 MG/DL (ref 8–23)
CALCIUM SERPL-MCNC: 9.8 MG/DL (ref 8.8–10.2)
CHLORIDE SERPL-SCNC: 106 MMOL/L (ref 98–107)
CHOLEST SERPL-MCNC: 180 MG/DL
CREAT SERPL-MCNC: 1.16 MG/DL (ref 0.51–0.95)
DEPRECATED HCO3 PLAS-SCNC: 25 MMOL/L (ref 22–29)
ERYTHROCYTE [DISTWIDTH] IN BLOOD BY AUTOMATED COUNT: 14.6 % (ref 10–15)
GFR SERPL CREATININE-BSD FRML MDRD: 48 ML/MIN/1.73M2
GLUCOSE SERPL-MCNC: 112 MG/DL (ref 70–99)
HCT VFR BLD AUTO: 39.5 % (ref 35–47)
HDLC SERPL-MCNC: 56 MG/DL
HGB BLD-MCNC: 12.9 G/DL (ref 11.7–15.7)
LDLC SERPL CALC-MCNC: 98 MG/DL
LVEF ECHO: NORMAL
MCH RBC QN AUTO: 28.1 PG (ref 26.5–33)
MCHC RBC AUTO-ENTMCNC: 32.7 G/DL (ref 31.5–36.5)
MCV RBC AUTO: 86 FL (ref 78–100)
NONHDLC SERPL-MCNC: 124 MG/DL
PLATELET # BLD AUTO: 181 10E3/UL (ref 150–450)
POTASSIUM SERPL-SCNC: 4.4 MMOL/L (ref 3.4–5.3)
RBC # BLD AUTO: 4.59 10E6/UL (ref 3.8–5.2)
SODIUM SERPL-SCNC: 141 MMOL/L (ref 136–145)
TRIGL SERPL-MCNC: 131 MG/DL
WBC # BLD AUTO: 13.7 10E3/UL (ref 4–11)

## 2023-05-30 PROCEDURE — 85027 COMPLETE CBC AUTOMATED: CPT | Performed by: PATHOLOGY

## 2023-05-30 PROCEDURE — 36415 COLL VENOUS BLD VENIPUNCTURE: CPT | Performed by: PATHOLOGY

## 2023-05-30 PROCEDURE — 80061 LIPID PANEL: CPT | Performed by: PATHOLOGY

## 2023-05-30 PROCEDURE — 93306 TTE W/DOPPLER COMPLETE: CPT | Performed by: STUDENT IN AN ORGANIZED HEALTH CARE EDUCATION/TRAINING PROGRAM

## 2023-05-30 PROCEDURE — 80048 BASIC METABOLIC PNL TOTAL CA: CPT | Performed by: PATHOLOGY

## 2023-06-17 DIAGNOSIS — I10 ESSENTIAL HYPERTENSION: ICD-10-CM

## 2023-06-17 DIAGNOSIS — R00.2 PALPITATIONS: ICD-10-CM

## 2023-06-21 RX ORDER — METOPROLOL TARTRATE 25 MG/1
TABLET, FILM COATED ORAL
Qty: 180 TABLET | Refills: 2 | Status: SHIPPED | OUTPATIENT
Start: 2023-06-21 | End: 2024-02-15

## 2023-07-02 ENCOUNTER — HEALTH MAINTENANCE LETTER (OUTPATIENT)
Age: 78
End: 2023-07-02

## 2023-07-07 NOTE — PROGRESS NOTES
"Speech Language Pathology  Treatment    Julio Benites   MRN: [id number]   Admitting Diagnosis: Severe malnutrition    Diet recommendations: Solid Diet Level: Puree  Liquid Diet Level: Thin 1 bite/sip at a time, Alternating bites/sips, Avoid talking while eating, Double swallow with each bite/sip, Frequent oral care, HOB to 90 degrees, Meds crushed in puree, Monitor for s/s of aspiration, Remain upright 30 minutes post meal, Small bites/sips, and Strict aspiration precautions    SLP Treatment Date: 07/07/23  Speech Start Time: 1350     Speech Stop Time: 1407     Speech Total (min): 17 min       TREATMENT BILLABLE MINUTES:  Treatment Swallowing Dysfunction 8 and Self Care/Home Management Training 8    Has the patient been evaluated by SLP for swallowing? : Yes  Keep patient NPO?: No   General Precautions: Standard, aspiration, fall, pureed diet, hearing impaired          Subjective:  "We can." Pt agreed to participate in dysphagia tx.          Objective:      Pt seen for ongoing dysphagia tx. Pt accepted ice chips x 5 to facilitate consecutive swallows for performing dysphagia exercises.  Pt performed Effortful swallows x 3-4 per ice chip (16 produced in total). He also performed Supraglottic swallows x 10, Falsetto exercise x 5, chin tuck with resistance x 10, basic TBR exercises x 20, and Kimberli maneuver on 2 out of 4 attempts. Ongoing education provided to pt regarding role of SLP, dysphagia exercises, aspiration precautions, and SLP treatment plan and POC.  Pt demonstrated understanding of education provided, but will benefit from continued reinforcement.       Assessment:  Julio Benites is a 87 y.o. male with a medical diagnosis of Severe malnutrition and presents with dysphagia.     Discharge recommendations: Discharge Facility/Level of Care Needs: home health speech therapy     Goals:   Multidisciplinary Problems       SLP Goals          Problem: SLP    Goal Priority Disciplines Outcome   SLP Goal     SLP  " IAN reviewed.  Please see procedure note.     Description: Speech Language Pathology Goals  Goals expected to be met by 7/4 (goals remain appropriate - reassess on 7/12):   1. Pt will tolerate pureed consistencies and thin liquids with minimal s/s of aspiration.   2. Pt will perform dysphagia exercises x 5-10 to improve pharyngeal swallow.                                 Plan:   Patient to be seen Therapy Frequency: 3 x/week  Planned Interventions: Dysphagia Therapy  Plan of Care expires: 07/26/23  Plan of Care reviewed with: patient  SLP Follow-up?: Yes  SLP - Next Visit Date: 07/10/23             07/07/2023

## 2023-09-10 ENCOUNTER — HEALTH MAINTENANCE LETTER (OUTPATIENT)
Age: 78
End: 2023-09-10

## 2023-11-28 DIAGNOSIS — I10 HYPERTENSION: ICD-10-CM

## 2023-11-28 DIAGNOSIS — I10 ESSENTIAL HYPERTENSION: ICD-10-CM

## 2023-12-01 ENCOUNTER — MYC REFILL (OUTPATIENT)
Dept: CARDIOLOGY | Facility: CLINIC | Age: 78
End: 2023-12-01
Payer: COMMERCIAL

## 2023-12-01 DIAGNOSIS — I10 HYPERTENSION: ICD-10-CM

## 2023-12-01 RX ORDER — VALSARTAN 160 MG/1
160 TABLET ORAL DAILY
Qty: 90 TABLET | OUTPATIENT
Start: 2023-12-01

## 2023-12-01 RX ORDER — HYDROCHLOROTHIAZIDE 25 MG/1
25 TABLET ORAL DAILY
Qty: 90 TABLET | OUTPATIENT
Start: 2023-12-01

## 2023-12-01 RX ORDER — VALSARTAN 160 MG/1
160 TABLET ORAL DAILY
Qty: 90 TABLET | Refills: 3 | Status: SHIPPED | OUTPATIENT
Start: 2023-12-01 | End: 2024-02-22

## 2023-12-01 NOTE — TELEPHONE ENCOUNTER
valsartan (DIOVAN) 160 MG tablet 90 tablet 3 11/22/2022     hydrochlorothiazide (HYDRODIURIL) 25 MG tablet 90 tablet 3 11/22/2022     Creatinine   Date Value Ref Range Status   05/30/2023 1.16 (H) 0.51 - 0.95 mg/dL Final   07/05/2021 0.86 0.52 - 1.04 mg/dL Final     BP Readings from Last 3 Encounters:   05/15/23 (!) 153/68   03/06/23 (!) 169/54   12/29/22 (!) 155/51       Last Office Visit: 5/15/23  Future Office visit:  none     Routing to provider/team for approval:  Abnormal lab    Jocelyne Gonzalez RN

## 2023-12-01 NOTE — TELEPHONE ENCOUNTER
valsartan (DIOVAN) 160 MG tablet     Last Written Prescription Date:  11/22/22  Last Fill Quantity: 90,   # refills: 3  Last Office Visit : 5/15/23  Future Office visit:  One year  BP REVIEWED @ APPT    Routing refill request to provider for review/approval because:  Abnormal Creatinine    05/30/23  1013    CR 1.16*

## 2024-01-25 DIAGNOSIS — I10 ESSENTIAL HYPERTENSION: ICD-10-CM

## 2024-01-28 ENCOUNTER — HEALTH MAINTENANCE LETTER (OUTPATIENT)
Age: 79
End: 2024-01-28

## 2024-01-31 RX ORDER — HYDROCHLOROTHIAZIDE 25 MG/1
25 TABLET ORAL DAILY
Qty: 90 TABLET | Refills: 3 | Status: SHIPPED | OUTPATIENT
Start: 2024-01-31

## 2024-01-31 NOTE — TELEPHONE ENCOUNTER
HYDROCHLOROTHIAZIDE 25 MG     Last Written Prescription Date:  11/22/22  Last Fill Quantity: 90,   # refills: 3  Last Office Visit : 5/15/23  Future Office visit:  none    Routing refill request to provider for review/approval because:  GFR Estimate   Date Value Ref Range Status   05/30/2023 48 (L) >60 mL/min/1.73m2 Final     Comment:     eGFR calculated using 2021 CKD-EPI equation.   07/05/2021 65 >60 mL/min/[1.73_m2] Final     Comment:     Non  GFR Calc  Starting 12/18/2018, serum creatinine based estimated GFR (eGFR) will be   calculated using the Chronic Kidney Disease Epidemiology Collaboration   (CKD-EPI) equation.       GFR Estimate If Black   Date Value Ref Range Status   07/05/2021 76 >60 mL/min/[1.73_m2] Final     Comment:      GFR Calc  Starting 12/18/2018, serum creatinine based estimated GFR (eGFR) will be   calculated using the Chronic Kidney Disease Epidemiology Collaboration   (CKD-EPI) equation.

## 2024-02-13 NOTE — PROGRESS NOTES
"  [facility] Cardiology   Cardiology Clinic Note      HPI:   Ms. Reba Juarez is a pleasant 79 year old female with medical history pertinent for HTN, HLD, T2DM, MAYKEL on CPAP, paroxysmal SVT, CAD with remote POBA history, non-obstructive CAD per angiogram 4/2021, HFpEF, and severe aortic stenosis status-post TAVR with a 23 mm Little Ellie 3 Ultra on 5/19/21 . She presents to cardiology clinic for HTN and angina follow up.    Patient contacted cardiology service 2/10 with complaints of /70. She denied chest pain at that time, but notes she gets \" deep ear pain and pinching feeling at elbows\" that she worries may be associated with angina. She does admit to sinus and ear congestion/pressure ever since she was diagnosed with COVID and is working with her PCP for a solution. Today in clinic she brings in a log of her pressures over the last week. Her SBP ranges from 160s-210s. She reports that recently, she went 3 weeks without taking her hydrochlorothiazide due to a viral illness. She also reports that her PCP had reduced her valsartan to 80mg daily and patient had also self-decreased her Metoprolol from BID to daily.   On blood pressure log, it was noted that patient was taking her blood pressures anywhere from 5-20 times per day. She states she has been getting frequent notifications from her monitor that her heart rate is irregular which causes her some degree of anxiety.   She denies dyspnea or edema at today's visit, but reports she felt like her ankles were swollen last week. Her EDW appears to be around 213 by her report which she states is what she weighed this morning.     Most recent lipid panel ordered by PCP 2/10/24: BNP 90, , HDL 52, ,     Today in clinic, she denies chest pain, dizziness, syncope, or lower extremity edema.     PAST MEDICAL HISTORY:  Past Medical History:   Diagnosis Date    Aortic valve sclerosis     echo 1-24-11 mild    CAD (coronary artery disease)     " Coronary artery disease     PCI at [facility]. Repeat angiogram in     Diabetes (H)     Gastric ulcer     H/O exercise stress test 3/21/2011    No ischemia    Hyperlipidemia     Hyperlipidemia LDL goal < 70     identified     Hypertension     onset age 50-55 approx    Hypertension     MAYKEL (obstructive sleep apnea)        FAMILY HISTORY:  Family History   Problem Relation Age of Onset    No Known Problems Sister     Alcoholism Brother     Myocardial Infarction Mother 51        HTN onset age 30, was a smoker    Myocardial Infarction Father 63        smoker    Myocardial Infarction Brother 39    No Known Problems Son     No Known Problems Daughter     CABG Brother     Heart Disease Brother     No Known Problems Sister     Cerebrovascular Disease Sister     Coronary Artery Disease Sister     No Known Problems Son        SOCIAL HISTORY:  Social History     Socioeconomic History    Marital status:    Occupational History    Occupation: retired     Employer: [email or web address]     Comment:  retired full time, still works a few days as biochemical pharmacist   Tobacco Use    Smoking status: Former     Packs/day: 1.00     Years: 20.00     Additional pack years: 0.00     Total pack years: 20.00     Types: Cigarettes     Quit date: 3/5/1992     Years since quittin.9    Smokeless tobacco: Never   Substance and Sexual Activity    Alcohol use: No    Drug use: No   Social History Narrative    Single and lives with her son and brother.       CURRENT MEDICATIONS:  amoxicillin (AMOXIL) 500 MG capsule, Take 4 capsules (2,000 mg) by mouth once as needed (SBE prophylaxis - take 30-60 minutes prior to dental procedure or cleaning)  aspirin 81 MG tablet, Take 1 tablet by mouth daily.  hydrochlorothiazide (HYDRODIURIL) 25 MG tablet, Take 1 tablet (25 mg) by mouth daily  nitroGLYcerin (NITROSTAT) 0.4 MG sublingual tablet, Place 0.4 mg under the tongue every 5 minutes as needed  ORDER FOR DME, Use your  CPAP device as directed by your provider.  rosuvastatin (CRESTOR) 40 MG tablet, Take 40 mg by mouth At Bedtime  valsartan (DIOVAN) 160 MG tablet, Take 1 tablet (160 mg) by mouth daily  doxycycline monohydrate (MONODOX) 100 MG capsule, Take 100 mg by mouth 2 times daily (Patient not taking: Reported on 5/15/2023)    No current facility-administered medications on file prior to visit.      ROS:   Refer to HPI    EXAM:  BP (!) 173/64 (BP Location: Right arm, Patient Position: Chair, Cuff Size: Adult Large)   Pulse 56   Wt 99.1 kg (218 lb 6.4 oz)   SpO2 95%   BMI 38.69 kg/m      GENERAL: Appears comfortable, in no acute distress.   HEENT: Eye symmetrical, no discharge or icterus bilaterally. Mucous membranes moist and without lesions.  CV: RRR, +S1S2, no murmur, rub, or gallop. No JVD.   RESPIRATORY: Respirations regular, even, and unlabored. Lungs CTA throughout. Frequent throat clearing  EXTREMITIES: no peripheral edema.   NEUROLOGIC: Alert and oriented x 3. No focal deficits.   SKIN: No jaundice. No rashes or lesions.     Labs, reviewed with patient in clinic today:  CBC RESULTS:  Lab Results   Component Value Date    WBC 13.7 (H) 05/30/2023    WBC 12.8 (H) 07/05/2021    RBC 4.59 05/30/2023    RBC 4.25 07/05/2021    HGB 12.9 05/30/2023    HGB 11.7 07/05/2021    HCT 39.5 05/30/2023    HCT 35.9 07/05/2021    MCV 86 05/30/2023    MCV 85 07/05/2021    MCH 28.1 05/30/2023    MCH 27.5 07/05/2021    MCHC 32.7 05/30/2023    MCHC 32.6 07/05/2021    RDW 14.6 05/30/2023    RDW 14.8 07/05/2021     05/30/2023     07/05/2021       CMP RESULTS:  Lab Results   Component Value Date     05/30/2023     07/05/2021    POTASSIUM 4.4 05/30/2023    POTASSIUM 4.1 05/16/2022    POTASSIUM 4.2 07/05/2021    CHLORIDE 106 05/30/2023    CHLORIDE 112 (H) 05/16/2022    CHLORIDE 108 07/05/2021    CO2 25 05/30/2023    CO2 24 05/16/2022    CO2 27 07/05/2021    ANIONGAP 10 05/30/2023    ANIONGAP 7 05/16/2022    ANIONGAP 3  "07/05/2021     (H) 05/30/2023     (H) 05/16/2022     (H) 07/05/2021    BUN 18.6 05/30/2023    BUN 14 05/16/2022    BUN 14 07/05/2021    CR 1.16 (H) 05/30/2023    CR 0.86 07/05/2021    GFRESTIMATED 48 (L) 05/30/2023    GFRESTIMATED 65 07/05/2021    GFRESTBLACK 76 07/05/2021    TERRI 9.8 05/30/2023    TERRI 8.6 07/05/2021    BILITOTAL 0.3 05/17/2021    ALBUMIN 3.8 05/17/2021    ALKPHOS 87 05/17/2021    ALT 27 05/17/2021    AST 17 05/17/2021        INR RESULTS:  Lab Results   Component Value Date    INR 1.04 05/17/2021       Lab Results   Component Value Date    MAG 1.7 12/22/2022    MAG 2.2 05/20/2021     Lab Results   Component Value Date    NTBNPI 70 12/22/2022     No results found for: \"NTBNP\"    LIPIDS:  Lab Results   Component Value Date    CHOL 180 05/30/2023    CHOL 198 03/29/2021     Lab Results   Component Value Date    HDL 56 05/30/2023    HDL 64 03/29/2021     Lab Results   Component Value Date    LDL 98 05/30/2023     03/29/2021     Lab Results   Component Value Date    TRIG 131 05/30/2023    TRIG 159 03/29/2021     Lab Results   Component Value Date    CHOLHDLRATIO 3.9 05/15/2013       Zio Monitor 5/15/23:      Echocardiogram 5/30/23:  Interpretation Summary  Global and regional left ventricular function is normal with an EF of 60-65%.  Global right ventricular function is normal.  S/P TAVR with 23 mm ES3 Ultra valve. Mean aortic gradient 15 mmHg, stable from  prior study (18 mmHg) Trivial paravalvular aortic insufficiency.  No pericardial effusion is present.  ______________________________________________________________________________  Left Ventricle  Global and regional left ventricular function is normal with an EF of 60-65%.  Left ventricular size is normal. Mild concentric wall thickening consistent  with left ventricular hypertrophy is present.     Right Ventricle  The right ventricle is normal size. Global right ventricular function is  normal.     Atria  Both atria " appear normal.     Mitral Valve  Mild mitral annular calcification is present. Mild mitral insufficiency is  present.     Aortic Valve  S/P TAVR with 23 mm ES3 Ultra valve. Mean aortic gradient 15 mmHg, stable from  prior study (18 mmHg) Trivial paravalvular aortic insufficiency. The valve  leaflets are not well visualized.     Tricuspid Valve  The tricuspid valve is normal. Mild tricuspid insufficiency is present. The  right ventricular systolic pressure is approximated at 28.9 mmHg plus the  right atrial pressure. Pulmonary artery systolic pressure is normal.     Pulmonic Valve  The pulmonic valve is normal.     Vessels  The inferior vena cava was normal in size with preserved respiratory  variability.     Pericardium  No pericardial effusion is present.     Compared to Previous Study  This study was compared with the study from 2022 . No significant changes  noted.  ______________________________________________________________________________  MMode/2D Measurements & Calculations  IVSd: 1.2 cm     LVIDd: 4.5 cm  LVIDs: 2.9 cm  LVPWd: 1.00 cm  FS: 36.9 %  LV mass(C)d: 171.7 grams  LV mass(C)dI: 86.3 grams/m2  LA dimension: 3.8 cm  asc Aorta Diam: 3.1 cm  LVOT diam: 1.9 cm  LVOT area: 2.8 cm2  LA Volume (BP): 43.0 ml  LA Volume Index (BP): 21.6 ml/m2  RWT: 0.44     Doppler Measurements & Calculations  MV E max delbert: 85.7 cm/sec  MV A max delbert: 120.8 cm/sec  MV E/A: 0.71  MV dec slope: 312.0 cm/sec2  MV dec time: 0.27 sec  Ao V2 max: 263.3 cm/sec  Ao max P.7 mmHg  Ao V2 mean: 175.9 cm/sec  Ao mean P.9 mmHg  Ao V2 VTI: 63.7 cm  TONI(I,D): 2.3 cm2  TONI(V,D): 2.2 cm2  LV V1 max P.6 mmHg  LV V1 max: 203.7 cm/sec  LV V1 VTI: 51.8 cm  SV(LVOT): 146.7 ml  SI(LVOT): 73.7 ml/m2  TR max delbert: 268.8 cm/sec  TR max P.9 mmHg  AV Delbert Ratio (DI): 0.77  TONI Index (cm2/m2): 1.2  E/E' av.0  Lateral E/e': 10.5  Medial E/e': 11.4  RV S Delbert: 12.5 cm/sec       Assessment and Plan:   Ms. Juarez is a 79 year old  female with a PMH of HTN, HLD, T2DM, MAYKEL on CPAP, paroxysmal SVT, CAD with remote POBA history, non-obstructive CAD per angiogram 4/2021, HFpEF and severe aortic stenosis status-post TAVR with a 23 mm Little Ellie 3 Ultra on 5/19/21      # HTN  Reviewed home BP measurements which were very elevated. She reports her PCP had significantly altered her antihypertensive regimen.  - Resume valsartan 160 mg daily, hydrochlorothiazide 25 and switch to Toprol 25mg daily.    # Tachycardia/palpitations:  Possible paroxysmal SVT which has been seen on previous monitor.   - Plan for 2 week cardiac monitor to rule out a-fib.   - Continue Metoprolol daily - switch to Toprol and stop Lopressor     # Aortic stenosis s/p TAVR  - Doing well clinically with improvement in dyspnea, fatigue and exercise tolerance.   - Continue ASA 81 mg lifelong and SBE prophylaxis lifelong. Repeat echo in 1 year.      # CAD   Remote history of POBA. No angina, continue ASA and statin.      # HFpEF:   - Resume hydrochlorothiazide 25 mg daily.  - Resume Valsartan 160mg daily   - Recommend avoid use of NSAIDs  -2g Na restriction and 2L fluid restriction. Call with significant weight gain of 3lbs in 1 day or 5lbs in 1 week    # HLD  Continue high intensity statin therapy with Crestor 40mg daily.    # MAYKEL   She is currently on CPAP, but states that she has a pulse ox that alerts her at night if her SpO2 drops below 90%. She reports this has been alerting her very frequently.  - Referral to sleep medicine ordered     # Sinus congestion  Encouraged patient to avoid nebulizing hydrogen peroxide as she had tried once in the past with relief of her congestion symptoms. Encouraged her to follow up with PCP        Follow up:  Establish care with general cardiology asap, follow up in 1 month with [facility] if not in sooner with MD  Chart review time today: 20 minutes  Visit time today: 30 minutes  Total time spent today: 50 minutes        CATY Shoemaker  Cardiology   02/15/24

## 2024-02-15 ENCOUNTER — OFFICE VISIT (OUTPATIENT)
Dept: CARDIOLOGY | Facility: CLINIC | Age: 79
End: 2024-02-15
Payer: COMMERCIAL

## 2024-02-15 VITALS
DIASTOLIC BLOOD PRESSURE: 64 MMHG | OXYGEN SATURATION: 95 % | WEIGHT: 218.4 LBS | BODY MASS INDEX: 38.69 KG/M2 | HEART RATE: 56 BPM | SYSTOLIC BLOOD PRESSURE: 173 MMHG

## 2024-02-15 DIAGNOSIS — R00.2 PALPITATIONS: Primary | ICD-10-CM

## 2024-02-15 DIAGNOSIS — G47.33 OSA (OBSTRUCTIVE SLEEP APNEA): ICD-10-CM

## 2024-02-15 PROCEDURE — 99215 OFFICE O/P EST HI 40 MIN: CPT

## 2024-02-15 PROCEDURE — G0463 HOSPITAL OUTPT CLINIC VISIT: HCPCS

## 2024-02-15 PROCEDURE — 93246 EXT ECG>7D<15D RECORDING: CPT

## 2024-02-15 RX ORDER — METOPROLOL SUCCINATE 50 MG/1
50 TABLET, EXTENDED RELEASE ORAL DAILY
Qty: 30 TABLET | Refills: 3 | Status: SHIPPED | OUTPATIENT
Start: 2024-02-15 | End: 2024-03-18 | Stop reason: DRUGHIGH

## 2024-02-15 ASSESSMENT — PAIN SCALES - GENERAL: PAINLEVEL: NO PAIN (0)

## 2024-02-15 NOTE — LETTER
"2/15/2024      RE: Reba Juarez  1974 [address] [facility] [address]       Dear Colleague,    Thank you for the opportunity to participate in the care of your patient, Reba Juarez, at the [facility] [place] [facility]. Please see a copy of my visit note below.      eal Cardiology   Cardiology Clinic Note      HPI:   Ms. Reba Juarez is a pleasant 79 year old female with medical history pertinent for HTN, HLD, T2DM, MAYKEL on CPAP, paroxysmal SVT, CAD with remote POBA history, non-obstructive CAD per angiogram 4/2021, HFpEF, and severe aortic stenosis status-post TAVR with a 23 mm Little Ellie 3 Ultra on 5/19/21 . She presents to cardiology clinic for HTN and angina follow up.    Patient contacted cardiology service 2/10 with complaints of /70. She denied chest pain at that time, but notes she gets \" deep ear pain and pinching feeling at elbows\" that she worries may be associated with angina. She does admit to sinus and ear congestion/pressure ever since she was diagnosed with COVID and is working with her PCP for a solution. Today in clinic she brings in a log of her pressures over the last week. Her SBP ranges from 160s-210s. She reports that recently, she went 3 weeks without taking her hydrochlorothiazide due to a viral illness. She also reports that her PCP had reduced her valsartan to 80mg daily and patient had also self-decreased her Metoprolol from BID to daily.   On blood pressure log, it was noted that patient was taking her blood pressures anywhere from 5-20 times per day. She states she has been getting frequent notifications from her monitor that her heart rate is irregular which causes her some degree of anxiety.   She denies dyspnea or edema at today's visit, but reports she felt like her ankles were swollen last week. Her EDW appears to be around 213 by her report which she states is what she weighed this " morning.     Most recent lipid panel ordered by PCP 2/10/24: BNP 90, , HDL 52, ,     Today in clinic, she denies chest pain, dizziness, syncope, or lower extremity edema.     PAST MEDICAL HISTORY:  Past Medical History:   Diagnosis Date     Aortic valve sclerosis     echo 11 mild     CAD (coronary artery disease)      Coronary artery disease     PCI at [facility]. Repeat angiogram in      Diabetes (H)      Gastric ulcer      H/O exercise stress test 3/21/2011    No ischemia     Hyperlipidemia      Hyperlipidemia LDL goal < 70     identified      Hypertension     onset age 50-55 approx     Hypertension      MAYKEL (obstructive sleep apnea)        FAMILY HISTORY:  Family History   Problem Relation Age of Onset     No Known Problems Sister      Alcoholism Brother      Myocardial Infarction Mother 51        HTN onset age 29, was a smoker     Myocardial Infarction Father 63        smoker     Myocardial Infarction Brother 39     No Known Problems Son      No Known Problems Daughter      CABG Brother      Heart Disease Brother      No Known Problems Sister      Cerebrovascular Disease Sister      Coronary Artery Disease Sister      No Known Problems Son        SOCIAL HISTORY:  Social History     Socioeconomic History     Marital status:    Occupational History     Occupation: retired     Employer: [email or web address]     Comment:  retired full time, still works a few days as biochemical pharmacist   Tobacco Use     Smoking status: Former     Packs/day: 1.00     Years: 20.00     Additional pack years: 0.00     Total pack years: 20.00     Types: Cigarettes     Quit date: 3/5/1992     Years since quittin.9     Smokeless tobacco: Never   Substance and Sexual Activity     Alcohol use: No     Drug use: No   Social History Narrative    Single and lives with her son and brother.       CURRENT MEDICATIONS:  amoxicillin (AMOXIL) 500 MG capsule, Take 4 capsules (2,000 mg) by  mouth once as needed (SBE prophylaxis - take 30-60 minutes prior to dental procedure or cleaning)  aspirin 81 MG tablet, Take 1 tablet by mouth daily.  hydrochlorothiazide (HYDRODIURIL) 25 MG tablet, Take 1 tablet (25 mg) by mouth daily  nitroGLYcerin (NITROSTAT) 0.4 MG sublingual tablet, Place 0.4 mg under the tongue every 5 minutes as needed  ORDER FOR DME, Use your CPAP device as directed by your provider.  rosuvastatin (CRESTOR) 40 MG tablet, Take 40 mg by mouth At Bedtime  valsartan (DIOVAN) 160 MG tablet, Take 1 tablet (160 mg) by mouth daily  doxycycline monohydrate (MONODOX) 100 MG capsule, Take 100 mg by mouth 2 times daily (Patient not taking: Reported on 5/15/2023)    No current facility-administered medications on file prior to visit.      ROS:   Refer to HPI    EXAM:  BP (!) 173/64 (BP Location: Right arm, Patient Position: Chair, Cuff Size: Adult Large)   Pulse 56   Wt 99.1 kg (218 lb 6.4 oz)   SpO2 95%   BMI 38.69 kg/m      GENERAL: Appears comfortable, in no acute distress.   HEENT: Eye symmetrical, no discharge or icterus bilaterally. Mucous membranes moist and without lesions.  CV: RRR, +S1S2, no murmur, rub, or gallop. No JVD.   RESPIRATORY: Respirations regular, even, and unlabored. Lungs CTA throughout. Frequent throat clearing  EXTREMITIES: no peripheral edema.   NEUROLOGIC: Alert and oriented x 3. No focal deficits.   SKIN: No jaundice. No rashes or lesions.     Labs, reviewed with patient in clinic today:  CBC RESULTS:  Lab Results   Component Value Date    WBC 13.7 (H) 05/30/2023    WBC 12.8 (H) 07/05/2021    RBC 4.59 05/30/2023    RBC 4.25 07/05/2021    HGB 12.9 05/30/2023    HGB 11.7 07/05/2021    HCT 39.5 05/30/2023    HCT 35.9 07/05/2021    MCV 86 05/30/2023    MCV 85 07/05/2021    MCH 28.1 05/30/2023    MCH 27.5 07/05/2021    MCHC 32.7 05/30/2023    MCHC 32.6 07/05/2021    RDW 14.6 05/30/2023    RDW 14.8 07/05/2021     05/30/2023     07/05/2021       CMP RESULTS:  Lab  "Results   Component Value Date     05/30/2023     07/05/2021    POTASSIUM 4.4 05/30/2023    POTASSIUM 4.1 05/16/2022    POTASSIUM 4.2 07/05/2021    CHLORIDE 106 05/30/2023    CHLORIDE 112 (H) 05/16/2022    CHLORIDE 108 07/05/2021    CO2 25 05/30/2023    CO2 24 05/16/2022    CO2 27 07/05/2021    ANIONGAP 10 05/30/2023    ANIONGAP 7 05/16/2022    ANIONGAP 3 07/05/2021     (H) 05/30/2023     (H) 05/16/2022     (H) 07/05/2021    BUN 18.6 05/30/2023    BUN 14 05/16/2022    BUN 14 07/05/2021    CR 1.16 (H) 05/30/2023    CR 0.86 07/05/2021    GFRESTIMATED 48 (L) 05/30/2023    GFRESTIMATED 65 07/05/2021    GFRESTBLACK 76 07/05/2021    TERRI 9.8 05/30/2023    TERRI 8.6 07/05/2021    BILITOTAL 0.3 05/17/2021    ALBUMIN 3.8 05/17/2021    ALKPHOS 87 05/17/2021    ALT 27 05/17/2021    AST 17 05/17/2021        INR RESULTS:  Lab Results   Component Value Date    INR 1.04 05/17/2021       Lab Results   Component Value Date    MAG 1.7 12/22/2022    MAG 2.2 05/20/2021     Lab Results   Component Value Date    NTBNPI 70 12/22/2022     No results found for: \"NTBNP\"    LIPIDS:  Lab Results   Component Value Date    CHOL 180 05/30/2023    CHOL 198 03/29/2021     Lab Results   Component Value Date    HDL 56 05/30/2023    HDL 64 03/29/2021     Lab Results   Component Value Date    LDL 98 05/30/2023     03/29/2021     Lab Results   Component Value Date    TRIG 131 05/30/2023    TRIG 159 03/29/2021     Lab Results   Component Value Date    CHOLHDLRATIO 3.9 05/15/2013       Zio Monitor 5/15/23:      Echocardiogram 5/30/23:  Interpretation Summary  Global and regional left ventricular function is normal with an EF of 60-65%.  Global right ventricular function is normal.  S/P TAVR with 23 mm ES3 Ultra valve. Mean aortic gradient 15 mmHg, stable from  prior study (18 mmHg) Trivial paravalvular aortic insufficiency.  No pericardial effusion is " present.  ______________________________________________________________________________  Left Ventricle  Global and regional left ventricular function is normal with an EF of 60-65%.  Left ventricular size is normal. Mild concentric wall thickening consistent  with left ventricular hypertrophy is present.     Right Ventricle  The right ventricle is normal size. Global right ventricular function is  normal.     Atria  Both atria appear normal.     Mitral Valve  Mild mitral annular calcification is present. Mild mitral insufficiency is  present.     Aortic Valve  S/P TAVR with 23 mm ES3 Ultra valve. Mean aortic gradient 15 mmHg, stable from  prior study (18 mmHg) Trivial paravalvular aortic insufficiency. The valve  leaflets are not well visualized.     Tricuspid Valve  The tricuspid valve is normal. Mild tricuspid insufficiency is present. The  right ventricular systolic pressure is approximated at 28.9 mmHg plus the  right atrial pressure. Pulmonary artery systolic pressure is normal.     Pulmonic Valve  The pulmonic valve is normal.     Vessels  The inferior vena cava was normal in size with preserved respiratory  variability.     Pericardium  No pericardial effusion is present.     Compared to Previous Study  This study was compared with the study from 2022 . No significant changes  noted.  ______________________________________________________________________________  MMode/2D Measurements & Calculations  IVSd: 1.2 cm     LVIDd: 4.5 cm  LVIDs: 2.9 cm  LVPWd: 1.00 cm  FS: 36.9 %  LV mass(C)d: 171.7 grams  LV mass(C)dI: 86.3 grams/m2  LA dimension: 3.8 cm  asc Aorta Diam: 3.1 cm  LVOT diam: 1.9 cm  LVOT area: 2.8 cm2  LA Volume (BP): 43.0 ml  LA Volume Index (BP): 21.6 ml/m2  RWT: 0.44     Doppler Measurements & Calculations  MV E max grayson: 85.7 cm/sec  MV A max grayson: 120.8 cm/sec  MV E/A: 0.71  MV dec slope: 312.0 cm/sec2  MV dec time: 0.27 sec  Ao V2 max: 263.3 cm/sec  Ao max P.7 mmHg  Ao V2 mean: 175.9  cm/sec  Ao mean P.9 mmHg  Ao V2 VTI: 63.7 cm  TONI(I,D): 2.3 cm2  TONI(V,D): 2.2 cm2  LV V1 max P.6 mmHg  LV V1 max: 203.7 cm/sec  LV V1 VTI: 51.8 cm  SV(LVOT): 146.7 ml  SI(LVOT): 73.7 ml/m2  TR max deblert: 268.8 cm/sec  TR max P.9 mmHg  AV Delbert Ratio (DI): 0.77  TONI Index (cm2/m2): 1.2  E/E' av.0  Lateral E/e': 10.5  Medial E/e': 11.4  RV S Delbert: 12.5 cm/sec       Assessment and Plan:   Ms. Juarez is a 79 year old female with a PMH of HTN, HLD, T2DM, MAYKEL on CPAP, paroxysmal SVT, CAD with remote POBA history, non-obstructive CAD per angiogram 2021, HFpEF and severe aortic stenosis status-post TAVR with a 23 mm Little Ellie 3 Ultra on 21      # HTN  Reviewed home BP measurements which were very elevated. She reports her PCP had significantly altered her antihypertensive regimen.  - Resume valsartan 160 mg daily, hydrochlorothiazide 25 and switch to Toprol 25mg daily.    # Tachycardia/palpitations:  Possible paroxysmal SVT which has been seen on previous monitor.   - Plan for 2 week cardiac monitor to rule out a-fib.   - Continue Metoprolol daily - switch to Toprol and stop Lopressor     # Aortic stenosis s/p TAVR  - Doing well clinically with improvement in dyspnea, fatigue and exercise tolerance.   - Continue ASA 81 mg lifelong and SBE prophylaxis lifelong. Repeat echo in 1 year.      # CAD   Remote history of POBA. No angina, continue ASA and statin.      # HFpEF:   - Resume hydrochlorothiazide 25 mg daily.  - Resume Valsartan 160mg daily   - Recommend avoid use of NSAIDs  -2g Na restriction and 2L fluid restriction. Call with significant weight gain of 3lbs in 1 day or 5lbs in 1 week    # HLD  Continue high intensity statin therapy with Crestor 40mg daily.    # MAYKEL   She is currently on CPAP, but states that she has a pulse ox that alerts her at night if her SpO2 drops below 90%. She reports this has been alerting her very frequently.  - Referral to sleep medicine ordered     # Sinus  congestion  Encouraged patient to avoid nebulizing hydrogen peroxide as she had tried once in the past with relief of her congestion symptoms. Encouraged her to follow up with PCP        Follow up:  Establish care with general cardiology asap, follow up in 1 month with [facility] if not in sooner with MD  Chart review time today: 20 minutes  Visit time today: 30 minutes  Total time spent today: 50 minutes        Claudia Stark PA-C  General Cardiology   02/15/24

## 2024-02-15 NOTE — NURSING NOTE
Chief Complaint   Patient presents with    Follow Up     Return Cardiology     Vitals were taken and medications reconciled.    Shay Gar, EMT  1:03 PM

## 2024-02-15 NOTE — NURSING NOTE
Reba Juarez arrived here on 2/15/2024 1:55 PM for 8-14 Days  Zio monitor placement per ordering provider Lincoln for the diagnosis Palpitations.  Patient s skin was prepped per protocol.  Zio monitor was placed.  Instructions were reviewed with and given to the patient.  Patient verbalized understanding of wear, troubleshooting and monitor return instructions.

## 2024-02-15 NOTE — PATIENT INSTRUCTIONS
You were seen today in the Cardiovascular Clinic at the [facility] by:       Claudia Stark PA-C    Your visit summary and instructions are as follows:    Valsartan 160mg daily   hydrochlorothiazide 25mg daily  Metoprolol 25mg once a day   Will call you in 1 week to go over blood pressures   Sleep study referral   Kandicetch to evaluate rhythm    Continue to work toward the recommendation of 150 minutes of moderate intensity exercise/week and maintain a healthy lifestyle (avoid illicit drugs, smoking and moderate alcohol consumption)      Return to cardiology clinic in 1 month with any general MD       Thank you for your visit today!     Please MyChart message me or call my nurse Bianka if you have any questions or concerns.      During Business Hours:  [phone], option # 1 (University) then option # 4 (medical questions) and ask to speak with my nurse.     After hours, weekends or holidays:   [phone], Option #4  Ask to speak to the On-Call Cardiologist. Inform them you are a cardiology patient at the [facility].    HOW TO CHECK YOUR BLOOD PRESSURE AT HOME:    Avoid eating, smoking, and exercising for at least 30 minutes before taking a reading.    Be sure you have taken your BP medication at least 2-3 hours before you check it. Please check your blood pressure only 1-2x a day.     Sit quietly for 10 minutes before a reading.     Sit in a chair with your feet flat on the floor. Rest your  arm on a table so that the arm cuff is at the same level as your heart.    Remain still during the reading.    Record your blood pressure and pulse in a log and bring to your next appointment.

## 2024-02-18 ENCOUNTER — APPOINTMENT (OUTPATIENT)
Dept: CT IMAGING | Facility: HOSPITAL | Age: 79
DRG: 281 | End: 2024-02-18
Attending: EMERGENCY MEDICINE
Payer: COMMERCIAL

## 2024-02-18 ENCOUNTER — HOSPITAL ENCOUNTER (INPATIENT)
Facility: HOSPITAL | Age: 79
LOS: 2 days | Discharge: HOME | DRG: 281 | End: 2024-02-20
Attending: EMERGENCY MEDICINE | Admitting: STUDENT IN AN ORGANIZED HEALTH CARE EDUCATION/TRAINING PROGRAM
Payer: COMMERCIAL

## 2024-02-18 DIAGNOSIS — R00.2 PALPITATIONS: ICD-10-CM

## 2024-02-18 DIAGNOSIS — M48.061 SPINAL STENOSIS, LUMBAR REGION, WITHOUT NEUROGENIC CLAUDICATION: Primary | ICD-10-CM

## 2024-02-18 DIAGNOSIS — R07.89 ATYPICAL CHEST PAIN: ICD-10-CM

## 2024-02-18 DIAGNOSIS — R91.8 PULMONARY NODULES: ICD-10-CM

## 2024-02-18 DIAGNOSIS — R00.0 SINUS TACHYCARDIA: ICD-10-CM

## 2024-02-18 DIAGNOSIS — I21.4 NSTEMI (NON-ST ELEVATED MYOCARDIAL INFARCTION) (H): ICD-10-CM

## 2024-02-18 PROBLEM — J44.9 COPD (CHRONIC OBSTRUCTIVE PULMONARY DISEASE) (H): Status: ACTIVE | Noted: 2017-05-11

## 2024-02-18 PROBLEM — I20.89 ANGINAL EQUIVALENT (H): Status: ACTIVE | Noted: 2017-05-11

## 2024-02-18 PROBLEM — D64.9 ANEMIA: Status: ACTIVE | Noted: 2024-02-18

## 2024-02-18 PROBLEM — R09.89 RIGHT CAROTID BRUIT: Status: ACTIVE | Noted: 2017-05-11

## 2024-02-18 PROBLEM — Z82.49 FAMILY HISTORY OF PREMATURE CAD: Status: ACTIVE | Noted: 2017-05-11

## 2024-02-18 PROBLEM — Z98.61 HISTORY OF PTCA: Status: ACTIVE | Noted: 2017-05-11

## 2024-02-18 PROBLEM — I35.8 SYSTOLIC MURMUR OF AORTA: Status: ACTIVE | Noted: 2017-05-11

## 2024-02-18 PROBLEM — R79.89 ABNORMAL LFTS: Status: ACTIVE | Noted: 2018-06-04

## 2024-02-18 LAB
ALBUMIN SERPL BCG-MCNC: 3.9 G/DL (ref 3.5–5.2)
ALP SERPL-CCNC: 87 U/L (ref 40–150)
ALT SERPL W P-5'-P-CCNC: 33 U/L (ref 0–50)
ANION GAP SERPL CALCULATED.3IONS-SCNC: 8 MMOL/L (ref 7–15)
AST SERPL W P-5'-P-CCNC: 26 U/L (ref 0–45)
BASOPHILS # BLD AUTO: ABNORMAL 10*3/UL
BASOPHILS # BLD MANUAL: 0 10E3/UL (ref 0–0.2)
BASOPHILS NFR BLD AUTO: ABNORMAL %
BASOPHILS NFR BLD MANUAL: 0 %
BILIRUB DIRECT SERPL-MCNC: <0.2 MG/DL (ref 0–0.3)
BILIRUB SERPL-MCNC: 0.4 MG/DL
BUN SERPL-MCNC: 17.7 MG/DL (ref 8–23)
CALCIUM SERPL-MCNC: 9 MG/DL (ref 8.8–10.2)
CHLORIDE SERPL-SCNC: 103 MMOL/L (ref 98–107)
CREAT SERPL-MCNC: 1.14 MG/DL (ref 0.51–0.95)
CRP SERPL-MCNC: <3 MG/L
D DIMER PPP FEU-MCNC: 0.81 UG/ML FEU (ref 0–0.5)
DEPRECATED HCO3 PLAS-SCNC: 27 MMOL/L (ref 22–29)
EGFRCR SERPLBLD CKD-EPI 2021: 48 ML/MIN/1.73M2
EOSINOPHIL # BLD AUTO: ABNORMAL 10*3/UL
EOSINOPHIL # BLD MANUAL: 0.3 10E3/UL (ref 0–0.7)
EOSINOPHIL NFR BLD AUTO: ABNORMAL %
EOSINOPHIL NFR BLD MANUAL: 2 %
ERYTHROCYTE [DISTWIDTH] IN BLOOD BY AUTOMATED COUNT: 15 % (ref 10–15)
GLUCOSE SERPL-MCNC: 196 MG/DL (ref 70–99)
HBA1C MFR BLD: 6.1 %
HCT VFR BLD AUTO: 40.3 % (ref 35–47)
HGB BLD-MCNC: 13.3 G/DL (ref 11.7–15.7)
HOLD SPECIMEN: NORMAL
IMM GRANULOCYTES # BLD: ABNORMAL 10*3/UL
IMM GRANULOCYTES NFR BLD: ABNORMAL %
LIPASE SERPL-CCNC: 32 U/L (ref 13–60)
LYMPHOCYTES # BLD AUTO: ABNORMAL 10*3/UL
LYMPHOCYTES # BLD MANUAL: 10.9 10E3/UL (ref 0.8–5.3)
LYMPHOCYTES NFR BLD AUTO: ABNORMAL %
LYMPHOCYTES NFR BLD MANUAL: 67 %
MAGNESIUM SERPL-MCNC: 1.7 MG/DL (ref 1.7–2.3)
MCH RBC QN AUTO: 28.2 PG (ref 26.5–33)
MCHC RBC AUTO-ENTMCNC: 33 G/DL (ref 31.5–36.5)
MCV RBC AUTO: 86 FL (ref 78–100)
MONOCYTES # BLD AUTO: ABNORMAL 10*3/UL
MONOCYTES # BLD MANUAL: 0.6 10E3/UL (ref 0–1.3)
MONOCYTES NFR BLD AUTO: ABNORMAL %
MONOCYTES NFR BLD MANUAL: 4 %
NEUTROPHILS # BLD AUTO: ABNORMAL 10*3/UL
NEUTROPHILS # BLD MANUAL: 4.4 10E3/UL (ref 1.6–8.3)
NEUTROPHILS NFR BLD AUTO: ABNORMAL %
NEUTROPHILS NFR BLD MANUAL: 27 %
NRBC # BLD AUTO: 0 10E3/UL
NRBC BLD AUTO-RTO: 0 /100
PLAT MORPH BLD: ABNORMAL
PLATELET # BLD AUTO: 167 10E3/UL (ref 150–450)
POTASSIUM SERPL-SCNC: 3.6 MMOL/L (ref 3.4–5.3)
PROT SERPL-MCNC: 7 G/DL (ref 6.4–8.3)
RBC # BLD AUTO: 4.71 10E6/UL (ref 3.8–5.2)
RBC MORPH BLD: ABNORMAL
SMUDGE CELLS BLD QL SMEAR: PRESENT
SODIUM SERPL-SCNC: 138 MMOL/L (ref 135–145)
TROPONIN T SERPL HS-MCNC: 14 NG/L
TROPONIN T SERPL HS-MCNC: 45 NG/L
TROPONIN T SERPL HS-MCNC: 73 NG/L
TSH SERPL DL<=0.005 MIU/L-ACNC: 2.17 UIU/ML (ref 0.3–4.2)
UFH PPP CHRO-ACNC: >1.1 IU/ML
WBC # BLD AUTO: 16.2 10E3/UL (ref 4–11)

## 2024-02-18 PROCEDURE — 258N000003 HC RX IP 258 OP 636: Performed by: EMERGENCY MEDICINE

## 2024-02-18 PROCEDURE — 210N000001 HC R&B IMCU HEART CARE

## 2024-02-18 PROCEDURE — 999N000157 HC STATISTIC RCP TIME EA 10 MIN

## 2024-02-18 PROCEDURE — 85520 HEPARIN ASSAY: CPT | Performed by: EMERGENCY MEDICINE

## 2024-02-18 PROCEDURE — 85379 FIBRIN DEGRADATION QUANT: CPT | Performed by: EMERGENCY MEDICINE

## 2024-02-18 PROCEDURE — 83036 HEMOGLOBIN GLYCOSYLATED A1C: CPT | Performed by: STUDENT IN AN ORGANIZED HEALTH CARE EDUCATION/TRAINING PROGRAM

## 2024-02-18 PROCEDURE — 84484 ASSAY OF TROPONIN QUANT: CPT | Performed by: EMERGENCY MEDICINE

## 2024-02-18 PROCEDURE — 86140 C-REACTIVE PROTEIN: CPT | Performed by: EMERGENCY MEDICINE

## 2024-02-18 PROCEDURE — 36415 COLL VENOUS BLD VENIPUNCTURE: CPT | Performed by: EMERGENCY MEDICINE

## 2024-02-18 PROCEDURE — 36415 COLL VENOUS BLD VENIPUNCTURE: CPT | Performed by: STUDENT IN AN ORGANIZED HEALTH CARE EDUCATION/TRAINING PROGRAM

## 2024-02-18 PROCEDURE — 250N000011 HC RX IP 250 OP 636: Performed by: EMERGENCY MEDICINE

## 2024-02-18 PROCEDURE — 71275 CT ANGIOGRAPHY CHEST: CPT

## 2024-02-18 PROCEDURE — 250N000013 HC RX MED GY IP 250 OP 250 PS 637: Performed by: STUDENT IN AN ORGANIZED HEALTH CARE EDUCATION/TRAINING PROGRAM

## 2024-02-18 PROCEDURE — 83690 ASSAY OF LIPASE: CPT | Performed by: EMERGENCY MEDICINE

## 2024-02-18 PROCEDURE — 96366 THER/PROPH/DIAG IV INF ADDON: CPT

## 2024-02-18 PROCEDURE — 5A09357 ASSISTANCE WITH RESPIRATORY VENTILATION, LESS THAN 24 CONSECUTIVE HOURS, CONTINUOUS POSITIVE AIRWAY PRESSURE: ICD-10-PCS | Performed by: STUDENT IN AN ORGANIZED HEALTH CARE EDUCATION/TRAINING PROGRAM

## 2024-02-18 PROCEDURE — 93005 ELECTROCARDIOGRAM TRACING: CPT | Performed by: EMERGENCY MEDICINE

## 2024-02-18 PROCEDURE — 83735 ASSAY OF MAGNESIUM: CPT | Performed by: EMERGENCY MEDICINE

## 2024-02-18 PROCEDURE — 84484 ASSAY OF TROPONIN QUANT: CPT | Performed by: STUDENT IN AN ORGANIZED HEALTH CARE EDUCATION/TRAINING PROGRAM

## 2024-02-18 PROCEDURE — 96365 THER/PROPH/DIAG IV INF INIT: CPT | Mod: 59

## 2024-02-18 PROCEDURE — 99223 1ST HOSP IP/OBS HIGH 75: CPT | Performed by: STUDENT IN AN ORGANIZED HEALTH CARE EDUCATION/TRAINING PROGRAM

## 2024-02-18 PROCEDURE — 99285 EMERGENCY DEPT VISIT HI MDM: CPT | Mod: 25

## 2024-02-18 PROCEDURE — 80053 COMPREHEN METABOLIC PANEL: CPT | Performed by: EMERGENCY MEDICINE

## 2024-02-18 PROCEDURE — 85007 BL SMEAR W/DIFF WBC COUNT: CPT | Performed by: EMERGENCY MEDICINE

## 2024-02-18 PROCEDURE — 85027 COMPLETE CBC AUTOMATED: CPT | Performed by: EMERGENCY MEDICINE

## 2024-02-18 PROCEDURE — 96376 TX/PRO/DX INJ SAME DRUG ADON: CPT

## 2024-02-18 PROCEDURE — 84443 ASSAY THYROID STIM HORMONE: CPT | Performed by: EMERGENCY MEDICINE

## 2024-02-18 PROCEDURE — 96361 HYDRATE IV INFUSION ADD-ON: CPT

## 2024-02-18 RX ORDER — POLYETHYLENE GLYCOL 3350 17 G/17G
17 POWDER, FOR SOLUTION ORAL 2 TIMES DAILY PRN
Status: DISCONTINUED | OUTPATIENT
Start: 2024-02-18 | End: 2024-02-20 | Stop reason: HOSPADM

## 2024-02-18 RX ORDER — LIDOCAINE 40 MG/G
CREAM TOPICAL
Status: DISCONTINUED | OUTPATIENT
Start: 2024-02-18 | End: 2024-02-20 | Stop reason: HOSPADM

## 2024-02-18 RX ORDER — ONDANSETRON 4 MG/1
4 TABLET, ORALLY DISINTEGRATING ORAL EVERY 6 HOURS PRN
Status: DISCONTINUED | OUTPATIENT
Start: 2024-02-18 | End: 2024-02-20 | Stop reason: HOSPADM

## 2024-02-18 RX ORDER — ASPIRIN 81 MG/1
81 TABLET ORAL DAILY
Status: DISCONTINUED | OUTPATIENT
Start: 2024-02-19 | End: 2024-02-20 | Stop reason: HOSPADM

## 2024-02-18 RX ORDER — CALCIUM CARBONATE 500 MG/1
1000 TABLET, CHEWABLE ORAL 4 TIMES DAILY PRN
Status: DISCONTINUED | OUTPATIENT
Start: 2024-02-18 | End: 2024-02-20 | Stop reason: HOSPADM

## 2024-02-18 RX ORDER — METOPROLOL TARTRATE 25 MG/1
25 TABLET, FILM COATED ORAL ONCE
Status: COMPLETED | OUTPATIENT
Start: 2024-02-18 | End: 2024-02-18

## 2024-02-18 RX ORDER — HYDROCHLOROTHIAZIDE 25 MG/1
25 TABLET ORAL DAILY
Status: DISCONTINUED | OUTPATIENT
Start: 2024-02-19 | End: 2024-02-20 | Stop reason: HOSPADM

## 2024-02-18 RX ORDER — ALPRAZOLAM 0.25 MG
0.5 TABLET ORAL DAILY PRN
Status: DISCONTINUED | OUTPATIENT
Start: 2024-02-18 | End: 2024-02-20 | Stop reason: HOSPADM

## 2024-02-18 RX ORDER — YEAST,DRIED (S. CEREVISIAE)
1 POWDER (GRAM) ORAL DAILY
COMMUNITY

## 2024-02-18 RX ORDER — ONDANSETRON 2 MG/ML
4 INJECTION INTRAMUSCULAR; INTRAVENOUS EVERY 6 HOURS PRN
Status: DISCONTINUED | OUTPATIENT
Start: 2024-02-18 | End: 2024-02-20 | Stop reason: HOSPADM

## 2024-02-18 RX ORDER — VALSARTAN 80 MG/1
160 TABLET ORAL DAILY
Status: DISCONTINUED | OUTPATIENT
Start: 2024-02-19 | End: 2024-02-20 | Stop reason: HOSPADM

## 2024-02-18 RX ORDER — HEPARIN SODIUM 10000 [USP'U]/100ML
0-5000 INJECTION, SOLUTION INTRAVENOUS CONTINUOUS
Status: DISCONTINUED | OUTPATIENT
Start: 2024-02-18 | End: 2024-02-19

## 2024-02-18 RX ORDER — AMOXICILLIN 250 MG
1 CAPSULE ORAL 2 TIMES DAILY PRN
Status: DISCONTINUED | OUTPATIENT
Start: 2024-02-18 | End: 2024-02-20 | Stop reason: HOSPADM

## 2024-02-18 RX ORDER — METOPROLOL SUCCINATE 50 MG/1
50 TABLET, EXTENDED RELEASE ORAL DAILY
Status: DISCONTINUED | OUTPATIENT
Start: 2024-02-19 | End: 2024-02-20 | Stop reason: HOSPADM

## 2024-02-18 RX ORDER — METHYLDOPA/HYDROCHLOROTHIAZIDE 250MG-15MG
100 TABLET ORAL DAILY
COMMUNITY

## 2024-02-18 RX ORDER — FUROSEMIDE 20 MG
1 TABLET ORAL
COMMUNITY
Start: 2024-02-09 | End: 2024-02-18

## 2024-02-18 RX ORDER — AMOXICILLIN 250 MG
2 CAPSULE ORAL 2 TIMES DAILY PRN
Status: DISCONTINUED | OUTPATIENT
Start: 2024-02-18 | End: 2024-02-20 | Stop reason: HOSPADM

## 2024-02-18 RX ORDER — ROSUVASTATIN CALCIUM 40 MG/1
40 TABLET, COATED ORAL DAILY
Status: DISCONTINUED | OUTPATIENT
Start: 2024-02-19 | End: 2024-02-20 | Stop reason: HOSPADM

## 2024-02-18 RX ORDER — ALPRAZOLAM 0.5 MG
0.5 TABLET ORAL DAILY PRN
COMMUNITY

## 2024-02-18 RX ORDER — PROCHLORPERAZINE MALEATE 5 MG
5 TABLET ORAL EVERY 6 HOURS PRN
Status: DISCONTINUED | OUTPATIENT
Start: 2024-02-18 | End: 2024-02-20 | Stop reason: HOSPADM

## 2024-02-18 RX ORDER — IOPAMIDOL 755 MG/ML
75 INJECTION, SOLUTION INTRAVASCULAR ONCE
Status: COMPLETED | OUTPATIENT
Start: 2024-02-18 | End: 2024-02-18

## 2024-02-18 RX ORDER — PROCHLORPERAZINE 25 MG
12.5 SUPPOSITORY, RECTAL RECTAL EVERY 12 HOURS PRN
Status: DISCONTINUED | OUTPATIENT
Start: 2024-02-18 | End: 2024-02-20 | Stop reason: HOSPADM

## 2024-02-18 RX ORDER — VITAMIN B COMPLEX
2 TABLET ORAL DAILY
COMMUNITY

## 2024-02-18 RX ORDER — MULTIVITAMIN,THERAPEUTIC
1 TABLET ORAL DAILY
COMMUNITY

## 2024-02-18 RX ADMIN — METOPROLOL TARTRATE 25 MG: 25 TABLET, FILM COATED ORAL at 22:35

## 2024-02-18 RX ADMIN — SODIUM CHLORIDE 500 ML: 9 INJECTION, SOLUTION INTRAVENOUS at 12:20

## 2024-02-18 RX ADMIN — IOPAMIDOL 75 ML: 755 INJECTION, SOLUTION INTRAVENOUS at 12:55

## 2024-02-18 RX ADMIN — HEPARIN SODIUM AND DEXTROSE 1200 UNITS/HR: 10000; 5 INJECTION INTRAVENOUS at 15:40

## 2024-02-18 ASSESSMENT — ACTIVITIES OF DAILY LIVING (ADL)
ADLS_ACUITY_SCORE: 36
ADLS_ACUITY_SCORE: 36
ADLS_ACUITY_SCORE: 19
ADLS_ACUITY_SCORE: 36
ADLS_ACUITY_SCORE: 36
ADLS_ACUITY_SCORE: 19

## 2024-02-18 NOTE — ED NOTES
eMERGENCY dEPARTMENT PROGRESS NOTE         ED COURSE AND MEDICAL DECISION MAKING  Patient was signed out to me by Dr. Miller at 2:50 PM    3:14 PM I spoke with Dr. Taylor, cardiology, about the patient. Recommends putting patient on heparin drip and admit.   3:32 PM I discussed the case with hospitalist, Dr Amor, who accepts the patient.      Reba Juarez is a 79 year old female who presented to the ED for evaluation of palpitation.     Will follow-up on repeat troponin.  Unfortunately the repeat troponin did come back elevated.  After discussion with cardiology was decided that the patient should be started on heparin drip for suspected NSTEMI, and evaluation by cardiology in the morning.  Patient was comfortable with this plan.  She is stable at this time, and essentially asymptomatic.  Discussed with hospitalist who agreed to the admission    At the conclusion of the encounter I discussed the results of all of the tests and the disposition. The questions were answered. The patient or family acknowledged understanding and was agreeable with the care plan.     LAB  Pertinent labs results reviewed   Results for orders placed or performed during the hospital encounter of 02/18/24   CT Chest Pulmonary Embolism w Contrast    Impression    IMPRESSION:  1.  No pulmonary emboli or other acute finding in the chest.    2.  Small pulmonary nodules remain unchanged. A 0.8 cm groundglass nodule in the left upper lobe is unchanged dating back to 7/5/2021. Suggest follow-up chest CT in July 2026 to demonstrate 5 years of temporal stability, as per below.    REFERENCE:  Guidelines for Management of Incidental Pulmonary Nodules Detected on CT Images: From the Fleischner Society 2017.   Guidelines apply to incidental nodules in patients who are 35 years or older.  Guidelines do not apply to lung cancer screening, patients with immunosuppression, or patients with known primary cancer.    SUBSOLID NODULES  Ground  glass  Nodule size 6 mm or greater  CT at 6-12 months to confirm persistence, then CT every 2 years until 5 years.       Extra Blue Top Tube   Result Value Ref Range    Hold Specimen JIC    Extra Red Top Tube   Result Value Ref Range    Hold Specimen JIC    Extra Green Top (Lithium Heparin) Tube   Result Value Ref Range    Hold Specimen JIC    Extra Purple Top Tube   Result Value Ref Range    Hold Specimen JIC    Extra Green Top (Lithium Heparin) ON ICE   Result Value Ref Range    Hold Specimen JIC    Basic metabolic panel   Result Value Ref Range    Sodium 138 135 - 145 mmol/L    Potassium 3.6 3.4 - 5.3 mmol/L    Chloride 103 98 - 107 mmol/L    Carbon Dioxide (CO2) 27 22 - 29 mmol/L    Anion Gap 8 7 - 15 mmol/L    Urea Nitrogen 17.7 8.0 - 23.0 mg/dL    Creatinine 1.14 (H) 0.51 - 0.95 mg/dL    GFR Estimate 48 (L) >60 mL/min/1.73m2    Calcium 9.0 8.8 - 10.2 mg/dL    Glucose 196 (H) 70 - 99 mg/dL   Hepatic function panel   Result Value Ref Range    Protein Total 7.0 6.4 - 8.3 g/dL    Albumin 3.9 3.5 - 5.2 g/dL    Bilirubin Total 0.4 <=1.2 mg/dL    Alkaline Phosphatase 87 40 - 150 U/L    AST 26 0 - 45 U/L    ALT 33 0 - 50 U/L    Bilirubin Direct <0.20 0.00 - 0.30 mg/dL   Result Value Ref Range    Lipase 32 13 - 60 U/L   D dimer quantitative   Result Value Ref Range    D-Dimer Quantitative 0.81 (H) 0.00 - 0.50 ug/mL FEU   Result Value Ref Range    Troponin T, High Sensitivity 14 <=14 ng/L   Result Value Ref Range    Magnesium 1.7 1.7 - 2.3 mg/dL   TSH with free T4 reflex   Result Value Ref Range    TSH 2.17 0.30 - 4.20 uIU/mL   CBC with platelets and differential   Result Value Ref Range    WBC Count 16.2 (H) 4.0 - 11.0 10e3/uL    RBC Count 4.71 3.80 - 5.20 10e6/uL    Hemoglobin 13.3 11.7 - 15.7 g/dL    Hematocrit 40.3 35.0 - 47.0 %    MCV 86 78 - 100 fL    MCH 28.2 26.5 - 33.0 pg    MCHC 33.0 31.5 - 36.5 g/dL    RDW 15.0 10.0 - 15.0 %    Platelet Count 167 150 - 450 10e3/uL    % Neutrophils      % Lymphocytes      %  Monocytes      % Eosinophils      % Basophils      % Immature Granulocytes      NRBCs per 100 WBC 0 <1 /100    Absolute Neutrophils      Absolute Lymphocytes      Absolute Monocytes      Absolute Eosinophils      Absolute Basophils      Absolute Immature Granulocytes      Absolute NRBCs 0.0 10e3/uL   Result Value Ref Range    Troponin T, High Sensitivity 45 (H) <=14 ng/L   Manual Differential   Result Value Ref Range    % Neutrophils 27 %    % Lymphocytes 67 %    % Monocytes 4 %    % Eosinophils 2 %    % Basophils 0 %    Absolute Neutrophils 4.4 1.6 - 8.3 10e3/uL    Absolute Lymphocytes 10.9 (H) 0.8 - 5.3 10e3/uL    Absolute Monocytes 0.6 0.0 - 1.3 10e3/uL    Absolute Eosinophils 0.3 0.0 - 0.7 10e3/uL    Absolute Basophils 0.0 0.0 - 0.2 10e3/uL    RBC Morphology Confirmed RBC Indices     Platelet Assessment  Automated Count Confirmed. Platelet morphology is normal.     Automated Count Confirmed. Platelet morphology is normal.    Smudge Cells Present (A) None Seen   Result Value Ref Range    CRP Inflammation <3.00 <5.00 mg/L         RADIOLOGY    Pertinent imaging reviewed   Please see official radiology report.  CT Chest Pulmonary Embolism w Contrast   Final Result   IMPRESSION:   1.  No pulmonary emboli or other acute finding in the chest.      2.  Small pulmonary nodules remain unchanged. A 0.8 cm groundglass nodule in the left upper lobe is unchanged dating back to 7/5/2021. Suggest follow-up chest CT in July 2026 to demonstrate 5 years of temporal stability, as per below.      REFERENCE:   Guidelines for Management of Incidental Pulmonary Nodules Detected on CT Images: From the Fleischner Society 2017.    Guidelines apply to incidental nodules in patients who are 35 years or older.   Guidelines do not apply to lung cancer screening, patients with immunosuppression, or patients with known primary cancer.      SUBSOLID NODULES   Ground glass   Nodule size 6 mm or greater   CT at 6-12 months to confirm persistence,  then CT every 2 years until 5 years.                FINAL IMPRESSION    1. Atypical chest pain    2. Pulmonary nodules    3. Palpitations    4. Sinus tachycardia    5. NSTEMI (non-ST elevated myocardial infarction) (H)       Critical Care  Performed by: ANH BERMAN  Authorized by: ANH BERMAN  Total critical care time: 30 minutes  Critical care time was exclusive of separately billable procedures and treating other patients.  Critical care was necessary to treat or prevent imminent or life-threatening deterioration of the following conditions: NSTEMI  Critical care was time spent personally by me on the following activities: development of treatment plan with patient or surrogate, discussions with consultants, examination of patient, evaluation of patient's response to treatment, obtaining history from patient or surrogate, ordering and performing treatments and interventions, ordering and review of laboratory studies, ordering and review of radiographic studies and re-evaluation of patient's condition, this excludes any separately billable procedures.         Anh Berman DO  [facility] ED   2/18/2024     Anh Berman DO  02/18/24 1649

## 2024-02-18 NOTE — PHARMACY-ADMISSION MEDICATION HISTORY
"Pharmacist Admission Medication History    Admission medication history is complete. The information provided in this note is only as accurate as the sources available at the time of the update.    Information Source(s): Patient and CareEverywhere/SureScripts via in-person with family member present    Pertinent Information: patient very knowledgeable about her home medications.   Nitroglycerin - patient did note one PRN dose within the past week. Reported symptoms resolved and she had a follow-up cardiology appointment on 2/15/24.    Changes made to PTA medication list:  Added:   Alprazolam PRN prior to dental work  OTCs: Move Right supplement, multivitamin, vitamin K2, and vitamin D3  Deleted:   Doxycycline 100 mg BID - no fill hx, patient denies any other current antibiotics besides prophylactic amoxicillin  Furosemide - recent rx issued 2/9/23. Patient reported she stopped taking due to incontinence, reports she notified her cardiologist of discontinuation  Metformin - no fill hx, patient reports not taking \"in years\"  Changed:   Rosuvastatin at bedtime -> QAM     Allergies reviewed with patient and updates made in EHR: yes    Medication History Completed By: Nicollette McMann, RPH 2/18/2024 4:05 PM    PTA Med List   Medication Sig Last Dose    ALPRAZolam (XANAX) 0.5 MG tablet Take 0.5 mg by mouth daily as needed for anxiety (prior to dental work) More than a month    amoxicillin (AMOXIL) 500 MG capsule Take 4 capsules (2,000 mg) by mouth once as needed (SBE prophylaxis - take 30-60 minutes prior to dental procedure or cleaning) More than a month    aspirin 81 MG tablet Take 1 tablet by mouth daily. 2/18/2024 at AM    Glucos-Chond-Hyal Ac-Ca Fructo (MOVE FREE JOINT HEALTH ADVANCE) TABS Take 1 tablet by mouth daily Past Week    hydrochlorothiazide (HYDRODIURIL) 25 MG tablet Take 1 tablet (25 mg) by mouth daily 2/18/2024 at AM    Menatetrenone (VITAMIN K2) 100 MCG TABS Take 100 mcg by mouth daily Past Week    " metoprolol succinate ER (TOPROL XL) 50 MG 24 hr tablet Take 1 tablet (50 mg) by mouth daily 2/17/2024 at AM    multivitamin, therapeutic (THERA-VIT) TABS tablet Take 1 tablet by mouth daily Past Week at AM    nitroGLYcerin (NITROSTAT) 0.4 MG sublingual tablet Place 0.4 mg under the tongue every 5 minutes as needed Past Week at x1 dose    rosuvastatin (CRESTOR) 40 MG tablet Take 40 mg by mouth daily 2/18/2024 at AM    valsartan (DIOVAN) 160 MG tablet Take 1 tablet (160 mg) by mouth daily 2/18/2024 at AM    Vitamin D3 (CHOLECALCIFEROL) 25 mcg (1000 units) tablet Take 2 tablets by mouth daily Past Week

## 2024-02-18 NOTE — ED PROVIDER NOTES
EMERGENCY DEPARTMENT ENCOUNTER      NAME: Reba Juarez  AGE: 79 year old female  YOB: 1945  MRN: [id number]  EVALUATION DATE & TIME: 2/18/2024 11:45 AM    PCP: Dayana Kruse    ED PROVIDER: Nghia Miller M.D.      Chief Complaint   Patient presents with    Palpitations                IMPRESSION  1. Atypical chest pain    2. Pulmonary nodules    3. Palpitations    4. Sinus tachycardia        PLAN  - follow up repeat troponin & reassess    ED COURSE & MEDICAL DECISION MAKING    ED Course as of 02/18/24 1450   Sun Feb 18, 2024   1412 79yoF with history of COPD, HTN, T2DM (not on insulin), obesity, s/p TAVR (5/2021, not on AC) presenting per EMS for evaluation of chest pain and palpitations. Reports she was sitting at home around 11am this morning reading the news (not particularly stressful for her) when she developed palpitations with left chest pressure; not pleuritic or exertional. No nausea, vomiting, diarrhea, sweats. Reports intermittent palpitations recently and has Zio patch in place. No leg pain/swelling. Denies any numbness, tingling, focal weakness. Ongoing palpitations so called EMS; no meds given en route. Here in the ED, states her symptoms resolved---denies any symptoms at this time.    HR 120s on presentation with SBO 190s on presentation. Calm on exam with clear lungs, normal work of breathing, benign abdomen, no peripheral edema or calf tenderness, clear mentation.    EKG with sinus tachycardia with no arrhythmia or ischemic changes; initial high-sensitivity troponin within normal limits with repeat 2-hour pending at this time. CT chest obtained given high d-dimer and no PE, no other explanatory pathology on this. Blood tests otherwise with WBC 16 with CRP within normal limits (no infectious symptoms per history, doubt bacterial sepsis or infectious process at this time), no anemia, no CARMEL, no glaring electrolyte abnormality, unremarkable LFTs.    Given 500mL IVF as workup  obtained; HR 90s at this time after this and asymptomatic. Family at bedside and state she is acting baseline. Agreeable with plan to obtain repeat troponin and discharge to follow up in clinic if this is reassuring; no further questions at this time.   1450 Patient signed out to Dr. Berman at routine shift change. Plan at this time is follow up repeat trop & reassess.       --------------------------------------------------------------------------------   --------------------------------------------------------------------------------     12:06 PM I met with the patient for the initial interview and physical examination. Discussed plan for treatment and workup in the ED.      This patient involved a high degree of complexity in medical decision making, as significant risks were present and assessed. Recent encounters & results in medical record reviewed by me.    All workup (i.e. any EKG/labs/imaging as per charting below) reviewed and independently interpreted by me. See respective sections for details.        See additional MDM below if interested.      MEDICATIONS GIVEN IN THE EMERGENCY DEPARTMENT  Medications   sodium chloride 0.9% BOLUS 500 mL (0 mLs Intravenous Stopped 2/18/24 1412)   iopamidol (ISOVUE-370) solution 75 mL (75 mLs Intravenous $Given 2/18/24 1255)       NEW PRESCRIPTIONS STARTED AT TODAY'S ER VISIT  Current Discharge Medication List        CONTINUE these medications which have NOT CHANGED    Details   furosemide (LASIX) 20 MG tablet Take 1 tablet by mouth daily at 2 pm      amoxicillin (AMOXIL) 500 MG capsule Take 4 capsules (2,000 mg) by mouth once as needed (SBE prophylaxis - take 30-60 minutes prior to dental procedure or cleaning)  Qty: 4 capsule, Refills: 3    Comments: Do not fill now - fill per patient request  Associated Diagnoses: S/P TAVR (transcatheter aortic valve replacement)      aspirin 81 MG tablet Take 1 tablet by mouth daily.    Associated Diagnoses: Hyperlipidemia LDL goal <70       doxycycline monohydrate (MONODOX) 100 MG capsule Take 100 mg by mouth 2 times daily      hydrochlorothiazide (HYDRODIURIL) 25 MG tablet Take 1 tablet (25 mg) by mouth daily  Qty: 90 tablet, Refills: 3    Associated Diagnoses: Essential hypertension      metFORMIN (GLUCOPHAGE) 500 MG tablet Take 1 tablet by mouth      metoprolol succinate ER (TOPROL XL) 50 MG 24 hr tablet Take 1 tablet (50 mg) by mouth daily  Qty: 30 tablet, Refills: 3    Associated Diagnoses: Palpitations      nitroGLYcerin (NITROSTAT) 0.4 MG sublingual tablet Place 0.4 mg under the tongue every 5 minutes as needed      ORDER FOR DME Use your CPAP device as directed by your provider.      rosuvastatin (CRESTOR) 40 MG tablet Take 40 mg by mouth At Bedtime      valsartan (DIOVAN) 160 MG tablet Take 1 tablet (160 mg) by mouth daily  Qty: 90 tablet, Refills: 3    Associated Diagnoses: Hypertension                 =================================================================      HPI  Use of : N/A         Reba Juarez is a 79 year old female with a pertinent history of CAD, aortic valve sclerosis s/p TAVR, CLL, hypertension, hyperlipidemia, COPD, MAYKEL, type 2 diabetes, and gastric ulcer who presents to this ED by EMS for evaluation of palpitations.     Around 1100 this morning (1 hour ago), the patient reports she was sitting and reading the paper when she started to experience palpitations. In the ED, she says denies current palpitations. She notes this morning, she woke up with pain in her left shoulder and upper arm. It is did not radiate anywhere else. She describes the pain as feeling like she slept on her left arm all night. She has a heart monitor in place and thought she may have pinched it between her torso and arm to cause the pain. The patient says she is being monitored for irregular beats. She takes a baby aspirin daily but is otherwise not anticoagulated. No abdominal pain, nausea, vomiting, diarrhea, or any other  complaints at this time.       --------------- MEDICAL HISTORY ---------------  PAST MEDICAL HISTORY:  Reviewed independently by me.  Past Medical History:   Diagnosis Date    Aortic valve sclerosis     echo 1-24-11 mild    CAD (coronary artery disease)     Coronary artery disease 1995    PCI at [facility]. Repeat angiogram in 2007    Diabetes (H)     Gastric ulcer 1990    H/O exercise stress test 3/21/2011    No ischemia    Hyperlipidemia     Hyperlipidemia LDL goal < 70     identified 1990's    Hypertension     onset age 50-55 approx    Hypertension     MAYKEL (obstructive sleep apnea) 2007     Patient Active Problem List   Diagnosis    Hyperlipidemia LDL goal <70    Coronary heart disease    MAYKEL on CPAP    Hypertension    Class 2 severe obesity due to excess calories with serious comorbidity and body mass index (BMI) of 35.0 to 35.9 in adult (H)    Type 2 diabetes mellitus with other circulatory complication, without long-term current use of insulin (H)    Palpitations    CLL (chronic lymphocytic leukemia) (H)    Aortic valve stenosis, etiology of cardiac valve disease unspecified    Nonrheumatic aortic valve stenosis    Severe aortic stenosis    Aortic stenosis, severe    Status post coronary angiogram    Pre-operative examination    Abnormal LFTs    Anemia    Anginal equivalent    COPD (chronic obstructive pulmonary disease) (H)    Family history of premature CAD    History of PTCA    Internal derangement of knee    Right carotid bruit    Systolic murmur of aorta       PAST SURGICAL HISTORY:  Reviewed independently by me.  Past Surgical History:   Procedure Laterality Date    ANGIOPLASTY  early 1990s    COLONOSCOPY  2010    CV CORONARY ANGIOGRAM N/A 4/9/2021    Procedure: CV CORONARY ANGIOGRAM;  Surgeon: Júnior Santos MD;  Location:  HEART CARDIAC CATH LAB    CV LEFT HEART CATH N/A 4/9/2021    Procedure: Left Heart Cath;  Surgeon: Júnior Santos MD;  Location:  HEART CARDIAC CATH LAB     CV RIGHT HEART CATH MEASUREMENTS RECORDED N/A 4/9/2021    Procedure: Right Heart Cath;  Surgeon: Júnior Santos MD;  Location:  HEART CARDIAC CATH LAB    CV TRANSCATHETER AORTIC VALVE REPLACEMENT N/A 5/19/2021    Procedure: Transfemoral Transcatheter Aortic Valve Replacement with 23 mm Little Ellie 3 Ultra Transcatheter Heart Valve, Transthoracic echocardiogram read by Dr. Farfan;  Surgeon: Harvinder Quick MD;  Location:  OR    EYE SURGERY      HEART CATH FEMORAL CANNULIZATION WITH OPEN STANDBY REPAIR AORTIC VALVE N/A 5/19/2021    Procedure: cardiopulmonary bypass on standby;  Surgeon: Riaz Fuller MD;  Location:  OR       CURRENT MEDICATIONS:    Reviewed independently by me.  No current facility-administered medications for this encounter.    Current Outpatient Medications:     furosemide (LASIX) 20 MG tablet, Take 1 tablet by mouth daily at 2 pm, Disp: , Rfl:     amoxicillin (AMOXIL) 500 MG capsule, Take 4 capsules (2,000 mg) by mouth once as needed (SBE prophylaxis - take 30-60 minutes prior to dental procedure or cleaning), Disp: 4 capsule, Rfl: 3    aspirin 81 MG tablet, Take 1 tablet by mouth daily., Disp: , Rfl:     doxycycline monohydrate (MONODOX) 100 MG capsule, Take 100 mg by mouth 2 times daily (Patient not taking: Reported on 5/15/2023), Disp: , Rfl:     hydrochlorothiazide (HYDRODIURIL) 25 MG tablet, Take 1 tablet (25 mg) by mouth daily, Disp: 90 tablet, Rfl: 3    metFORMIN (GLUCOPHAGE) 500 MG tablet, Take 1 tablet by mouth, Disp: , Rfl:     metoprolol succinate ER (TOPROL XL) 50 MG 24 hr tablet, Take 1 tablet (50 mg) by mouth daily, Disp: 30 tablet, Rfl: 3    nitroGLYcerin (NITROSTAT) 0.4 MG sublingual tablet, Place 0.4 mg under the tongue every 5 minutes as needed, Disp: , Rfl:     ORDER FOR DME, Use your CPAP device as directed by your provider., Disp: , Rfl:     rosuvastatin (CRESTOR) 40 MG tablet, Take 40 mg by mouth At Bedtime, Disp: , Rfl:     valsartan (DIOVAN) 160 MG tablet,  Take 1 tablet (160 mg) by mouth daily, Disp: 90 tablet, Rfl: 3    ALLERGIES:  Reviewed independently by me.  Allergies   Allergen Reactions    Pravastatin      Myalgia      Simvastatin      myalgia    Calcium Citrate Unknown     Bloody nose per pt    Lipitor [Atorvastatin Calcium]      Myalgia         FAMILY HISTORY:  Reviewed independently by me.  Family History   Problem Relation Age of Onset    No Known Problems Sister     Alcoholism Brother     Myocardial Infarction Mother 51        HTN onset age 30, was a smoker    Myocardial Infarction Father 63        smoker    Myocardial Infarction Brother 39    No Known Problems Son     No Known Problems Daughter     CABG Brother     Heart Disease Brother     No Known Problems Sister     Cerebrovascular Disease Sister     Coronary Artery Disease Sister     No Known Problems Son          SOCIAL HISTORY:   Reviewed independently by me.  Social History     Socioeconomic History    Marital status:    Occupational History    Occupation: retired     Employer: [employer]     Comment:  retired full time, still works a few days as biochemical pharmacist   Tobacco Use    Smoking status: Former     Packs/day: 1.00     Years: 20.00     Additional pack years: 0.00     Total pack years: 20.00     Types: Cigarettes     Quit date: 3/5/1992     Years since quittin.9    Smokeless tobacco: Never   Substance and Sexual Activity    Alcohol use: No    Drug use: No   Social History Narrative    Single and lives with her son and brother.       --------------- PHYSICAL EXAM ---------------  Nursing notes and vitals independently reviewed by me.  VITALS:  Vitals:    24 1400 24 1415 24 1430 24 1445   BP: (!) 140/56      Pulse: 101 99 102 103   Resp:       Temp:       TempSrc:       SpO2: 95% 93% 92% 94%   Weight:       Height:           PHYSICAL EXAM:    General:  alert, interactive, no distress  Eyes:  conjunctivae clear, conjugate gaze  HENT:  atraumatic, nose  with no rhinorrhea, oropharynx clear, TMs clear bilaterally.   Neck:  no meningismus  Cardiovascular:  HR 110s-120s during exam (denies palpitations at this time), regular rhythm, no murmurs, brisk cap refill  Chest:  Zio patch to left anterior chest well. no chest wall tenderness  Pulmonary:  no stridor, normal phonation, normal work of breathing, clear lungs bilaterally  Abdomen:  soft, nondistended, nontender  :  no CVA tenderness  Back:  no midline spinal tenderness  Musculoskeletal:  no pretibial edema, no calf tenderness. Gross ROM intact to joints of extremities with no obvious deformities.  Skin:  warm, dry, no rash  Neuro:  awake, alert, answers questions appropriately, follows commands, moves all limbs  Psych:  calm, normal affect      --------------- RESULTS ---------------  EKG:    Reviewed and independently interpreted by me.  - sinus tachycardia at 120bpm, no MAXIME, mild STD in V5-6, T wave now upright in III, normal intervals  - mild lateral STD & inferior TWI resolved compared to prior on 5/15/23 (NSR at 65bpm, suspect rate-related changes given her tachycardia here today)  My read.    LAB:  Reviewed and independently interpreted by me.  Results for orders placed or performed during the hospital encounter of 02/18/24   CT Chest Pulmonary Embolism w Contrast    Impression    IMPRESSION:  1.  No pulmonary emboli or other acute finding in the chest.    2.  Small pulmonary nodules remain unchanged. A 0.8 cm groundglass nodule in the left upper lobe is unchanged dating back to 7/5/2021. Suggest follow-up chest CT in July 2026 to demonstrate 5 years of temporal stability, as per below.    REFERENCE:  Guidelines for Management of Incidental Pulmonary Nodules Detected on CT Images: From the Fleischner Society 2017.   Guidelines apply to incidental nodules in patients who are 35 years or older.  Guidelines do not apply to lung cancer screening, patients with immunosuppression, or patients with known primary  cancer.    SUBSOLID NODULES  Ground glass  Nodule size 6 mm or greater  CT at 6-12 months to confirm persistence, then CT every 2 years until 5 years.       Extra Blue Top Tube   Result Value Ref Range    Hold Specimen JIC    Extra Red Top Tube   Result Value Ref Range    Hold Specimen JIC    Extra Green Top (Lithium Heparin) Tube   Result Value Ref Range    Hold Specimen JIC    Extra Purple Top Tube   Result Value Ref Range    Hold Specimen JIC    Extra Green Top (Lithium Heparin) ON ICE   Result Value Ref Range    Hold Specimen JIC    Basic metabolic panel   Result Value Ref Range    Sodium 138 135 - 145 mmol/L    Potassium 3.6 3.4 - 5.3 mmol/L    Chloride 103 98 - 107 mmol/L    Carbon Dioxide (CO2) 27 22 - 29 mmol/L    Anion Gap 8 7 - 15 mmol/L    Urea Nitrogen 17.7 8.0 - 23.0 mg/dL    Creatinine 1.14 (H) 0.51 - 0.95 mg/dL    GFR Estimate 48 (L) >60 mL/min/1.73m2    Calcium 9.0 8.8 - 10.2 mg/dL    Glucose 196 (H) 70 - 99 mg/dL   Hepatic function panel   Result Value Ref Range    Protein Total 7.0 6.4 - 8.3 g/dL    Albumin 3.9 3.5 - 5.2 g/dL    Bilirubin Total 0.4 <=1.2 mg/dL    Alkaline Phosphatase 87 40 - 150 U/L    AST 26 0 - 45 U/L    ALT 33 0 - 50 U/L    Bilirubin Direct <0.20 0.00 - 0.30 mg/dL   Result Value Ref Range    Lipase 32 13 - 60 U/L   D dimer quantitative   Result Value Ref Range    D-Dimer Quantitative 0.81 (H) 0.00 - 0.50 ug/mL FEU   Result Value Ref Range    Troponin T, High Sensitivity 14 <=14 ng/L   Result Value Ref Range    Magnesium 1.7 1.7 - 2.3 mg/dL   TSH with free T4 reflex   Result Value Ref Range    TSH 2.17 0.30 - 4.20 uIU/mL   CBC with platelets and differential   Result Value Ref Range    WBC Count 16.2 (H) 4.0 - 11.0 10e3/uL    RBC Count 4.71 3.80 - 5.20 10e6/uL    Hemoglobin 13.3 11.7 - 15.7 g/dL    Hematocrit 40.3 35.0 - 47.0 %    MCV 86 78 - 100 fL    MCH 28.2 26.5 - 33.0 pg    MCHC 33.0 31.5 - 36.5 g/dL    RDW 15.0 10.0 - 15.0 %    Platelet Count 167 150 - 450 10e3/uL    %  Neutrophils      % Lymphocytes      % Monocytes      % Eosinophils      % Basophils      % Immature Granulocytes      NRBCs per 100 WBC 0 <1 /100    Absolute Neutrophils      Absolute Lymphocytes      Absolute Monocytes      Absolute Eosinophils      Absolute Basophils      Absolute Immature Granulocytes      Absolute NRBCs 0.0 10e3/uL   Manual Differential   Result Value Ref Range    % Neutrophils 27 %    % Lymphocytes 67 %    % Monocytes 4 %    % Eosinophils 2 %    % Basophils 0 %    Absolute Neutrophils 4.4 1.6 - 8.3 10e3/uL    Absolute Lymphocytes 10.9 (H) 0.8 - 5.3 10e3/uL    Absolute Monocytes 0.6 0.0 - 1.3 10e3/uL    Absolute Eosinophils 0.3 0.0 - 0.7 10e3/uL    Absolute Basophils 0.0 0.0 - 0.2 10e3/uL    RBC Morphology Confirmed RBC Indices     Platelet Assessment  Automated Count Confirmed. Platelet morphology is normal.     Automated Count Confirmed. Platelet morphology is normal.    Smudge Cells Present (A) None Seen   Result Value Ref Range    CRP Inflammation <3.00 <5.00 mg/L       RADIOLOGY:  Reviewed and independently interpreted by me. Please see official radiology report.  Recent Results (from the past 24 hour(s))   CT Chest Pulmonary Embolism w Contrast    Narrative    EXAM: CT CHEST PULMONARY EMBOLISM W CONTRAST  LOCATION: [facility]  DATE: 2/18/2024    INDICATION: chest pain, tachy, +dimer  COMPARISON: 12/22/2022  TECHNIQUE: CT chest pulmonary angiogram during arterial phase injection of IV contrast. Multiplanar reformats and MIP reconstructions were performed. Dose reduction techniques were used.   CONTRAST: [id number] 75ml    FINDINGS:  ANGIOGRAM CHEST: Pulmonary arteries are normal caliber and negative for pulmonary emboli. Suboptimal opacification of the subsegmental branches. Thoracic aorta is negative for dissection. No CT evidence of right heart strain.    LUNGS AND PLEURA: No new or enlarging suspicious nodules. Pre-existing nodules remain unchanged, the largest of  which is along the right major fissure and probably a lymph node. A 0.8 x 0.7 cm left upper lobe groundglass opacity is also unchanged dating   back to 7/5/2021 (6/115).    MEDIASTINUM/AXILLAE: No lymphadenopathy. Normal esophagus. No significant pericardial effusion. TAVR.    CORONARY ARTERY CALCIFICATION: Severe.    UPPER ABDOMEN: Normal.    MUSCULOSKELETAL: Degenerative changes in the spine. No acute fracture.      Impression    IMPRESSION:  1.  No pulmonary emboli or other acute finding in the chest.    2.  Small pulmonary nodules remain unchanged. A 0.8 cm groundglass nodule in the left upper lobe is unchanged dating back to 7/5/2021. Suggest follow-up chest CT in July 2026 to demonstrate 5 years of temporal stability, as per below.    REFERENCE:  Guidelines for Management of Incidental Pulmonary Nodules Detected on CT Images: From the Fleischner Society 2017.   Guidelines apply to incidental nodules in patients who are 35 years or older.  Guidelines do not apply to lung cancer screening, patients with immunosuppression, or patients with known primary cancer.    SUBSOLID NODULES  Ground glass  Nodule size 6 mm or greater  CT at 6-12 months to confirm persistence, then CT every 2 years until 5 years.             PROCEDURES:   Procedures   --------------------------------------------------------------------------------   Cardiac telemetry monitoring ordered by me secondary to the patient's history of chest pain and palpitations, and to monitor the patient for dysrhythmia. Reviewed & independently interpreted by me. Revealed sinus tachycardia with no other abnormalities. --------------------------------------------------------------------------------           --------------- ADDITIONAL MDM ---------------  History:  - I considered systemic symptoms of the presenting illness.  - Supplemental history from:       -- patient, family (son), EMS  - External Record(s) reviewed:       -- Inpatient/outpatient record,  prior labs, prior imaging       -- see above ED course & MDM for further details    Workup:  - Chart documentation above includes differential considered and any EKGs or imaging independently interpreted by provider.  - In additional to work up documented, I considered the following work up:       -- CTA chest/abdomen/pelvis       -- see above ED course & MDM for further details    External Consultation:  - Discussion of management with another provider:       -- ED pharmacist re: meds       -- see above charting for additional    Complicating Factors:  - Care impacted by chronic illness:       -- see above MDM, past medical history, & problem list  - Care affected by social determinants of health:       -- see above social history       -- Access to Affordable Healthcare    Disposition Considerations:  - Admission considered. Patient was signed out to the oncoming physician, disposition pending.           I, Cammy Arreola, am serving as a scribe to document services personally performed by Dr. Nghia Miller based on my observation and the provider's statements to me. I, Nghia Miller MD attest that Cammy Arreola is acting in a scribe capacity, has observed my performance of the services and has documented them in accordance with my direction.      Nghia Miller MD  02/18/24  Emergency Medicine  [facility] EMERGENCY DEPARTMENT  [address] [place] [id number] [phone]  Dept: [phone]     Nghia Miller MD  02/18/24 [id number]

## 2024-02-18 NOTE — ED TRIAGE NOTES
Pt arrives via EMS from home due to sudden onset of palpitations around 1100 with SOB and chest discomfort. Currently denies any SOB or CP, reports diminished feelings of palpitations.

## 2024-02-18 NOTE — ED NOTES
[facility] ED Handoff Report    ED Chief Complaint: Palpitations    ED Diagnosis:  (R07.89) Atypical chest pain  Comment: Pt reported having some chest discomfort when she felt palpitations around 1100. Has denied any pain since arriving to the ER.  Plan: [facility]  Referral        Labs indicated NSTEMI - heparin drip.    (R91.8) Pulmonary nodules  Comment: Old findings from 07/2021  Plan: Suggested follow-up in 2026    (R00.2) Palpitations  Comment: Pt reported having some chest discomfort when she felt palpitations around 1100. Has denied any pain since arriving to the ER.  Plan: [facility]  Referral        Labs indicated NSTEMI - heparin drip.    (R00.0) Sinus tachycardia  Comment: Pt reported having some chest discomfort when she felt palpitations around 1100. Has denied any pain since arriving to the ER.  Plan: [facility]  Referral        Labs indicated NSTEMI - heparin drip.    (I21.4) NSTEMI (non-ST elevated myocardial infarction) (H)  Comment: Pt reported having some chest discomfort when she felt palpitations around 1100. Has denied any pain since arriving to the ER.  Plan: [facility]  Referral        Labs indicated NSTEMI - heparin drip.       PMH:    Past Medical History:   Diagnosis Date    Aortic valve sclerosis     echo 1-24-11 mild    CAD (coronary artery disease)     Coronary artery disease 1995    PCI at [facility]. Repeat angiogram in 2007    Diabetes (H)     Gastric ulcer 1990    H/O exercise stress test 3/21/2011    No ischemia    Hyperlipidemia     Hyperlipidemia LDL goal < 70     identified 1990's    Hypertension     onset age 50-55 approx    Hypertension     MAYKEL (obstructive sleep apnea) 2007        Code Status:  Prior     Falls Risk: No Band: Not applicable    Current Living Situation/Residence: lives with their son or daughter     Elimination Status: Continent: Yes     Activity Level:  "Independent    Patients Preferred Language:  English     Needed: No    Vital Signs:  BP (!) 160/70   Pulse 96   Temp 97.9  F (36.6  C) (Oral)   Resp 17   Ht 1.6 m (5' 3\")   Wt 98.9 kg (218 lb)   SpO2 94%   BMI 38.62 kg/m       Cardiac Rhythm: Sinus tachycardia    Pain Score: 1/10    Is the Patient Confused:  No    Last Food or Drink: 02/18/24 at 0900    Focused Assessment:  Sinus tachy with no CP or SOB. Pt reports feeling \"less palpitations\"    Tests Performed: Done: Labs and Imaging    Treatments Provided:  IV fluids, heparin drip    Family Dynamics/Concerns: No    Family Updated On Visitor Policy: Yes    Plan of Care Communicated to Family: Yes    Who Was Updated about Plan of Care: Tref, son    Belongings Checklist Done and Signed by Patient: Yes    Medications sent with patient: N/A    Covid: asymptomatic    Additional Information:    RN: Pako Ashley RN   2/18/2024 4:35 PM        "

## 2024-02-18 NOTE — DISCHARGE INSTRUCTIONS
Continue all of your previously-prescribed medications.    Follow up with your Primary Care provider in 2 days for a recheck.    Return to the Emergency Department for any difficulty breathing, persistent vomiting, severe worsening, or any other concerns.  
Yes

## 2024-02-19 ENCOUNTER — APPOINTMENT (OUTPATIENT)
Dept: RADIOLOGY | Facility: HOSPITAL | Age: 79
DRG: 281 | End: 2024-02-19
Attending: NURSE PRACTITIONER
Payer: COMMERCIAL

## 2024-02-19 LAB
ABO/RH(D): NORMAL
ANION GAP SERPL CALCULATED.3IONS-SCNC: 7 MMOL/L (ref 7–15)
ANTIBODY SCREEN: NEGATIVE
ATRIAL RATE - MUSE: 120 BPM
BUN SERPL-MCNC: 12.9 MG/DL (ref 8–23)
CALCIUM SERPL-MCNC: 8.6 MG/DL (ref 8.8–10.2)
CHLORIDE SERPL-SCNC: 107 MMOL/L (ref 98–107)
CREAT SERPL-MCNC: 0.98 MG/DL (ref 0.51–0.95)
DEPRECATED HCO3 PLAS-SCNC: 26 MMOL/L (ref 22–29)
DIASTOLIC BLOOD PRESSURE - MUSE: 86 MMHG
EGFRCR SERPLBLD CKD-EPI 2021: 58 ML/MIN/1.73M2
ERYTHROCYTE [DISTWIDTH] IN BLOOD BY AUTOMATED COUNT: 15 % (ref 10–15)
GLUCOSE SERPL-MCNC: 115 MG/DL (ref 70–99)
HCT VFR BLD AUTO: 37.7 % (ref 35–47)
HGB BLD-MCNC: 12.4 G/DL (ref 11.7–15.7)
INTERPRETATION ECG - MUSE: NORMAL
MCH RBC QN AUTO: 28.2 PG (ref 26.5–33)
MCHC RBC AUTO-ENTMCNC: 32.9 G/DL (ref 31.5–36.5)
MCV RBC AUTO: 86 FL (ref 78–100)
P AXIS - MUSE: 73 DEGREES
PLATELET # BLD AUTO: 164 10E3/UL (ref 150–450)
POTASSIUM SERPL-SCNC: 4 MMOL/L (ref 3.4–5.3)
PR INTERVAL - MUSE: 146 MS
QRS DURATION - MUSE: 74 MS
QT - MUSE: 332 MS
QTC - MUSE: 469 MS
R AXIS - MUSE: 9 DEGREES
RBC # BLD AUTO: 4.4 10E6/UL (ref 3.8–5.2)
SODIUM SERPL-SCNC: 140 MMOL/L (ref 135–145)
SPECIMEN EXPIRATION DATE: NORMAL
SYSTOLIC BLOOD PRESSURE - MUSE: 193 MMHG
T AXIS - MUSE: 82 DEGREES
TROPONIN T SERPL HS-MCNC: 73 NG/L
UFH PPP CHRO-ACNC: 0.19 IU/ML
VENTRICULAR RATE- MUSE: 120 BPM
WBC # BLD AUTO: 21.2 10E3/UL (ref 4–11)

## 2024-02-19 PROCEDURE — C1894 INTRO/SHEATH, NON-LASER: HCPCS | Performed by: INTERNAL MEDICINE

## 2024-02-19 PROCEDURE — 250N000011 HC RX IP 250 OP 636: Performed by: NURSE PRACTITIONER

## 2024-02-19 PROCEDURE — 258N000003 HC RX IP 258 OP 636: Performed by: NURSE PRACTITIONER

## 2024-02-19 PROCEDURE — 250N000013 HC RX MED GY IP 250 OP 250 PS 637: Performed by: INTERNAL MEDICINE

## 2024-02-19 PROCEDURE — 99233 SBSQ HOSP IP/OBS HIGH 50: CPT | Performed by: INTERNAL MEDICINE

## 2024-02-19 PROCEDURE — 258N000003 HC RX IP 258 OP 636: Performed by: INTERNAL MEDICINE

## 2024-02-19 PROCEDURE — C1769 GUIDE WIRE: HCPCS | Performed by: INTERNAL MEDICINE

## 2024-02-19 PROCEDURE — 210N000001 HC R&B IMCU HEART CARE

## 2024-02-19 PROCEDURE — B211YZZ FLUOROSCOPY OF MULTIPLE CORONARY ARTERIES USING OTHER CONTRAST: ICD-10-PCS | Performed by: INTERNAL MEDICINE

## 2024-02-19 PROCEDURE — 250N000009 HC RX 250: Performed by: INTERNAL MEDICINE

## 2024-02-19 PROCEDURE — 84484 ASSAY OF TROPONIN QUANT: CPT | Performed by: STUDENT IN AN ORGANIZED HEALTH CARE EDUCATION/TRAINING PROGRAM

## 2024-02-19 PROCEDURE — 250N000011 HC RX IP 250 OP 636: Performed by: INTERNAL MEDICINE

## 2024-02-19 PROCEDURE — 99153 MOD SED SAME PHYS/QHP EA: CPT | Performed by: INTERNAL MEDICINE

## 2024-02-19 PROCEDURE — 86900 BLOOD TYPING SEROLOGIC ABO: CPT | Performed by: INTERNAL MEDICINE

## 2024-02-19 PROCEDURE — 93454 CORONARY ARTERY ANGIO S&I: CPT | Mod: 26 | Performed by: INTERNAL MEDICINE

## 2024-02-19 PROCEDURE — 36415 COLL VENOUS BLD VENIPUNCTURE: CPT | Performed by: EMERGENCY MEDICINE

## 2024-02-19 PROCEDURE — 4A0335C MEASUREMENT OF ARTERIAL FLOW, CORONARY, PERCUTANEOUS APPROACH: ICD-10-PCS | Performed by: INTERNAL MEDICINE

## 2024-02-19 PROCEDURE — 250N000011 HC RX IP 250 OP 636: Performed by: EMERGENCY MEDICINE

## 2024-02-19 PROCEDURE — 250N000013 HC RX MED GY IP 250 OP 250 PS 637: Performed by: STUDENT IN AN ORGANIZED HEALTH CARE EDUCATION/TRAINING PROGRAM

## 2024-02-19 PROCEDURE — 93454 CORONARY ARTERY ANGIO S&I: CPT | Performed by: INTERNAL MEDICINE

## 2024-02-19 PROCEDURE — 99152 MOD SED SAME PHYS/QHP 5/>YRS: CPT | Performed by: INTERNAL MEDICINE

## 2024-02-19 PROCEDURE — 73502 X-RAY EXAM HIP UNI 2-3 VIEWS: CPT

## 2024-02-19 PROCEDURE — 93571 IV DOP VEL&/PRESS C FLO 1ST: CPT | Performed by: INTERNAL MEDICINE

## 2024-02-19 PROCEDURE — 272N000001 HC OR GENERAL SUPPLY STERILE: Performed by: INTERNAL MEDICINE

## 2024-02-19 PROCEDURE — 80048 BASIC METABOLIC PNL TOTAL CA: CPT | Performed by: STUDENT IN AN ORGANIZED HEALTH CARE EDUCATION/TRAINING PROGRAM

## 2024-02-19 PROCEDURE — 4A033BC MEASUREMENT OF ARTERIAL PRESSURE, CORONARY, PERCUTANEOUS APPROACH: ICD-10-PCS | Performed by: INTERNAL MEDICINE

## 2024-02-19 PROCEDURE — 999N000157 HC STATISTIC RCP TIME EA 10 MIN

## 2024-02-19 PROCEDURE — 85027 COMPLETE CBC AUTOMATED: CPT | Performed by: STUDENT IN AN ORGANIZED HEALTH CARE EDUCATION/TRAINING PROGRAM

## 2024-02-19 PROCEDURE — C1887 CATHETER, GUIDING: HCPCS | Performed by: INTERNAL MEDICINE

## 2024-02-19 PROCEDURE — 85520 HEPARIN ASSAY: CPT | Performed by: EMERGENCY MEDICINE

## 2024-02-19 PROCEDURE — 99223 1ST HOSP IP/OBS HIGH 75: CPT | Performed by: INTERNAL MEDICINE

## 2024-02-19 PROCEDURE — 255N000002 HC RX 255 OP 636: Performed by: INTERNAL MEDICINE

## 2024-02-19 PROCEDURE — 93571 IV DOP VEL&/PRESS C FLO 1ST: CPT | Mod: 26 | Performed by: INTERNAL MEDICINE

## 2024-02-19 RX ORDER — ATROPINE SULFATE 0.1 MG/ML
0.5 INJECTION INTRAVENOUS
Status: ACTIVE | OUTPATIENT
Start: 2024-02-19 | End: 2024-02-19

## 2024-02-19 RX ORDER — NALOXONE HYDROCHLORIDE 0.4 MG/ML
0.2 INJECTION, SOLUTION INTRAMUSCULAR; INTRAVENOUS; SUBCUTANEOUS
Status: DISCONTINUED | OUTPATIENT
Start: 2024-02-19 | End: 2024-02-20 | Stop reason: HOSPADM

## 2024-02-19 RX ORDER — LIDOCAINE 40 MG/G
CREAM TOPICAL
Status: DISCONTINUED | OUTPATIENT
Start: 2024-02-19 | End: 2024-02-19 | Stop reason: HOSPADM

## 2024-02-19 RX ORDER — NALOXONE HYDROCHLORIDE 0.4 MG/ML
0.4 INJECTION, SOLUTION INTRAMUSCULAR; INTRAVENOUS; SUBCUTANEOUS
Status: DISCONTINUED | OUTPATIENT
Start: 2024-02-19 | End: 2024-02-19

## 2024-02-19 RX ORDER — IODIXANOL 320 MG/ML
INJECTION, SOLUTION INTRAVASCULAR
Status: DISCONTINUED | OUTPATIENT
Start: 2024-02-19 | End: 2024-02-19 | Stop reason: HOSPADM

## 2024-02-19 RX ORDER — NALOXONE HYDROCHLORIDE 0.4 MG/ML
0.4 INJECTION, SOLUTION INTRAMUSCULAR; INTRAVENOUS; SUBCUTANEOUS
Status: DISCONTINUED | OUTPATIENT
Start: 2024-02-19 | End: 2024-02-20 | Stop reason: HOSPADM

## 2024-02-19 RX ORDER — NALOXONE HYDROCHLORIDE 0.4 MG/ML
0.2 INJECTION, SOLUTION INTRAMUSCULAR; INTRAVENOUS; SUBCUTANEOUS
Status: DISCONTINUED | OUTPATIENT
Start: 2024-02-19 | End: 2024-02-19

## 2024-02-19 RX ORDER — FENTANYL CITRATE 50 UG/ML
INJECTION, SOLUTION INTRAMUSCULAR; INTRAVENOUS
Status: DISCONTINUED | OUTPATIENT
Start: 2024-02-19 | End: 2024-02-19 | Stop reason: HOSPADM

## 2024-02-19 RX ORDER — FENTANYL CITRATE 50 UG/ML
25 INJECTION, SOLUTION INTRAMUSCULAR; INTRAVENOUS
Status: DISCONTINUED | OUTPATIENT
Start: 2024-02-19 | End: 2024-02-19 | Stop reason: HOSPADM

## 2024-02-19 RX ORDER — ASPIRIN 325 MG
325 TABLET ORAL ONCE
Status: DISCONTINUED | OUTPATIENT
Start: 2024-02-19 | End: 2024-02-19

## 2024-02-19 RX ORDER — NITROGLYCERIN 5 MG/ML
VIAL (ML) INTRAVENOUS
Status: DISCONTINUED | OUTPATIENT
Start: 2024-02-19 | End: 2024-02-19 | Stop reason: HOSPADM

## 2024-02-19 RX ORDER — HYDRALAZINE HYDROCHLORIDE 20 MG/ML
10 INJECTION INTRAMUSCULAR; INTRAVENOUS
Status: COMPLETED | OUTPATIENT
Start: 2024-02-19 | End: 2024-02-19

## 2024-02-19 RX ORDER — ASPIRIN 81 MG/1
243 TABLET, CHEWABLE ORAL ONCE
Status: COMPLETED | OUTPATIENT
Start: 2024-02-19 | End: 2024-02-19

## 2024-02-19 RX ORDER — FLUMAZENIL 0.1 MG/ML
0.2 INJECTION, SOLUTION INTRAVENOUS
Status: ACTIVE | OUTPATIENT
Start: 2024-02-19 | End: 2024-02-19

## 2024-02-19 RX ORDER — DIAZEPAM 5 MG
5 TABLET ORAL
Status: DISCONTINUED | OUTPATIENT
Start: 2024-02-19 | End: 2024-02-19

## 2024-02-19 RX ORDER — SODIUM CHLORIDE 9 MG/ML
75 INJECTION, SOLUTION INTRAVENOUS CONTINUOUS
Status: ACTIVE | OUTPATIENT
Start: 2024-02-19 | End: 2024-02-19

## 2024-02-19 RX ORDER — ACETAMINOPHEN 650 MG/1
650 SUPPOSITORY RECTAL EVERY 4 HOURS PRN
Status: DISCONTINUED | OUTPATIENT
Start: 2024-02-19 | End: 2024-02-20 | Stop reason: HOSPADM

## 2024-02-19 RX ORDER — SODIUM CHLORIDE 9 MG/ML
INJECTION, SOLUTION INTRAVENOUS CONTINUOUS
Status: DISCONTINUED | OUTPATIENT
Start: 2024-02-19 | End: 2024-02-19 | Stop reason: HOSPADM

## 2024-02-19 RX ORDER — OXYCODONE HYDROCHLORIDE 5 MG/1
10 TABLET ORAL EVERY 4 HOURS PRN
Status: DISCONTINUED | OUTPATIENT
Start: 2024-02-19 | End: 2024-02-20 | Stop reason: HOSPADM

## 2024-02-19 RX ORDER — FENTANYL CITRATE 50 UG/ML
25 INJECTION, SOLUTION INTRAMUSCULAR; INTRAVENOUS
Status: DISCONTINUED | OUTPATIENT
Start: 2024-02-19 | End: 2024-02-19

## 2024-02-19 RX ORDER — ACETAMINOPHEN 325 MG/1
650 TABLET ORAL EVERY 4 HOURS PRN
Status: DISCONTINUED | OUTPATIENT
Start: 2024-02-19 | End: 2024-02-20 | Stop reason: HOSPADM

## 2024-02-19 RX ORDER — HEPARIN SODIUM 1000 [USP'U]/ML
INJECTION, SOLUTION INTRAVENOUS; SUBCUTANEOUS
Status: DISCONTINUED | OUTPATIENT
Start: 2024-02-19 | End: 2024-02-19 | Stop reason: HOSPADM

## 2024-02-19 RX ORDER — ACETAMINOPHEN 325 MG/1
650 TABLET ORAL EVERY 4 HOURS PRN
Status: DISCONTINUED | OUTPATIENT
Start: 2024-02-19 | End: 2024-02-19

## 2024-02-19 RX ORDER — OXYCODONE HYDROCHLORIDE 5 MG/1
5 TABLET ORAL EVERY 4 HOURS PRN
Status: DISCONTINUED | OUTPATIENT
Start: 2024-02-19 | End: 2024-02-20 | Stop reason: HOSPADM

## 2024-02-19 RX ADMIN — HYDROCHLOROTHIAZIDE 25 MG: 25 TABLET ORAL at 08:20

## 2024-02-19 RX ADMIN — VALSARTAN 160 MG: 80 TABLET, FILM COATED ORAL at 08:20

## 2024-02-19 RX ADMIN — HEPARIN SODIUM AND DEXTROSE 850 UNITS/HR: 10000; 5 INJECTION INTRAVENOUS at 06:58

## 2024-02-19 RX ADMIN — SODIUM CHLORIDE: 9 INJECTION, SOLUTION INTRAVENOUS at 10:13

## 2024-02-19 RX ADMIN — ROSUVASTATIN CALCIUM 40 MG: 40 TABLET, COATED ORAL at 08:20

## 2024-02-19 RX ADMIN — SODIUM CHLORIDE 75 ML/HR: 9 INJECTION, SOLUTION INTRAVENOUS at 13:08

## 2024-02-19 RX ADMIN — METOPROLOL SUCCINATE 50 MG: 50 TABLET, EXTENDED RELEASE ORAL at 08:20

## 2024-02-19 RX ADMIN — HYDRALAZINE HYDROCHLORIDE 10 MG: 20 INJECTION INTRAMUSCULAR; INTRAVENOUS at 13:39

## 2024-02-19 RX ADMIN — ASPIRIN 81 MG CHEWABLE TABLET 243 MG: 81 TABLET CHEWABLE at 09:31

## 2024-02-19 RX ADMIN — Medication 81 MG: at 08:20

## 2024-02-19 ASSESSMENT — ACTIVITIES OF DAILY LIVING (ADL)
ADLS_ACUITY_SCORE: 19
ADLS_ACUITY_SCORE: 19
DEPENDENT_IADLS:: INDEPENDENT
ADLS_ACUITY_SCORE: 19

## 2024-02-19 NOTE — PLAN OF CARE
"Goal Outcome Evaluation:      Plan of Care Reviewed With: patient    Overall Patient Progress: improvingOverall Patient Progress: improving  BP (!) 158/72   Pulse 84   Temp 97.9  F (36.6  C) (Oral)   Resp 16   Ht 1.6 m (5' 3\")   Wt 94.8 kg (209 lb 1.6 oz)   SpO2 94%   BMI 37.04 kg/m           Admitted pt to unit around 2030.   AOX4; VSS on RA, CPAP ovn; NSR on tele; Denies CP, palpitations, SOB, dizziness, nausea. Anti-Xa >1.1, paused heparin gtt for an hour and decreased dose, now running at 700 units/hr; next Anti-Xa recheck at 0430. Able to make needs known, call light in reach.     Nichelle Hathaway RN      Problem: Adult Inpatient Plan of Care  Goal: Plan of Care Review  Description: The Plan of Care Review/Shift note should be completed every shift.  The Outcome Evaluation is a brief statement about your assessment that the patient is improving, declining, or no change.  This information will be displayed automatically on your shift  note.  2/18/2024 2244 by Nichelle Hathaway RN  Outcome: Progressing  Flowsheets (Taken 2/18/2024 2244)  Plan of Care Reviewed With: patient  Overall Patient Progress: improving  2/18/2024 2244 by Nichelle Hahtaway RN  Outcome: Progressing  Flowsheets (Taken 2/18/2024 2244)  Plan of Care Reviewed With: patient  Overall Patient Progress: improving  Goal: Absence of Hospital-Acquired Illness or Injury  Intervention: Identify and Manage Fall Risk  Recent Flowsheet Documentation  Taken 2/18/2024 2030 by Nichelle Hathaway RN  Safety Promotion/Fall Prevention:   activity supervised   clutter free environment maintained   mobility aid in reach   assistive device/personal items within reach   room near nurse's station   room organization consistent   safety round/check completed   supervised activity   treat reversible contributory factors   treat underlying cause  Intervention: Prevent Skin Injury  Recent Flowsheet Documentation  Taken 2/18/2024 2030 by Nichelle Hathaway RN  Body Position: position " changed independently  Intervention: Prevent Infection  Recent Flowsheet Documentation  Taken 2/18/2024 2030 by Nichelle Hathaway RN  Infection Prevention:   environmental surveillance performed   personal protective equipment utilized   hand hygiene promoted   rest/sleep promoted   single patient room provided  Goal: Readiness for Transition of Care  Intervention: Mutually Develop Transition Plan  Recent Flowsheet Documentation  Taken 2/18/2024 2030 by Nichelle Hathaway RN  Equipment Currently Used at Home: (CPAP) other (see comments)     Problem: Risk for Delirium  Goal: Improved Behavioral Control  Intervention: Minimize Safety Risk  Recent Flowsheet Documentation  Taken 2/18/2024 2030 by Nichelle Hathaway RN  Enhanced Safety Measures:   room near unit station   review medications for side effects with activity

## 2024-02-19 NOTE — PRE-PROCEDURE
GENERAL PRE-PROCEDURE:   Procedure:  Coronary angiogram with possible PCI  Date/Time:  2/19/2024 11:05 AM    Written consent obtained?: Yes    Risks and benefits: Risks, benefits and alternatives were discussed    Consent given by:  Patient  Patient states understanding of procedure being performed: Yes    Patient's understanding of procedure matches consent: Yes    Procedure consent matches procedure scheduled: Yes    Expected level of sedation:  Moderate  Appropriately NPO:  Yes  ASA Class:  3 (elevated troponin, CAD with hx of remote balloon angioplasty, severe AS; s/p TAVR, CLL, MAYKEL, Class II obesity; BMI 37.71kg/m2)  Mallampati  :  Grade 2- soft palate, base of uvula, tonsillar pillars, and portion of posterior pharyngeal wall visible  Lungs:  Lungs clear with good breath sounds bilaterally  Heart:  Normal heart sounds and rate  History & Physical reviewed:  History and physical reviewed and updates made (see comment)  H&P Comments:  History & Physical reviewed:  History and physical reviewed and updates made (see comment)  H&P Comments:  Clinically Significant Risk Factors Present on Admission     Cardiovascular : elevated troponin, CAD with hx of remote balloon angioplasty, severe AS; s/p TAVR, Class II obesity; BMI 37.71kg/m2     Fluid & Electrolyte Disorders : Not present on admission     Gastroenterology : Not present on admission     Hematology/Oncology : CLL     Nephrology : Not present on admission     Neurology : Not present on admission     Pulmonology : MAYKEL; on CPAP     Systemic : Class II obesity; BMI 37.71kg/m2    Statement of review:  I have reviewed the lab findings, diagnostic data, medications, and the plan for sedation    Statement of review:  I have reviewed the lab findings, diagnostic data, medications, and the plan for sedation

## 2024-02-19 NOTE — ED NOTES
Called floor and made them aware that the note is in and pt will be transferred to the floor shortly.

## 2024-02-19 NOTE — PLAN OF CARE
Goal Outcome Evaluation:         Pt transferred [id number] to [facility] for CA/P.PCI pt prepped and ready for procedure. No family is here at this time. Pt states her Son Brian can be updated as needed. Number is on the front of the chart.      Jadyn Monroe RN

## 2024-02-19 NOTE — PLAN OF CARE
"  Problem: Adult Inpatient Plan of Care  Goal: Plan of Care Review  Description: The Plan of Care Review/Shift note should be completed every shift.  The Outcome Evaluation is a brief statement about your assessment that the patient is improving, declining, or no change.  This information will be displayed automatically on your shift  note.  Outcome: Progressing     Problem: Adult Inpatient Plan of Care  Goal: Patient-Specific Goal (Individualized)  Description: You can add care plan individualizations to a care plan. Examples of Individualization might be:  \"Parent requests to be called daily at 9am for status\", \"I have a hard time hearing out of my right ear\", or \"Do not touch me to wake me up as it startles  me\".  Outcome: Progressing     Problem: Risk for Delirium  Goal: Optimal Coping  Outcome: Progressing     Problem: Chest Pain  Goal: Resolution of Chest Pain Symptoms  Outcome: Progressing   Goal Outcome Evaluation:       Patient is here for chest pain and palpatation's, heparin gtt ifusing at 700 units/hr, recheck XA at 0430. Patient denies pain at this time. A&Ox4, SBA to bathroom, bed alarm on for safety.rhythm=NSR, 02 sat's 94% on home c-pap. Plan is continue to monitor troponin and chest pain.                 "

## 2024-02-19 NOTE — PLAN OF CARE
Problem: Cardiac Catheterization (Diagnostic/Interventional)  Goal: Absence of Bleeding  Outcome: Progressing  Goal: Absence of Embolism Signs and Symptoms  Outcome: Progressing  Goal: Absence of Vascular Access Complication  Outcome: Progressing   Goal Outcome Evaluation:    TR band was placed @ 1200 w/12 cc air.  Order to start air removal after 60 minutes. Removed 3 ml air @ 1315, soon noted blood loss from site, air replaced. Wait 30 minutes to start again.       Dr. Hyatt wants patient to go home with heart monitor. Patient arrived with heart monitor in place but CSC removed device. Called heart care to see if someone would be able to place monitor back on. They do not do the Fermín patches there so cannot assist.  Patient said that she was told if patches become rolled or dislodged she could place tegaderm over flaps, so we placed back on patient.    Patient remained on bedrest x 2 hrs, then up to restroom. Had BM.

## 2024-02-19 NOTE — H&P
"[facility]    History and Physical - Hospitalist Service       Date of Admission:  2/18/2024    Assessment & Plan   Reba Juarez is a 79 year old female with PMHx of CAD, HFpEF, MAYKEL on CPAP, HTN, DM2, aortic stenosis s/p TAVR who presents with acute onset of chest pain and palpitations.    #Acute chest pain and palpitations  #NSTEMI  Sudden onset of palpitations with associated chest pressure. Resolved by time of admission. Was being worked up for interittent palpitations as OP has Zio patch in place.  - Cardiology consulted  - Heparin gtt  - Continue home metoprolol  - HS trop 14 to 45 to 73  - EKG personally reviewed with 1mm ST depression in leads V4-V6, II  - Tele    #Leukocytosis  #CLL  No clinical signs of acute infection. Has history of CLL and leukocytosis is lymphocytic predominance.  - OP monitoring    #HTN: Continue home valsartan and HCTZ  #Aortic stenosis s/p TAVR: Continue home ASA  #CAD: Continue home ASA and statin  #MAYKEL: Continue CPAP at bedtime  #HFpEF: Not on diuretics as OP          Diet: Combination Diet Regular Diet Adult  NPO per Anesthesia Guidelines for Procedure/Surgery Except for: Meds    DVT Prophylaxis: Heparin gtt  Ceballos Catheter: Not present  Lines: None     Cardiac Monitoring: None  Code Status: Full Code      Clinically Significant Risk Factors Present on Admission                # Drug Induced Platelet Defect: home medication list includes an antiplatelet medication   # Hypertension: Noted on problem list      # Obesity: Estimated body mass index is 37.04 kg/m  as calculated from the following:    Height as of this encounter: 1.6 m (5' 3\").    Weight as of this encounter: 94.8 kg (209 lb 1.6 oz).              Disposition Plan      Expected Discharge Date: 02/20/2024                  VINAYAK AMARO MD  Hospitalist Service  [facility]  Securely message with [employer] (more info)  Text page via [employer] Paging/Directory "     ______________________________________________________________________    Chief Complaint   Chest pain and palpitations    History is obtained from the patient    History of Present Illness   Reba Juarez is a 79 year old female with PMHx of CAD, HFpEF, MAYKEL on CPAP, HTN, DM2, aortic stenosis s/p TAVR who presents with acute onset of chest pain and palpitations. Palpitations started suddenly the morning of 2/18. Not doing anything notable. Also felt some central chest pressure with these. Resolved by the time she came to the hospital.    In the ED initially had a normal evaluation, but HS troponin began to uptrend and was admitted for NSTEMI.      Past Medical History    Past Medical History:   Diagnosis Date    Aortic valve sclerosis     echo 1-24-11 mild    CAD (coronary artery disease)     Coronary artery disease 1995    PCI at [facility]. Repeat angiogram in 2007    Diabetes (H)     Gastric ulcer 1990    H/O exercise stress test 3/21/2011    No ischemia    Hyperlipidemia     Hyperlipidemia LDL goal < 70     identified 1990's    Hypertension     onset age 50-55 approx    Hypertension     MAYKEL (obstructive sleep apnea) 2007       Past Surgical History   Past Surgical History:   Procedure Laterality Date    ANGIOPLASTY  early 1990s    COLONOSCOPY  2010    CV CORONARY ANGIOGRAM N/A 4/9/2021    Procedure: CV CORONARY ANGIOGRAM;  Surgeon: Júnior Santos MD;  Location:  HEART CARDIAC CATH LAB    CV LEFT HEART CATH N/A 4/9/2021    Procedure: Left Heart Cath;  Surgeon: Júnior Santos MD;  Location:  HEART CARDIAC CATH LAB    CV RIGHT HEART CATH MEASUREMENTS RECORDED N/A 4/9/2021    Procedure: Right Heart Cath;  Surgeon: Júnior Santos MD;  Location:  HEART CARDIAC CATH LAB    CV TRANSCATHETER AORTIC VALVE REPLACEMENT N/A 5/19/2021    Procedure: Transfemoral Transcatheter Aortic Valve Replacement with 23 mm Little Ellie 3 Ultra Transcatheter Heart Valve, Transthoracic  echocardiogram read by Dr. Farfan;  Surgeon: Harvinder Quick MD;  Location: [facility] OR    EYE SURGERY      HEART CATH FEMORAL CANNULIZATION WITH OPEN STANDBY REPAIR AORTIC VALVE N/A 5/19/2021    Procedure: cardiopulmonary bypass on standby;  Surgeon: Riaz Fuller MD;  Location: [facility] OR       Prior to Admission Medications   Prior to Admission Medications   Prescriptions Last Dose Informant Patient Reported? Taking?   ALPRAZolam (XANAX) 0.5 MG tablet More than a month Self Yes Yes   Sig: Take 0.5 mg by mouth daily as needed for anxiety (prior to dental work)   Glucos-Chond-Hyal Ac-Ca Fructo (MOVE FREE JOINT HEALTH ADVANCE) TABS Past Week Self Yes Yes   Sig: Take 1 tablet by mouth daily   Menatetrenone (VITAMIN K2) 100 MCG TABS Past Week Self Yes Yes   Sig: Take 100 mcg by mouth daily   ORDER FOR DME   Yes No   Sig: Use your CPAP device as directed by your provider.   Vitamin D3 (CHOLECALCIFEROL) 25 mcg (1000 units) tablet Past Week Self Yes Yes   Sig: Take 2 tablets by mouth daily   amoxicillin (AMOXIL) 500 MG capsule More than a month Self No Yes   Sig: Take 4 capsules (2,000 mg) by mouth once as needed (SBE prophylaxis - take 30-60 minutes prior to dental procedure or cleaning)   aspirin 81 MG tablet 2/18/2024 at AM Self Yes Yes   Sig: Take 1 tablet by mouth daily.   hydrochlorothiazide (HYDRODIURIL) 25 MG tablet 2/18/2024 at AM Self No Yes   Sig: Take 1 tablet (25 mg) by mouth daily   metoprolol succinate ER (TOPROL XL) 50 MG 24 hr tablet 2/17/2024 at AM Self No Yes   Sig: Take 1 tablet (50 mg) by mouth daily   multivitamin, therapeutic (THERA-VIT) TABS tablet Past Week at AM Self Yes Yes   Sig: Take 1 tablet by mouth daily   nitroGLYcerin (NITROSTAT) 0.4 MG sublingual tablet Past Week at x1 dose Self Yes Yes   Sig: Place 0.4 mg under the tongue every 5 minutes as needed   rosuvastatin (CRESTOR) 40 MG tablet 2/18/2024 at AM Self Yes Yes   Sig: Take 40 mg by mouth daily   valsartan (DIOVAN) 160 MG tablet 2/18/2024  at AM Self No Yes   Sig: Take 1 tablet (160 mg) by mouth daily      Facility-Administered Medications: None           Physical Exam   Vital Signs: Temp: 97.9  F (36.6  C) Temp src: Oral BP: 139/63 Pulse: 88   Resp: 18 SpO2: 94 % O2 Device: None (Room air)    Weight: 209 lbs 1.6 oz    General Appearance: NAD, well appearing  Respiratory: CTAB  Cardiovascular: RRR. Systolic murmur best hear at RUSB  GI: Soft. NT/ND  Skin: No rashes noted   Other: Mild LE edema    Medical Decision Making       75 MINUTES SPENT BY ME on the date of service doing chart review, history, exam, documentation & further activities per the note.      Data     I have personally reviewed the following data over the past 24 hrs:    16.2 (H)  \   13.3   / 167     138 103 17.7 /  196 (H)   3.6 27 1.14 (H) \     ALT: 33 AST: 26 AP: 87 TBILI: 0.4   ALB: 3.9 TOT PROTEIN: 7.0 LIPASE: 32     Trop: 73 (H) BNP: N/A     TSH: 2.17 T4: N/A A1C: N/A     Procal: N/A CRP: <3.00 Lactic Acid: N/A       INR:  N/A PTT:  N/A   D-dimer:  0.81 (H) Fibrinogen:  N/A       Imaging results reviewed over the past 24 hrs:   Recent Results (from the past 24 hour(s))   CT Chest Pulmonary Embolism w Contrast    Narrative    EXAM: CT CHEST PULMONARY EMBOLISM W CONTRAST  LOCATION: [facility]  DATE: 2/18/2024    INDICATION: chest pain, tachy, +dimer  COMPARISON: 12/22/2022  TECHNIQUE: CT chest pulmonary angiogram during arterial phase injection of IV contrast. Multiplanar reformats and MIP reconstructions were performed. Dose reduction techniques were used.   CONTRAST: [id number] 75ml    FINDINGS:  ANGIOGRAM CHEST: Pulmonary arteries are normal caliber and negative for pulmonary emboli. Suboptimal opacification of the subsegmental branches. Thoracic aorta is negative for dissection. No CT evidence of right heart strain.    LUNGS AND PLEURA: No new or enlarging suspicious nodules. Pre-existing nodules remain unchanged, the largest of which is along the  right major fissure and probably a lymph node. A 0.8 x 0.7 cm left upper lobe groundglass opacity is also unchanged dating   back to 7/5/2021 (6/115).    MEDIASTINUM/AXILLAE: No lymphadenopathy. Normal esophagus. No significant pericardial effusion. TAVR.    CORONARY ARTERY CALCIFICATION: Severe.    UPPER ABDOMEN: Normal.    MUSCULOSKELETAL: Degenerative changes in the spine. No acute fracture.      Impression    IMPRESSION:  1.  No pulmonary emboli or other acute finding in the chest.    2.  Small pulmonary nodules remain unchanged. A 0.8 cm groundglass nodule in the left upper lobe is unchanged dating back to 7/5/2021. Suggest follow-up chest CT in July 2026 to demonstrate 5 years of temporal stability, as per below.    REFERENCE:  Guidelines for Management of Incidental Pulmonary Nodules Detected on CT Images: From the Fleischner Society 2017.   Guidelines apply to incidental nodules in patients who are 35 years or older.  Guidelines do not apply to lung cancer screening, patients with immunosuppression, or patients with known primary cancer.    SUBSOLID NODULES  Ground glass  Nodule size 6 mm or greater  CT at 6-12 months to confirm persistence, then CT every 2 years until 5 years.

## 2024-02-19 NOTE — CONSULTS
"Care Management Initial Consult    General Information  Assessment completed with: Patient, Patient  Type of CM/SW Visit: Initial Assessment    Primary Care Provider verified and updated as needed: Yes   Readmission within the last 30 days: no previous admission in last 30 days      Reason for Consult: discharge planning  Advance Care Planning: Advance Care Planning Reviewed: verified with patient        Communication Assessment  Patient's communication style: spoken language (English or Bilingual)    Hearing Difficulty or Deaf: no (\"my ears are just stuffy\")   Wear Glasses or Blind: no    Cognitive  Cognitive/Neuro/Behavioral: WDL                      Living Environment:   People in home: child(steve), adult     Current living Arrangements: house      Able to return to prior arrangements: yes     Family/Social Support:  Care provided by: self  Provides care for: no one  Marital Status:   Children          Description of Support System: Supportive, Involved    Support Assessment: Adequate family and caregiver support    Current Resources:   Patient receiving home care services: No     Community Resources: None  Equipment currently used at home: other (see comments), cane, straight, walker, rolling (CPAP)  Supplies currently used at home: None    Employment/Financial:  Employment Status: retired        Financial Concerns: none   Referral to Financial Worker: No     Does the patient's insurance plan have a 3 day qualifying hospital stay waiver?  Yes     Which insurance plan 3 day waiver is available? Alternative insurance waiver    Will the waiver be used for post-acute placement? Undetermined at this time    Lifestyle & Psychosocial Needs:  Social Determinants of Health     Food Insecurity: Not on file   Depression: Not at risk (3/29/2021)    PHQ-2     PHQ-2 Score: 0   Housing Stability: Not on file   Tobacco Use: Medium Risk (2/18/2024)    Patient History     Smoking Tobacco Use: Former     Smokeless Tobacco " "Use: Never     Passive Exposure: Not on file   Financial Resource Strain: Not on file   Alcohol Use: Not on file   Transportation Needs: Not on file   Physical Activity: Not on file   Interpersonal Safety: Not on file   Stress: Not on file   Social Connections: Not on file     Functional Status:  Prior to admission patient needed assistance:   Dependent ADLs:: Independent  Dependent IADLs:: Independent    Additional Information:  Writer met with patient at bedside to review role of care management services, discuss goals of care and assess need for any possible services at discharge. Patient alert, answering questions appropriately and engaged in the conversation. Patient reported no HCD, \"My children are working on it.\" Patient's son lives with her in a house. Report baseline independent with ADLs/IADLs. Still drives. No community resources. Has walker, cane and CPAP. Goal is to return home. Family will transport.       Johanna Gardiner RN     "

## 2024-02-19 NOTE — PROGRESS NOTES
[facility]    Medicine Progress Note - Hospitalist Service    Date of Admission:  2/18/2024    Assessment & Plan   Reba Juarez is a 79 year old female with PMHx of CAD, HFpEF, MAYKEL on CPAP, HTN, DM2, aortic stenosis s/p TAVR who presents with acute onset of chest pain and palpitations.    Coronary angiogram 2/19/2024 revealed borderline complex distal left main disease disease for which cardiologist recommends trial of medical management given preserved LV function, lack of exertional angina vs thoracic surgery consultation for surgical vascularization with plan to consider high risk complex PCI if patient is not a candidate for surgical intervention.      #Acute chest pain and palpitations  #NSTEMI  Coronary angiogram 2/19/2024 revealed borderline complex distal left main disease disease for which cardiologist recommends trial of medical management given preserved LV function, lack of exertional angina vs thoracic surgery consultation for surgical vascularization with plan to consider high risk complex PCI if patient is not a candidate for surgical intervention.    -Aspirin 81 mg daily  -Heparin gtt  -Rosuvastatin 40 mg daily  -Continue metoprolol succinate 50 mg daily  -Cardiology [id number] assistance    #Leukocytosis  #CLL  No clinical signs of acute infection. Has history of CLL and leukocytosis is lymphocytic predominance.  -Outpatient follow-up with oncologist.    Right sided pelvic pain   Follow up pelvic x ray with right hip x ray   Tylenol as needed   Will consider orthopedic consult and CT scan if pain persists and if x ray is unremarkable     #HTN: Continue home valsartan and HCTZ  #Aortic stenosis s/p TAVR: Continue home ASA  #CAD: Continue home ASA and statin  #MAYKEL: Continue CPAP at bedtime  #HFpEF: Not on diuretics as OP          Diet: Low Saturated Fat Na <2400 mg    DVT Prophylaxis: heparin  Ceballos Catheter: Not present  Lines: PRESENT             Cardiac  "Monitoring: ACTIVE order. Indication: Post- PCI/Angiogram (24 hours)  Code Status: Full Code      Clinically Significant Risk Factors Present on Admission                # Drug Induced Platelet Defect: home medication list includes an antiplatelet medication   # Hypertension: Noted on problem list      # Obesity: Estimated body mass index is 37.71 kg/m  as calculated from the following:    Height as of this encounter: 1.6 m (5' 3\").    Weight as of this encounter: 96.6 kg (212 lb 14.4 oz).              Disposition Plan     Expected Discharge Date: 02/20/2024                  Riaz Pollard MD  Hospitalist Service  [facility]  Securely message with [employer] (more info)  Text page via [employer] Paging/Directory   ______________________________________________________________________    Interval History   No new complaints today and no acute events overnight.  She denies chest pain, palpitation, shortness of breath or dizziness.  She states she has been ambulating without any difficulty. She endorsed right sided pelvic pain that started few days ago when she bent down to  a book on a shelf.  She is currently n.p.o. for the planned angiogram.        Physical Exam   Vital Signs: Temp: 97.6  F (36.4  C) Temp src: Oral BP: (!) 149/71 Pulse: 57   Resp: (!) 8 SpO2: 92 % O2 Device: None (Room air) Oxygen Delivery: 2 LPM  Weight: 212 lbs 14.4 oz    General appearance: Awake, Alert, Cooperative, not in any obvious distress and appears stated age   HEENT: Normocephalic, atraumatic, conjunctiva clear without icterus and ears without discharge  Lungs: Clear to auscultation bilaterally, no wheezing, good air exchange, normal work of breathing  Cardiovascular: Regular Rate and Rythm, normal apical impulse, normal S1 and S2, no lower extremity edema bilaterally  Abdomen: Soft, non-tender and Non-distended, active bowel sounds  Skin: Skin color, texture normal and bruising or bleeding. No rashes or " lesions over face, neck, arms and legs, turgor normal.  Musculoskeletal: No bony deformities or joint tenderness. Normal ROM upon flexion & extension.   Neurologic: Alert & Oriented X 3, Facial symmetry preserved and upper & lower extremities moving well with symmetry  Psychiatric: Calm, normal eye contact and normal affect      Medical Decision Making       50 MINUTES SPENT BY ME on the date of service doing chart review, history, exam, documentation & further activities per the note.      Data   Imaging results reviewed over the past 24 hrs:   Recent Results (from the past 24 hour(s))   CT Chest Pulmonary Embolism w Contrast    Narrative    EXAM: CT CHEST PULMONARY EMBOLISM W CONTRAST  LOCATION: [facility]  DATE: 2/18/2024    INDICATION: chest pain, tachy, +dimer  COMPARISON: 12/22/2022  TECHNIQUE: CT chest pulmonary angiogram during arterial phase injection of IV contrast. Multiplanar reformats and MIP reconstructions were performed. Dose reduction techniques were used.   CONTRAST: [id number] 75ml    FINDINGS:  ANGIOGRAM CHEST: Pulmonary arteries are normal caliber and negative for pulmonary emboli. Suboptimal opacification of the subsegmental branches. Thoracic aorta is negative for dissection. No CT evidence of right heart strain.    LUNGS AND PLEURA: No new or enlarging suspicious nodules. Pre-existing nodules remain unchanged, the largest of which is along the right major fissure and probably a lymph node. A 0.8 x 0.7 cm left upper lobe groundglass opacity is also unchanged dating   back to 7/5/2021 (6/115).    MEDIASTINUM/AXILLAE: No lymphadenopathy. Normal esophagus. No significant pericardial effusion. TAVR.    CORONARY ARTERY CALCIFICATION: Severe.    UPPER ABDOMEN: Normal.    MUSCULOSKELETAL: Degenerative changes in the spine. No acute fracture.      Impression    IMPRESSION:  1.  No pulmonary emboli or other acute finding in the chest.    2.  Small pulmonary nodules remain  unchanged. A 0.8 cm groundglass nodule in the left upper lobe is unchanged dating back to 2021. Suggest follow-up chest CT in 2026 to demonstrate 5 years of temporal stability, as per below.    REFERENCE:  Guidelines for Management of Incidental Pulmonary Nodules Detected on CT Images: From the Fleischner Society 2017.   Guidelines apply to incidental nodules in patients who are 35 years or older.  Guidelines do not apply to lung cancer screening, patients with immunosuppression, or patients with known primary cancer.    SUBSOLID NODULES  Ground glass  Nodule size 6 mm or greater  CT at 6-12 months to confirm persistence, then CT every 2 years until 5 years.       Cardiac Catheterization    Narrative    CARDIAC CATHETERIZATION AND INTERVENTION REPORT    PATIENT NAME:  Reba Juarez          MEDICAL RECORD NUMBER: [id number]  : 1945       PROCEDURE DATE: 2024        CONCLUSIONS: Borderline distal left main disease (FFR 0.8) with mild to   moderate nonobstructive disease elsewhere.      RECOMMENDATIONS:   Usual postprocedure cares, please see orders.  Recommend a trial of medical management given borderline complex distal   left main disease, preserved LV function, and lack of exertional angina -   unclear if left main disease etiology of current presentation.    Alternatively, can consider cardiothoracic surgery consultation for   evaluation of surgical revascularization and if not a candidate then high   risk, complex PCI is an option.  Aggressive risk factor modification for secondary prevention.    PROCEDURE PERFORMED:   Selective right and left coronary angiography  iFR and FFR of the left main to the left anterior descending artery    PRE-OP DIAGNOSIS:  Atypical chest pain  Troponin elevation    POST-OP DIAGNOSIS: Same     PROCEDURALISTS: Romero Garcia MD      DIAGNOSTIC PROCEDURAL DETAILS:   Signed, informed consent was obtained. The patient was placed on the table   and  prepped and draped in the usual fashion. Time out and site   verification performed.   Access: Right radial artery  Catheters: JL 3.5, JR4  Hemostasis: TR band    PROCEDURAL MEDICATIONS:  Conscious sedation - midazolam and fentanyl, please see procedure log for   dosing.   Local anesthesia - 1% lidocaine   Procedural anticoagulation - 75 units/kg of heparin     CONTRAST VOLUME: 120 mL Visipaque  BLOOD LOSS: minimal   FLUIDS: None   SPECIMENS: None  COMPLICATIONS: None    Coronary Angiography:  LEFT MAIN: Normal caliber vessel that bifurcates into left anterior   descending and left circumflex arteries.  Proximal left main has mild   diffuse disease followed by 50% eccentric, calcific stenosis in the distal   portion.  FFR of the left main to the LAD was 0.8.  LEFT ANTERIOR DESCENDING: Large vessel that gives rise to multiple medium   size diagonal branches and septal perforators.  The LAD wraps around the   apex distally.  The ostial LAD has 50 to 60% calcific stenosis followed by   mild to moderate diffuse disease of the LAD and its branches distally.  LEFT CIRCUMFLEX: Large dominant vessel that gives rise to both small to   medium size OM branches and terminates into the posterior descending   artery and posterolateral system distally.  Left circumflex and its   branches have mild diffuse disease.  RIGHT CORONARY ARTERY: Small, nondominant vessel with moderate disease.    INTERVENTIONAL DETAILS:   Lesion # 1 : 50% eccentric, calcific stenosis of the distal left main  Left main coronary artery was engaged using a JL 3.5 diagnostic catheter   that provide adequate coaptation and support.  The left main into LAD   lesion was traversed with a Comet 2 wire and iFR was performed per   standard protocol after administration of IV nitroglycerin.  With   inconsistent recordings with iFR elected to switch to FFR.  FFR of the   left main to the LAD was performed after administration of IV adenosine   per  protocol.        Please do not hesitate to contact me if you have any questions or   concerns.  I was present for the procedure in its entirety.     Romero Garcia MD  Interventional Cardiology

## 2024-02-19 NOTE — CONSULTS
"  Thank you,  No ref. provider found, for asking the [facility] Care team to see Reba Juarez to evaluate elevated troponin.      Assessment/Recommendations   Assessment:    Elevated troponin consistent with minor non-ST segment elevation myocardial infarction, unclear whether related to demand ischemia from uncontrolled hypertension tachycardia or progression of coronary artery disease  Coronary artery disease, mild to moderate prior to TAVR in 2021, history of balloon angioplasty in the 90s  Aortic stenosis status post TAVR 2021, normal auscultation  Heart palpitations, unclear etiology probably related to sinus tachycardia  Hypertension, labile  CLL  Obstructive sleep apnea on CPAP    Plan:  Continue current medications  I think we should proceed with direct coronary angiogram to reassess coronary circulation.  I discussed the risk and benefits of the procedure with the patient.  She agrees to proceed  Further recommendation when the results of the angiogram available      Clinically Significant Risk Factors Present on Admission                # Drug Induced Platelet Defect: home medication list includes an antiplatelet medication   # Hypertension: Noted on problem list      # Obesity: Estimated body mass index is 37.71 kg/m  as calculated from the following:    Height as of this encounter: 1.6 m (5' 3\").    Weight as of this encounter: 96.6 kg (212 lb 14.4 oz).             Heart Valve Replacement      CKD POA List: Stage 3 (unspecified if a or b) (GFR 30 - 59)           History of Present Illness/Subjective    Ms. Reba Juarez is a 79 year old female who came into the emergency room with complaints of heart palpitations and chest discomfort.  She states that she been having palpitations on and off for last several days.  Yesterday she started feeling heart accelerate gradually.  She checked her blood pressure was significantly elevated.  Later on she started to feel some mild chest " "discomfort.  He decided to come to the emergency room.  In the ER she had an EKG that showed no acute ischemic changes.  She was in sinus tachycardia.  Troponin was mildly elevated.  She denies syncope or near syncope.  She reports that she had the flu about a month ago.  She was not taking all of her antihypertensive medications due to poor appetite.  She has a history of coronary artery disease with remote balloon angioplasty 1992.  Coronary angiogram before aortic valve replacement showed mild to moderate nonobstructive coronary artery disease.    ECG: Personally reviewed.  Normal sinus rhythm no acute ischemic changes.    ECHO (personnaly Reviewed): May 2023  Global and regional left ventricular function is normal with an EF of 60-65%.  Global right ventricular function is normal.  S/P TAVR with 23 mm ES3 Ultra valve. Mean aortic gradient 15 mmHg, stable from  prior study (18 mmHg) Trivial paravalvular aortic insufficiency.  No pericardial effusion is present.  Coronary angiogram: April 2021  Non-obstructive coronary artery disease  >>> 25% proximal LMCA stenosis  >>> 50% proximal LAD, 20% mid/distal LAD stenoses  >>> 25% proximal and mid LCx stenoses    CT chest:no acute findings     Physical Examination Review of Systems   /63 (BP Location: Right arm)   Pulse 69   Temp 97.6  F (36.4  C) (Oral)   Resp 18   Ht 1.6 m (5' 3\")   Wt 96.6 kg (212 lb 14.4 oz)   SpO2 95%   BMI 37.71 kg/m    Body mass index is 37.71 kg/m .  Wt Readings from Last 3 Encounters:   02/19/24 96.6 kg (212 lb 14.4 oz)   02/15/24 99.1 kg (218 lb 6.4 oz)   05/15/23 99.6 kg (219 lb 8 oz)       Intake/Output Summary (Last 24 hours) at 2/19/2024 0828  Last data filed at 2/19/2024 0828  Gross per 24 hour   Intake 315 ml   Output 1000 ml   Net -685 ml     General Appearance:   Alert, cooperative, no distress, appears stated age   Head/ENT: Normocephalic, without obvious abnormality. Membranes moist.      EYES:  No scleral icterus   Neck: " "Supple, symmetrical, trachea midline, no adenopathy, thyroid: not enlarged, symmetric, no carotid bruit or JVD   Chest/Lungs:   lungs are clear to auscultation, respirations unlabored. No tenderness or deformity   Cardiovascular:   Regular rhythm, S1, S2 normal, no murmur, rub or gallop.   Abdomen:  Soft, non-tender, bowel sounds active all four quadrants,  no masses, no organomegaly   Extremities: no cyanosis or clubbing. No edema   Skin: Skin color, texture, turgor normal, no rashes or lesions.    Neurologic: Alert and oriented x 3, moving all four extremities.    Psychiatric: Normal affect.      10 system review of systems completed see history of present illness and inpatient H&P (reviewed) for further details.          Lab Results    Chemistry/lipid CBC Cardiac Enzymes/BNP/TSH/INR   Recent Labs   Lab Test 05/30/23  1013   CHOL 180   HDL 56   LDL 98   TRIG 131     Recent Labs   Lab Test 05/30/23  1013 03/29/21  1135   LDL 98 103*     Recent Labs   Lab Test 02/19/24  0507      POTASSIUM 4.0   CHLORIDE 107   CO2 26   *   BUN 12.9   CR 0.98*   GFRESTIMATED 58*   TERRI 8.6*     Recent Labs   Lab Test 02/19/24  0507 02/18/24  1156 05/30/23  1013   CR 0.98* 1.14* 1.16*     Recent Labs   Lab Test 02/18/24  1156   A1C 6.1*          Recent Labs   Lab Test 02/19/24  0507   WBC 21.2*   HGB 12.4   HCT 37.7   MCV 86        Recent Labs   Lab Test 02/19/24  0507 02/18/24  1156 05/30/23  1013   HGB 12.4 13.3 12.9    No results for input(s): \"TROPONINI\" in the last 41493 hours.  Recent Labs   Lab Test 12/22/22  1923   NTBNPI 70     Recent Labs   Lab Test 02/18/24  1156   TSH 2.17     Recent Labs   Lab Test 05/17/21  1246   INR 1.04        Medical History  Surgical History Family History Social History   Past Medical History:   Diagnosis Date    Aortic valve sclerosis     echo 1-24-11 mild    CAD (coronary artery disease)     Coronary artery disease 1995    PCI at [facility]. Repeat angiogram in 2007 "    Diabetes (H)     Gastric ulcer     H/O exercise stress test 3/21/2011    No ischemia    Hyperlipidemia     Hyperlipidemia LDL goal < 70     identified     Hypertension     onset age 50-55 approx    Hypertension     MAYKEL (obstructive sleep apnea)      Past Surgical History:   Procedure Laterality Date    ANGIOPLASTY  early     COLONOSCOPY      CV CORONARY ANGIOGRAM N/A 2021    Procedure: CV CORONARY ANGIOGRAM;  Surgeon: Júnior Santos MD;  Location:  HEART CARDIAC CATH LAB    CV LEFT HEART CATH N/A 2021    Procedure: Left Heart Cath;  Surgeon: Júnior Santos MD;  Location:  HEART CARDIAC CATH LAB    CV RIGHT HEART CATH MEASUREMENTS RECORDED N/A 2021    Procedure: Right Heart Cath;  Surgeon: Júnior Santos MD;  Location:  HEART CARDIAC CATH LAB    CV TRANSCATHETER AORTIC VALVE REPLACEMENT N/A 2021    Procedure: Transfemoral Transcatheter Aortic Valve Replacement with 23 mm Little Ellie 3 Ultra Transcatheter Heart Valve, Transthoracic echocardiogram read by Dr. Farfan;  Surgeon: Harvinder Quick MD;  Location:  OR    EYE SURGERY      HEART CATH FEMORAL CANNULIZATION WITH OPEN STANDBY REPAIR AORTIC VALVE N/A 2021    Procedure: cardiopulmonary bypass on standby;  Surgeon: Riaz Fuller MD;  Location:  OR     No premature CAD, SCD,cardiomyopathy   Social History     Socioeconomic History    Marital status:      Spouse name: Not on file    Number of children: Not on file    Years of education: Not on file    Highest education level: Not on file   Occupational History    Occupation: retired     Employer: [employer]     Comment:  retired full time, still works a few days as biochemical pharmacist   Tobacco Use    Smoking status: Former     Packs/day: 1.00     Years: 20.00     Additional pack years: 0.00     Total pack years: 20.00     Types: Cigarettes     Quit date: 3/5/1992     Years since quittin.9    Smokeless tobacco: Never    Substance and Sexual Activity    Alcohol use: No    Drug use: No    Sexual activity: Not on file   Other Topics Concern    Parent/sibling w/ CABG, MI or angioplasty before 65F 55M? Not Asked   Social History Narrative    Single and lives with her son and brother.     Social Determinants of Health     Financial Resource Strain: Not on file   Food Insecurity: Not on file   Transportation Needs: Not on file   Physical Activity: Not on file   Stress: Not on file   Social Connections: Not on file   Interpersonal Safety: Not on file   Housing Stability: Not on file         Medications  Allergies   Current Facility-Administered Medications Ordered in Epic   Medication Dose Route Frequency Provider Last Rate Last Admin    ALPRAZolam (XANAX) tablet 0.5 mg  0.5 mg Oral Daily PRN Cl Linares MD        aspirin EC tablet 81 mg  81 mg Oral Daily Cl Linares MD   81 mg at 02/19/24 0820    calcium carbonate (TUMS) chewable tablet 1,000 mg  1,000 mg Oral 4x Daily PRN Cl Linares MD        diazepam (VALIUM) tablet 5 mg  5 mg Oral Once PRN Wilfredo Hyatt MD        heparin infusion 25,000 units in D5W 250 mL ANTICOAGULANT  0-5,000 Units/hr Intravenous Continuous Preet Berman DO 8.5 mL/hr at 02/19/24 0658 850 Units/hr at 02/19/24 0658    hydrochlorothiazide (HYDRODIURIL) tablet 25 mg  25 mg Oral Daily Cl Linares MD   25 mg at 02/19/24 0820    lidocaine (LMX4) cream   Topical Q1H PRN Cl Linares MD        lidocaine 1 % 0.1-1 mL  0.1-1 mL Other Q1H PRN Cl Linares MD        metoprolol succinate ER (TOPROL XL) 24 hr tablet 50 mg  50 mg Oral Daily Cl Linares MD   50 mg at 02/19/24 0820    ondansetron (ZOFRAN ODT) ODT tab 4 mg  4 mg Oral Q6H PRN Cl Linares MD        Or    ondansetron (ZOFRAN) injection 4 mg  4 mg Intravenous Q6H PRN Cl Linares MD        Patient is already receiving anticoagulation with heparin, enoxaparin (LOVENOX), warfarin (COUMADIN)   or other anticoagulant medication   Does not apply Continuous PRN Cl Linares MD        polyethylene glycol (MIRALAX) Packet 17 g  17 g Oral BID PRN Cl Linares MD        prochlorperazine (COMPAZINE) injection 5 mg  5 mg Intravenous Q6H PRN Cl Linares MD        Or    prochlorperazine (COMPAZINE) tablet 5 mg  5 mg Oral Q6H PRN Cl Linares MD        Or    prochlorperazine (COMPAZINE) suppository 12.5 mg  12.5 mg Rectal Q12H PRN Cl Linares MD        rosuvastatin (CRESTOR) tablet 40 mg  40 mg Oral Daily Cl Linares MD   40 mg at 02/19/24 0820    senna-docusate (SENOKOT-S/PERICOLACE) 8.6-50 MG per tablet 1 tablet  1 tablet Oral BID PRN Cl Linares MD        Or    senna-docusate (SENOKOT-S/PERICOLACE) 8.6-50 MG per tablet 2 tablet  2 tablet Oral BID PRN Cl Linares MD        sodium chloride (PF) 0.9% PF flush 3 mL  3 mL Intracatheter Q8H Cl Linares MD   3 mL at 02/19/24 0237    sodium chloride (PF) 0.9% PF flush 3 mL  3 mL Intracatheter q1 min prn Cl Linares MD        valsartan (DIOVAN) tablet 160 mg  160 mg Oral Daily Cl Linares MD   160 mg at 02/19/24 0820     No current Epic-ordered outpatient medications on file.       Allergies   Allergen Reactions    Calcium Citrate Unknown     Bloody nose per pt    Lipitor [Atorvastatin Calcium]      Myalgia      Pravastatin      Myalgia      Simvastatin      myalgia

## 2024-02-19 NOTE — PROGRESS NOTES
No available hospital cpap units at this time, pt will contact son to bring home cpap for use while in hospital.    Caroline Condon, RT on 2/18/2024 at 7:43 PM

## 2024-02-19 NOTE — PROGRESS NOTES
"Care Management Follow Up    Length of Stay (days): 1    Expected Discharge Date: 02/20/2024     Concerns to be Addressed: Care progression - discharge planning     Patient plan of care discussed at interdisciplinary rounds: Yes    Anticipated Discharge Disposition:  TBD     Anticipated Discharge Services:  TBD  Anticipated Discharge DME:  [facility]    Patient/family educated on Medicare website which has current facility and service quality ratings:  NA  Education Provided on the Discharge Plan:  Yes per team  Patient/Family in Agreement with the Plan:  NA    Referrals Placed by CM/SW:  [facility]  Private pay costs discussed: Not applicable    Additional Information:  Per provider, Dr. Pollard, patient had coronary angiogram. Waiting for cardiology rec. Maybe discharge home tomorrow.    Social Hx: \"Adult son lives with her in a house. Report baseline independent with ADLs/IADLs. Still drives. No community resources. Has walker, cane and CPAP. Goal is to return home. Family will transport.\"    RNCM to follow for medical progression, recommendations, and final discharge plan.     Johanna Gardiner RN     "

## 2024-02-20 ENCOUNTER — APPOINTMENT (OUTPATIENT)
Dept: CT IMAGING | Facility: HOSPITAL | Age: 79
DRG: 281 | End: 2024-02-20
Attending: INTERNAL MEDICINE
Payer: COMMERCIAL

## 2024-02-20 VITALS
SYSTOLIC BLOOD PRESSURE: 137 MMHG | BODY MASS INDEX: 37.4 KG/M2 | HEART RATE: 63 BPM | DIASTOLIC BLOOD PRESSURE: 64 MMHG | OXYGEN SATURATION: 92 % | RESPIRATION RATE: 18 BRPM | HEIGHT: 63 IN | WEIGHT: 211.1 LBS | TEMPERATURE: 97.9 F

## 2024-02-20 LAB
ERYTHROCYTE [DISTWIDTH] IN BLOOD BY AUTOMATED COUNT: 14.7 % (ref 10–15)
HCT VFR BLD AUTO: 37.8 % (ref 35–47)
HGB BLD-MCNC: 12.3 G/DL (ref 11.7–15.7)
MCH RBC QN AUTO: 28 PG (ref 26.5–33)
MCHC RBC AUTO-ENTMCNC: 32.5 G/DL (ref 31.5–36.5)
MCV RBC AUTO: 86 FL (ref 78–100)
PLATELET # BLD AUTO: 149 10E3/UL (ref 150–450)
RBC # BLD AUTO: 4.4 10E6/UL (ref 3.8–5.2)
WBC # BLD AUTO: 14.3 10E3/UL (ref 4–11)

## 2024-02-20 PROCEDURE — 85027 COMPLETE CBC AUTOMATED: CPT | Performed by: INTERNAL MEDICINE

## 2024-02-20 PROCEDURE — 250N000013 HC RX MED GY IP 250 OP 250 PS 637: Performed by: NURSE PRACTITIONER

## 2024-02-20 PROCEDURE — 99232 SBSQ HOSP IP/OBS MODERATE 35: CPT | Performed by: INTERNAL MEDICINE

## 2024-02-20 PROCEDURE — 72131 CT LUMBAR SPINE W/O DYE: CPT

## 2024-02-20 PROCEDURE — 999N000157 HC STATISTIC RCP TIME EA 10 MIN

## 2024-02-20 PROCEDURE — 36415 COLL VENOUS BLD VENIPUNCTURE: CPT | Performed by: INTERNAL MEDICINE

## 2024-02-20 PROCEDURE — 99239 HOSP IP/OBS DSCHRG MGMT >30: CPT | Performed by: INTERNAL MEDICINE

## 2024-02-20 RX ADMIN — VALSARTAN 160 MG: 80 TABLET, FILM COATED ORAL at 07:53

## 2024-02-20 RX ADMIN — METOPROLOL SUCCINATE 50 MG: 50 TABLET, EXTENDED RELEASE ORAL at 07:53

## 2024-02-20 RX ADMIN — Medication 81 MG: at 07:53

## 2024-02-20 RX ADMIN — ROSUVASTATIN CALCIUM 40 MG: 40 TABLET, COATED ORAL at 07:53

## 2024-02-20 RX ADMIN — HYDROCHLOROTHIAZIDE 25 MG: 25 TABLET ORAL at 07:53

## 2024-02-20 ASSESSMENT — ACTIVITIES OF DAILY LIVING (ADL)
ADLS_ACUITY_SCORE: 19

## 2024-02-20 NOTE — PLAN OF CARE
Problem: Cardiac Catheterization (Diagnostic/Interventional)  Goal: Absence of Bleeding  Outcome: Progressing     Problem: Cardiac Catheterization (Diagnostic/Interventional)  Goal: Absence of Contrast-Induced Injury  Outcome: Progressing     Problem: Cardiac Catheterization (Diagnostic/Interventional)  Goal: Stable Heart Rate and Rhythm  Outcome: Progressing     Problem: Cardiac Catheterization (Diagnostic/Interventional)  Goal: Absence of Embolism Signs and Symptoms  Outcome: Progressing     Problem: Cardiac Catheterization (Diagnostic/Interventional)  Goal: Anesthesia/Sedation Recovery  Outcome: Progressing  Intervention: Optimize Anesthesia Recovery  Recent Flowsheet Documentation  Taken 2/19/2024 1700 by Yusra Fajardo RN  Safety Promotion/Fall Prevention:   activity supervised   assistive device/personal items within reach   clutter free environment maintained   nonskid shoes/slippers when out of bed   toileting scheduled   Goal Outcome Evaluation:       Pt is AOX4 and denies pain, SOB, N/V, and lightheadedness. Pt is a SBA. Angio site is WDL, bandaid in place. Tele NSR/SB. Pt wearing CPAP while sleeping. Pt is able to make needs known.

## 2024-02-20 NOTE — PROGRESS NOTES
Care Management Discharge Note    Discharge Date: 02/20/2024       Discharge Disposition: Home    Discharge Services: None    Discharge DME: None    Discharge Transportation: family or friend will provide    Private pay costs discussed: Not applicable    Does the patient's insurance plan have a 3 day qualifying hospital stay waiver?  Yes     Which insurance plan 3 day waiver is available? Alternative insurance waiver    Will the waiver be used for post-acute placement? No    PAS Confirmation Code:  NA  Patient/family educated on Medicare website which has current facility and service quality ratings: no    Education Provided on the Discharge Plan: Yes per team  Persons Notified of Discharge Plans: Patient per team  Patient/Family in Agreement with the Plan: yes    Handoff Referral Completed: Yes    Additional Information:  Patient discharge to home. Family will transport.    Johanna Gardiner RN

## 2024-02-20 NOTE — DISCHARGE SUMMARY
"[facility]  Hospitalist Discharge Summary      Date of Admission:  2/18/2024  Date of Discharge:  2/20/2024  Discharging Provider: Riaz Pollard MD  Discharge Service: Hospitalist Service    Discharge Diagnoses   CAD  Borderline left main distal stenosis  Aortic stenosis status post TAVR in 2021  Palpitations  Labile hypertension  CLL  MAYKEL on CPAP  NSTEMI    Clinically Significant Risk Factors     # Obesity: Estimated body mass index is 37.39 kg/m  as calculated from the following:    Height as of this encounter: 1.6 m (5' 3\").    Weight as of this encounter: 95.8 kg (211 lb 1.6 oz).       Follow-ups Needed After Discharge   Follow-up Appointments     Follow-up and recommended labs and tests       Follow up with primary care provider, Dayana Kruse, within 7 days   for hospital follow- up.   Outpatient follow-up at the [facility]   cardiology clinic as arranged.  No need for revascularization procedure   including bypass surgery at this point per cardiologist.  Follow-up at the   neurosurgery clinic as arranged for management of spinal stenosis. Resume   routine oncology follow up for management of CLL.          Discharge Disposition   Discharged to home  Condition at discharge: Stable    Hospital Course   Reba Juarez is 79 year old female with history of CAD, HFpEF, MAYKEL on CPAP, hypertension, type II diabetes, and aortic stenosis s/p TAVR admitted on 2/18/24 with acute onset of chest pain and palpitations from suspected NSTEMI.    She received heparin drip briefly and subsequently underwent coronary angiogram on 2/19/2024, which revealed borderline complex distal left main coronary artery disease. The cardiologist recommended medical management due to preserved left ventricular function and the absence of exertional angina. Her symptoms have improved with the current treatment regimen, and she is scheduled for follow-up at the [facility]" [facility] as previously arranged.    During hospital stay, patient complained of low back pain radiating to the right hip. Lumbar CT reveals severe spinal canal stenosis at L2-L3 and L4-L5, with severe bilateral foraminal narrowing at L4-L5 and significant degenerative changes across multiple levels as well as  severe L5-S1 right foraminal narrowing. The patient exhibited no progressive neurological deficits or cauda equina syndrome. Symptoms have improved with conservative management and she is clinically stable for discharge at this time.     Consultations This Hospital Stay   PHARMACY IP CONSULT  CARDIOLOGY IP CONSULT  PHARMACY IP CONSULT  CARE MANAGEMENT / SOCIAL WORK IP CONSULT  PAIN MANAGEMENT ADULT IP CONSULT  SMOKING CESSATION PROGRAM IP CONSULT    Code Status   Full Code    Time Spent on this Encounter   I, Riaz Pollard MD, personally saw the patient today and spent greater than 30 minutes discharging this patient.       Riaz Pollard MD  [address] [address]  Phone: [phone]  Fax: [phone]  ______________________________________________________________________    Physical Exam   Vital Signs: Temp: 97.4  F (36.3  C) Temp src: Oral BP: (!) 148/66 Pulse: 60   Resp: 18 SpO2: 93 % O2 Device: None (Room air)    Weight: 211 lbs 1.6 oz    General appearance: Awake, Alert, Cooperative, not in any obvious distress and appears stated age   HEENT: Normocephalic, atraumatic, conjunctiva clear without icterus and ears without discharge  Lungs: Clear to auscultation bilaterally, no wheezing, good air exchange, normal work of breathing  Cardiovascular: Regular Rate and Rythm, normal apical impulse, normal S1 and S2, no lower extremity edema bilaterally  Abdomen: Soft, non-tender and Non-distended, active bowel sounds  Skin: Skin color, texture normal and bruising or bleeding. No rashes or lesions over face, neck,  arms and legs, turgor normal.  Musculoskeletal: No bony deformities or joint tenderness. Normal ROM upon flexion & extension.   Neurologic: Alert & Oriented X 3, Facial symmetry preserved and upper & lower extremities moving well with symmetry  Psychiatric: Calm, normal eye contact and normal affect       Primary Care Physician   Dayana Kruse    Discharge Orders      [facility]  Referral      Adult Neurosurgery  Referral      Reason for your hospital stay    CAD  Borderline left main distal stenosis  Aortic stenosis status post TAVR in 2021  Palpitations  Labile hypertension  CLL  MAYKEL on CPAP     Activity    Your activity upon discharge: activity as tolerated     Follow-up and recommended labs and tests     Follow up with primary care provider, Dayana Kruse, within 7 days for hospital follow- up.   Outpatient follow-up at the [facility] cardiology clinic as arranged.  No need for revascularization procedure including bypass surgery at this point per cardiologist.  Follow-up at the neurosurgery clinic as arranged for management of spinal stenosis. Resume routine oncology follow up for management of CLL.     Diet    Follow this diet upon discharge: Orders Placed This Encounter      Low Saturated Fat Na <2400 mg       Significant Results and Procedures   Results for orders placed or performed during the hospital encounter of 02/18/24   CT Chest Pulmonary Embolism w Contrast    Narrative    EXAM: CT CHEST PULMONARY EMBOLISM W CONTRAST  LOCATION: [facility]  DATE: 2/18/2024    INDICATION: chest pain, tachy, +dimer  COMPARISON: 12/22/2022  TECHNIQUE: CT chest pulmonary angiogram during arterial phase injection of IV contrast. Multiplanar reformats and MIP reconstructions were performed. Dose reduction techniques were used.   CONTRAST: [id number] 75ml    FINDINGS:  ANGIOGRAM CHEST: Pulmonary arteries are normal caliber and negative for  pulmonary emboli. Suboptimal opacification of the subsegmental branches. Thoracic aorta is negative for dissection. No CT evidence of right heart strain.    LUNGS AND PLEURA: No new or enlarging suspicious nodules. Pre-existing nodules remain unchanged, the largest of which is along the right major fissure and probably a lymph node. A 0.8 x 0.7 cm left upper lobe groundglass opacity is also unchanged dating   back to 7/5/2021 (6/115).    MEDIASTINUM/AXILLAE: No lymphadenopathy. Normal esophagus. No significant pericardial effusion. TAVR.    CORONARY ARTERY CALCIFICATION: Severe.    UPPER ABDOMEN: Normal.    MUSCULOSKELETAL: Degenerative changes in the spine. No acute fracture.      Impression    IMPRESSION:  1.  No pulmonary emboli or other acute finding in the chest.    2.  Small pulmonary nodules remain unchanged. A 0.8 cm groundglass nodule in the left upper lobe is unchanged dating back to 7/5/2021. Suggest follow-up chest CT in July 2026 to demonstrate 5 years of temporal stability, as per below.    REFERENCE:  Guidelines for Management of Incidental Pulmonary Nodules Detected on CT Images: From the Fleischner Society 2017.   Guidelines apply to incidental nodules in patients who are 35 years or older.  Guidelines do not apply to lung cancer screening, patients with immunosuppression, or patients with known primary cancer.    SUBSOLID NODULES  Ground glass  Nodule size 6 mm or greater  CT at 6-12 months to confirm persistence, then CT every 2 years until 5 years.       XR Pelvis and Hip Portable Right 2 Views    Narrative    EXAM: XR PELVIS AND HIP PORTABLE RIGHT 2 VIEWS  LOCATION: [facility]  DATE: 2/19/2024    INDICATION: Sudden onset of right pelvic pain after bending down  COMPARISON: None.      Impression    IMPRESSION: Both hips negative for fracture or dislocation. Pelvis negative for fracture. Mild degenerative change both hip joints. Degenerative change within the visualized  portion of lower lumbar spine. Residual contrast material within the urinary   bladder.   CT Lumbar Spine w/o Contrast    Narrative    EXAM: CT LUMBAR SPINE W/O CONTRAST  LOCATION: [facility]  DATE: 2/20/2024    INDICATION: New onset lower back pain after bending down, radiating to the right hip  COMPARISON: None.  TECHNIQUE: Routine CT Lumbar Spine without IV contrast. Multiplanar reformats. Dose reduction techniques were used.    FINDINGS:  No evidence of lumbar spine fracture. Paraspinous soft tissues including limited views of the kidneys, abdominal aorta and psoas muscles are clear. Atherosclerotic calcification throughout the abdominal aorta.     T12-L1: Loss of disc height. Anterior osteophyte formation. Shallow left paracentral ossified disc bulge causes no neural compromise. Spinal canal and foramen are patent.    L1-L2: DDD change with loss of disc height. Broad-based bulging of the degenerated/desiccated/ossified disc margin flattens the anterior margin of the thecal sac resulting in mild spinal canal stenosis. Bilateral facet degeneration. Left foramen is   patent. Right foramen is mildly narrowed.    L2-L3: Degenerative change at L2-L3 with loss of disc height. Broad-based bulging of the degenerated/desiccated disc margin with associated bilateral facet hypertrophic change and thickening/ossification of the ligamentum flavum results in severe spinal   canal stenosis. Right foramen is patent. Left foramen is patent. Vacuum disc phenomena.    L3-L4: DDD change at L3-L4 with loss of disc height. 2 mm degenerative retrolisthesis of L3 on L4 with broad-based bulging of the degenerated/desiccated disc margin. Bilateral facet hypertrophic change with thickening and ossification of the ligamentum   flavum. Moderate spinal canal stenosis. Bilateral lateral recess stenosis. Bilateral foraminal stenosis which is more severe on the left. Vacuum disc phenomena.    L4-L5: DDD change at L4-L5  with loss of disc height. Vacuum disc phenomena. Sclerosis throughout the subjacent endplates. Broad-based bulging of the degenerated/desiccated disc margin with extensive bilateral facet degeneration including thickening and   ossification of the ligamentum flavum results in moderate to severe spinal canal stenosis. Severe narrowing of the foramen bilaterally.    L5-S1: Degenerative change at L5-S1 with loss of disc height. Generalized disc bulging. Bilateral facet hypertrophic change including thickening and ossification of the ligamentum flavum which results in moderate spinal canal stenosis with encroachment   upon the lateral recesses bilaterally. Severe narrowing of the right foramen is moderate to severe narrowing of the left foramen.      Impression    IMPRESSION:  1.  Multilevel degenerative spondylotic change throughout the lumbar spine. Variable degrees of facet hypertrophic change resulting in multilevel foraminal stenosis. Please see body of report for details at each level.    2.  DDD change at L2-L3 results in severe spinal canal stenosis.    3.  DDD change at L3-L4 results in moderate spinal canal stenosis.    4.  DDD change at L4-L5 results in moderate to severe spinal canal stenosis.    5.  DDD change at L5-S1 results in moderate spinal canal stenosis.   Cardiac Catheterization    Narrative    CARDIAC CATHETERIZATION AND INTERVENTION REPORT    PATIENT NAME:  Reba Juarez          MEDICAL RECORD NUMBER: [id number]  : 1945       PROCEDURE DATE: 2024        CONCLUSIONS: Borderline distal left main disease (FFR 0.8) with mild to   moderate nonobstructive disease elsewhere.      RECOMMENDATIONS:   Usual postprocedure cares, please see orders.  Recommend a trial of medical management given borderline complex distal   left main disease, preserved LV function, and lack of exertional angina -   unclear if left main disease etiology of current presentation.    Alternatively, can  consider cardiothoracic surgery consultation for   evaluation of surgical revascularization and if not a candidate then high   risk, complex PCI is an option.  Aggressive risk factor modification for secondary prevention.    PROCEDURE PERFORMED:   Selective right and left coronary angiography  iFR and FFR of the left main to the left anterior descending artery    PRE-OP DIAGNOSIS:  Atypical chest pain  Troponin elevation    POST-OP DIAGNOSIS: Same     PROCEDURALISTS: Romero Garcia MD      DIAGNOSTIC PROCEDURAL DETAILS:   Signed, informed consent was obtained. The patient was placed on the table   and prepped and draped in the usual fashion. Time out and site   verification performed.   Access: Right radial artery  Catheters: JL 3.5, JR4  Hemostasis: TR band    PROCEDURAL MEDICATIONS:  Conscious sedation - midazolam and fentanyl, please see procedure log for   dosing.   Local anesthesia - 1% lidocaine   Procedural anticoagulation - 75 units/kg of heparin     CONTRAST VOLUME: 120 mL Visipaque  BLOOD LOSS: minimal   FLUIDS: None   SPECIMENS: None  COMPLICATIONS: None    Coronary Angiography:  LEFT MAIN: Normal caliber vessel that bifurcates into left anterior   descending and left circumflex arteries.  Proximal left main has mild   diffuse disease followed by 50% eccentric, calcific stenosis in the distal   portion.  FFR of the left main to the LAD was 0.8.  LEFT ANTERIOR DESCENDING: Large vessel that gives rise to multiple medium   size diagonal branches and septal perforators.  The LAD wraps around the   apex distally.  The ostial LAD has 50 to 60% calcific stenosis followed by   mild to moderate diffuse disease of the LAD and its branches distally.  LEFT CIRCUMFLEX: Large dominant vessel that gives rise to both small to   medium size OM branches and terminates into the posterior descending   artery and posterolateral system distally.  Left circumflex and its   branches have mild diffuse disease.  RIGHT CORONARY  ARTERY: Small, nondominant vessel with moderate disease.    INTERVENTIONAL DETAILS:   Lesion # 1 : 50% eccentric, calcific stenosis of the distal left main  Left main coronary artery was engaged using a JL 3.5 diagnostic catheter   that provide adequate coaptation and support.  The left main into LAD   lesion was traversed with a Comet 2 wire and iFR was performed per   standard protocol after administration of IV nitroglycerin.  With   inconsistent recordings with iFR elected to switch to FFR.  FFR of the   left main to the LAD was performed after administration of IV adenosine   per protocol.        Please do not hesitate to contact me if you have any questions or   concerns.  I was present for the procedure in its entirety.     Romero Garcia MD  Interventional Cardiology        Discharge Medications   Current Discharge Medication List        CONTINUE these medications which have NOT CHANGED    Details   ALPRAZolam (XANAX) 0.5 MG tablet Take 0.5 mg by mouth daily as needed for anxiety (prior to dental work)      amoxicillin (AMOXIL) 500 MG capsule Take 4 capsules (2,000 mg) by mouth once as needed (SBE prophylaxis - take 30-60 minutes prior to dental procedure or cleaning)  Qty: 4 capsule, Refills: 3    Comments: Do not fill now - fill per patient request  Associated Diagnoses: S/P TAVR (transcatheter aortic valve replacement)      aspirin 81 MG tablet Take 1 tablet by mouth daily.    Associated Diagnoses: Hyperlipidemia LDL goal <70      Glucos-Chond-Hyal Ac-Ca Fructo (MOVE FREE JOINT HEALTH ADVANCE) TABS Take 1 tablet by mouth daily      hydrochlorothiazide (HYDRODIURIL) 25 MG tablet Take 1 tablet (25 mg) by mouth daily  Qty: 90 tablet, Refills: 3    Associated Diagnoses: Essential hypertension      Menatetrenone (VITAMIN K2) 100 MCG TABS Take 100 mcg by mouth daily      metoprolol succinate ER (TOPROL XL) 50 MG 24 hr tablet Take 1 tablet (50 mg) by mouth daily  Qty: 30 tablet, Refills: 3    Associated  Diagnoses: Palpitations      multivitamin, therapeutic (THERA-VIT) TABS tablet Take 1 tablet by mouth daily      nitroGLYcerin (NITROSTAT) 0.4 MG sublingual tablet Place 0.4 mg under the tongue every 5 minutes as needed      rosuvastatin (CRESTOR) 40 MG tablet Take 40 mg by mouth daily      valsartan (DIOVAN) 160 MG tablet Take 1 tablet (160 mg) by mouth daily  Qty: 90 tablet, Refills: 3    Associated Diagnoses: Hypertension      Vitamin D3 (CHOLECALCIFEROL) 25 mcg (1000 units) tablet Take 2 tablets by mouth daily      ORDER FOR DME Use your CPAP device as directed by your provider.           Allergies   Allergies   Allergen Reactions    Calcium Citrate Unknown     Bloody nose per pt    Lipitor [Atorvastatin Calcium]      Myalgia      Pravastatin      Myalgia      Simvastatin      myalgia

## 2024-02-20 NOTE — PROGRESS NOTES
[facility] ACUTE INPATIENT PAIN SERVICE    [facility], [facility], [facility], [facility], [place]   PAIN Progress Note    Assessment/Plan:  Reba Juarez is a 79 year old female who was admitted on 2/18/2024. Acute Pain Management Team was asked by Hospitalist,   to see the patient for severe spinal stenosis and intractable back pain.  Admitted for atypical chest pain, palpitations, sinus tachycardia. History including HTN, CAD, HLD, T2DM, MAYKEL on CPAP, paroxymal SVT, CAD.    shows no opioids on report, only alprazolam 0.5 mg #10 on 2/9/24. Patient was having palpitations and some chest discomfort before coming in.  Patient reporting he has some right sided back pain, extending down to right hip.   Patient rating pain as minimal at this time, but is more painful after laying in same position at night. CT lumbar spine showing she has DDD changes at L2-L3, L3-L4, L4-L5, L5-S1. She reports it started when reaching for a book, but feels it is improving.  XR pelvis completed,  both hips negative for fracture or dislocation, pelvis, negative for fracture.   A referral was placed for Neurosurgery, but would not start any systemic medications for her to start now, until evaluated by Neurosurgery.    Discussed plan with Mena Ballard, CNS, CNP, Acute Pain Management Team, gautam to Dr. Pollard    PLAN:  Acute pain secondary to probable muscle strain, imagaing showing stenosis, , Neurosurgery referral made.     Multimodal Medication Therapy:   Adjuvants:  Tylenol is prn, Tums prn heartburn  NSAIDs-no, not with cardiac history  Topicals-I talked to patient about various topicals she could try, but did not order because discharging.  Opioids: oxycodone 5-10 mg po q 4 h prn-do not order at discharge  Non-medication interventions- Ice, vs heat.  Constipation Prophylaxis- Miralax daily prn, and senna/docusate bid prn  Follow up /Discharge Recommendations - We recommend prescribing the following at the time of  discharge:  I discussed many different topicals patient could purchase to try to help.          NSTEMI (non-ST elevated myocardial infarction) (H)   Patient Active Problem List   Diagnosis    Hyperlipidemia LDL goal <70    Coronary heart disease    MAYKEL on CPAP    Hypertension    Class 2 severe obesity due to excess calories with serious comorbidity and body mass index (BMI) of 35.0 to 35.9 in adult (H)    Type 2 diabetes mellitus with other circulatory complication, without long-term current use of insulin (H)    Palpitations    CLL (chronic lymphocytic leukemia) (H)    Aortic valve stenosis, etiology of cardiac valve disease unspecified    Nonrheumatic aortic valve stenosis    Severe aortic stenosis    Aortic stenosis, severe    Status post coronary angiogram    Pre-operative examination    Abnormal LFTs    Anemia    Anginal equivalent    COPD (chronic obstructive pulmonary disease) (H)    Family history of premature CAD    History of PTCA    Internal derangement of knee    Right carotid bruit    Systolic murmur of aorta    Sinus tachycardia    Atypical chest pain    Pulmonary nodules    NSTEMI (non-ST elevated myocardial infarction) (H)        History   Drug Use No         Tobacco Use      Smoking status: Former        Packs/day: 1.00        Years: 20.00        Additional pack years: 0.00        Total pack years: 20.00        Types: Cigarettes        Quit date: 3/5/1992        Years since quittin.9      Smokeless tobacco: Never         aspirin  81 mg Oral Daily    hydrochlorothiazide  25 mg Oral Daily    metoprolol succinate ER  50 mg Oral Daily    rosuvastatin  40 mg Oral Daily    sodium chloride (PF)  3 mL Intracatheter Q8H    valsartan  160 mg Oral Daily             [facility]  Acute Care Pain Management Program   Hours of pain coverage Mon-Fri 8-1600, afterhours please call the house officer   [facility] (DOROTHY, Adi, [place], [place])

## 2024-02-20 NOTE — PROGRESS NOTES
"    Cardiology Progress Note    Assessment:  Coronary artery disease, mild to moderate prior to TAVR in 2021, history of balloon angioplasty in the 90s-stable coronary anatomy/no severe coronary lesions/borderline left main distal stenosis on 2/19/2024  Aortic stenosis status post TAVR 2021, normal auscultation  Heart palpitations, unclear etiology probably related to sinus tachycardia, undergoing heart rhythm monitoring with Zio patch  Hypertension, labile  CLL  Obstructive sleep apnea on CPAP      Plan:  Discharge home  No need for revascularization procedures including bypass surgery at this point.    Continue current antihypertensive medications    Follow-up with cardiology at [facility]    Subjective:   Underwent coronary angiogram yesterday.  Denies recurrence of chest discomfort or heart palpitations    Objective:   BP (!) 148/66 (BP Location: Right arm)   Pulse 60   Temp 97.4  F (36.3  C) (Oral)   Resp 18   Ht 1.6 m (5' 3\")   Wt 95.8 kg (211 lb 1.6 oz)   SpO2 93%   BMI 37.39 kg/m      Intake/Output Summary (Last 24 hours) at 2/20/2024 1325  Last data filed at 2/20/2024 1300  Gross per 24 hour   Intake 720 ml   Output 1675 ml   Net -955 ml         Physical Exam:  GENERAL: no distress  NECK: No JVD  LUNGS: Clear to auscultation.  CARDIAC: regular  rhythm, S1 & S2 normal.  No heaves, thrills, gallops or murmurs.  ABDOMEN: flat, negative hepatosplenomegaly, soft and non-tender.  EXTREMITIES: No evidence of cyanosis, clubbing or edema.    Current Facility-Administered Medications Ordered in Epic   Medication Dose Route Frequency Provider Last Rate Last Admin    acetaminophen (TYLENOL) tablet 650 mg  650 mg Oral Q4H PRN Riaz Pollard MD        Or    acetaminophen (TYLENOL) Suppository 650 mg  650 mg Rectal Q4H PRN Riaz Pollard MD        ALPRAZolam (XANAX) tablet 0.5 mg  0.5 mg Oral Daily PRN Bianka Silver, CNP        aspirin EC tablet 81 mg  81 mg " Oral Daily Bianka Silver CNP   81 mg at 02/20/24 0753    calcium carbonate (TUMS) chewable tablet 1,000 mg  1,000 mg Oral 4x Daily PRN Bianka Silver CNP        HOLD:  Metformin and metformin containing medications if patient received IV contrast with acute kidney injury or severe chronic kidney disease (stage IV or stage V; i.e., eGFR less than 30)   Does not apply HOLD Bianka Silver CNP        hydrochlorothiazide (HYDRODIURIL) tablet 25 mg  25 mg Oral Daily Bianka Silver CNP   25 mg at 02/20/24 0753    lidocaine (LMX4) cream   Topical Q1H PRN Bianka Silver CNP        lidocaine 1 % 0.1-1 mL  0.1-1 mL Other Q1H PRN Bianka Silver CNP        metoprolol succinate ER (TOPROL XL) 24 hr tablet 50 mg  50 mg Oral Daily Bianka Silver CNP   50 mg at 02/20/24 0753    naloxone (NARCAN) injection 0.2 mg  0.2 mg Intravenous Q2 Min PRN Bianka Silver CNP        Or    naloxone (NARCAN) injection 0.4 mg  0.4 mg Intravenous Q2 Min PRN Bianka Silver CNP        Or    naloxone (NARCAN) injection 0.2 mg  0.2 mg Intramuscular Q2 Min PRN Bianka Silver CNP        Or    naloxone (NARCAN) injection 0.4 mg  0.4 mg Intramuscular Q2 Min PRN Bianka Silver CNP        ondansetron (ZOFRAN ODT) ODT tab 4 mg  4 mg Oral Q6H PRN Bianka Silver CNP        Or    ondansetron (ZOFRAN) injection 4 mg  4 mg Intravenous Q6H PRN Bianka Silver CNP        oxyCODONE (ROXICODONE) tablet 5 mg  5 mg Oral Q4H PRN Bianka Silver CNP        Or    oxyCODONE (ROXICODONE) tablet 10 mg  10 mg Oral Q4H PRN Bianka Silver CNP        Patient is already receiving anticoagulation with heparin, enoxaparin (LOVENOX), warfarin (COUMADIN)  or other anticoagulant medication   Does not apply Continuous PRN Bianka Silver CNP        polyethylene glycol (MIRALAX) Packet 17 g  17 g Oral BID PRN Bianka Silver,  CNP        prochlorperazine (COMPAZINE) injection 5 mg  5 mg Intravenous Q6H PRN Kalamitsiotis, Bianka C, CNP        Or    prochlorperazine (COMPAZINE) tablet 5 mg  5 mg Oral Q6H PRN Kalamitsiotis, Bianka C, CNP        Or    prochlorperazine (COMPAZINE) suppository 12.5 mg  12.5 mg Rectal Q12H PRN Kalamitsiotis, Bianka C, CNP        rosuvastatin (CRESTOR) tablet 40 mg  40 mg Oral Daily Kalamitsiotis, Bianka C, CNP   40 mg at 02/20/24 0753    senna-docusate (SENOKOT-S/PERICOLACE) 8.6-50 MG per tablet 1 tablet  1 tablet Oral BID PRN Kalamitsiotis, Bianka C, CNP        Or    senna-docusate (SENOKOT-S/PERICOLACE) 8.6-50 MG per tablet 2 tablet  2 tablet Oral BID PRN Kalamitsiotis, Bianka C, CNP        sodium chloride (PF) 0.9% PF flush 3 mL  3 mL Intracatheter Q8H Kalamitsiotis, Bianka C, CNP   3 mL at 02/20/24 1034    sodium chloride (PF) 0.9% PF flush 3 mL  3 mL Intracatheter q1 min prn Kalamitsiotis, Bianka C, CNP        valsartan (DIOVAN) tablet 160 mg  160 mg Oral Daily Kalamitsiotis, Bianka C, CNP   160 mg at 02/20/24 0753     No current [facility]-ordered outpatient medications on file.       Cardiographics:    Coronary angiogram:  LEFT MAIN: Normal caliber vessel that bifurcates into left anterior descending and left circumflex arteries.  Proximal left main has mild diffuse disease followed by 50% eccentric, calcific stenosis in the distal portion.  FFR of the left main to the LAD was 0.8.  LEFT ANTERIOR DESCENDING: Large vessel that gives rise to multiple medium size diagonal branches and septal perforators.  The LAD wraps around the apex distally.  The ostial LAD has 50 to 60% calcific stenosis followed by mild to moderate diffuse disease of the LAD and its branches distally.  LEFT CIRCUMFLEX: Large dominant vessel that gives rise to both small to medium size OM branches and terminates into the posterior descending artery and posterolateral system distally.  Left circumflex and its branches have mild diffuse  "disease.  RIGHT CORONARY ARTERY: Small, nondominant vessel with moderate disease.     Lab Results    Chemistry/lipid CBC Cardiac Enzymes/BNP/TSH/INR   Recent Labs   Lab Test 05/30/23  1013   CHOL 180   HDL 56   LDL 98   TRIG 131     Recent Labs   Lab Test 05/30/23  1013 03/29/21  1135   LDL 98 103*     Recent Labs   Lab Test 02/19/24  0507      POTASSIUM 4.0   CHLORIDE 107   CO2 26   *   BUN 12.9   CR 0.98*   GFRESTIMATED 58*   TERRI 8.6*     Recent Labs   Lab Test 02/19/24  0507 02/18/24  1156 05/30/23  1013   CR 0.98* 1.14* 1.16*     Recent Labs   Lab Test 02/18/24  1156   A1C 6.1*          Recent Labs   Lab Test 02/19/24  0507   WBC 21.2*   HGB 12.4   HCT 37.7   MCV 86        Recent Labs   Lab Test 02/19/24  0507 02/18/24  1156 05/30/23  1013   HGB 12.4 13.3 12.9    No results for input(s): \"TROPONINI\" in the last 17609 hours.  Recent Labs   Lab Test 12/22/22  1923   NTBNPI 70     Recent Labs   Lab Test 02/18/24  1156   TSH 2.17     Recent Labs   Lab Test 05/17/21  1246   INR 1.04                   " Mid-Level Procedure Text (F): After obtaining clear surgical margins the patient was sent to a mid-level provider for surgical repair.  The patient understands they will receive post-surgical care and follow-up from the mid-level provider.

## 2024-02-20 NOTE — PLAN OF CARE
"  Problem: Adult Inpatient Plan of Care  Goal: Plan of Care Review  Description: The Plan of Care Review/Shift note should be completed every shift.  The Outcome Evaluation is a brief statement about your assessment that the patient is improving, declining, or no change.  This information will be displayed automatically on your shift  note.  Outcome: Progressing     Problem: Adult Inpatient Plan of Care  Goal: Patient-Specific Goal (Individualized)  Description: You can add care plan individualizations to a care plan. Examples of Individualization might be:  \"Parent requests to be called daily at 9am for status\", \"I have a hard time hearing out of my right ear\", or \"Do not touch me to wake me up as it startles  me\".  Outcome: Progressing     Problem: Adult Inpatient Plan of Care  Goal: Absence of Hospital-Acquired Illness or Injury  Outcome: Progressing  Intervention: Identify and Manage Fall Risk  Recent Flowsheet Documentation  Taken 2/19/2024 2350 by Yasmin Rao, RN  Safety Promotion/Fall Prevention: activity supervised  Intervention: Prevent Skin Injury  Recent Flowsheet Documentation  Taken 2/19/2024 2350 by Yasmin Rao RN  Body Position: position changed independently  Intervention: Prevent Infection  Recent Flowsheet Documentation  Taken 2/19/2024 2350 by Yasmin Rao, RN  Infection Prevention:   single patient room provided   rest/sleep promoted   personal protective equipment utilized   hand hygiene promoted   equipment surfaces disinfected   environmental surveillance performed     Problem: Adult Inpatient Plan of Care  Goal: Absence of Hospital-Acquired Illness or Injury  Intervention: Identify and Manage Fall Risk  Recent Flowsheet Documentation  Taken 2/19/2024 2350 by Yasmin Rao, RN  Safety Promotion/Fall Prevention: activity supervised     Problem: Adult Inpatient Plan of Care  Goal: Absence of Hospital-Acquired Illness or Injury  Intervention: Prevent Skin Injury  Recent " Flowsheet Documentation  Taken 2/19/2024 2350 by Yasmin Rao, RN  Body Position: position changed independently     Problem: Adult Inpatient Plan of Care  Goal: Absence of Hospital-Acquired Illness or Injury  Intervention: Prevent Infection  Recent Flowsheet Documentation  Taken 2/19/2024 2350 by Yasmin Rao, RN  Infection Prevention:   single patient room provided   rest/sleep promoted   personal protective equipment utilized   hand hygiene promoted   equipment surfaces disinfected   environmental surveillance performed     Problem: Adult Inpatient Plan of Care  Goal: Optimal Comfort and Wellbeing  Outcome: Progressing     Problem: Cardiac Catheterization (Diagnostic/Interventional)  Goal: Absence of Bleeding  Outcome: Met   Goal Outcome Evaluation:       Patient is post- angiogram right radial access, no bleeding, swelling or bruising and no intervention. Rhythm=NSR/sinus carlos. Denies pain and plan discharge to home.

## 2024-02-20 NOTE — PROGRESS NOTES
"Care Management Follow Up    Length of Stay (days): 2    Expected Discharge Date: 02/21/2024     Concerns to be Addressed: Care progression - discharge planning     Patient plan of care discussed at interdisciplinary rounds: Yes    Anticipated Discharge Disposition:  TBD     Anticipated Discharge Services:  TBD  Anticipated Discharge DME:  [facility]EL[facility]    Patient/family educated on Medicare website which has current facility and service quality ratings:  NA  Education Provided on the Discharge Plan:  Yes per team  Patient/Family in Agreement with the Plan:  NA    Referrals Placed by CM/SW:  NA  Private pay costs discussed: Not applicable    Additional Information:  Per provider, Dr. Pollard, patient maybe discharge today. Waiting for cardiology rec and patient has a CT scan.    Social Hx: \"Adult son lives with her in a house. Report baseline independent with ADLs/IADLs. Still drives. No community resources. Has walker, cane and CPAP. Goal is to return home. Family will transport.\"    RNCM to follow for medical progression, recommendations, and final discharge plan.     Johanna Gardiner RN     "

## 2024-02-20 NOTE — PLAN OF CARE
Problem: Cardiac Catheterization (Diagnostic/Interventional)  Goal: Absence of Vascular Access Complication  2/20/2024 1450 by Manisha Bains, RN  Outcome: Progressing  2/20/2024 1449 by Manisha Bains, RN  Outcome: Progressing     Problem: Pain Acute  Goal: Optimal Pain Control and Function  Outcome: Progressing   Goal Outcome Evaluation:    Patient angio site WDL.  Patient hoping to discharge today. Had CT lumbar spine.   MD waiting for pain management to see patient before completing discharge.

## 2024-02-20 NOTE — PLAN OF CARE
Pt discharged home with son. All medications, chest pain and post angiogram instructions provided. Pt verbalized understanding. All belongings provided and reviewed with pt. PIV out.

## 2024-02-22 ENCOUNTER — TELEPHONE (OUTPATIENT)
Dept: CARDIOLOGY | Facility: CLINIC | Age: 79
End: 2024-02-22
Payer: COMMERCIAL

## 2024-02-22 DIAGNOSIS — I10 HYPERTENSION: ICD-10-CM

## 2024-02-22 RX ORDER — VALSARTAN 160 MG/1
240 TABLET ORAL DAILY
Start: 2024-02-22 | End: 2024-02-28

## 2024-02-22 NOTE — TELEPHONE ENCOUNTER
Called Adamaris as follow-up to her visit with Claudia Stark last week.    Hospital visit 02/18-2/20  Reba Juarez is 79 year old female with history of CAD, HFpEF, MAYKEL on CPAP, hypertension, type II diabetes, and aortic stenosis s/p TAVR admitted on 2/18/24 with acute onset of chest pain and palpitations from suspected NSTEMI.     She received heparin drip briefly and subsequently underwent coronary angiogram on 2/19/2024, which revealed borderline complex distal left main coronary artery disease. The cardiologist recommended medical management due to preserved left ventricular function and the absence of exertional angina. Her symptoms have improved with the current treatment regimen     Blood pressure last night 199/95. HR-69. Recommended increasing valsartan to 240mg.     Date: 2/22/2024    Time of Call: 9:48 AM     Diagnosis:  HTN     [ VORB ] Ordering provider: Claudia Stark PA-C   Order: Increase Valsartan to 240mg     Order received by: Bianka Mcadams RN     Follow-up/additional notes: follow-up with pt on Tuesday. Call back number given to pt in case issues arise.

## 2024-03-18 ENCOUNTER — OFFICE VISIT (OUTPATIENT)
Dept: CARDIOLOGY | Facility: CLINIC | Age: 79
End: 2024-03-18
Payer: COMMERCIAL

## 2024-03-18 VITALS
WEIGHT: 224 LBS | RESPIRATION RATE: 14 BRPM | DIASTOLIC BLOOD PRESSURE: 56 MMHG | BODY MASS INDEX: 39.68 KG/M2 | SYSTOLIC BLOOD PRESSURE: 146 MMHG | HEART RATE: 68 BPM

## 2024-03-18 DIAGNOSIS — E78.5 DYSLIPIDEMIA: ICD-10-CM

## 2024-03-18 DIAGNOSIS — I10 HYPERTENSION, UNSPECIFIED TYPE: ICD-10-CM

## 2024-03-18 DIAGNOSIS — I25.10 CORONARY ARTERY DISEASE INVOLVING NATIVE CORONARY ARTERY OF NATIVE HEART WITHOUT ANGINA PECTORIS: ICD-10-CM

## 2024-03-18 DIAGNOSIS — Z95.2 STATUS POST AORTIC VALVE REPLACEMENT: Primary | ICD-10-CM

## 2024-03-18 DIAGNOSIS — R60.0 BILATERAL LOWER EXTREMITY EDEMA: ICD-10-CM

## 2024-03-18 LAB
CHOLEST SERPL-MCNC: 184 MG/DL
FASTING STATUS PATIENT QL REPORTED: YES
HDLC SERPL-MCNC: 50 MG/DL
LDLC SERPL CALC-MCNC: 103 MG/DL
NONHDLC SERPL-MCNC: 134 MG/DL
TRIGL SERPL-MCNC: 156 MG/DL

## 2024-03-18 PROCEDURE — 99214 OFFICE O/P EST MOD 30 MIN: CPT | Performed by: INTERNAL MEDICINE

## 2024-03-18 PROCEDURE — 80061 LIPID PANEL: CPT | Performed by: INTERNAL MEDICINE

## 2024-03-18 PROCEDURE — 36415 COLL VENOUS BLD VENIPUNCTURE: CPT | Performed by: INTERNAL MEDICINE

## 2024-03-18 RX ORDER — VALSARTAN 160 MG/1
160 TABLET ORAL DAILY
Qty: 90 TABLET | Refills: 3 | Status: SHIPPED | OUTPATIENT
Start: 2024-03-18

## 2024-03-18 RX ORDER — METOPROLOL SUCCINATE 25 MG/1
25 TABLET, EXTENDED RELEASE ORAL 2 TIMES DAILY
COMMUNITY
Start: 2024-02-09

## 2024-03-18 NOTE — PROGRESS NOTES
"[facility] HEART CARE   [address], [place] [address]   [email or web address]   OFFICE: [phone]          Thank you Dr. Kruse for asking the [facility] Heart Care team to participate in the care of your patient, Reba Juarez.     Impression and Plan     1.  Coronary artery disease.  Adamaris has known coronary artery disease.  Specifically, Adamaris underwent prior balloon angioplasty in the 1990s.      She underwent repeat coronary angiography 19 February 2024 that revealed \"borderline\" distal left main disease (FFR 0.8) with mild to moderate nonobstructive disease elsewhere.  Given the \"borderline\" nature of the stenosis, preserved left ventricular systolic performance, and lack of symptoms of exertional angina, medical therapy was advocated though her interventionalists, Dr. Romero Garcia, did indicate that alternatively; one could consider cardiothoracic surgery consultation for evaluation of surgical revascularization and if not a candidate then high risk, complex PCI is an option.    As noted below, she denies any chest pain or other symptoms concerning for angina.    2.  Status post aortic valve replacement.  Adamaris underwent transcatheter aortic valve replacement 19 May 2021  (documented 23 mm GUDELIA 3 Ultra valve).  Echocardiogram 30 May 2023 revealed normal prosthetic valve function.  She does have somewhat of a prominent murmur though early peaking.  Plan:   Echocardiogram.    3.  Hypertension.  Blood pressure somewhat elevated in the office today though Adamaris attributes this to recent sodium indiscretion after dining out.  Consequently, we will continue with current management and simply follow.    4.  Dyslipidemia.  Lipid profile 30 May 2023 revealed LDL 98 mg/dL and HDL 56 mg/dL.  Continue rosuvastatin 40 mg daily.  Will obtain fasting lipid profile     5.  Palpitations.  Ambulatory monitor for 13  days February 2024 revealed occasional episodes of SVT " "though very fleeting duration with longest being 16 beats.  There were rare PACs and PVCs.  Continue metoprolol.    6.  Obstructive sleep apnea.  Adamaris utilizes CPAP.    7.  Lower extremity edema.  Adamaris does have mild bilateral lower extremity edema.  Suspect that this is primarily due to venous insufficiency as well as some impedance to venous return due to some central obesity.  We did discuss increasing her diuretic therapy, but jointly decided to pursue trial of limiting sodium which she states that she could definitely improve on.  Plan to obtain echocardiogram as per problem #2 to exclude any structural heart disease that may be a contributor.    Follow-up and further recommendations pending echocardiogram findings.    35 minutes spent reviewing prior records (including documentation, laboratory studies, cardiac testing/imaging), interview with patient along with physical exam, planning, and subsequent documentation/crafting of note).           History of Present Illness    Once again I would like to thank you again for asking me to participate in the care of your patient, Reba Juarez.  As you know, but to reiterate for my own records, Reba Juarez is a 79 year old female previously seen by my colleague, Dr. Wilfredo Byrd (Yas Hyatt,     Adamaris has known coronary artery disease.  Specifically, Adamaris underwent prior balloon angioplasty in the 1990s.      She underwent repeat coronary angiography 19 February 2024 that revealed \"borderline\" distal left main disease (FFR 0.8) with mild to moderate nonobstructive disease elsewhere.  Given the \"borderline\" nature of the stenosis, preserved left ventricular systolic performance, and lack of symptoms of exertional angina, medical therapy was advocated though her interventionalists, Dr. Romero Garcia, did indicate that alternatively; one could consider cardiothoracic surgery consultation for evaluation of surgical revascularization and if not a " "candidate then high risk, complex PCI is an option.    On interview, Adamaris denies any chest pain or other symptoms concerning for angina.  She continues to report some intermittent palpitations, but no sustained rapid heart rates in the like.  She denies any associated lightheadedness.    Further review of systems is otherwise negative/noncontributory (medical record and 13 point review of systems reviewed as well and pertinent positives noted).         Cardiac Diagnostics      Echocardiogram 30 May 2023:  Normal left ventricular size and systolic performance with ejection fraction of 60-65%.  Mild increase in left ventricular wall thickness.  There is a bioprosthetic aortic valve (documented 23 mm GUDELIA 3 Ultra valve).  Normal spectral Doppler metrics with mean gradient of 15 mmHg.  \"Trivial\" aortic insufficiency.  Normal right ventricular size and systolic performance.  Normal atrial dimensions.    Coronary angiogram 19 February 2024:  Left main coronary artery: Normal caliber vessel that bifurcates into left anterior descending and left circumflex arteries.  Proximal left main has mild diffuse disease followed by 50% eccentric, calcific stenosis in the distal portion.  FFR of the left main to the LAD was 0.8.  Left anterior descending coronary artery: Large vessel that gives rise to multiple medium size diagonal branches and septal perforators.  The LAD wraps around the apex distally.  The ostial LAD has 50 to 60% calcific stenosis followed by mild to moderate diffuse disease of the LAD and its branches distally.  Circumflex coronary artery: Large dominant vessel that gives rise to both small to medium size OM branches and terminates into the posterior descending artery and posterolateral system distally.  Left circumflex and its branches have mild diffuse disease.  Right coronary artery: Small, nondominant vessel with moderate disease.    Ambulatory monitor x 13  days February 2024:  Patient had a min HR of 47 " bpm, max HR of 176 bpm, and avg HR of 72 bpm.  Predominant underlying rhythm was Sinus Rhythm.   Eight (8) Supraventricular Tachycardia runs occurred, the run with the fastest interval lasting 5 beats with a max rate of 176 bpm, the longest lasting 16 beats with an avg rate of 139 bpm.   Some episodes of Supraventricular Tachycardia may be possible Atrial Tachycardia with variable block.  Isolated SVEs were rare (<1.0%), SVE Triplets were rare (<1.0%), and no SVE Couplets were present.   Isolated VEs were rare (<1.0%, 4110), VE Couplets were rare (<1.0%, 4), and VE Triplets were rare (<1.0%, 1). Ventricular Bigeminy was           Physical Examination       There were no vitals taken for this visit.        Wt Readings from Last 3 Encounters:   02/20/24 95.8 kg (211 lb 1.6 oz)   02/15/24 99.1 kg (218 lb 6.4 oz)   05/15/23 99.6 kg (219 lb 8 oz)       The patient is alert and oriented times three. Sclerae are anicteric. Mucosal membranes are moist. Jugular venous pressure is normal. No significant adenopathy/thyromegally appreciated. Lungs are clear with good expansion. On cardiovascular exam, the patient has a regular S1 and S2. Abdomen is soft and non-tender. Extremities reveal no clubbing, cyanosis, mild bilateral lower extremity edema.         Family History/Social History/Risk Factors   Patient does not smoke.  Family history reviewed, and family history includes Alcoholism in her brother; CABG in her brother; Cerebrovascular Disease in her sister; Coronary Artery Disease in her sister; Heart Disease in her brother; Myocardial Infarction (age of onset: 39) in her brother; Myocardial Infarction (age of onset: 51) in her mother; Myocardial Infarction (age of onset: 63) in her father; No Known Problems in her daughter, sister, sister, son, and son.          Medical History  Surgical History Family History Social History   Past Medical History:   Diagnosis Date    Aortic valve sclerosis     echo 1-24-11 mild    CAD  (coronary artery disease)     Coronary artery disease 1995    PCI at [facility]. Repeat angiogram in 2007    Diabetes (H)     Gastric ulcer 1990    H/O exercise stress test 3/21/2011    No ischemia    Hyperlipidemia     Hyperlipidemia LDL goal < 70     identified 1990's    Hypertension     onset age 50-55 approx    Hypertension     MAYKEL (obstructive sleep apnea) 2007     Past Surgical History:   Procedure Laterality Date    ANGIOPLASTY  early 1990s    COLONOSCOPY  2010    CV CORONARY ANGIOGRAM N/A 4/9/2021    Procedure: CV CORONARY ANGIOGRAM;  Surgeon: Júnior Santos MD;  Location: [facility] CARDIAC CATH LAB    CV CORONARY ANGIOGRAM N/A 2/19/2024    Procedure: Coronary Angiogram;  Surgeon: Romero Garcia MD;  Location: [facility] CATH LAB CV    CV FRACTIONAL FLOW RATIO WIRE N/A 2/19/2024    Procedure: Fractional Flow Ratio Wire;  Surgeon: Romero Garcia MD;  Location: [facility] CATH LAB CV    CV INSTANTANEOUS WAVE-FREE RATIO N/A 2/19/2024    Procedure: Instantaneous Wave-Free Ratio;  Surgeon: Romero Garcia MD;  Location: [facility] CATH LAB CV    CV LEFT HEART CATH N/A 4/9/2021    Procedure: Left Heart Cath;  Surgeon: Júnior Santos MD;  Location:  HEART CARDIAC CATH LAB    CV RIGHT HEART CATH MEASUREMENTS RECORDED N/A 4/9/2021    Procedure: Right Heart Cath;  Surgeon: Júnior Santos MD;  Location:  HEART CARDIAC CATH LAB    CV TRANSCATHETER AORTIC VALVE REPLACEMENT N/A 5/19/2021    Procedure: Transfemoral Transcatheter Aortic Valve Replacement with 23 mm [place]ie 3 Ultra Transcatheter Heart Valve, Transthoracic echocardiogram read by Dr. Farfan;  Surgeon: Harvinder Quick MD;  Location:  OR    EYE SURGERY      HEART CATH FEMORAL CANNULIZATION WITH OPEN STANDBY REPAIR AORTIC VALVE N/A 5/19/2021    Procedure: cardiopulmonary bypass on standby;  Surgeon: Riaz Fuller MD;  Location:  OR     Family History   Problem Relation Age of Onset    No Known Problems Sister      Alcoholism Brother     Myocardial Infarction Mother 51        HTN onset age 30, was a smoker    Myocardial Infarction Father 63        smoker    Myocardial Infarction Brother 39    No Known Problems Son     No Known Problems Daughter     CABG Brother     Heart Disease Brother     No Known Problems Sister     Cerebrovascular Disease Sister     Coronary Artery Disease Sister     No Known Problems Son         Social History     Socioeconomic History    Marital status:      Spouse name: Not on file    Number of children: Not on file    Years of education: Not on file    Highest education level: Not on file   Occupational History    Occupation: retired     Employer: [employer]     Comment:  retired full time, still works a few days as biochemical pharmacist   Tobacco Use    Smoking status: Former     Packs/day: 1.00     Years: 20.00     Additional pack years: 0.00     Total pack years: 20.00     Types: Cigarettes     Quit date: 3/5/1992     Years since quittin.0    Smokeless tobacco: Never   Substance and Sexual Activity    Alcohol use: No    Drug use: No    Sexual activity: Not on file   Other Topics Concern    Parent/sibling w/ CABG, MI or angioplasty before 65F 55M? Not Asked   Social History Narrative    Single and lives with her son and brother.     Social Determinants of Health     Financial Resource Strain: Not on file   Food Insecurity: Not on file   Transportation Needs: Not on file   Physical Activity: Not on file   Stress: Not on file   Social Connections: Not on file   Interpersonal Safety: Not on file   Housing Stability: Not on file           Medications  Allergies   Current Outpatient Medications   Medication Sig Dispense Refill    ALPRAZolam (XANAX) 0.5 MG tablet Take 0.5 mg by mouth daily as needed for anxiety (prior to dental work)      amoxicillin (AMOXIL) 500 MG capsule Take 4 capsules (2,000 mg) by mouth once as needed (SBE prophylaxis - take 30-60 minutes prior to dental procedure or  "cleaning) 4 capsule 3    aspirin 81 MG tablet Take 1 tablet by mouth daily.      Glucos-Chond-Hyal Ac-Ca Fructo (MOVE FREE JOINT HEALTH ADVANCE) TABS Take 1 tablet by mouth daily      hydrochlorothiazide (HYDRODIURIL) 25 MG tablet Take 1 tablet (25 mg) by mouth daily 90 tablet 3    Menatetrenone (VITAMIN K2) 100 MCG TABS Take 100 mcg by mouth daily      metoprolol succinate ER (TOPROL XL) 50 MG 24 hr tablet Take 1 tablet (50 mg) by mouth daily 30 tablet 3    multivitamin, therapeutic (THERA-VIT) TABS tablet Take 1 tablet by mouth daily      nitroGLYcerin (NITROSTAT) 0.4 MG sublingual tablet Place 0.4 mg under the tongue every 5 minutes as needed      ORDER FOR DME Use your CPAP device as directed by your provider.      rosuvastatin (CRESTOR) 40 MG tablet Take 40 mg by mouth daily      valsartan (DIOVAN) 160 MG tablet Take 1 tablet (160 mg) by mouth daily      Vitamin D3 (CHOLECALCIFEROL) 25 mcg (1000 units) tablet Take 2 tablets by mouth daily         Allergies   Allergen Reactions    Calcium Citrate Unknown     Bloody nose per pt    Lipitor [Atorvastatin Calcium]      Myalgia      Pravastatin      Myalgia      Simvastatin      myalgia          Lab Results    Chemistry/lipid CBC Cardiac Enzymes/BNP/TSH/INR   Recent Labs   Lab Test 05/30/23  1013   CHOL 180   HDL 56   LDL 98   TRIG 131     Recent Labs   Lab Test 05/30/23  1013 03/29/21  1135   LDL 98 103*     Recent Labs   Lab Test 02/19/24  0507      POTASSIUM 4.0   CHLORIDE 107   CO2 26   *   BUN 12.9   CR 0.98*   GFRESTIMATED 58*   TERRI 8.6*     Recent Labs   Lab Test 02/19/24  0507 02/18/24  1156 05/30/23  1013   CR 0.98* 1.14* 1.16*     Recent Labs   Lab Test 02/18/24  1156   A1C 6.1*          Recent Labs   Lab Test 02/20/24  1348   WBC 14.3*   HGB 12.3   HCT 37.8   MCV 86   *     Recent Labs   Lab Test 02/20/24  1348 02/19/24  0507 02/18/24  1156   HGB 12.3 12.4 13.3    No results for input(s): \"TROPONINI\" in the last 22839 hours.  Recent " Labs   Lab Test 12/22/22 1923   NTBNPI 70     Recent Labs   Lab Test 02/18/24  1156   TSH 2.17     Recent Labs   Lab Test 05/17/21  1246   INR 1.04          Medications  Allergies   Current Outpatient Medications   Medication Sig Dispense Refill    ALPRAZolam (XANAX) 0.5 MG tablet Take 0.5 mg by mouth daily as needed for anxiety (prior to dental work)      amoxicillin (AMOXIL) 500 MG capsule Take 4 capsules (2,000 mg) by mouth once as needed (SBE prophylaxis - take 30-60 minutes prior to dental procedure or cleaning) 4 capsule 3    aspirin 81 MG tablet Take 1 tablet by mouth daily.      Glucos-Chond-Hyal Ac-Ca Fructo (MOVE FREE JOINT HEALTH ADVANCE) TABS Take 1 tablet by mouth daily      hydrochlorothiazide (HYDRODIURIL) 25 MG tablet Take 1 tablet (25 mg) by mouth daily 90 tablet 3    Menatetrenone (VITAMIN K2) 100 MCG TABS Take 100 mcg by mouth daily      metoprolol succinate ER (TOPROL XL) 50 MG 24 hr tablet Take 1 tablet (50 mg) by mouth daily 30 tablet 3    multivitamin, therapeutic (THERA-VIT) TABS tablet Take 1 tablet by mouth daily      nitroGLYcerin (NITROSTAT) 0.4 MG sublingual tablet Place 0.4 mg under the tongue every 5 minutes as needed      ORDER FOR DME Use your CPAP device as directed by your provider.      rosuvastatin (CRESTOR) 40 MG tablet Take 40 mg by mouth daily      valsartan (DIOVAN) 160 MG tablet Take 1 tablet (160 mg) by mouth daily      Vitamin D3 (CHOLECALCIFEROL) 25 mcg (1000 units) tablet Take 2 tablets by mouth daily        Allergies   Allergen Reactions    Calcium Citrate Unknown     Bloody nose per pt    Lipitor [Atorvastatin Calcium]      Myalgia      Pravastatin      Myalgia      Simvastatin      myalgia          Lab Results   Lab Results   Component Value Date     02/19/2024     07/05/2021    CO2 26 02/19/2024    CO2 24 05/16/2022    CO2 27 07/05/2021    BUN 12.9 02/19/2024    BUN 14 05/16/2022    BUN 14 07/05/2021     Lab Results   Component Value Date    WBC 14.3  "02/20/2024    WBC 12.8 07/05/2021    HGB 12.3 02/20/2024    HGB 11.7 07/05/2021    HCT 37.8 02/20/2024    HCT 35.9 07/05/2021    MCV 86 02/20/2024    MCV 85 07/05/2021     02/20/2024     07/05/2021     Lab Results   Component Value Date    CHOL 180 05/30/2023    CHOL 198 03/29/2021    TRIG 131 05/30/2023    TRIG 159 03/29/2021    HDL 56 05/30/2023    HDL 64 03/29/2021     Lab Results   Component Value Date    INR 1.04 05/17/2021     No results found for: \"BNP\"  No results found for: \"CKTOTAL\", \"CKMB\", \"TROPONINI\"  Lab Results   Component Value Date    TSH 2.17 02/18/2024    TSH 1.38 08/30/2007                  "

## 2024-03-18 NOTE — LETTER
"3/18/2024    Dayana Kruse NP  [facility] [address]  [facility] [address]    RE: Reba Juarez       Dear Colleague,     I had the pleasure of seeing Reba Juarez in the [facility].         [facility]   [address], [place] [address]   [email or web address]   OFFICE: [phone]          Thank you Dr. Kruse for asking the [facility] team to participate in the care of your patient, Reba Juarez.     Impression and Plan     1.  Coronary artery disease.  Adamaris has known coronary artery disease.  Specifically, Adamaris underwent prior balloon angioplasty in the 1990s.      She underwent repeat coronary angiography 19 February 2024 that revealed \"borderline\" distal left main disease (FFR 0.8) with mild to moderate nonobstructive disease elsewhere.  Given the \"borderline\" nature of the stenosis, preserved left ventricular systolic performance, and lack of symptoms of exertional angina, medical therapy was advocated though her interventionalists, Dr. Romero Garcia, did indicate that alternatively; one could consider cardiothoracic surgery consultation for evaluation of surgical revascularization and if not a candidate then high risk, complex PCI is an option.    As noted below, she denies any chest pain or other symptoms concerning for angina.    2.  Status post aortic valve replacement.  Adamaris underwent transcatheter aortic valve replacement 19 May 2021  (documented 23 mm GUDELIA 3 Ultra valve).  Echocardiogram 30 May 2023 revealed normal prosthetic valve function.  She does have somewhat of a prominent murmur though early peaking.  Plan:   Echocardiogram.    3.  Hypertension.  Blood pressure somewhat elevated in the office today though Adamaris attributes this to recent sodium indiscretion after dining out.  Consequently, we will continue with current management and simply follow.    4.  " "Dyslipidemia.  Lipid profile 30 May 2023 revealed LDL 98 mg/dL and HDL 56 mg/dL.  Continue rosuvastatin 40 mg daily.  Will obtain fasting lipid profile     5.  Palpitations.  Ambulatory monitor for 13  days February 2024 revealed occasional episodes of SVT though very fleeting duration with longest being 16 beats.  There were rare PACs and PVCs.  Continue metoprolol.    6.  Obstructive sleep apnea.  Adamaris utilizes CPAP.    7.  Lower extremity edema.  Adamaris does have mild bilateral lower extremity edema.  Suspect that this is primarily due to venous insufficiency as well as some impedance to venous return due to some central obesity.  We did discuss increasing her diuretic therapy, but jointly decided to pursue trial of limiting sodium which she states that she could definitely improve on.  Plan to obtain echocardiogram as per problem #2 to exclude any structural heart disease that may be a contributor.    Follow-up and further recommendations pending echocardiogram findings.    35 minutes spent reviewing prior records (including documentation, laboratory studies, cardiac testing/imaging), interview with patient along with physical exam, planning, and subsequent documentation/crafting of note).           History of Present Illness    Once again I would like to thank you again for asking me to participate in the care of your patient, Reba Juarez.  As you know, but to reiterate for my own records, Reba Juarez is a 79 year old female previously seen by my colleague, Dr. Wilfredo Byrd (Yas Hyatt,     Adamaris has known coronary artery disease.  Specifically, Adamaris underwent prior balloon angioplasty in the 1990s.      She underwent repeat coronary angiography 19 February 2024 that revealed \"borderline\" distal left main disease (FFR 0.8) with mild to moderate nonobstructive disease elsewhere.  Given the \"borderline\" nature of the stenosis, preserved left ventricular systolic performance, and lack of " "symptoms of exertional angina, medical therapy was advocated though her interventionalists, Dr. Romero Garcia, did indicate that alternatively; one could consider cardiothoracic surgery consultation for evaluation of surgical revascularization and if not a candidate then high risk, complex PCI is an option.    On interview, Adamaris denies any chest pain or other symptoms concerning for angina.  She continues to report some intermittent palpitations, but no sustained rapid heart rates in the like.  She denies any associated lightheadedness.    Further review of systems is otherwise negative/noncontributory (medical record and 13 point review of systems reviewed as well and pertinent positives noted).         Cardiac Diagnostics      Echocardiogram 30 May 2023:  Normal left ventricular size and systolic performance with ejection fraction of 60-65%.  Mild increase in left ventricular wall thickness.  There is a bioprosthetic aortic valve (documented 23 mm GUDELIA 3 Ultra valve).  Normal spectral Doppler metrics with mean gradient of 15 mmHg.  \"Trivial\" aortic insufficiency.  Normal right ventricular size and systolic performance.  Normal atrial dimensions.    Coronary angiogram 19 February 2024:  Left main coronary artery: Normal caliber vessel that bifurcates into left anterior descending and left circumflex arteries.  Proximal left main has mild diffuse disease followed by 50% eccentric, calcific stenosis in the distal portion.  FFR of the left main to the LAD was 0.8.  Left anterior descending coronary artery: Large vessel that gives rise to multiple medium size diagonal branches and septal perforators.  The LAD wraps around the apex distally.  The ostial LAD has 50 to 60% calcific stenosis followed by mild to moderate diffuse disease of the LAD and its branches distally.  Circumflex coronary artery: Large dominant vessel that gives rise to both small to medium size OM branches and terminates into the posterior " descending artery and posterolateral system distally.  Left circumflex and its branches have mild diffuse disease.  Right coronary artery: Small, nondominant vessel with moderate disease.    Ambulatory monitor x 13  days February 2024:  Patient had a min HR of 47 bpm, max HR of 176 bpm, and avg HR of 72 bpm.  Predominant underlying rhythm was Sinus Rhythm.   Eight (8) Supraventricular Tachycardia runs occurred, the run with the fastest interval lasting 5 beats with a max rate of 176 bpm, the longest lasting 16 beats with an avg rate of 139 bpm.   Some episodes of Supraventricular Tachycardia may be possible Atrial Tachycardia with variable block.  Isolated SVEs were rare (<1.0%), SVE Triplets were rare (<1.0%), and no SVE Couplets were present.   Isolated VEs were rare (<1.0%, 4110), VE Couplets were rare (<1.0%, 4), and VE Triplets were rare (<1.0%, 1). Ventricular Bigeminy was           Physical Examination       There were no vitals taken for this visit.        Wt Readings from Last 3 Encounters:   02/20/24 95.8 kg (211 lb 1.6 oz)   02/15/24 99.1 kg (218 lb 6.4 oz)   05/15/23 99.6 kg (219 lb 8 oz)       The patient is alert and oriented times three. Sclerae are anicteric. Mucosal membranes are moist. Jugular venous pressure is normal. No significant adenopathy/thyromegally appreciated. Lungs are clear with good expansion. On cardiovascular exam, the patient has a regular S1 and S2. Abdomen is soft and non-tender. Extremities reveal no clubbing, cyanosis, mild bilateral lower extremity edema.         Family History/Social History/Risk Factors   Patient does not smoke.  Family history reviewed, and family history includes Alcoholism in her brother; CABG in her brother; Cerebrovascular Disease in her sister; Coronary Artery Disease in her sister; Heart Disease in her brother; Myocardial Infarction (age of onset: 39) in her brother; Myocardial Infarction (age of onset: 51) in her mother; Myocardial Infarction (age of  onset: 63) in her father; No Known Problems in her daughter, sister, sister, son, and son.          Medical History  Surgical History Family History Social History   Past Medical History:   Diagnosis Date    Aortic valve sclerosis     echo 1-24-11 mild    CAD (coronary artery disease)     Coronary artery disease 1995    PCI at [facility]. Repeat angiogram in 2007    Diabetes (H)     Gastric ulcer 1990    H/O exercise stress test 3/21/2011    No ischemia    Hyperlipidemia     Hyperlipidemia LDL goal < 70     identified 1990's    Hypertension     onset age 50-55 approx    Hypertension     MAYKEL (obstructive sleep apnea) 2007     Past Surgical History:   Procedure Laterality Date    ANGIOPLASTY  early 1990s    COLONOSCOPY  2010    CV CORONARY ANGIOGRAM N/A 4/9/2021    Procedure: CV CORONARY ANGIOGRAM;  Surgeon: Júnior Santos MD;  Location: [facility] CARDIAC CATH LAB    CV CORONARY ANGIOGRAM N/A 2/19/2024    Procedure: Coronary Angiogram;  Surgeon: Romero Garcia MD;  Location: [facility] CATH LAB CV    CV FRACTIONAL FLOW RATIO WIRE N/A 2/19/2024    Procedure: Fractional Flow Ratio Wire;  Surgeon: Romero Garcia MD;  Location: [facility] CATH LAB CV    CV INSTANTANEOUS WAVE-FREE RATIO N/A 2/19/2024    Procedure: Instantaneous Wave-Free Ratio;  Surgeon: Romero Garcia MD;  Location: [facility] CATH LAB CV    CV LEFT HEART CATH N/A 4/9/2021    Procedure: Left Heart Cath;  Surgeon: Júnior Santos MD;  Location: [facility] CARDIAC CATH LAB    CV RIGHT HEART CATH MEASUREMENTS RECORDED N/A 4/9/2021    Procedure: Right Heart Cath;  Surgeon: Júnior Santos MD;  Location: [facility] CARDIAC CATH LAB    CV TRANSCATHETER AORTIC VALVE REPLACEMENT N/A 5/19/2021    Procedure: Transfemoral Transcatheter Aortic Valve Replacement with 23 mm Little Ellie 3 Ultra Transcatheter Heart Valve, Transthoracic echocardiogram read by Dr. Farfan;  Surgeon: Harvinder Quick MD;  Location:  OR    EYE SURGERY      HEART  CATH FEMORAL CANNULIZATION WITH OPEN STANDBY REPAIR AORTIC VALVE N/A 2021    Procedure: cardiopulmonary bypass on standby;  Surgeon: Riaz Fuller MD;  Location:  OR     Family History   Problem Relation Age of Onset    No Known Problems Sister     Alcoholism Brother     Myocardial Infarction Mother 51        HTN onset age 30, was a smoker    Myocardial Infarction Father 63        smoker    Myocardial Infarction Brother 39    No Known Problems Son     No Known Problems Daughter     CABG Brother     Heart Disease Brother     No Known Problems Sister     Cerebrovascular Disease Sister     Coronary Artery Disease Sister     No Known Problems Son         Social History     Socioeconomic History    Marital status:      Spouse name: Not on file    Number of children: Not on file    Years of education: Not on file    Highest education level: Not on file   Occupational History    Occupation: retired     Employer: [employer]     Comment:  retired full time, still works a few days as biochemical pharmacist   Tobacco Use    Smoking status: Former     Packs/day: 1.00     Years: 20.00     Additional pack years: 0.00     Total pack years: 20.00     Types: Cigarettes     Quit date: 3/5/1992     Years since quittin.0    Smokeless tobacco: Never   Substance and Sexual Activity    Alcohol use: No    Drug use: No    Sexual activity: Not on file   Other Topics Concern    Parent/sibling w/ CABG, MI or angioplasty before 65F 55M? Not Asked   Social History Narrative    Single and lives with her son and brother.     Social Determinants of Health     Financial Resource Strain: Not on file   Food Insecurity: Not on file   Transportation Needs: Not on file   Physical Activity: Not on file   Stress: Not on file   Social Connections: Not on file   Interpersonal Safety: Not on file   Housing Stability: Not on file           Medications  Allergies   Current Outpatient Medications   Medication Sig Dispense Refill     ALPRAZolam (XANAX) 0.5 MG tablet Take 0.5 mg by mouth daily as needed for anxiety (prior to dental work)      amoxicillin (AMOXIL) 500 MG capsule Take 4 capsules (2,000 mg) by mouth once as needed (SBE prophylaxis - take 30-60 minutes prior to dental procedure or cleaning) 4 capsule 3    aspirin 81 MG tablet Take 1 tablet by mouth daily.      Glucos-Chond-Hyal Ac-Ca Fructo (MOVE FREE JOINT HEALTH ADVANCE) TABS Take 1 tablet by mouth daily      hydrochlorothiazide (HYDRODIURIL) 25 MG tablet Take 1 tablet (25 mg) by mouth daily 90 tablet 3    Menatetrenone (VITAMIN K2) 100 MCG TABS Take 100 mcg by mouth daily      metoprolol succinate ER (TOPROL XL) 50 MG 24 hr tablet Take 1 tablet (50 mg) by mouth daily 30 tablet 3    multivitamin, therapeutic (THERA-VIT) TABS tablet Take 1 tablet by mouth daily      nitroGLYcerin (NITROSTAT) 0.4 MG sublingual tablet Place 0.4 mg under the tongue every 5 minutes as needed      ORDER FOR DME Use your CPAP device as directed by your provider.      rosuvastatin (CRESTOR) 40 MG tablet Take 40 mg by mouth daily      valsartan (DIOVAN) 160 MG tablet Take 1 tablet (160 mg) by mouth daily      Vitamin D3 (CHOLECALCIFEROL) 25 mcg (1000 units) tablet Take 2 tablets by mouth daily         Allergies   Allergen Reactions    Calcium Citrate Unknown     Bloody nose per pt    Lipitor [Atorvastatin Calcium]      Myalgia      Pravastatin      Myalgia      Simvastatin      myalgia          Lab Results    Chemistry/lipid CBC Cardiac Enzymes/BNP/TSH/INR   Recent Labs   Lab Test 05/30/23  1013   CHOL 180   HDL 56   LDL 98   TRIG 131     Recent Labs   Lab Test 05/30/23  1013 03/29/21  1135   LDL 98 103*     Recent Labs   Lab Test 02/19/24  0507      POTASSIUM 4.0   CHLORIDE 107   CO2 26   *   BUN 12.9   CR 0.98*   GFRESTIMATED 58*   TERRI 8.6*     Recent Labs   Lab Test 02/19/24  0507 02/18/24  1156 05/30/23  1013   CR 0.98* 1.14* 1.16*     Recent Labs   Lab Test 02/18/24  1156   A1C 6.1*  "         Recent Labs   Lab Test 02/20/24  1348   WBC 14.3*   HGB 12.3   HCT 37.8   MCV 86   *     Recent Labs   Lab Test 02/20/24  1348 02/19/24  0507 02/18/24  1156   HGB 12.3 12.4 13.3    No results for input(s): \"TROPONINI\" in the last 95500 hours.  Recent Labs   Lab Test 12/22/22  1923   NTBNPI 70     Recent Labs   Lab Test 02/18/24  1156   TSH 2.17     Recent Labs   Lab Test 05/17/21  1246   INR 1.04          Medications  Allergies   Current Outpatient Medications   Medication Sig Dispense Refill    ALPRAZolam (XANAX) 0.5 MG tablet Take 0.5 mg by mouth daily as needed for anxiety (prior to dental work)      amoxicillin (AMOXIL) 500 MG capsule Take 4 capsules (2,000 mg) by mouth once as needed (SBE prophylaxis - take 30-60 minutes prior to dental procedure or cleaning) 4 capsule 3    aspirin 81 MG tablet Take 1 tablet by mouth daily.      Glucos-Chond-Hyal Ac-Ca Fructo (MOVE FREE JOINT HEALTH ADVANCE) TABS Take 1 tablet by mouth daily      hydrochlorothiazide (HYDRODIURIL) 25 MG tablet Take 1 tablet (25 mg) by mouth daily 90 tablet 3    Menatetrenone (VITAMIN K2) 100 MCG TABS Take 100 mcg by mouth daily      metoprolol succinate ER (TOPROL XL) 50 MG 24 hr tablet Take 1 tablet (50 mg) by mouth daily 30 tablet 3    multivitamin, therapeutic (THERA-VIT) TABS tablet Take 1 tablet by mouth daily      nitroGLYcerin (NITROSTAT) 0.4 MG sublingual tablet Place 0.4 mg under the tongue every 5 minutes as needed      ORDER FOR DME Use your CPAP device as directed by your provider.      rosuvastatin (CRESTOR) 40 MG tablet Take 40 mg by mouth daily      valsartan (DIOVAN) 160 MG tablet Take 1 tablet (160 mg) by mouth daily      Vitamin D3 (CHOLECALCIFEROL) 25 mcg (1000 units) tablet Take 2 tablets by mouth daily        Allergies   Allergen Reactions    Calcium Citrate Unknown     Bloody nose per pt    Lipitor [Atorvastatin Calcium]      Myalgia      Pravastatin      Myalgia      Simvastatin      myalgia          Lab " "Results   Lab Results   Component Value Date     02/19/2024     07/05/2021    CO2 26 02/19/2024    CO2 24 05/16/2022    CO2 27 07/05/2021    BUN 12.9 02/19/2024    BUN 14 05/16/2022    BUN 14 07/05/2021     Lab Results   Component Value Date    WBC 14.3 02/20/2024    WBC 12.8 07/05/2021    HGB 12.3 02/20/2024    HGB 11.7 07/05/2021    HCT 37.8 02/20/2024    HCT 35.9 07/05/2021    MCV 86 02/20/2024    MCV 85 07/05/2021     02/20/2024     07/05/2021     Lab Results   Component Value Date    CHOL 180 05/30/2023    CHOL 198 03/29/2021    TRIG 131 05/30/2023    TRIG 159 03/29/2021    HDL 56 05/30/2023    HDL 64 03/29/2021     Lab Results   Component Value Date    INR 1.04 05/17/2021     No results found for: \"BNP\"  No results found for: \"CKTOTAL\", \"CKMB\", \"TROPONINI\"  Lab Results   Component Value Date    TSH 2.17 02/18/2024    TSH 1.38 08/30/2007                Thank you for allowing me to participate in the care of your patient.      Sincerely,   Mary Jo Ball MD   [facility]  cc:   Dayana Kruse NP  [facility]  [address] [place] [phone]  "

## 2024-03-20 DIAGNOSIS — E78.5 DYSLIPIDEMIA: Primary | ICD-10-CM

## 2024-03-20 RX ORDER — EZETIMIBE 10 MG/1
10 TABLET ORAL DAILY
Qty: 90 TABLET | Refills: 3 | Status: SHIPPED | OUTPATIENT
Start: 2024-03-20

## 2024-03-23 PROCEDURE — 93248 EXT ECG>7D<15D REV&INTERPJ: CPT | Performed by: INTERNAL MEDICINE

## 2024-03-29 ENCOUNTER — HOSPITAL ENCOUNTER (OUTPATIENT)
Dept: CARDIOLOGY | Facility: HOSPITAL | Age: 79
Discharge: HOME | End: 2024-03-29
Attending: INTERNAL MEDICINE | Admitting: INTERNAL MEDICINE
Payer: COMMERCIAL

## 2024-03-29 DIAGNOSIS — I25.10 CORONARY ARTERY DISEASE INVOLVING NATIVE CORONARY ARTERY OF NATIVE HEART WITHOUT ANGINA PECTORIS: ICD-10-CM

## 2024-03-29 DIAGNOSIS — Z95.2 STATUS POST AORTIC VALVE REPLACEMENT: ICD-10-CM

## 2024-03-29 LAB — LVEF ECHO: NORMAL

## 2024-03-29 PROCEDURE — 93306 TTE W/DOPPLER COMPLETE: CPT | Mod: 26 | Performed by: INTERNAL MEDICINE

## 2024-03-29 PROCEDURE — 93306 TTE W/DOPPLER COMPLETE: CPT

## 2024-04-02 DIAGNOSIS — I25.10 CORONARY ARTERY DISEASE INVOLVING NATIVE CORONARY ARTERY OF NATIVE HEART WITHOUT ANGINA PECTORIS: ICD-10-CM

## 2024-04-02 DIAGNOSIS — E78.5 DYSLIPIDEMIA: ICD-10-CM

## 2024-04-02 DIAGNOSIS — G47.33 OSA (OBSTRUCTIVE SLEEP APNEA): ICD-10-CM

## 2024-04-02 DIAGNOSIS — I15.9 SECONDARY HYPERTENSION: ICD-10-CM

## 2024-04-02 DIAGNOSIS — I10 HYPERTENSION, UNSPECIFIED TYPE: ICD-10-CM

## 2024-04-02 DIAGNOSIS — R00.2 PALPITATIONS: ICD-10-CM

## 2024-04-02 DIAGNOSIS — R00.0 TACHYCARDIA: ICD-10-CM

## 2024-04-02 DIAGNOSIS — Z95.2 STATUS POST AORTIC VALVE REPLACEMENT: Primary | ICD-10-CM

## 2024-04-02 DIAGNOSIS — R60.0 BILATERAL LOWER EXTREMITY EDEMA: ICD-10-CM

## 2024-04-08 ENCOUNTER — PRE VISIT (OUTPATIENT)
Dept: NEUROSURGERY | Facility: CLINIC | Age: 79
End: 2024-04-08
Payer: COMMERCIAL

## 2024-04-08 NOTE — TELEPHONE ENCOUNTER
RECORDS RECEIVED FROM: Internal    REFERRED BY: Riaz Pollard MD    REASON FOR VISIT: Spinal stenosis, lumbar region, without neurogenic claudication   Date of Appt: 4/8/2024   NOTES (FOR ALL VISITS) STATUS DETAILS   OFFICE NOTE from referring provider Internal 2/20/24   OFFICE NOTE from other specialist N/A    DISCHARGE SUMMARY from hospital N/A    DISCHARGE REPORT from the ER Internal 2/20/24   SURGERY N/A    PHYSICAL THERAPY N/A    INJECTIONS N/A    EMG N/A    IMAGING  (FOR ALL VISITS)     XR (HEAD, NECK, SPINE) N/A    MRI (HEAD, NECK, SPINE) N/A    CT (HEAD, NECK, SPINE) Internal Lumbar 2/20/2024

## 2024-04-09 ENCOUNTER — TELEPHONE (OUTPATIENT)
Dept: CARDIOLOGY | Facility: CLINIC | Age: 79
End: 2024-04-09
Payer: COMMERCIAL

## 2024-04-09 NOTE — TELEPHONE ENCOUNTER
Patient Contacted for the patient to call back and schedule the following:    Appointment type: JASS  Provider: ALO  Return date: 05/15/24  Specialty phone number: [phone] OPT 1  Additional appointment(s) needed: N/A  Additonal Notes:   PT SEES PROVIDER AT [facility] NO LONGER ALO PT

## 2024-04-10 ENCOUNTER — OFFICE VISIT (OUTPATIENT)
Dept: NEUROSURGERY | Facility: CLINIC | Age: 79
End: 2024-04-10
Payer: COMMERCIAL

## 2024-04-10 VITALS
HEART RATE: 68 BPM | HEIGHT: 63 IN | SYSTOLIC BLOOD PRESSURE: 212 MMHG | DIASTOLIC BLOOD PRESSURE: 90 MMHG | WEIGHT: 223 LBS | BODY MASS INDEX: 39.51 KG/M2 | OXYGEN SATURATION: 93 %

## 2024-04-10 DIAGNOSIS — M48.061 SPINAL STENOSIS, LUMBAR REGION, WITHOUT NEUROGENIC CLAUDICATION: ICD-10-CM

## 2024-04-10 PROCEDURE — 99203 OFFICE O/P NEW LOW 30 MIN: CPT | Performed by: PHYSICIAN ASSISTANT

## 2024-04-10 ASSESSMENT — PAIN SCALES - GENERAL: PAINLEVEL: NO PAIN (0)

## 2024-04-10 NOTE — LETTER
4/10/2024         RE: Reba Juarez  [address] [address]        Dear Colleague,    Thank you for referring your patient, Reba Juarez, to the [facility]. Please see a copy of my visit note below.    Neurosurgery Consult    HPI    Ms. Juarez is an 79-year-old female who was recently admitted in February for palpitations, during that admission she developed back pain, CT scans performed about multilevel spinal stenosis.    Today patient presents for follow-up.  She states she has episodic back pain flareups over the years.  They come and go.  She denies neurogenic claudication, denies radicular symptoms.    Currently she is asymptomatic.  She states when she does have the pain it is a midline low back pain that is quite bothersome but without radicular symptoms or bowel or bladder symptoms.    Medical history  Obesity  Hyperlipidemia  Type 2 diabetes  COPD  CLL  NSTEMI    Social history  Retired      B/P: 212/90, T: Data Unavailable, P: 68, R: Data Unavailable       Exam    And oriented no acute distress  Gait is normal  Bilateral lower extremities appropriate strength      Imaging    Lumbar CT scan demonstrated    IMPRESSION:  1.  Multilevel degenerative spondylotic change throughout the lumbar spine. Variable degrees of facet hypertrophic change resulting in multilevel foraminal stenosis. Please see body of report for details at each level.     2.  DDD change at L2-L3 results in severe spinal canal stenosis.     3.  DDD change at L3-L4 results in moderate spinal canal stenosis.     4.  DDD change at L4-L5 results in moderate to severe spinal canal stenosis.     5.  DDD change at L5-S1 results in moderate spinal canal stenosis.    Assessment    Spinal stenosis, currently asymptomatic without neurogenic claudication or radiculopathy.    Plan:      Patient can follow-up as needed.  If symptoms recur, recommend beginning with conservative therapies, and  following up with us if needed further.      Again, thank you for allowing me to participate in the care of your patient.        Sincerely,        Whitley Denson PA-C

## 2024-04-10 NOTE — PROGRESS NOTES
Neurosurgery Consult    HPI    Ms. Juarez is an 79-year-old female who was recently admitted in February for palpitations, during that admission she developed back pain, CT scans performed about multilevel spinal stenosis.    Today patient presents for follow-up.  She states she has episodic back pain flareups over the years.  They come and go.  She denies neurogenic claudication, denies radicular symptoms.    Currently she is asymptomatic.  She states when she does have the pain it is a midline low back pain that is quite bothersome but without radicular symptoms or bowel or bladder symptoms.    Medical history  Obesity  Hyperlipidemia  Type 2 diabetes  COPD  CLL  NSTEMI    Social history  Retired      B/P: 212/90, T: Data Unavailable, P: 68, R: Data Unavailable       Exam    And oriented no acute distress  Gait is normal  Bilateral lower extremities appropriate strength      Imaging    Lumbar CT scan demonstrated    IMPRESSION:  1.  Multilevel degenerative spondylotic change throughout the lumbar spine. Variable degrees of facet hypertrophic change resulting in multilevel foraminal stenosis. Please see body of report for details at each level.     2.  DDD change at L2-L3 results in severe spinal canal stenosis.     3.  DDD change at L3-L4 results in moderate spinal canal stenosis.     4.  DDD change at L4-L5 results in moderate to severe spinal canal stenosis.     5.  DDD change at L5-S1 results in moderate spinal canal stenosis.    Assessment    Spinal stenosis, currently asymptomatic without neurogenic claudication or radiculopathy.    Plan:      Patient can follow-up as needed.  If symptoms recur, recommend beginning with conservative therapies, and following up with us if needed further.

## 2024-04-10 NOTE — NURSING NOTE
"Reba Juarez is a 79 year old female who presents for:  Chief Complaint   Patient presents with    Consult     Spinal stenosis, lumbar region, without neurogenic claudication. Patient reports no pain today.        Initial Vitals:  BP (!) 212/90   Pulse 68   Ht 5' 3\" (1.6 m)   Wt 223 lb (101.2 kg)   SpO2 93%   BMI 39.50 kg/m   Estimated body mass index is 39.5 kg/m  as calculated from the following:    Height as of this encounter: 5' 3\" (1.6 m).    Weight as of this encounter: 223 lb (101.2 kg).. Body surface area is 2.12 meters squared. BP completed using cuff size: regular  No Pain (0)        Claudio Bran    "

## 2024-06-16 ENCOUNTER — HEALTH MAINTENANCE LETTER (OUTPATIENT)
Age: 79
End: 2024-06-16

## 2024-06-24 ENCOUNTER — LAB (OUTPATIENT)
Dept: CARDIOLOGY | Facility: CLINIC | Age: 79
End: 2024-06-24
Payer: COMMERCIAL

## 2024-06-24 DIAGNOSIS — E78.5 DYSLIPIDEMIA: ICD-10-CM

## 2024-06-24 LAB
CHOLEST SERPL-MCNC: 186 MG/DL
FASTING STATUS PATIENT QL REPORTED: YES
HDLC SERPL-MCNC: 56 MG/DL
LDLC SERPL CALC-MCNC: 96 MG/DL
NONHDLC SERPL-MCNC: 130 MG/DL
TRIGL SERPL-MCNC: 171 MG/DL

## 2024-06-24 PROCEDURE — 36415 COLL VENOUS BLD VENIPUNCTURE: CPT

## 2024-06-24 PROCEDURE — 80061 LIPID PANEL: CPT

## 2024-06-27 DIAGNOSIS — E78.5 DYSLIPIDEMIA: Primary | ICD-10-CM

## 2024-08-25 ENCOUNTER — HEALTH MAINTENANCE LETTER (OUTPATIENT)
Age: 79
End: 2024-08-25

## 2024-10-01 NOTE — PROGRESS NOTES
HEART CARE ENCOUNTER NOTE      [facility]  [phone]      Assessment/Recommendations   Assessment:   Coronary artery disease: Remote history of balloon angioplasty in 1990s.  Recent coronary angiogram 2/19/2024 showed borderline distal left main disease with an FFR of 0.8 and mild to moderate nonobstructive disease elsewhere.  Medical therapy had been recommended and could consider CABG consult if symptomatic though complex PCI could be an option in the future if patient was too high risk for surgery.  Isosorbide mononitrate had been sent in in July for patient to trial.  Patient noted that her blood pressure went down to 110 systolic and she felt slightly more fatigued so she did not take this again as she was worried it was harmful to her.  Reassured patient and will have her try and restart this.  If this is not improving her anginal symptoms would recommend she meet with CV surgery.  Severe aortic stenosis: Status post TAVR 5/19/2021 echocardiogram obtained 3/29/2024 showed mean gradient of 16 mmHg with trace paravalvular regurgitation.  Will repeat echocardiogram as patient notes worsening shortness of breath.  Hypertension: On valsartan 160 mg daily, metoprolol 25 mg twice daily, hydrochlorothiazide 25 mg daily.  Elevated today in clinic at 162/65.  Patient notes that at home her blood pressures are typically well-controlled in the 130s systolic.  Dyslipidemia: On rosuvastatin 40 mg daily and Zetia 10 mg daily.  Last lipids 6/24/2024 showed LDL of 96.  Dr. Ball had recommended to continue management focusing on heart healthy diet and repeat in 1 year.  Obstructive sleep apnea: Compliant on CPAP.  Patient notes that she wears an oral ring and will note oxygen dropping into the 80s for short periods of time both when sleeping and during the daytime.  Encouraged her to discuss with sleep medicine to make sure her CPAP is fitted well and working properly.  Palpitations: Zio patch  3/23/2024 showed less than 1% burden of PACs/PVCs patient remains on metoprolol.  Patient notes short-lived palpitations but that these have not changed since her Zio patch monitor in March.  Lower extremity edema: Chronic and partially due to venous insufficiency.  Echocardiogram 3/29/2024 showed normal EF with no significant valvular issues.    Plan:   Continue current medications  Try restarting isosorbide mononitrate 30 mg daily to see if this improves patient's chest pain and shortness of breath  If no improvement in symptoms would consider bypass surgery versus complex PCI  If patient has any return of the odd feeling of withdrawn from the heart or feeling that she is going to pass out patient will reach out and we could send over Zio monitor  Consider seeing sleep medicine again to see if CPAP is working well and fitting correctly due to patient noting her oxygen level dropping when sleeping that seems to have been somewhat during the day as well      Follow up in 3 months with Dr. Ball    The longitudinal plan of care for Reba Juarez was addressed during this visit. Due to the added complexity in care, I will continue to support Adamaris in the subsequent management of this condition(s) and with the ongoing continuity of care of this condition(s).      History of Present Illness/Subjective    HPI: Reba Juarez is a 79 year old female with PMHx of coronary artery disease, severe aortic stenosis status post TAVR 2021, hypertension, dyslipidemia, MAYKEL on CPAP, lower extremity edema presents for follow-up.  Patient last saw Dr. Ball 3/18/2024 where patient was denying any anginal symptoms.  Was noting some intermittent palpitations but no lightheadedness.  Echocardiogram was obtained 3/29/2024 that showed normal EF with trace paravalvular regurgitation of the aortic valve status post TAVR.    Patient notes that she had an episode when she was walking around [facility] where she felt the sensation of  "something being pulled out from her heart and that her head was \"swimming\".  Patient notes this would last a few seconds then improve, then come back 20 minutes later and that she had this on and off all day about 2 weeks ago.  Patient notes that it is not happening anymore.  Patient does still note intermittent palpitations and will get a racing heart beat at rest though this is somewhat better than it was back in March.  Patient does note chest discomfort when climbing the stairs and took nitroglycerin the other day which seemed to help symptoms.  Patient notes she does not take this often.  Patient does note shortness of breath that continues to worsen.  Patient wears an hour ring and saw that her oxygen was dropping into the 80s when she was sleeping as well as short times during the day.  Patient denies lightheadedness.          Echocardiogram 3/29/2024 results:  The visual ejection fraction is 65-70%.  The right ventricle is normal in size and function.  There is a GUDELIA 3 bioprosthetic valve present in aortic valve position. Mean  gradient is 16 mmHg. There is trace paravalvular regurgitation present.     Compared to the prior study dated 5/30/2023, there have been no changes.    Coronary angiogram 2/19/2024  -Borderline distal left main disease (FFR 0.8) with mild to moderate nonobstructive disease elsewhere.    RECOMMENDATIONS:   Usual postprocedure cares, please see orders.  Recommend a trial of medical management given borderline complex distal left main disease, preserved LV function, and lack of exertional angina - unclear if left main disease etiology of current presentation.  Alternatively, can consider cardiothoracic surgery consultation for evaluation of surgical revascularization and if not a candidate then high risk, complex PCI is an option.  Aggressive risk factor modification for secondary prevention.    Zio patch 3/23/2024  Sinus rhythm, average 72 bpm, range  bpm.  PACs/PVCs " <1%.  Brief runs of PACs, longest 16 beats, no symptoms.  Patient triggered events correlated with sinus rhythm and sinus tachycardia.    US carotid bilateral 5/17/2017  4 vessel study done with mild to moderate diffuse plaque in   carotid distribution without flow limiting stenosis. Bruit is not due to   obstructive lesion. Results shared with patient today.      Physical Examination  Review of Systems   Vitals: BP (!) 162/65 (BP Location: Right arm, Patient Position: Sitting, Cuff Size: Adult Large)   Pulse 67   Resp 14   Wt 102.5 kg (226 lb)   BMI 40.03 kg/m    BMI= Body mass index is 40.03 kg/m .  Wt Readings from Last 3 Encounters:   04/10/24 101.2 kg (223 lb)   03/18/24 101.6 kg (224 lb)   02/20/24 95.8 kg (211 lb 1.6 oz)           ENT/Mouth: membranes moist, no oral lesions or bleeding gums.      EYES:  no scleral icterus, normal conjunctivae       Chest/Lungs:   lungs are clear to auscultation, no rales or wheezing,  equal chest wall expansion    Cardiovascular:   Regular. Normal first and second heart sounds with subtle systolic murmur, no rubs, or gallops; the radial and posterior tibial pulses are intact,  no edema bilaterally        Extremities: no cyanosis or clubbing   Skin: no xanthelasma, warm.    Neurologic: no tremors     Psychiatric: alert and oriented x3, calm        Please refer above for cardiac ROS details.        Medical History  Surgical History Family History Social History   Past Medical History:   Diagnosis Date    Aortic valve sclerosis     echo 1-24-11 mild    CAD (coronary artery disease)     Coronary artery disease 1995    PCI at [facility]. Repeat angiogram in 2007    Diabetes (H)     Gastric ulcer 1990    H/O exercise stress test 3/21/2011    No ischemia    Hyperlipidemia     Hyperlipidemia LDL goal < 70     identified 1990's    Hypertension     onset age 50-55 approx    Hypertension     MAYKEL (obstructive sleep apnea) 2007     Past Surgical History:   Procedure  Laterality Date    ANGIOPLASTY  early 1990s    COLONOSCOPY  2010    CV CORONARY ANGIOGRAM N/A 4/9/2021    Procedure: CV CORONARY ANGIOGRAM;  Surgeon: Júnior Santos MD;  Location: [facility] CARDIAC CATH LAB    CV CORONARY ANGIOGRAM N/A 2/19/2024    Procedure: Coronary Angiogram;  Surgeon: Romero Garcia MD;  Location: [facility] CATH LAB CV    CV FRACTIONAL FLOW RATIO WIRE N/A 2/19/2024    Procedure: Fractional Flow Ratio Wire;  Surgeon: Romero Garcia MD;  Location: [facility] CATH LAB CV    CV INSTANTANEOUS WAVE-FREE RATIO N/A 2/19/2024    Procedure: Instantaneous Wave-Free Ratio;  Surgeon: Romero Garcia MD;  Location: [facility] CATH LAB CV    CV LEFT HEART CATH N/A 4/9/2021    Procedure: Left Heart Cath;  Surgeon: Júnior Santos MD;  Location: [facility] CARDIAC CATH LAB    CV RIGHT HEART CATH MEASUREMENTS RECORDED N/A 4/9/2021    Procedure: Right Heart Cath;  Surgeon: Júnior Santos MD;  Location: [facility] CARDIAC CATH LAB    CV TRANSCATHETER AORTIC VALVE REPLACEMENT N/A 5/19/2021    Procedure: Transfemoral Transcatheter Aortic Valve Replacement with 23 mm Little Ellie 3 Ultra Transcatheter Heart Valve, Transthoracic echocardiogram read by Dr. Farfan;  Surgeon: Harvinder Quick MD;  Location:  OR    EYE SURGERY      HEART CATH FEMORAL CANNULIZATION WITH OPEN STANDBY REPAIR AORTIC VALVE N/A 5/19/2021    Procedure: cardiopulmonary bypass on standby;  Surgeon: Riaz Fuller MD;  Location:  OR     Family History   Problem Relation Age of Onset    No Known Problems Sister     Alcoholism Brother     Myocardial Infarction Mother 51        HTN onset age 30, was a smoker    Myocardial Infarction Father 63        smoker    Myocardial Infarction Brother 39    No Known Problems Son     No Known Problems Daughter     CABG Brother     Heart Disease Brother     No Known Problems Sister     Cerebrovascular Disease Sister     Coronary Artery Disease Sister     No Known Problems Son         Social  History     Socioeconomic History    Marital status:      Spouse name: Not on file    Number of children: Not on file    Years of education: Not on file    Highest education level: Not on file   Occupational History    Occupation: retired     Employer: [employer]     Comment:  retired full time, still works a few days as biochemical pharmacist   Tobacco Use    Smoking status: Former     Current packs/day: 0.00     Average packs/day: 1 pack/day for 20.0 years (20.0 ttl pk-yrs)     Types: Cigarettes     Start date: 3/5/1972     Quit date: 3/5/1992     Years since quittin.5    Smokeless tobacco: Never   Substance and Sexual Activity    Alcohol use: No    Drug use: No    Sexual activity: Not on file   Other Topics Concern    Parent/sibling w/ CABG, MI or angioplasty before 65F 55M? Not Asked   Social History Narrative    Single and lives with her son and brother.     Social Determinants of Health     Financial Resource Strain: Not on file   Food Insecurity: Not on file   Transportation Needs: Not on file   Physical Activity: Not on file   Stress: Not on file   Social Connections: Unknown (1/3/2024)    Received from [email or web address] [facility] [employer] [email or web address] [facility] [employer]    Social Connections     Frequency of Communication with Friends and Family: Not on file   Interpersonal Safety: Not on file   Housing Stability: Not on file           Medications  Allergies   Current Outpatient Medications   Medication Sig Dispense Refill    ALPRAZolam (XANAX) 0.5 MG tablet Take 0.5 mg by mouth daily as needed for anxiety (prior to dental work)      amoxicillin (AMOXIL) 500 MG capsule Take 4 capsules (2,000 mg) by mouth once as needed (SBE prophylaxis - take 30-60 minutes prior to dental procedure or cleaning) 4 capsule 3    aspirin 81 MG tablet Take 1 tablet by mouth daily.      ezetimibe (ZETIA) 10 MG tablet Take 1 tablet (10 mg) by mouth daily 90 tablet 3    Glucos-Chond-Hyal Ac-Ca  "Fructo (MOVE FREE JOINT HEALTH ADVANCE) TABS Take 1 tablet by mouth daily      hydrochlorothiazide (HYDRODIURIL) 25 MG tablet Take 1 tablet (25 mg) by mouth daily 90 tablet 3    isosorbide mononitrate (IMDUR) 30 MG 24 hr tablet Take 1 tablet (30 mg) by mouth daily 90 tablet 3    Menatetrenone (VITAMIN K2) 100 MCG TABS Take 100 mcg by mouth daily      metoprolol succinate ER (TOPROL XL) 25 MG 24 hr tablet Take 25 mg by mouth 2 times daily      multivitamin, therapeutic (THERA-VIT) TABS tablet Take 1 tablet by mouth daily      nitroGLYcerin (NITROSTAT) 0.4 MG sublingual tablet Place 0.4 mg under the tongue every 5 minutes as needed      ORDER FOR DME Use your CPAP device as directed by your provider.      rosuvastatin (CRESTOR) 40 MG tablet Take 40 mg by mouth daily      valsartan (DIOVAN) 160 MG tablet Take 1 tablet (160 mg) by mouth daily 90 tablet 3    Vitamin D3 (CHOLECALCIFEROL) 25 mcg (1000 units) tablet Take 2 tablets by mouth daily         Allergies   Allergen Reactions    Calcium Citrate Unknown     Bloody nose per pt    Lipitor [Atorvastatin Calcium]      Myalgia      Pravastatin      Myalgia      Simvastatin      myalgia          Lab Results    Chemistry/lipid CBC Cardiac Enzymes/BNP/TSH/INR   Recent Labs   Lab Test 06/24/24  1152   CHOL 186   HDL 56   LDL 96   TRIG 171*     Recent Labs   Lab Test 06/24/24  1152 03/18/24  1047 05/30/23  1013   LDL 96 103* 98     Recent Labs   Lab Test 02/19/24  0507      POTASSIUM 4.0   CHLORIDE 107   CO2 26   *   BUN 12.9   CR 0.98*   GFRESTIMATED 58*   TERRI 8.6*     Recent Labs   Lab Test 02/19/24  0507 02/18/24  1156 05/30/23  1013   CR 0.98* 1.14* 1.16*     Recent Labs   Lab Test 02/18/24  1156   A1C 6.1*          Recent Labs   Lab Test 02/20/24  1348   WBC 14.3*   HGB 12.3   HCT 37.8   MCV 86   *     Recent Labs   Lab Test 02/20/24  1348 02/19/24  0507 02/18/24  1156   HGB 12.3 12.4 13.3    No results for input(s): \"TROPONINI\" in the last 19862 " hours.  Recent Labs   Lab Test 12/22/22  1923   NTBNPI 70     Recent Labs   Lab Test 02/18/24  1156   TSH 2.17     Recent Labs   Lab Test 05/17/21  1246   INR 1.04          Ashley Dent PA-C

## 2024-10-02 ENCOUNTER — OFFICE VISIT (OUTPATIENT)
Dept: CARDIOLOGY | Facility: CLINIC | Age: 79
End: 2024-10-02
Attending: INTERNAL MEDICINE
Payer: COMMERCIAL

## 2024-10-02 VITALS
SYSTOLIC BLOOD PRESSURE: 162 MMHG | RESPIRATION RATE: 14 BRPM | BODY MASS INDEX: 40.03 KG/M2 | DIASTOLIC BLOOD PRESSURE: 65 MMHG | HEART RATE: 67 BPM | WEIGHT: 226 LBS

## 2024-10-02 DIAGNOSIS — R60.0 BILATERAL LOWER EXTREMITY EDEMA: ICD-10-CM

## 2024-10-02 DIAGNOSIS — G47.33 OSA ON CPAP: Primary | ICD-10-CM

## 2024-10-02 DIAGNOSIS — R00.2 PALPITATIONS: ICD-10-CM

## 2024-10-02 DIAGNOSIS — Z95.2 STATUS POST AORTIC VALVE REPLACEMENT: ICD-10-CM

## 2024-10-02 DIAGNOSIS — E78.5 HYPERLIPIDEMIA LDL GOAL <70: ICD-10-CM

## 2024-10-02 DIAGNOSIS — I35.0 SEVERE AORTIC STENOSIS: ICD-10-CM

## 2024-10-02 DIAGNOSIS — I10 HYPERTENSION, UNSPECIFIED TYPE: ICD-10-CM

## 2024-10-02 DIAGNOSIS — I25.10 CORONARY ARTERY DISEASE INVOLVING NATIVE CORONARY ARTERY OF NATIVE HEART WITHOUT ANGINA PECTORIS: ICD-10-CM

## 2024-10-02 PROCEDURE — G2211 COMPLEX E/M VISIT ADD ON: HCPCS | Performed by: STUDENT IN AN ORGANIZED HEALTH CARE EDUCATION/TRAINING PROGRAM

## 2024-10-02 PROCEDURE — 99214 OFFICE O/P EST MOD 30 MIN: CPT | Performed by: STUDENT IN AN ORGANIZED HEALTH CARE EDUCATION/TRAINING PROGRAM

## 2024-10-02 NOTE — LETTER
10/2/2024    Dayana Kruse NP  [facility] [address]  [place] [address]    RE: Reba Juarez       Dear Colleague,     I had the pleasure of seeing Reba Juarez in the [facility].    HEART CARE ENCOUNTER NOTE      [facility]  [phone]      Assessment/Recommendations   Assessment:   Coronary artery disease: Remote history of balloon angioplasty in 1990s.  Recent coronary angiogram 2/19/2024 showed borderline distal left main disease with an FFR of 0.8 and mild to moderate nonobstructive disease elsewhere.  Medical therapy had been recommended and could consider CABG consult if symptomatic though complex PCI could be an option in the future if patient was too high risk for surgery.  Isosorbide mononitrate had been sent in in July for patient to trial.  Patient noted that her blood pressure went down to 110 systolic and she felt slightly more fatigued so she did not take this again as she was worried it was harmful to her.  Reassured patient and will have her try and restart this.  If this is not improving her anginal symptoms would recommend she meet with CV surgery.  Severe aortic stenosis: Status post TAVR 5/19/2021 echocardiogram obtained 3/29/2024 showed mean gradient of 16 mmHg with trace paravalvular regurgitation.  Will repeat echocardiogram as patient notes worsening shortness of breath.  Hypertension: On valsartan 160 mg daily, metoprolol 25 mg twice daily, hydrochlorothiazide 25 mg daily.  Elevated today in clinic at 162/65.  Patient notes that at home her blood pressures are typically well-controlled in the 130s systolic.  Dyslipidemia: On rosuvastatin 40 mg daily and Zetia 10 mg daily.  Last lipids 6/24/2024 showed LDL of 96.  Dr. Ball had recommended to continue management focusing on heart healthy diet and repeat in 1 year.  Obstructive sleep apnea: Compliant on CPAP.  Patient notes that she wears an oral ring and  will note oxygen dropping into the 80s for short periods of time both when sleeping and during the daytime.  Encouraged her to discuss with sleep medicine to make sure her CPAP is fitted well and working properly.  Palpitations: Zio patch 3/23/2024 showed less than 1% burden of PACs/PVCs patient remains on metoprolol.  Patient notes short-lived palpitations but that these have not changed since her Zio patch monitor in March.  Lower extremity edema: Chronic and partially due to venous insufficiency.  Echocardiogram 3/29/2024 showed normal EF with no significant valvular issues.    Plan:   Continue current medications  Try restarting isosorbide mononitrate 30 mg daily to see if this improves patient's chest pain and shortness of breath  If no improvement in symptoms would consider bypass surgery versus complex PCI  If patient has any return of the odd feeling of withdrawn from the heart or feeling that she is going to pass out patient will reach out and we could send over Zio monitor  Consider seeing sleep medicine again to see if CPAP is working well and fitting correctly due to patient noting her oxygen level dropping when sleeping that seems to have been somewhat during the day as well      Follow up in 3 months with Dr. Ball    The longitudinal plan of care for Reba Juarez was addressed during this visit. Due to the added complexity in care, I will continue to support Adamaris in the subsequent management of this condition(s) and with the ongoing continuity of care of this condition(s).      History of Present Illness/Subjective    HPI: Reba Juarez is a 79 year old female with PMHx of coronary artery disease, severe aortic stenosis status post TAVR 2021, hypertension, dyslipidemia, MAYKEL on CPAP, lower extremity edema presents for follow-up.  Patient last saw Dr. Ball 3/18/2024 where patient was denying any anginal symptoms.  Was noting some intermittent palpitations but no lightheadedness.   "Echocardiogram was obtained 3/29/2024 that showed normal EF with trace paravalvular regurgitation of the aortic valve status post TAVR.    Patient notes that she had an episode when she was walking around [facility] where she felt the sensation of something being pulled out from her heart and that her head was \"swimming\".  Patient notes this would last a few seconds then improve, then come back 20 minutes later and that she had this on and off all day about 2 weeks ago.  Patient notes that it is not happening anymore.  Patient does still note intermittent palpitations and will get a racing heart beat at rest though this is somewhat better than it was back in March.  Patient does note chest discomfort when climbing the stairs and took nitroglycerin the other day which seemed to help symptoms.  Patient notes she does not take this often.  Patient does note shortness of breath that continues to worsen.  Patient wears an hour ring and saw that her oxygen was dropping into the 80s when she was sleeping as well as short times during the day.  Patient denies lightheadedness.          Echocardiogram 3/29/2024 results:  The visual ejection fraction is 65-70%.  The right ventricle is normal in size and function.  There is a GUDELIA 3 bioprosthetic valve present in aortic valve position. Mean  gradient is 16 mmHg. There is trace paravalvular regurgitation present.     Compared to the prior study dated 5/30/2023, there have been no changes.    Coronary angiogram 2/19/2024  -Borderline distal left main disease (FFR 0.8) with mild to moderate nonobstructive disease elsewhere.    RECOMMENDATIONS:   Usual postprocedure cares, please see orders.  Recommend a trial of medical management given borderline complex distal left main disease, preserved LV function, and lack of exertional angina - unclear if left main disease etiology of current presentation.  Alternatively, can consider cardiothoracic surgery consultation for evaluation of " surgical revascularization and if not a candidate then high risk, complex PCI is an option.  Aggressive risk factor modification for secondary prevention.    Zio patch 3/23/2024  Sinus rhythm, average 72 bpm, range  bpm.  PACs/PVCs <1%.  Brief runs of PACs, longest 16 beats, no symptoms.  Patient triggered events correlated with sinus rhythm and sinus tachycardia.    US carotid bilateral 5/17/2017  4 vessel study done with mild to moderate diffuse plaque in   carotid distribution without flow limiting stenosis. Bruit is not due to   obstructive lesion. Results shared with patient today.      Physical Examination  Review of Systems   Vitals: BP (!) 162/65 (BP Location: Right arm, Patient Position: Sitting, Cuff Size: Adult Large)   Pulse 67   Resp 14   Wt 102.5 kg (226 lb)   BMI 40.03 kg/m    BMI= Body mass index is 40.03 kg/m .  Wt Readings from Last 3 Encounters:   04/10/24 101.2 kg (223 lb)   03/18/24 101.6 kg (224 lb)   02/20/24 95.8 kg (211 lb 1.6 oz)           ENT/Mouth: membranes moist, no oral lesions or bleeding gums.      EYES:  no scleral icterus, normal conjunctivae       Chest/Lungs:   lungs are clear to auscultation, no rales or wheezing,  equal chest wall expansion    Cardiovascular:   Regular. Normal first and second heart sounds with subtle systolic murmur, no rubs, or gallops; the radial and posterior tibial pulses are intact,  no edema bilaterally        Extremities: no cyanosis or clubbing   Skin: no xanthelasma, warm.    Neurologic: no tremors     Psychiatric: alert and oriented x3, calm        Please refer above for cardiac ROS details.        Medical History  Surgical History Family History Social History   Past Medical History:   Diagnosis Date     Aortic valve sclerosis     echo 1-24-11 mild     CAD (coronary artery disease)      Coronary artery disease 1995    PCI at [facility]. Repeat angiogram in 2007     Diabetes (H)      Gastric ulcer 1990     H/O exercise stress  test 3/21/2011    No ischemia     Hyperlipidemia      Hyperlipidemia LDL goal < 70     identified 1990's     Hypertension     onset age 50-55 approx     Hypertension      MAYKEL (obstructive sleep apnea) 2007     Past Surgical History:   Procedure Laterality Date     ANGIOPLASTY  early 1990s     COLONOSCOPY  2010     CV CORONARY ANGIOGRAM N/A 4/9/2021    Procedure: CV CORONARY ANGIOGRAM;  Surgeon: Júnior Santos MD;  Location:  HEART CARDIAC CATH LAB     CV CORONARY ANGIOGRAM N/A 2/19/2024    Procedure: Coronary Angiogram;  Surgeon: Romero Garcia MD;  Location: [facility] CATH LAB CV     CV FRACTIONAL FLOW RATIO WIRE N/A 2/19/2024    Procedure: Fractional Flow Ratio Wire;  Surgeon: Romero Garcia MD;  Location: [facility] CATH LAB CV     CV INSTANTANEOUS WAVE-FREE RATIO N/A 2/19/2024    Procedure: Instantaneous Wave-Free Ratio;  Surgeon: Romero Garcia MD;  Location: [facility] CATH LAB CV     CV LEFT HEART CATH N/A 4/9/2021    Procedure: Left Heart Cath;  Surgeon: Júnior Santos MD;  Location:  HEART CARDIAC CATH LAB     CV RIGHT HEART CATH MEASUREMENTS RECORDED N/A 4/9/2021    Procedure: Right Heart Cath;  Surgeon: Júnior Santos MD;  Location:  HEART CARDIAC CATH LAB     CV TRANSCATHETER AORTIC VALVE REPLACEMENT N/A 5/19/2021    Procedure: Transfemoral Transcatheter Aortic Valve Replacement with 23 mm Little Ellie 3 Ultra Transcatheter Heart Valve, Transthoracic echocardiogram read by Dr. Farfan;  Surgeon: Harvinder Qiuck MD;  Location:  OR     EYE SURGERY       HEART CATH FEMORAL CANNULIZATION WITH OPEN STANDBY REPAIR AORTIC VALVE N/A 5/19/2021    Procedure: cardiopulmonary bypass on standby;  Surgeon: Riaz Fuller MD;  Location:  OR     Family History   Problem Relation Age of Onset     No Known Problems Sister      Alcoholism Brother      Myocardial Infarction Mother 51        HTN onset age 30, was a smoker     Myocardial Infarction Father 63        smoker     Myocardial  Infarction Brother 39     No Known Problems Son      No Known Problems Daughter      CABG Brother      Heart Disease Brother      No Known Problems Sister      Cerebrovascular Disease Sister      Coronary Artery Disease Sister      No Known Problems Son         Social History     Socioeconomic History     Marital status:      Spouse name: Not on file     Number of children: Not on file     Years of education: Not on file     Highest education level: Not on file   Occupational History     Occupation: retired     Employer: [employer]     Comment:  retired full time, still works a few days as biochemical pharmacist   Tobacco Use     Smoking status: Former     Current packs/day: 0.00     Average packs/day: 1 pack/day for 20.0 years (20.0 ttl pk-yrs)     Types: Cigarettes     Start date: 3/5/1972     Quit date: 3/5/1992     Years since quittin.5     Smokeless tobacco: Never   Substance and Sexual Activity     Alcohol use: No     Drug use: No     Sexual activity: Not on file   Other Topics Concern     Parent/sibling w/ CABG, MI or angioplasty before 65F 55M? Not Asked   Social History Narrative    Single and lives with her son and brother.     Social Determinants of Health     Financial Resource Strain: Not on file   Food Insecurity: Not on file   Transportation Needs: Not on file   Physical Activity: Not on file   Stress: Not on file   Social Connections: Unknown (1/3/2024)    [facility] [employer] [facility]    Social Connections      Frequency of Communication with Friends and Family: Not on file   Interpersonal Safety: Not on file   Housing Stability: Not on file           Medications  Allergies   Current Outpatient Medications   Medication Sig Dispense Refill     ALPRAZolam (XANAX) 0.5 MG tablet Take 0.5 mg by mouth daily as needed for anxiety (prior to dental work)       amoxicillin (AMOXIL) 500 MG capsule Take 4 capsules (2,000  mg) by mouth once as needed (SBE prophylaxis - take 30-60 minutes prior to dental procedure or cleaning) 4 capsule 3     aspirin 81 MG tablet Take 1 tablet by mouth daily.       ezetimibe (ZETIA) 10 MG tablet Take 1 tablet (10 mg) by mouth daily 90 tablet 3     Glucos-Chond-Hyal Ac-Ca Fructo (MOVE FREE [facility] ADVANCE) TABS Take 1 tablet by mouth daily       hydrochlorothiazide (HYDRODIURIL) 25 MG tablet Take 1 tablet (25 mg) by mouth daily 90 tablet 3     isosorbide mononitrate (IMDUR) 30 MG 24 hr tablet Take 1 tablet (30 mg) by mouth daily 90 tablet 3     Menatetrenone (VITAMIN K2) 100 MCG TABS Take 100 mcg by mouth daily       metoprolol succinate ER (TOPROL XL) 25 MG 24 hr tablet Take 25 mg by mouth 2 times daily       multivitamin, therapeutic (THERA-VIT) TABS tablet Take 1 tablet by mouth daily       nitroGLYcerin (NITROSTAT) 0.4 MG sublingual tablet Place 0.4 mg under the tongue every 5 minutes as needed       ORDER FOR DME Use your CPAP device as directed by your provider.       rosuvastatin (CRESTOR) 40 MG tablet Take 40 mg by mouth daily       valsartan (DIOVAN) 160 MG tablet Take 1 tablet (160 mg) by mouth daily 90 tablet 3     Vitamin D3 (CHOLECALCIFEROL) 25 mcg (1000 units) tablet Take 2 tablets by mouth daily         Allergies   Allergen Reactions     Calcium Citrate Unknown     Bloody nose per pt     Lipitor [Atorvastatin Calcium]      Myalgia       Pravastatin      Myalgia       Simvastatin      myalgia          Lab Results    Chemistry/lipid CBC Cardiac Enzymes/BNP/TSH/INR   Recent Labs   Lab Test 06/24/24  1152   CHOL 186   HDL 56   LDL 96   TRIG 171*     Recent Labs   Lab Test 06/24/24  1152 03/18/24  1047 05/30/23  1013   LDL 96 103* 98     Recent Labs   Lab Test 02/19/24  0507      POTASSIUM 4.0   CHLORIDE 107   CO2 26   *   BUN 12.9   CR 0.98*   GFRESTIMATED 58*   TERRI 8.6*     Recent Labs   Lab Test 02/19/24  0507 02/18/24  1156 05/30/23  1013   CR 0.98* 1.14* 1.16*     Recent  "Labs   Lab Test 02/18/24  1156   A1C 6.1*          Recent Labs   Lab Test 02/20/24  1348   WBC 14.3*   HGB 12.3   HCT 37.8   MCV 86   *     Recent Labs   Lab Test 02/20/24  1348 02/19/24  0507 02/18/24  1156   HGB 12.3 12.4 13.3    No results for input(s): \"TROPONINI\" in the last 31810 hours.  Recent Labs   Lab Test 12/22/22  1923   NTBNPI 70     Recent Labs   Lab Test 02/18/24  1156   TSH 2.17     Recent Labs   Lab Test 05/17/21  1246   INR 1.04          Ashley Dent PA-C                                         Thank you for allowing me to participate in the care of your patient.      Sincerely,     Ashley Dent PA-C     [facility]  cc:   Mary Jo Ball MD  [address]  [place] [address]      "

## 2024-10-02 NOTE — PATIENT INSTRUCTIONS
Reba Juarez,    It was a pleasure to see you today at the [facility].     My recommendations after this visit include:    - Continue current medications.   - Schedule echocardiogram  - Try restarting isosorbide mononitrate 30 mg daily and lets see if this improves your chest pain and shortness of breath  - If no improvement in symptoms would consider bypass surgery vs complex coronary angiogram  - If heart palpations and feeling like you could pass out comes back let me know so we can order a heart monitor  - Could consider seeing sleep medicine again with oxygen dropping when sleeping to make sure CPAP is working well and fitting correctly  - Follow up with Dr. Ball or me in 3 months    - Please call [phone], if you have any questions or concerns      Ashley Dent PA-C

## 2024-10-05 DIAGNOSIS — I10 ESSENTIAL HYPERTENSION: ICD-10-CM

## 2024-10-10 RX ORDER — HYDROCHLOROTHIAZIDE 25 MG/1
25 TABLET ORAL DAILY
Qty: 90 TABLET | Refills: 3 | OUTPATIENT
Start: 2024-10-10

## 2024-10-22 ENCOUNTER — MYC MEDICAL ADVICE (OUTPATIENT)
Dept: CARDIOLOGY | Facility: CLINIC | Age: 79
End: 2024-10-22
Payer: COMMERCIAL

## 2024-10-22 DIAGNOSIS — I25.10 CORONARY ARTERY DISEASE INVOLVING NATIVE CORONARY ARTERY OF NATIVE HEART WITHOUT ANGINA PECTORIS: Primary | ICD-10-CM

## 2024-10-22 DIAGNOSIS — I20.89 ATYPICAL ANGINA (H): ICD-10-CM

## 2024-10-24 ENCOUNTER — HOSPITAL ENCOUNTER (OUTPATIENT)
Dept: CARDIOLOGY | Facility: HOSPITAL | Age: 79
Discharge: HOME | End: 2024-10-24
Attending: STUDENT IN AN ORGANIZED HEALTH CARE EDUCATION/TRAINING PROGRAM | Admitting: STUDENT IN AN ORGANIZED HEALTH CARE EDUCATION/TRAINING PROGRAM
Payer: COMMERCIAL

## 2024-10-24 DIAGNOSIS — Z95.2 STATUS POST AORTIC VALVE REPLACEMENT: ICD-10-CM

## 2024-10-24 LAB — LVEF ECHO: NORMAL

## 2024-10-24 PROCEDURE — 93306 TTE W/DOPPLER COMPLETE: CPT | Mod: 26 | Performed by: INTERNAL MEDICINE

## 2024-10-24 PROCEDURE — 93306 TTE W/DOPPLER COMPLETE: CPT

## 2024-10-24 RX ORDER — RANOLAZINE 500 MG/1
500 TABLET, EXTENDED RELEASE ORAL 2 TIMES DAILY
Qty: 60 TABLET | Refills: 2 | Status: SHIPPED | OUTPATIENT
Start: 2024-10-24

## 2024-10-24 NOTE — TELEPHONE ENCOUNTER
Phone call to patient - informed her of Ashley's response / recommendations - patient verbalized understanding after questions addressed, confirmed preferred pharmacy and echo appt today - patient will plan to follow-up with Ashley 1-2-25 as scheduled and call back during interim if she develops any new or worsening sx.  mg

## 2024-10-24 NOTE — TELEPHONE ENCOUNTER
If patient is feeling fatigued on Imdur we could trial Ranexa 500 mg twice daily to see if this helps with her chest discomfort.  If not improving would recommend meeting with CV surgery.

## 2024-11-03 ENCOUNTER — HEALTH MAINTENANCE LETTER (OUTPATIENT)
Age: 79
End: 2024-11-03

## 2024-11-13 ENCOUNTER — TRANSCRIBE ORDERS (OUTPATIENT)
Dept: OTHER | Age: 79
End: 2024-11-13

## 2024-11-13 ENCOUNTER — TRANSFERRED RECORDS (OUTPATIENT)
Dept: HEALTH INFORMATION MANAGEMENT | Facility: CLINIC | Age: 79
End: 2024-11-13
Payer: COMMERCIAL

## 2024-11-13 ENCOUNTER — MEDICAL CORRESPONDENCE (OUTPATIENT)
Dept: HEALTH INFORMATION MANAGEMENT | Facility: CLINIC | Age: 79
End: 2024-11-13
Payer: COMMERCIAL

## 2024-11-13 ENCOUNTER — TELEPHONE (OUTPATIENT)
Dept: SLEEP MEDICINE | Facility: CLINIC | Age: 79
End: 2024-11-13
Payer: COMMERCIAL

## 2024-11-13 ENCOUNTER — TELEPHONE (OUTPATIENT)
Dept: PULMONOLOGY | Facility: CLINIC | Age: 79
End: 2024-11-13
Payer: COMMERCIAL

## 2024-11-13 DIAGNOSIS — J44.9 COPD (CHRONIC OBSTRUCTIVE PULMONARY DISEASE) (H): ICD-10-CM

## 2024-11-13 DIAGNOSIS — R06.09 CHRONIC DYSPNEA: ICD-10-CM

## 2024-11-13 DIAGNOSIS — G47.33 OSA (OBSTRUCTIVE SLEEP APNEA): ICD-10-CM

## 2024-11-13 DIAGNOSIS — R06.00 DYSPNEA: ICD-10-CM

## 2024-11-13 DIAGNOSIS — I25.10 CAD (CORONARY ARTERY DISEASE): ICD-10-CM

## 2024-11-13 DIAGNOSIS — G47.34 NOCTURNAL HYPOXIA: Primary | ICD-10-CM

## 2024-11-13 DIAGNOSIS — I35.0 SEVERE AORTIC STENOSIS: ICD-10-CM

## 2024-11-13 NOTE — TELEPHONE ENCOUNTER
[facility] Call Center    Phone Message    May a detailed message be left on voicemail: yes     Reason for Call: Call received from pt to schedule pulmonology appt, she is now scheduled for PFT's and to establish care with pulmonology provider on 12/5/24-however, she would like her sleep provider to be aware of her concerns because that seems to be the bigger issue for her.     Pt met with her PCP (Dayana Kruse NP) earlier today (11/13/24) who was very concerned because pt's O2 levels have been dropping very low every night, despite pt being on oxygen while sleeping (down to as low as 70-71%). O2 levels are better while awake, but do not get above 95%.   Pt's PCP thought she might benefit from having some of her C-pap settings adjusted: pt is currently scheduled to follow-up with Dr. Jack 1/29/25, wondering if she might be able to get an earlier appt?    Note: Pt will be contacting Dayana Kruse NP's office today to request to have her most recent records sent to our office.     Action Taken: Other: Sleep    Travel Screening: Not Applicable

## 2024-11-13 NOTE — TELEPHONE ENCOUNTER
[facility] Call Center    Phone Message    May a detailed message be left on voicemail: yes     Reason for Call: Appointment Intake    Referring Provider Name: Dayana Kruse NP  Diagnosis and/or Symptoms: Nocturnal hypoxia, MAYKEL on CPAP, likely COPD, Chronic Dyspnea, CAD, Severe Aortic Stenosis     Pt has future appt scheduled 12/5/24 with Dr. Rueda, needs orders placed for PFT's (pended).     Note: Pt also has future appt scheduled with sleep doctor. The majority of her issues are related to low O2 levels while sleeping (as low as 70-71% despite her being on oxygen while sleeping), but O2 levels do not get above 95% even when she's awake, and pt reports consistently feeling short of breath.     Action Taken: Other: Pulm    Travel Screening: Not Applicable

## 2024-11-14 NOTE — TELEPHONE ENCOUNTER
Patient requested to be seen sooner to address O2 and CPAP setting concerns. Writer scheduled patient with soonest opening per patient request. Closing encounter.    Future Appointments   Date Time Provider Department Center   11/15/2024  9:30 AM [facility]   12/5/2024  8:00 AM [facility] PFT RM 2 [facility]   12/5/2024  9:00 AM Nando Rueda MD [facility]   1/2/2025  1:30 PM Ashley Dent PA-C [facility]   1/29/2025  1:00 PM [facility]

## 2024-11-15 ENCOUNTER — OFFICE VISIT (OUTPATIENT)
Dept: SLEEP MEDICINE | Facility: CLINIC | Age: 79
End: 2024-11-15
Payer: COMMERCIAL

## 2024-11-15 VITALS
HEIGHT: 63 IN | WEIGHT: 223.7 LBS | SYSTOLIC BLOOD PRESSURE: 134 MMHG | HEART RATE: 59 BPM | OXYGEN SATURATION: 96 % | DIASTOLIC BLOOD PRESSURE: 66 MMHG | BODY MASS INDEX: 39.64 KG/M2

## 2024-11-15 DIAGNOSIS — G47.33 OSA ON CPAP: Primary | ICD-10-CM

## 2024-11-15 DIAGNOSIS — G47.34 NOCTURNAL HYPOXEMIA: ICD-10-CM

## 2024-11-15 DIAGNOSIS — G47.00 INSOMNIA, UNSPECIFIED TYPE: ICD-10-CM

## 2024-11-15 PROCEDURE — 99214 OFFICE O/P EST MOD 30 MIN: CPT | Performed by: INTERNAL MEDICINE

## 2024-11-15 NOTE — PROGRESS NOTES
Chief complaint: Referred from primary care due to reports of low oxygen levels with sleep    Last office visit 3/6/2023 Elida Jack MD    History of Present Illness: 79-year-old female with history of hypertension, type 2 diabetes, hyperlipidemia, coronary artery disease, severe aortic stenosis status post TAVR, paroxysmal SVT and sleep apnea.  She started waking up a little bit more at night and was concerned that maybe it was a respiratory issue.  However she did not note to respiratory distress or sense of inadequate pressures.  She purchased a wearable device that measures oxygen saturation.  She reports that it shows dips in her oxygen saturation.    She typically gets into bed around 8:30 PM and will read for a while typically falling asleep before 9:30 PM.  She will get out of bed for the day usually between 6 and 7 AM.  Lately she has been waking up around 330 might be awake for an hour.  During the day she typically will have 2 cups of coffee a day.  She rarely takes naps.  She denies a change in her chronic dyspnea.  There has been no significant wake hypoxemia documented.  She has had 2 overnight oximetry's in the past that it showed optimal treatment of sleep associated hypoxemia.    Reviewing download today it does appear that there is significant mask leak.  Patient is aware of this and reports that she is unable to get supplies without a new prescription.      Insomnia Severity Scale  RON Total Score: (Patient-Rptd) 9  Total score categories:  0-7 = No clinically significant insomnia   8-14 = Subthreshold insomnia   15-21 = Clinical insomnia (moderate severity)  22-28 = Clinical insomnia (severe)        Past Medical History:   Diagnosis Date    Aortic valve sclerosis     echo 1-24-11 mild    Coronary artery disease 1995    PCI at [facility]. Repeat angiogram in 2007    Diabetes (H)     Gastric ulcer 1990    H/O exercise stress test 03/21/2011    No ischemia    Hyperlipidemia  LDL goal < 70     identified 1990's    Hypertension     onset age 50-55 approx    MAYKEL (obstructive sleep apnea) 2007       Allergies   Allergen Reactions    Calcium Citrate Unknown     Bloody nose per pt    Lipitor [Atorvastatin Calcium]      Myalgia      Pravastatin      Myalgia      Simvastatin      myalgia       Current Outpatient Medications   Medication Sig Dispense Refill    ALPRAZolam (XANAX) 0.5 MG tablet Take 0.5 mg by mouth daily as needed for anxiety (prior to dental work)      amoxicillin (AMOXIL) 500 MG capsule Take 4 capsules (2,000 mg) by mouth once as needed (SBE prophylaxis - take 30-60 minutes prior to dental procedure or cleaning) 4 capsule 3    aspirin 81 MG tablet Take 1 tablet by mouth daily.      ezetimibe (ZETIA) 10 MG tablet Take 1 tablet (10 mg) by mouth daily 90 tablet 3    Glucos-Chond-Hyal Ac-Ca Fructo (MOVE FREE JOINT HEALTH ADVANCE) TABS Take 1 tablet by mouth daily      hydrochlorothiazide (HYDRODIURIL) 25 MG tablet Take 1 tablet (25 mg) by mouth daily 90 tablet 3    Menatetrenone (VITAMIN K2) 100 MCG TABS Take 100 mcg by mouth daily      metoprolol succinate ER (TOPROL XL) 25 MG 24 hr tablet Take 25 mg by mouth 2 times daily      multivitamin, therapeutic (THERA-VIT) TABS tablet Take 1 tablet by mouth daily      nitroGLYcerin (NITROSTAT) 0.4 MG sublingual tablet Place 0.4 mg under the tongue every 5 minutes as needed      ORDER FOR DME Use your CPAP device as directed by your provider.      ranolazine (RANEXA) 500 MG 12 hr tablet Take 1 tablet (500 mg) by mouth 2 times daily. 60 tablet 2    rosuvastatin (CRESTOR) 40 MG tablet Take 40 mg by mouth daily      valsartan (DIOVAN) 160 MG tablet Take 1 tablet (160 mg) by mouth daily 90 tablet 3    Vitamin D3 (CHOLECALCIFEROL) 25 mcg (1000 units) tablet Take 2 tablets by mouth daily       No current facility-administered medications for this visit.       Social History     Socioeconomic History    Marital status:      Spouse name:  "Not on file    Number of children: Not on file    Years of education: Not on file    Highest education level: Not on file   Occupational History    Occupation: retired     Employer: [employer]     Comment:  retired full time, still works a few days as biochemical pharmacist   Tobacco Use    Smoking status: Former     Current packs/day: 0.00     Average packs/day: 1 pack/day for 20.0 years (20.0 ttl pk-yrs)     Types: Cigarettes     Start date: 3/5/1972     Quit date: 3/5/1992     Years since quittin.7    Smokeless tobacco: Never   Vaping Use    Vaping status: Never Used   Substance and Sexual Activity    Alcohol use: No    Drug use: No    Sexual activity: Not on file   Other Topics Concern    Parent/sibling w/ CABG, MI or angioplasty before 65F 55M? Not Asked   Social History Narrative    Single and lives with her son and brother.     Social Drivers of Health     Financial Resource Strain: Not on file   Food Insecurity: Not on file   Transportation Needs: Not on file   Physical Activity: Not on file   Stress: Not on file   Social Connections: Unknown (1/3/2024)    Received from [facility], [facility]    Social Connections     Frequency of Communication with Friends and Family: Not on file   Interpersonal Safety: Not on file   Housing Stability: Not on file       Family History   Problem Relation Age of Onset    No Known Problems Sister     Alcoholism Brother     Myocardial Infarction Mother 51        HTN onset age 30, was a smoker    Myocardial Infarction Father 63        smoker    Myocardial Infarction Brother 39    No Known Problems Son     No Known Problems Daughter     CABG Brother     Heart Disease Brother     No Known Problems Sister     Cerebrovascular Disease Sister     Coronary Artery Disease Sister     No Known Problems Son          EXAM:  /66   Pulse 59   Ht 1.6 m (5' 3\")   Wt 101.5 kg (223 lb 11.2 oz)   SpO2 96%   BMI " 39.63 kg/m    GENERAL: Alert and no distress  EYES: Eyes grossly normal to inspection.  No discharge or erythema, or obvious scleral/conjunctival abnormalities.  RESP: No audible wheeze, cough, or visible cyanosis.    SKIN: Visible skin clear. No significant rash, abnormal pigmentation or lesions.  NEURO: Cranial nerves grossly intact.  Mentation and speech appropriate for age.  PSYCH: Appropriate affect, tone, and pace of words       PSG Split 10/11/2007 (Per Dr Joe note 11/5/2007)  BMI 39.1  AHI 55.2, REM AHI 96.6 Lowest O2 SAT 79%  CPAP 10 cmH2O     Overnight oximetry performed on CPAP 10/2/2013  Time below 88% 0.9 minutes     Overnight oximetry performed on CPAP 7/22/2015  Time below 88% 0.6 minutes    ResMed air sense 10 auto PAP download from 10/15/2024 to 11/13/2024 reviewed:  Per cent of days used greater than 4 Hours 77% (minimum goal greater than 70%)  Average use on days used: 7 hours 20 min  Settings: Min EPAP 11 cmH2O    Max EPAP 14 cmH2O  Pressures delivered 90/95th percentile for pressure 12.6 cmH2O  Average AHI 2.0 events per hour (goal less than 5)  Leak excessive      ASSESSMENT:  79-year-old female with complex past medical history including aortic stenosis, she has a history of severe obstructive sleep apnea.  She reports oximetry by a wearable fitness monitor.  However, notable mask leak occurring on a nightly basis.  This could impact effectiveness of CPAP.  This could also be waking patient up.    PLAN:  Recommended that she get all new CPAP supplies and resolve the leak as soon as possible.  Proceed with overnight oximetry to be done on CPAP after the leak is resolved.  If overnight oximetry confirms hypoxemia despite resolution of sleep apnea on download would consider titration PSG.  Also recommended that she try to regularize her sleep schedule and limit her time in bed to her sleep time. this may help with middle the night awakenings.  Consider referral for formal CBT-I if insomnia  persists.      37 minutes spent by me on the date of the encounter doing chart review, history and exam, documentation and further activities per the note    Cindy Ness M.D.  Pulmonary/Critical Care/Sleep Medicine    [facility]   Floor 1, [address]   [address] [place] [address]. [place] [address]   Appointments: [phone]    The above note was dictated using voice recognition software and may include typographical errors. Please contact the author for any clarifications.

## 2024-11-15 NOTE — NURSING NOTE
"Chief Complaint   Patient presents with    CPAP Follow Up     O2 level dropping at night, requesting new supplies, waking up many times throughout the night        Initial /66   Pulse 59   Ht 1.6 m (5' 3\")   Wt 101.5 kg (223 lb 11.2 oz)   SpO2 96%   BMI 39.63 kg/m   Estimated body mass index is 39.63 kg/m  as calculated from the following:    Height as of this encounter: 1.6 m (5' 3\").    Weight as of this encounter: 101.5 kg (223 lb 11.2 oz).    Medication Reconciliation: complete  ESS:   DME: [facility] on 11/15/2024 at 9:38 AM      "

## 2024-11-15 NOTE — PATIENT INSTRUCTIONS
I will reach out to you in [facility] with results of the overnight oximetry      For general sleep health questions:   https://[email or web address]/sleep-health-topics/  http://sleepeducation.org    Medicare and Medicaid patients starting on CPAP or biPAP on or after 5/12/2023  Medicare patients should schedule an IN PERSON CLINIC appointment to provide your CPAP/biPAP usage data to a provider within 90 days of starting CPAP    For new ResMed devices, sign up for device john to monitor your device for your followup visits  We encourage you to utilize the [employer]ion[employer] john or website (myAir web ([employer]) to monitor your therapy progress and share the data with your healthcare team when you discuss your sleep apnea.                                                      Know your equipment:  CPAP is continuous positive airway pressure that prevents obstructive sleep apnea by keeping the throat from collapsing while you are sleeping. In most cases, the device is  smart  and can slowly self-adjusts if your throat collapses and keeps a record every day of how well you are treated-this information is available to you and your care team.  BPAP is bilevel positive airway pressure that keeps your throat open and also assists each breath with a pressure boost to maintain adequate breathing.  Special kinds of BPAP are used in patients who have inadequate breathing from lung or heart disease. In most cases, the device is  smart  and can slowly self-adjusts to assist breathing. Like CPAP, the device keeps a record of how well you are treated.  Your mask is your connection to the device. You get to choose what feels most comfortable and the staff will help to make sure if fits.     *Masks with magnets:  Updated Contraindications  Masks with magnetic components are contraindicated for use by patients where they, or anyone in close physical contact while using the mask, have the following:   Active medical implants that interact with  magnets (i.e., pacemakers, implantable cardioverter defibrillators (ICD), neurostimulators, cerebrospinal fluid (CSF) shunts, insulin/infusion pumps)   Metallic implants/objects containing ferromagnetic material (i.e., aneurysm clips/flow disruption devices, embolic coils, stents, valves, electrodes, implants to restore hearing or balance with implanted magnets, ocular implants, metallic splinters in the eye)  Updated Warning  Keep the mask magnets at a safe distance of at least 6 inches (150 mm) away from implants or medical devices that may be adversely affected by magnetic interference. This warning applies to you or anyone in close physical contact with your mask. The magnets are in the frame and lower headgear clips, with a magnetic field strength of up to 400mT. When worn, they connect to secure the mask but may inadvertently detach while asleep.  Implants/medical devices, including those listed within contraindications, may be adversely affected if they change function under external magnetic fields or contain ferromagnetic materials that attract/repel to magnetic fields (some metallic implants, e.g., contact lenses with metal, dental implants, metallic cranial plates, screws, mehreen hole covers, and bone substitute devices). Consult your physician and  of your implant / other medical device for information on the potential adverse effects of magnetic fields.      Continue PAP therapy every night, for all hours that you are sleeping (including naps.)  As always, try to get at least 8 hours of sleep or more each day, keep a regular sleep schedule, and avoid sleep deprivation. Avoid alcohol.  Reasons that you might need a change to your pressure therapy would be weight gain or loss, waking having inadvertently removed your PAP overnight, having previously felt refreshed by sleep with CPAP use and now waking un-refreshed, and return of daytime sleepiness. Also, the development of new medical problems   (such as heart failure, stroke, medications such as narcotics) can sometimes affect breathing at night and change your PAP therapy needs.  Please bring PAP with you if you are hospitalized.  If anticipating surgery be sure to discuss with your surgeon that you have sleep apnea and use PAP therapy.    Maintain your equipment as recommended which includes routine cleaning and replacement of supplies.      Call DME for any questions regarding supplies or maintenance.    [facility], [facility] [phone]    Do not drive on engage in potentially dangerous activities if feeling sleepy.          Tips for your PAP use-    Mask fitting tips  Mask fitting exercise:    To improve your mask seal and your mobility at night, put mask on and secure in place.  Lie down in bed with full pressure and roll to one side, adjust headgear while in that position to eliminate any leaks. Repeat process rolling to other side.     The mask seal does not have to be perfect:   CPAP machines are designed to make up for small leaks. However, you will not tolerate leaks blowing in your eyes so you will need to adjust.   Any leak should only be near or at the bottom of the mask.  We expect your mask to leak slightly at night.    Do not over-tighten the headgear straps, tighter IS NOT better, we expect minimal leak.    First try re-positioning the mask or headgear before tightening the headgear straps.  Mask leaks are expected due to changing sleeping positions. Try pulling the mask away from your skin allowing the cushion to re-inflate will minimize the leak.  If you struggle for a good fit, try turning the CPAP off and then readjust the mask by pulling it away from your face and then turning back on the CPAP.        Humidifier tips  Humidifiers can be adjusted to increase or decrease the amount of moisture according to your comfort level. You may need to adjust this frequently at first, but then might  only change it with seasonal weather changes.     Try INCREASING the humidity if:  You experience a dry, irritated nasal passage or throat.  You have a runny, drippy nose or sneezing fits after using CPAP.  You experience nasal congestion during or after CPAP use.    Try DECREASING the humidity if:  You have excessive condensation or  rain out  in the tubing or mask.  Otherwise keep the tubing warm during the night by running it underneath the blankets or pillow.      Clinic visit after initial PAP set-up   Bring your equipment with you to your 5-8 week follow up clinic visit.  We will be extracting your data from the machine if not available from the cloud based Stuffle.        Travel  Always take your equipment with you when you travel.  If you fly with your equipment bring it on with you as a carry on.  Medical equipment does not count as a carry on.    If you travel international the machines take 110-240v.  The only adapter needed is the adapter that will fit into the receptacle (outlet).    You may also want to bring an extension cord as many hotel rooms have limited outlets at the bedside.  Do not travel with water in your humidifier chamber.     Cleaning and Maintenance Guidelines    Equipment Frequency Cleaning Method   Mask First Day    Daily      Weekly Soak mask in hot soapy water for 30 minutes, rinse and air dry.  Wipe nasal cushion with a hot soapy (Ivory, baby shampoo) cloth and rinse.  Baby wipes may also be used.  Do not use anti-bacterial soaps,Cleo  liquid soap, rubbing alcohol, bleach or ammonia.  Wash frame in hot soapy water (Ivory, baby shampoo) rinse and let air dry   Headgear Biweekly Wash in hot soapy water, rinse and air dry   Reusable Gray Filter Weekly Wash in hot soapy water, rinse, put in towel squeeze moisture out, let air dry   Disposable White Filter Check Weekly Replace when brown or gray in color; at least every 2 to 3 months   Humidifier Chamber Daily    Weekly Empty distilled  water from humidifier and let air dry    Hand wash in hot soapy water, rinse and air dry   Tubing Weekly Wash in hot soapy water, rinse and let air dry   Mask, Tubing and Humidifier Chamber As needed Disinfect: Soak in 1 part distilled white vinegar to 3 parts hot water for 30 minutes, rinse well and air dry  Not the material headgear        MASK AND SUPPLY REORDERING and EQUIPMENT NEEDS through your DME and per your insurance  Reminder: Most insurance companies will allow for a new mask, headgear, tubing, and reusable gray filter every six months.  Disposable white ultra-fine filters are covered monthly.      HOME AND SAFETY INSTRUCTIONS  Do not use frayed or cracked electrical cords, multi plug adaptors, or switched receptacles  Do not immerse electrical equipment into water  Assure that electrical cords do not become a tripping hazard

## 2024-11-16 DIAGNOSIS — I20.89 ATYPICAL ANGINA (H): ICD-10-CM

## 2024-11-16 DIAGNOSIS — I25.10 CORONARY ARTERY DISEASE INVOLVING NATIVE CORONARY ARTERY OF NATIVE HEART WITHOUT ANGINA PECTORIS: ICD-10-CM

## 2024-11-18 RX ORDER — RANOLAZINE 500 MG/1
500 TABLET, EXTENDED RELEASE ORAL 2 TIMES DAILY
Qty: 180 TABLET | Refills: 1 | Status: SHIPPED | OUTPATIENT
Start: 2024-11-18

## 2024-12-05 ENCOUNTER — OFFICE VISIT (OUTPATIENT)
Dept: PULMONOLOGY | Facility: CLINIC | Age: 79
End: 2024-12-05
Payer: COMMERCIAL

## 2024-12-05 VITALS
WEIGHT: 220 LBS | OXYGEN SATURATION: 96 % | HEIGHT: 63 IN | DIASTOLIC BLOOD PRESSURE: 78 MMHG | SYSTOLIC BLOOD PRESSURE: 124 MMHG | BODY MASS INDEX: 38.98 KG/M2 | HEART RATE: 66 BPM

## 2024-12-05 DIAGNOSIS — G47.33 OSA (OBSTRUCTIVE SLEEP APNEA): ICD-10-CM

## 2024-12-05 DIAGNOSIS — R06.09 CHRONIC DYSPNEA: ICD-10-CM

## 2024-12-05 DIAGNOSIS — G47.33 OSA ON CPAP: Primary | ICD-10-CM

## 2024-12-05 DIAGNOSIS — G47.34 NOCTURNAL HYPOXIA: ICD-10-CM

## 2024-12-05 DIAGNOSIS — R06.00 DYSPNEA: ICD-10-CM

## 2024-12-05 DIAGNOSIS — I35.0 SEVERE AORTIC STENOSIS: ICD-10-CM

## 2024-12-05 LAB
DLCOCOR-%PRED-PRE: 84 %
DLCOCOR-PRE: 14.67 ML/MIN/MMHG
DLCOUNC-%PRED-PRE: 80 %
DLCOUNC-PRE: 14.1 ML/MIN/MMHG
DLCOUNC-PRED: 17.46 ML/MIN/MMHG
ERV-%PRED-PRE: 34 %
ERV-PRE: 0.27 L
ERV-PRED: 0.78 L
EXPTIME-PRE: 5.94 SEC
FEF2575-%PRED-PRE: 92 %
FEF2575-PRE: 1.3 L/SEC
FEF2575-PRED: 1.4 L/SEC
FEFMAX-%PRED-PRE: 130 %
FEFMAX-PRE: 5.8 L/SEC
FEFMAX-PRED: 4.45 L/SEC
FEV1-%PRED-PRE: 103 %
FEV1-PRE: 1.73 L
FEV1FEV6-PRE: 75 %
FEV1FEV6-PRED: 77 %
FEV1FVC-PRE: 75 %
FEV1FVC-PRED: 78 %
FEV1SVC-PRE: 68 %
FEV1SVC-PRED: 61 %
FIFMAX-PRE: 5 L/SEC
FRCPLETH-%PRED-PRE: 121 %
FRCPLETH-PRE: 3.18 L
FRCPLETH-PRED: 2.62 L
FVC-%PRED-PRE: 106 %
FVC-PRE: 2.32 L
FVC-PRED: 2.18 L
HGB BLD-MCNC: 12.2 G/DL
IC-%PRED-PRE: 145 %
IC-PRE: 2.27 L
IC-PRED: 1.56 L
RVPLETH-%PRED-PRE: 135 %
RVPLETH-PRE: 2.92 L
RVPLETH-PRED: 2.16 L
TLCPLETH-%PRED-PRE: 117 %
TLCPLETH-PRE: 5.45 L
TLCPLETH-PRED: 4.65 L
VA-%PRED-PRE: 103 %
VA-PRE: 4.34 L
VC-%PRED-PRE: 91 %
VC-PRE: 2.53 L
VC-PRED: 2.76 L

## 2024-12-05 NOTE — PROGRESS NOTES
Pulmonary Clinic Outpatient Consultation    Assessment and Plan:   79F with obesity, severe aortic stenosis s/p TAVR in 2021 with now trace paravalvular regurgitation, severe MAYKEL on CPAP, CAD, HTN, HLD, other issues, presents for evaluation of dyspnea on exertion that is associated with chest pressure and anginal symptoms. PFTs were completely normal. She does NOT have COPD or any other obvious lung disease. CT chest from February was unremarkable. I believe her dyspnea is related to underlying coronary artery disease. She was apparently being evaluated for CABG vs. Complex PCI. It sounds like her symptoms have improved significantly with the ranolazine. I recommended she follow up with her cardiology team to discuss further options for management of CAD.  I did recommend she continue to use CPAP for MAYKEL and follow up with her sleep provider as directed.  She does not need to see a pulmonologist going forward unless she develops worsening SOB and/or cough.  She declines any vaccinations; defer to her primary care provider.     Follow up as needed.    The longitudinal plan of care for the diagnosis(es)/condition(s) as documented were addressed during this visit. Due to the added complexity in care, I will continue to support Adamaris in the subsequent management and with ongoing continuity of care.       Nando Rueda MD (Avi)  [facility] Pulmonary & Critical Care ([facility])  Send me a secure message using [employer]  Clinic [phone]  Fax [phone]     CCx: COPD evaluation, dyspnea on exertion, nocturnal desaturations.     HPI: 79F with obesity, severe aortic stenosis s/p TAVR in 2021 with now trace paravalvular regurgitation, severe MAYKEL on CPAP, CAD, HTN, HLD, other issues, presents for evaluation of dyspnea on exertion and nocturnal desaturations. She has chronic SOB and anginal chest pain. This improved with ranolazine which she started recently. Her exercise tolerance has improved.  She has  occasional cough with mucus production, mostly in the mornings.   The complex cardiac history was reviewed.   Minimal cough. No wheezing. No hemoptysis.     ROS:  A 12-system review was obtained and was negative with the exception of the symptoms endorsed in the history of present illness.    PMH:  Past Medical History:   Diagnosis Date    Aortic valve sclerosis     echo 1-24-11 mild    Coronary artery disease 1995    PCI at [facility]. Repeat angiogram in 2007    Diabetes (H)     Gastric ulcer 1990    H/O exercise stress test 03/21/2011    No ischemia    Hyperlipidemia LDL goal < 70     identified 1990's    Hypertension     onset age 50-55 approx    MAYKEL (obstructive sleep apnea) 2007        PSH:  Past Surgical History:   Procedure Laterality Date    ANGIOPLASTY  early 1990s    COLONOSCOPY  2010    CV CORONARY ANGIOGRAM N/A 4/9/2021    Procedure: CV CORONARY ANGIOGRAM;  Surgeon: Júnior Santos MD;  Location: [facility] CARDIAC CATH LAB    CV CORONARY ANGIOGRAM N/A 2/19/2024    Procedure: Coronary Angiogram;  Surgeon: Romero Garcia MD;  Location: [facility] CATH LAB CV    CV FRACTIONAL FLOW RATIO WIRE N/A 2/19/2024    Procedure: Fractional Flow Ratio Wire;  Surgeon: Romero Garcia MD;  Location: [facility] CATH LAB CV    CV INSTANTANEOUS WAVE-FREE RATIO N/A 2/19/2024    Procedure: Instantaneous Wave-Free Ratio;  Surgeon: Romero Garcia MD;  Location: [facility] CATH LAB CV    CV LEFT HEART CATH N/A 4/9/2021    Procedure: Left Heart Cath;  Surgeon: Júnior Santos MD;  Location:  HEART CARDIAC CATH LAB    CV RIGHT HEART CATH MEASUREMENTS RECORDED N/A 4/9/2021    Procedure: Right Heart Cath;  Surgeon: Júnior Santos MD;  Location: [facility] CARDIAC CATH LAB    CV TRANSCATHETER AORTIC VALVE REPLACEMENT N/A 5/19/2021    Procedure: Transfemoral Transcatheter Aortic Valve Replacement with 23 mm Little Ellie 3 Ultra Transcatheter Heart Valve, Transthoracic echocardiogram read by Dr. Farfan;   Surgeon: Harvinder Quick MD;  [facility] OR    EYE SURGERY      HEART CATH FEMORAL CANNULIZATION WITH OPEN STANDBY REPAIR AORTIC VALVE N/A 2021    Procedure: cardiopulmonary bypass on standby;  Surgeon: Riaz Fuller MD;  [facility]       Allergies:  Allergies   Allergen Reactions    Calcium Citrate Unknown     Bloody nose per pt    Lipitor [Atorvastatin Calcium]      Myalgia      Pravastatin      Myalgia      Simvastatin      myalgia       Family HX:  Family History   Problem Relation Age of Onset    No Known Problems Sister     Alcoholism Brother     Myocardial Infarction Mother 51        HTN onset age 30, was a smoker    Myocardial Infarction Father 63        smoker    Myocardial Infarction Brother 39    No Known Problems Son     No Known Problems Daughter     CABG Brother     Heart Disease Brother     No Known Problems Sister     Cerebrovascular Disease Sister     Coronary Artery Disease Sister     No Known Problems Son        Social Hx:  Social History     Socioeconomic History    Marital status:      Spouse name: Not on file    Number of children: Not on file    Years of education: Not on file    Highest education level: Not on file   Occupational History    Occupation: retired     Employer: [employer]     Comment:  retired full time, still works a few days as biochemical pharmacist   Tobacco Use    Smoking status: Former     Current packs/day: 0.00     Average packs/day: 1 pack/day for 20.0 years (20.0 ttl pk-yrs)     Types: Cigarettes     Start date: 3/5/1972     Quit date: 3/5/1992     Years since quittin.7    Smokeless tobacco: Never   Vaping Use    Vaping status: Never Used   Substance and Sexual Activity    Alcohol use: No    Drug use: No    Sexual activity: Not on file   Other Topics Concern    Parent/sibling w/ CABG, MI or angioplasty before 65F 55M? Not Asked   Social History Narrative    Single and lives with her son and brother.     Social Drivers of Health      Financial Resource Strain: Not on file   Food Insecurity: Not on file   Transportation Needs: Not on file   Physical Activity: Not on file   Stress: Not on file   Social Connections: Unknown (1/3/2024)    [facility] [employer] [facility], [facility]    Social Connections     Frequency of Communication with Friends and Family: Not on file   Interpersonal Safety: Not on file   Housing Stability: Not on file       Current Meds:  Current Outpatient Medications   Medication Sig Dispense Refill    ALPRAZolam (XANAX) 0.5 MG tablet Take 0.5 mg by mouth daily as needed for anxiety (prior to dental work)      amoxicillin (AMOXIL) 500 MG capsule Take 4 capsules (2,000 mg) by mouth once as needed (SBE prophylaxis - take 30-60 minutes prior to dental procedure or cleaning) 4 capsule 3    aspirin 81 MG tablet Take 1 tablet by mouth daily.      ezetimibe (ZETIA) 10 MG tablet Take 1 tablet (10 mg) by mouth daily 90 tablet 3    Glucos-Chond-Hyal Ac-Ca Fructo (MOVE FREE JOINT HEALTH ADVANCE) TABS Take 1 tablet by mouth daily      hydrochlorothiazide (HYDRODIURIL) 25 MG tablet Take 1 tablet (25 mg) by mouth daily 90 tablet 3    Menatetrenone (VITAMIN K2) 100 MCG TABS Take 100 mcg by mouth daily      metoprolol succinate ER (TOPROL XL) 25 MG 24 hr tablet Take 25 mg by mouth 2 times daily      multivitamin, therapeutic (THERA-VIT) TABS tablet Take 1 tablet by mouth daily      nitroGLYcerin (NITROSTAT) 0.4 MG sublingual tablet Place 0.4 mg under the tongue every 5 minutes as needed      ORDER FOR DME Use your CPAP device as directed by your provider.      ranolazine (RANEXA) 500 MG 12 hr tablet TAKE 1 TABLET BY MOUTH TWICE A  tablet 1    rosuvastatin (CRESTOR) 40 MG tablet Take 40 mg by mouth daily      valsartan (DIOVAN) 160 MG tablet Take 1 tablet (160 mg) by mouth daily 90 tablet 3    Vitamin D3 (CHOLECALCIFEROL) 25 mcg (1000 units) tablet Take 2 tablets by mouth  "daily         Physical Exam:  /78   Pulse 66   Ht 1.59 m (5' 2.6\")   Wt 99.8 kg (220 lb)   SpO2 96%   BMI 39.47 kg/m    Gen: awake, alert, oriented, no distress  HEENT: nasal turbinates are unremarkable, no oropharyngeal lesions, no cervical or supraclavicular lymphadenopathy  CV: RRR, no M/G/R  Resp: CTAB, no focal crackles or wheezes  Skin: no apparent rashes  Ext: no cyanosis, clubbing or edema  Neuro: alert, nonfocal    Labs:  Reviewedhgb 12.2  Prior CBC/diff without eosinophilia    Imaging studies:  EXAM: CT CHEST PULMONARY EMBOLISM W CONTRAST  [facility]: [facility]  DATE: 2/18/2024     INDICATION: chest pain, tachy, +dimer  COMPARISON: 12/22/2022  TECHNIQUE: CT chest pulmonary angiogram during arterial phase injection of IV contrast. Multiplanar reformats and MIP reconstructions were performed. Dose reduction techniques were used.   CONTRAST: [id number] 75ml     FINDINGS:  ANGIOGRAM CHEST: Pulmonary arteries are normal caliber and negative for pulmonary emboli. Suboptimal opacification of the subsegmental branches. Thoracic aorta is negative for dissection. No CT evidence of right heart strain.     LUNGS AND PLEURA: No new or enlarging suspicious nodules. Pre-existing nodules remain unchanged, the largest of which is along the right major fissure and probably a lymph node. A 0.8 x 0.7 cm left upper lobe groundglass opacity is also unchanged dating   back to 7/5/2021 (6/115).     MEDIASTINUM/AXILLAE: No lymphadenopathy. Normal esophagus. No significant pericardial effusion. TAVR.     CORONARY ARTERY CALCIFICATION: Severe.     UPPER ABDOMEN: Normal.     MUSCULOSKELETAL: Degenerative changes in the spine. No acute fracture.                                                                      IMPRESSION:  1.  No pulmonary emboli or other acute finding in the chest.     2.  Small pulmonary nodules remain unchanged. A 0.8 cm groundglass nodule in the left upper lobe is unchanged " dating back to 7/5/2021. Suggest follow-up chest CT in July 2026 to demonstrate 5 years of temporal stability, as per below.    Echo Oct 2024  Interpretation Summary     The visual ejection fraction is 65-70%.  Right ventricular function cannot be assessed due to poor image quality.  There is a GUDELIA 3 bioprosthetic valve present in aortic valve position. Mean  gradient is 14.5mmHg. There is no paravalvular regurgitation present.  Compared to the prior study dated 3/29/2024, there have been no changes.    Pulmonary Function Testing  12/5/2024  FEV1 1.73L, 103%  FVC 2.32L, 106%  Ratio 0.75  No BD testing done  TLC 5.4L, 117%  Dlco 84% smith for hgb  Flow volume curve (insp limb) may suggest small airways disease

## 2024-12-05 NOTE — LETTER
12/5/2024      Reba Juarez  1974 [address] [facility] [address]      Dear Colleague,    Thank you for referring your patient, Reba Juarez, to the [facility]. Please see a copy of my visit note below.    Pulmonary Clinic Outpatient Consultation    Assessment and Plan:   79F with obesity, severe aortic stenosis s/p TAVR in 2021 with now trace paravalvular regurgitation, severe MAYKEL on CPAP, CAD, HTN, HLD, other issues, presents for evaluation of dyspnea on exertion that is associated with chest pressure and anginal symptoms. PFTs were completely normal. She does NOT have COPD or any other obvious lung disease. CT chest from February was unremarkable. I believe her dyspnea is related to underlying coronary artery disease. She was apparently being evaluated for CABG vs. Complex PCI. It sounds like her symptoms have improved significantly with the ranolazine. I recommended she follow up with her cardiology team to discuss further options for management of CAD.  I did recommend she continue to use CPAP for MAYKEL and follow up with her sleep provider as directed.  She does not need to see a pulmonologist going forward unless she develops worsening SOB and/or cough.  She declines any vaccinations; defer to her primary care provider.     Follow up as needed.    The longitudinal plan of care for the diagnosis(es)/condition(s) as documented were addressed during this visit. Due to the added complexity in care, I will continue to support Adamaris in the subsequent management and with ongoing continuity of care.       Nando Rueda MD (Avi)  [facility] Pulmonary & Critical Care ([facility])  Send me a secure message using [employer]  Clinic [phone]  Fax [phone]     CCx: COPD evaluation, dyspnea on exertion, nocturnal desaturations.     HPI: 79F with obesity, severe aortic stenosis s/p TAVR in 2021 with now trace paravalvular regurgitation, severe MAYKEL on CPAP, CAD, HTN,  HLD, other issues, presents for evaluation of dyspnea on exertion and nocturnal desaturations. She has chronic SOB and anginal chest pain. This improved with ranolazine which she started recently. Her exercise tolerance has improved.  She has occasional cough with mucus production, mostly in the mornings.   The complex cardiac history was reviewed.   Minimal cough. No wheezing. No hemoptysis.     ROS:  A 12-system review was obtained and was negative with the exception of the symptoms endorsed in the history of present illness.    PMH:  Past Medical History:   Diagnosis Date     Aortic valve sclerosis     echo 1-24-11 mild     Coronary artery disease 1995    PCI at [facility]. Repeat angiogram in 2007     Diabetes (H)      Gastric ulcer 1990     H/O exercise stress test 03/21/2011    No ischemia     Hyperlipidemia LDL goal < 70     identified 1990's     Hypertension     onset age 50-55 approx     MAYKEL (obstructive sleep apnea) 2007        PSH:  Past Surgical History:   Procedure Laterality Date     ANGIOPLASTY  early 1990s     COLONOSCOPY  2010     CV CORONARY ANGIOGRAM N/A 4/9/2021    Procedure: CV CORONARY ANGIOGRAM;  Surgeon: Júnior Santos MD;  Location: [facility] CARDIAC CATH LAB     CV CORONARY ANGIOGRAM N/A 2/19/2024    Procedure: Coronary Angiogram;  Surgeon: Romero Garcia MD;  Location: [facility] CATH LAB CV     CV FRACTIONAL FLOW RATIO WIRE N/A 2/19/2024    Procedure: Fractional Flow Ratio Wire;  Surgeon: Romero Garcia MD;  Location: [facility] CATH LAB CV     CV INSTANTANEOUS WAVE-FREE RATIO N/A 2/19/2024    Procedure: Instantaneous Wave-Free Ratio;  Surgeon: Romero Garcia MD;  Location: [facility] CATH LAB CV     CV LEFT HEART CATH N/A 4/9/2021    Procedure: Left Heart Cath;  Surgeon: Júnior Santos MD;  Location:  HEART CARDIAC CATH LAB     CV RIGHT HEART CATH MEASUREMENTS RECORDED N/A 4/9/2021    Procedure: Right Heart Cath;  Surgeon: Júnior Santos MD;  Location:   HEART CARDIAC CATH LAB     CV TRANSCATHETER AORTIC VALVE REPLACEMENT N/A 2021    Procedure: Transfemoral Transcatheter Aortic Valve Replacement with 23 mm Little Ellie 3 Ultra Transcatheter Heart Valve, Transthoracic echocardiogram read by Dr. Farfan;  Surgeon: Harvinder Quick MD;  Location: [facility] OR     EYE SURGERY       HEART CATH FEMORAL CANNULIZATION WITH OPEN STANDBY REPAIR AORTIC VALVE N/A 2021    Procedure: cardiopulmonary bypass on standby;  Surgeon: Riaz Fuller MD;  Location: [facility] OR       Allergies:  Allergies   Allergen Reactions     Calcium Citrate Unknown     Bloody nose per pt     Lipitor [Atorvastatin Calcium]      Myalgia       Pravastatin      Myalgia       Simvastatin      myalgia       Family HX:  Family History   Problem Relation Age of Onset     No Known Problems Sister      Alcoholism Brother      Myocardial Infarction Mother 51        HTN onset age 30, was a smoker     Myocardial Infarction Father 63        smoker     Myocardial Infarction Brother 39     No Known Problems Son      No Known Problems Daughter      CABG Brother      Heart Disease Brother      No Known Problems Sister      Cerebrovascular Disease Sister      Coronary Artery Disease Sister      No Known Problems Son        Social Hx:  Social History     Socioeconomic History     Marital status:      Spouse name: Not on file     Number of children: Not on file     Years of education: Not on file     Highest education level: Not on file   Occupational History     Occupation: retired     Employer: [employer]     Comment:  retired full time, still works a few days as biochemical pharmacist   Tobacco Use     Smoking status: Former     Current packs/day: 0.00     Average packs/day: 1 pack/day for 20.0 years (20.0 ttl pk-yrs)     Types: Cigarettes     Start date: 3/5/1972     Quit date: 3/5/1992     Years since quittin.7     Smokeless tobacco: Never   Vaping Use     Vaping status: Never Used   Substance and  Sexual Activity     Alcohol use: No     Drug use: No     Sexual activity: Not on file   Other Topics Concern     Parent/sibling w/ CABG, MI or angioplasty before 65F 55M? Not Asked   Social History Narrative    Single and lives with her son and brother.     Social Drivers of Health     Financial Resource Strain: Not on file   Food Insecurity: Not on file   Transportation Needs: Not on file   Physical Activity: Not on file   Stress: Not on file   Social Connections: Unknown (1/3/2024)    [facility]    Social Connections      Frequency of Communication with Friends and Family: Not on file   Interpersonal Safety: Not on file   Housing Stability: Not on file       Current Meds:  Current Outpatient Medications   Medication Sig Dispense Refill     ALPRAZolam (XANAX) 0.5 MG tablet Take 0.5 mg by mouth daily as needed for anxiety (prior to dental work)       amoxicillin (AMOXIL) 500 MG capsule Take 4 capsules (2,000 mg) by mouth once as needed (SBE prophylaxis - take 30-60 minutes prior to dental procedure or cleaning) 4 capsule 3     aspirin 81 MG tablet Take 1 tablet by mouth daily.       ezetimibe (ZETIA) 10 MG tablet Take 1 tablet (10 mg) by mouth daily 90 tablet 3     Glucos-Chond-Hyal Ac-Ca Fructo (MOVE FREE JOINT HEALTH ADVANCE) TABS Take 1 tablet by mouth daily       hydrochlorothiazide (HYDRODIURIL) 25 MG tablet Take 1 tablet (25 mg) by mouth daily 90 tablet 3     Menatetrenone (VITAMIN K2) 100 MCG TABS Take 100 mcg by mouth daily       metoprolol succinate ER (TOPROL XL) 25 MG 24 hr tablet Take 25 mg by mouth 2 times daily       multivitamin, therapeutic (THERA-VIT) TABS tablet Take 1 tablet by mouth daily       nitroGLYcerin (NITROSTAT) 0.4 MG sublingual tablet Place 0.4 mg under the tongue every 5 minutes as needed       ORDER FOR DME Use your CPAP device as directed by your provider.       ranolazine (RANEXA) 500 MG 12  "hr tablet TAKE 1 TABLET BY MOUTH TWICE A  tablet 1     rosuvastatin (CRESTOR) 40 MG tablet Take 40 mg by mouth daily       valsartan (DIOVAN) 160 MG tablet Take 1 tablet (160 mg) by mouth daily 90 tablet 3     Vitamin D3 (CHOLECALCIFEROL) 25 mcg (1000 units) tablet Take 2 tablets by mouth daily         Physical Exam:  /78   Pulse 66   Ht 1.59 m (5' 2.6\")   Wt 99.8 kg (220 lb)   SpO2 96%   BMI 39.47 kg/m    Gen: awake, alert, oriented, no distress  HEENT: nasal turbinates are unremarkable, no oropharyngeal lesions, no cervical or supraclavicular lymphadenopathy  CV: RRR, no M/G/R  Resp: CTAB, no focal crackles or wheezes  Skin: no apparent rashes  Ext: no cyanosis, clubbing or edema  Neuro: alert, nonfocal    Labs:  Reviewedhgb 12.2  Prior CBC/diff without eosinophilia    Imaging studies:  EXAM: CT CHEST PULMONARY EMBOLISM W CONTRAST  LOCATION: [facility]  DATE: 2/18/2024     INDICATION: chest pain, tachy, +dimer  COMPARISON: 12/22/2022  TECHNIQUE: CT chest pulmonary angiogram during arterial phase injection of IV contrast. Multiplanar reformats and MIP reconstructions were performed. Dose reduction techniques were used.   CONTRAST: [id number] 75ml     FINDINGS:  ANGIOGRAM CHEST: Pulmonary arteries are normal caliber and negative for pulmonary emboli. Suboptimal opacification of the subsegmental branches. Thoracic aorta is negative for dissection. No CT evidence of right heart strain.     LUNGS AND PLEURA: No new or enlarging suspicious nodules. Pre-existing nodules remain unchanged, the largest of which is along the right major fissure and probably a lymph node. A 0.8 x 0.7 cm left upper lobe groundglass opacity is also unchanged dating   back to 7/5/2021 (6/115).     MEDIASTINUM/AXILLAE: No lymphadenopathy. Normal esophagus. No significant pericardial effusion. TAVR.     CORONARY ARTERY CALCIFICATION: Severe.     UPPER ABDOMEN: Normal.     MUSCULOSKELETAL: Degenerative " changes in the spine. No acute fracture.                                                                      IMPRESSION:  1.  No pulmonary emboli or other acute finding in the chest.     2.  Small pulmonary nodules remain unchanged. A 0.8 cm groundglass nodule in the left upper lobe is unchanged dating back to 7/5/2021. Suggest follow-up chest CT in July 2026 to demonstrate 5 years of temporal stability, as per below.    Echo Oct 2024  Interpretation Summary     The visual ejection fraction is 65-70%.  Right ventricular function cannot be assessed due to poor image quality.  There is a GUDELIA 3 bioprosthetic valve present in aortic valve position. Mean  gradient is 14.5mmHg. There is no paravalvular regurgitation present.  Compared to the prior study dated 3/29/2024, there have been no changes.    Pulmonary Function Testing  12/5/2024  FEV1 1.73L, 103%  FVC 2.32L, 106%  Ratio 0.75  No BD testing done  TLC 5.4L, 117%  Dlco 84% smith for hgb  Flow volume curve (insp limb) may suggest small airways disease    Again, thank you for allowing me to participate in the care of your patient.        Sincerely,        Nando Rueda MD

## 2024-12-19 ENCOUNTER — CARE COORDINATION (OUTPATIENT)
Dept: SLEEP MEDICINE | Facility: CLINIC | Age: 79
End: 2024-12-19
Payer: COMMERCIAL

## 2024-12-26 DIAGNOSIS — I10 ESSENTIAL HYPERTENSION: ICD-10-CM

## 2024-12-27 DIAGNOSIS — G47.33 OSA ON CPAP: ICD-10-CM

## 2024-12-30 RX ORDER — HYDROCHLOROTHIAZIDE 25 MG/1
25 TABLET ORAL DAILY
Qty: 90 TABLET | Refills: 0 | Status: SHIPPED | OUTPATIENT
Start: 2024-12-30

## 2024-12-31 NOTE — PROGRESS NOTES
OVERNIGHT OXIMETRY INTERPRETATION     I reviewed the overnight oximetry testing results from 12/20/2024 done on CPAP and room air.  Analysis time:  9 Hours 16 Minutes.  Time at or below 88% oxygen saturation 18.8 Minutes.  Lowest oxygen saturation 83 %  Oxygen desaturation index 4.2  CPAP download suggest mask leak could be excessive at times.  I recommend optimizing mask fit and consider titration PSG.    Cindy Ness M.D.  Pulmonary/Critical Care/Sleep Medicine

## 2025-01-06 NOTE — PROGRESS NOTES
HEART CARE ENCOUNTER NOTE      [facility]  [phone]      Assessment/Recommendations   Assessment:   Coronary artery disease: Remote history of balloon angioplasty in 1990s.  Recent coronary angiogram 2/19/2024 showed borderline distal left main disease with an FFR of 0.8 and mild to moderate nonobstructive disease elsewhere.  Medical therapy had been recommended and could consider CABG consult if symptomatic though complex PCI could be an option in the future if patient was too high risk for surgery.  Patient trialed isosorbide mononitrate, but did not tolerate this due to fatigue.  Started Ranexa 500 mg twice daily and noted significant improvement in her jaw and arm pain with exertion and shortness of breath.  Patient did feel that the past couple weeks symptoms may be slowly creeping back in.  She seems to get the symptoms only with exertion after eating.  She fasted for the last few days and does not have symptoms.  Encouraged her to focus on more of a heart healthy diet as the food she was eating prior to symptoms for fast food and pizza and cake.  Encouraged trial of antiacid medication.  Severe aortic stenosis: Status post TAVR 5/19/2021 echocardiogram obtained 10/24/24 showed mean gradient 14.5 mmHg with no paravalvular regurgitation.  Hypertension: Controlled on valsartan 160 mg daily, metoprolol 25 mg twice daily, hydrochlorothiazide 25 mg daily.    Dyslipidemia: On rosuvastatin 40 mg daily and Zetia 10 mg daily.  Last lipids 6/24/2024 showed LDL of 96.  Dr. Ball had recommended to continue management focusing on heart healthy diet and repeat in 1 year.  Obstructive sleep apnea: Compliant on CPAP.  Patient saw sleep medicine who did overnight oximetry that showed 88% oxygen saturation for 18 minutes and also showed mask leak could be excessive at times and recommended optimizing mask fit and consider titration PSG.  Palpitations: Zio patch 3/23/2024 showed less than 1% burden  "of PACs/PVCs patient remains on metoprolol.  Patient notes short-lived palpitations but that these have not changed since her Zio patch monitor in March.  Lower extremity edema: Chronic and partially due to venous insufficiency.  Echocardiogram 10/24/2024 showed normal EF with no significant valvular issues.    Plan:   Continue current medications  EKG today to monitor QTc on Ranexa and see if we would be able to increase dose.  QTc 458.  Could consider increasing to 1000 mg twice daily if needed  Encourage patient to focus on more heart healthy diet limiting saturated fats and sodium focusing on vegetables and lean meats  Trial antiacid medication  If symptoms persist will discuss with interventionalist regarding complex PCI as patient does not want open heart surgery      Follow up in 3 months with Dr. Ball    The longitudinal plan of care for Reba Juarez was addressed during this visit. Due to the added complexity in care, I will continue to support Adamaris in the subsequent management of this condition(s) and with the ongoing continuity of care of this condition(s).      History of Present Illness/Subjective    HPI: Reba Juarez is a 79 year old female with PMHx of coronary artery disease, severe aortic stenosis status post TAVR 2021, hypertension, dyslipidemia, MAYKEL on CPAP, lower extremity edema presents for follow-up.  Patient last saw Dr. Ball 3/18/2024 where patient was denying any anginal symptoms.  Was noting some intermittent palpitations but no lightheadedness.  Echocardiogram was obtained 3/29/2024 that showed normal EF with trace paravalvular regurgitation of the aortic valve status post TAVR.  I last saw patient 10-24 where patient had noted an episode when walking around [place] that something felt like it was pulled out of her heart and her head was \"swimming\".  Patient notes she had had this sensation on and off for 2 weeks though had improved by the time of the visit.  Patient " was noting intermittent palpitations and racing heartbeat at rest though somewhat improved.  Was also noting chest pain with climbing stairs and had taken a nitroglycerin the other day which seemed to help symptoms.  Patient wears an oura ring and saw that her oxygen was dropping into the 80s when she was sleeping as well as short times during the day.  Patient did note shortness of breath that was continuing to worsen.  Had recommended seeing sleep medicine to see if CPAP is working well and fitted correctly.  We trialed isosorbide mononitrate 30 mg daily though this caused fatigue.  We switched to Ranexa 500 mg twice daily which seemed to significantly improve angina and shortness of breath.  Echocardiogram was obtained 10/24/2024 which showed stable ejection fraction 65 to 70% and stable TAVR.      Patient notes that upon starting the Ranexa for anginal type symptoms that were mainly jaw and arm pain improved as well as her shortness of breath.  Patient notes she is able to go up and down the stairs twice before feeling short of breath.  Is wondering if part of her breathing is not due to deconditioning as she has not done much for the past few years.  Patient notes that in the past couple weeks some of her anginal equivalents that started to creep back in slightly.  Patient notes it more after eating so she started to fast last weekend and notes that symptoms improved.  Patient had trialed fast food, pizza, and cake noting recurrence of the symptoms with exertion after eating.  Patient also had 1 episode of sharp pain in the right side of her chest that went through her back and lasted ~3 minutes when she was sitting and has not reoccurred.          ECG 1/7/25:  Sinus rhythm   Normal ECG   When compared with ECG of 18-Feb-2024 11:52,   Nonspecific T wave abnormality now evident in Inferior leads     Echocardiogram 10/24/2024  The visual ejection fraction is 65-70%.  Right ventricular function cannot be assessed  due to poor image quality.  There is a GUDELIA 3 bioprosthetic valve present in aortic valve position. Mean  gradient is 14.5mmHg. There is no paravalvular regurgitation present.  Compared to the prior study dated 3/29/2024, there have been no changes.    Echocardiogram 3/29/2024 results:  The visual ejection fraction is 65-70%.  The right ventricle is normal in size and function.  There is a GUDELIA 3 bioprosthetic valve present in aortic valve position. Mean  gradient is 16 mmHg. There is trace paravalvular regurgitation present.     Compared to the prior study dated 5/30/2023, there have been no changes.    Coronary angiogram 2/19/2024  -Borderline distal left main disease (FFR 0.8) with mild to moderate nonobstructive disease elsewhere.    RECOMMENDATIONS:   Usual postprocedure cares, please see orders.  Recommend a trial of medical management given borderline complex distal left main disease, preserved LV function, and lack of exertional angina - unclear if left main disease etiology of current presentation.  Alternatively, can consider cardiothoracic surgery consultation for evaluation of surgical revascularization and if not a candidate then high risk, complex PCI is an option.  Aggressive risk factor modification for secondary prevention.    Zio patch 3/23/2024  Sinus rhythm, average 72 bpm, range  bpm.  PACs/PVCs <1%.  Brief runs of PACs, longest 16 beats, no symptoms.  Patient triggered events correlated with sinus rhythm and sinus tachycardia.    US carotid bilateral 5/17/2017  4 vessel study done with mild to moderate diffuse plaque in   carotid distribution without flow limiting stenosis. Bruit is not due to   obstructive lesion. Results shared with patient today.      Physical Examination  Review of Systems   Vitals: /58 (BP Location: Right arm, Patient Position: Sitting, Cuff Size: Adult Large)   Pulse 87   Resp 14   Wt 99.3 kg (219 lb)   BMI 39.29 kg/m    BMI= Body mass index is 39.29  kg/m .  Wt Readings from Last 3 Encounters:   01/07/25 99.3 kg (219 lb)   12/05/24 99.8 kg (220 lb)   11/15/24 101.5 kg (223 lb 11.2 oz)           ENT/Mouth: membranes moist, no oral lesions or bleeding gums.      EYES:  no scleral icterus, normal conjunctivae       Chest/Lungs:   lungs are clear to auscultation, no rales or wheezing,  equal chest wall expansion    Cardiovascular:   Regular. Normal first and second heart sounds with subtle systolic murmur, no rubs, or gallops; the radial and posterior tibial pulses are intact,  no edema bilaterally        Extremities: no cyanosis or clubbing   Skin: no xanthelasma, warm.    Neurologic: no tremors     Psychiatric: alert and oriented x3, calm        Please refer above for cardiac ROS details.        Medical History  Surgical History Family History Social History   Past Medical History:   Diagnosis Date    Aortic valve sclerosis     echo 1-24-11 mild    Coronary artery disease 1995    PCI at [facility]. Repeat angiogram in 2007    Diabetes (H)     Gastric ulcer 1990    H/O exercise stress test 03/21/2011    No ischemia    Hyperlipidemia LDL goal < 70     identified 1990's    Hypertension     onset age 50-55 approx    MAYKEL (obstructive sleep apnea) 2007     Past Surgical History:   Procedure Laterality Date    ANGIOPLASTY  early 1990s    COLONOSCOPY  2010    CV CORONARY ANGIOGRAM N/A 4/9/2021    Procedure: CV CORONARY ANGIOGRAM;  Surgeon: Júnior Santos MD;  Location: [facility] CARDIAC CATH LAB    CV CORONARY ANGIOGRAM N/A 2/19/2024    Procedure: Coronary Angiogram;  Surgeon: Romero Garcia MD;  Location: [facility] CATH LAB CV    CV FRACTIONAL FLOW RATIO WIRE N/A 2/19/2024    Procedure: Fractional Flow Ratio Wire;  Surgeon: Romero Garcia MD;  Location: [facility] CV    CV INSTANTANEOUS WAVE-FREE RATIO N/A 2/19/2024    Procedure: Instantaneous Wave-Free Ratio;  Surgeon: Romero Garcia MD;  Location: [facility] CATH LAB CV    CV LEFT HEART  CATH N/A 2021    Procedure: Left Heart Cath;  Surgeon: Júnior Santos MD;  Location:  HEART CARDIAC CATH LAB    CV RIGHT HEART CATH MEASUREMENTS RECORDED N/A 2021    Procedure: Right Heart Cath;  Surgeon: Júnior Santos MD;  Location:  HEART CARDIAC CATH LAB    CV TRANSCATHETER AORTIC VALVE REPLACEMENT N/A 2021    Procedure: Transfemoral Transcatheter Aortic Valve Replacement with 23 mm Little Ellie 3 Ultra Transcatheter Heart Valve, Transthoracic echocardiogram read by Dr. Farfan;  Surgeon: Harvinder Quick MD;  Location:  OR    EYE SURGERY      HEART CATH FEMORAL CANNULIZATION WITH OPEN STANDBY REPAIR AORTIC VALVE N/A 2021    Procedure: cardiopulmonary bypass on standby;  Surgeon: Riaz Fuller MD;  Location:  OR     Family History   Problem Relation Age of Onset    No Known Problems Sister     Alcoholism Brother     Myocardial Infarction Mother 51        HTN onset age 30, was a smoker    Myocardial Infarction Father 63        smoker    Myocardial Infarction Brother 39    No Known Problems Son     No Known Problems Daughter     CABG Brother     Heart Disease Brother     No Known Problems Sister     Cerebrovascular Disease Sister     Coronary Artery Disease Sister     No Known Problems Son         Social History     Socioeconomic History    Marital status:      Spouse name: Not on file    Number of children: Not on file    Years of education: Not on file    Highest education level: Not on file   Occupational History    Occupation: retired     Employer: [employer]     Comment:  retired full time, still works a few days as biochemical pharmacist   Tobacco Use    Smoking status: Former     Current packs/day: 0.00     Average packs/day: 1 pack/day for 20.0 years (20.0 ttl pk-yrs)     Types: Cigarettes     Start date: 3/5/1972     Quit date: 3/5/1992     Years since quittin.8    Smokeless tobacco: Never   Vaping Use    Vaping status: Never Used   Substance and  Sexual Activity    Alcohol use: No    Drug use: No    Sexual activity: Not on file   Other Topics Concern    Parent/sibling w/ CABG, MI or angioplasty before 65F 55M? Not Asked   Social History Narrative    Single and lives with her son and brother.     Social Drivers of Health     Financial Resource Strain: Not on file   Food Insecurity: Not on file   Transportation Needs: Not on file   Physical Activity: Not on file   Stress: Not on file   Social Connections: Unknown (1/3/2024)    Received from [employer] [facility], [employer] [facility] [employer]    Social Connections     Frequency of Communication with Friends and Family: Not on file   Interpersonal Safety: Not on file   Housing Stability: Not on file           Medications  Allergies   Current Outpatient Medications   Medication Sig Dispense Refill    ALPRAZolam (XANAX) 0.5 MG tablet Take 0.5 mg by mouth daily as needed for anxiety (prior to dental work)      amoxicillin (AMOXIL) 500 MG capsule Take 4 capsules (2,000 mg) by mouth once as needed (SBE prophylaxis - take 30-60 minutes prior to dental procedure or cleaning) 4 capsule 3    aspirin 81 MG tablet Take 1 tablet by mouth daily.      ezetimibe (ZETIA) 10 MG tablet Take 1 tablet (10 mg) by mouth daily 90 tablet 3    Glucos-Chond-Hyal Ac-Ca Fructo (MOVE FREE JOINT HEALTH ADVANCE) TABS Take 1 tablet by mouth daily      hydrochlorothiazide (HYDRODIURIL) 25 MG tablet TAKE 1 TABLET BY MOUTH EVERY DAY 90 tablet 0    Menatetrenone (VITAMIN K2) 100 MCG TABS Take 100 mcg by mouth daily      metoprolol succinate ER (TOPROL XL) 25 MG 24 hr tablet Take 1 tablet (25 mg) by mouth 2 times daily. 180 tablet 3    multivitamin, therapeutic (THERA-VIT) TABS tablet Take 1 tablet by mouth daily      nitroGLYcerin (NITROSTAT) 0.4 MG sublingual tablet Place 0.4 mg under the tongue every 5 minutes as needed      ORDER FOR DME Use your CPAP device as directed by your provider.      rosuvastatin  "(CRESTOR) 40 MG tablet Take 40 mg by mouth daily      valsartan (DIOVAN) 160 MG tablet Take 1 tablet (160 mg) by mouth daily 90 tablet 3    Vitamin D3 (CHOLECALCIFEROL) 25 mcg (1000 units) tablet Take 2 tablets by mouth daily      ranolazine (RANEXA) 500 MG 12 hr tablet TAKE 1 TABLET BY MOUTH TWICE A  tablet 1       Allergies   Allergen Reactions    Calcium Citrate Unknown     Bloody nose per pt    Lipitor [Atorvastatin Calcium]      Myalgia      Pravastatin      Myalgia      Simvastatin      myalgia          Lab Results    Chemistry/lipid CBC Cardiac Enzymes/BNP/TSH/INR   Recent Labs   Lab Test 06/24/24  1152   CHOL 186   HDL 56   LDL 96   TRIG 171*     Recent Labs   Lab Test 06/24/24  1152 03/18/24  1047 05/30/23  1013   LDL 96 103* 98     Recent Labs   Lab Test 02/19/24  0507      POTASSIUM 4.0   CHLORIDE 107   CO2 26   *   BUN 12.9   CR 0.98*   GFRESTIMATED 58*   TERRI 8.6*     Recent Labs   Lab Test 02/19/24  0507 02/18/24  1156 05/30/23  1013   CR 0.98* 1.14* 1.16*     Recent Labs   Lab Test 02/18/24  1156   A1C 6.1*          Recent Labs   Lab Test 02/20/24  1348   WBC 14.3*   HGB 12.3   HCT 37.8   MCV 86   *     Recent Labs   Lab Test 02/20/24  1348 02/19/24  0507 02/18/24  1156   HGB 12.3 12.4 13.3    No results for input(s): \"TROPONINI\" in the last 72053 hours.  Recent Labs   Lab Test 12/22/22  1923   NTBNPI 70     Recent Labs   Lab Test 02/18/24  1156   TSH 2.17     Recent Labs   Lab Test 05/17/21  1246   INR 1.04          Ashley Dent PA-C                                       "

## 2025-01-07 ENCOUNTER — OFFICE VISIT (OUTPATIENT)
Dept: CARDIOLOGY | Facility: CLINIC | Age: 80
End: 2025-01-07
Attending: STUDENT IN AN ORGANIZED HEALTH CARE EDUCATION/TRAINING PROGRAM
Payer: COMMERCIAL

## 2025-01-07 VITALS
HEART RATE: 87 BPM | RESPIRATION RATE: 14 BRPM | SYSTOLIC BLOOD PRESSURE: 123 MMHG | BODY MASS INDEX: 39.29 KG/M2 | DIASTOLIC BLOOD PRESSURE: 58 MMHG | WEIGHT: 219 LBS

## 2025-01-07 DIAGNOSIS — I25.10 CORONARY ARTERY DISEASE INVOLVING NATIVE CORONARY ARTERY OF NATIVE HEART WITHOUT ANGINA PECTORIS: ICD-10-CM

## 2025-01-07 DIAGNOSIS — Z95.2 STATUS POST AORTIC VALVE REPLACEMENT: ICD-10-CM

## 2025-01-07 DIAGNOSIS — R60.0 BILATERAL LOWER EXTREMITY EDEMA: ICD-10-CM

## 2025-01-07 DIAGNOSIS — I10 ESSENTIAL HYPERTENSION: ICD-10-CM

## 2025-01-07 DIAGNOSIS — R00.2 PALPITATIONS: ICD-10-CM

## 2025-01-07 DIAGNOSIS — I10 HYPERTENSION, UNSPECIFIED TYPE: ICD-10-CM

## 2025-01-07 LAB
ATRIAL RATE - MUSE: 83 BPM
DIASTOLIC BLOOD PRESSURE - MUSE: NORMAL MMHG
INTERPRETATION ECG - MUSE: NORMAL
P AXIS - MUSE: 63 DEGREES
PR INTERVAL - MUSE: 148 MS
QRS DURATION - MUSE: 74 MS
QT - MUSE: 390 MS
QTC - MUSE: 458 MS
R AXIS - MUSE: 5 DEGREES
SYSTOLIC BLOOD PRESSURE - MUSE: NORMAL MMHG
T AXIS - MUSE: 9 DEGREES
VENTRICULAR RATE- MUSE: 83 BPM

## 2025-01-07 PROCEDURE — 93000 ELECTROCARDIOGRAM COMPLETE: CPT | Performed by: STUDENT IN AN ORGANIZED HEALTH CARE EDUCATION/TRAINING PROGRAM

## 2025-01-07 PROCEDURE — 99214 OFFICE O/P EST MOD 30 MIN: CPT | Performed by: STUDENT IN AN ORGANIZED HEALTH CARE EDUCATION/TRAINING PROGRAM

## 2025-01-07 RX ORDER — METOPROLOL SUCCINATE 25 MG/1
25 TABLET, EXTENDED RELEASE ORAL 2 TIMES DAILY
Qty: 180 TABLET | Refills: 3 | Status: SHIPPED | OUTPATIENT
Start: 2025-01-07

## 2025-01-07 NOTE — PATIENT INSTRUCTIONS
Reba Juarez,    It was a pleasure to see you today at the [facility].     My recommendations after this visit include:    - Continue current medications- restart metoprolol.   - EKG today  - Try to focus on heart healthy diet- whole vegetables, lean meats. Limit saturated fats, sodium  - Restart antiacid medication to see if acid reflux is cause of symptoms  - Try to increase activity and see if walking more improves the shortness of breath  - Follow up with Dr. Ball  in 3 months    - Please call [phone], if you have any questions or concerns      Ashley Dent PA-C

## 2025-01-07 NOTE — LETTER
1/7/2025    Dayana Kruse NP  [facility] [address]  [facility] [id number]    RE: Reba Juarez       Dear Colleague,     I had the pleasure of seeing Reba Juarez in the [facility].    HEART CARE ENCOUNTER NOTE      [facility]  [phone]      Assessment/Recommendations   Assessment:   Coronary artery disease: Remote history of balloon angioplasty in 1990s.  Recent coronary angiogram 2/19/2024 showed borderline distal left main disease with an FFR of 0.8 and mild to moderate nonobstructive disease elsewhere.  Medical therapy had been recommended and could consider CABG consult if symptomatic though complex PCI could be an option in the future if patient was too high risk for surgery.  Patient trialed isosorbide mononitrate, but did not tolerate this due to fatigue.  Started Ranexa 500 mg twice daily and noted significant improvement in her jaw and arm pain with exertion and shortness of breath.  Patient did feel that the past couple weeks symptoms may be slowly creeping back in.  She seems to get the symptoms only with exertion after eating.  She fasted for the last few days and does not have symptoms.  Encouraged her to focus on more of a heart healthy diet as the food she was eating prior to symptoms for fast food and pizza and cake.  Encouraged trial of antiacid medication.  Severe aortic stenosis: Status post TAVR 5/19/2021 echocardiogram obtained 10/24/24 showed mean gradient 14.5 mmHg with no paravalvular regurgitation.  Hypertension: Controlled on valsartan 160 mg daily, metoprolol 25 mg twice daily, hydrochlorothiazide 25 mg daily.    Dyslipidemia: On rosuvastatin 40 mg daily and Zetia 10 mg daily.  Last lipids 6/24/2024 showed LDL of 96.  Dr. Ball had recommended to continue management focusing on heart healthy diet and repeat in 1 year.  Obstructive sleep apnea: Compliant on CPAP.  Patient saw sleep medicine who did  overnight oximetry that showed 88% oxygen saturation for 18 minutes and also showed mask leak could be excessive at times and recommended optimizing mask fit and consider titration PSG.  Palpitations: Zio patch 3/23/2024 showed less than 1% burden of PACs/PVCs patient remains on metoprolol.  Patient notes short-lived palpitations but that these have not changed since her Zio patch monitor in March.  Lower extremity edema: Chronic and partially due to venous insufficiency.  Echocardiogram 10/24/2024 showed normal EF with no significant valvular issues.    Plan:   Continue current medications  EKG today to monitor QTc on Ranexa and see if we would be able to increase dose.  QTc 458.  Could consider increasing to 1000 mg twice daily if needed  Encourage patient to focus on more heart healthy diet limiting saturated fats and sodium focusing on vegetables and lean meats  Trial antiacid medication  If symptoms persist will discuss with interventionalist regarding complex PCI as patient does not want open heart surgery      Follow up in 3 months with Dr. Ball    The longitudinal plan of care for Reba Juarez was addressed during this visit. Due to the added complexity in care, I will continue to support Adamaris in the subsequent management of this condition(s) and with the ongoing continuity of care of this condition(s).      History of Present Illness/Subjective    HPI: Reba Juarez is a 79 year old female with PMHx of coronary artery disease, severe aortic stenosis status post TAVR 2021, hypertension, dyslipidemia, MAYKEL on CPAP, lower extremity edema presents for follow-up.  Patient last saw Dr. Ball 3/18/2024 where patient was denying any anginal symptoms.  Was noting some intermittent palpitations but no lightheadedness.  Echocardiogram was obtained 3/29/2024 that showed normal EF with trace paravalvular regurgitation of the aortic valve status post TAVR.  I last saw patient 10-24 where patient had  "noted an episode when walking around [employer] that something felt like it was pulled out of her heart and her head was \"swimming\".  Patient notes she had had this sensation on and off for 2 weeks though had improved by the time of the visit.  Patient was noting intermittent palpitations and racing heartbeat at rest though somewhat improved.  Was also noting chest pain with climbing stairs and had taken a nitroglycerin the other day which seemed to help symptoms.  Patient wears an oura ring and saw that her oxygen was dropping into the 80s when she was sleeping as well as short times during the day.  Patient did note shortness of breath that was continuing to worsen.  Had recommended seeing sleep medicine to see if CPAP is working well and fitted correctly.  We trialed isosorbide mononitrate 30 mg daily though this caused fatigue.  We switched to Ranexa 500 mg twice daily which seemed to significantly improve angina and shortness of breath.  Echocardiogram was obtained 10/24/2024 which showed stable ejection fraction 65 to 70% and stable TAVR.      Patient notes that upon starting the Ranexa for anginal type symptoms that were mainly jaw and arm pain improved as well as her shortness of breath.  Patient notes she is able to go up and down the stairs twice before feeling short of breath.  Is wondering if part of her breathing is not due to deconditioning as she has not done much for the past few years.  Patient notes that in the past couple weeks some of her anginal equivalents that started to creep back in slightly.  Patient notes it more after eating so she started to fast last weekend and notes that symptoms improved.  Patient had trialed fast food, pizza, and cake noting recurrence of the symptoms with exertion after eating.  Patient also had 1 episode of sharp pain in the right side of her chest that went through her back and lasted ~3 minutes when she was sitting and has not reoccurred.          ECG 1/7/25:  Sinus " rhythm   Normal ECG   When compared with ECG of 18-Feb-2024 11:52,   Nonspecific T wave abnormality now evident in Inferior leads     Echocardiogram 10/24/2024  The visual ejection fraction is 65-70%.  Right ventricular function cannot be assessed due to poor image quality.  There is a GUDELIA 3 bioprosthetic valve present in aortic valve position. Mean  gradient is 14.5mmHg. There is no paravalvular regurgitation present.  Compared to the prior study dated 3/29/2024, there have been no changes.    Echocardiogram 3/29/2024 results:  The visual ejection fraction is 65-70%.  The right ventricle is normal in size and function.  There is a GUDELIA 3 bioprosthetic valve present in aortic valve position. Mean  gradient is 16 mmHg. There is trace paravalvular regurgitation present.     Compared to the prior study dated 5/30/2023, there have been no changes.    Coronary angiogram 2/19/2024  -Borderline distal left main disease (FFR 0.8) with mild to moderate nonobstructive disease elsewhere.    RECOMMENDATIONS:   Usual postprocedure cares, please see orders.  Recommend a trial of medical management given borderline complex distal left main disease, preserved LV function, and lack of exertional angina - unclear if left main disease etiology of current presentation.  Alternatively, can consider cardiothoracic surgery consultation for evaluation of surgical revascularization and if not a candidate then high risk, complex PCI is an option.  Aggressive risk factor modification for secondary prevention.    Zio patch 3/23/2024  Sinus rhythm, average 72 bpm, range  bpm.  PACs/PVCs <1%.  Brief runs of PACs, longest 16 beats, no symptoms.  Patient triggered events correlated with sinus rhythm and sinus tachycardia.    US carotid bilateral 5/17/2017  4 vessel study done with mild to moderate diffuse plaque in   carotid distribution without flow limiting stenosis. Bruit is not due to   obstructive lesion. Results shared with  patient today.      Physical Examination  Review of Systems   Vitals: /58 (BP Location: Right arm, Patient Position: Sitting, Cuff Size: Adult Large)   Pulse 87   Resp 14   Wt 99.3 kg (219 lb)   BMI 39.29 kg/m    BMI= Body mass index is 39.29 kg/m .  Wt Readings from Last 3 Encounters:   01/07/25 99.3 kg (219 lb)   12/05/24 99.8 kg (220 lb)   11/15/24 101.5 kg (223 lb 11.2 oz)           ENT/Mouth: membranes moist, no oral lesions or bleeding gums.      EYES:  no scleral icterus, normal conjunctivae       Chest/Lungs:   lungs are clear to auscultation, no rales or wheezing,  equal chest wall expansion    Cardiovascular:   Regular. Normal first and second heart sounds with subtle systolic murmur, no rubs, or gallops; the radial and posterior tibial pulses are intact,  no edema bilaterally        Extremities: no cyanosis or clubbing   Skin: no xanthelasma, warm.    Neurologic: no tremors     Psychiatric: alert and oriented x3, calm        Please refer above for cardiac ROS details.        Medical History  Surgical History Family History Social History   Past Medical History:   Diagnosis Date     Aortic valve sclerosis     echo 1-24-11 mild     Coronary artery disease 1995    PCI at [facility]. Repeat angiogram in 2007     Diabetes (H)      Gastric ulcer 1990     H/O exercise stress test 03/21/2011    No ischemia     Hyperlipidemia LDL goal < 70     identified 1990's     Hypertension     onset age 50-55 approx     MAYKEL (obstructive sleep apnea) 2007     Past Surgical History:   Procedure Laterality Date     ANGIOPLASTY  early 1990s     COLONOSCOPY  2010     CV CORONARY ANGIOGRAM N/A 4/9/2021    Procedure: CV CORONARY ANGIOGRAM;  Surgeon: Júnior Santos MD;  Location: [facility] CARDIAC CATH LAB     CV CORONARY ANGIOGRAM N/A 2/19/2024    Procedure: Coronary Angiogram;  Surgeon: Romero Garcia MD;  Location: [facility] CATH LAB CV     CV FRACTIONAL FLOW RATIO WIRE N/A 2/19/2024    Procedure:  Fractional Flow Ratio Wire;  Surgeon: Romero Garcia MD;  Location: [facility] CATH LAB CV     CV INSTANTANEOUS WAVE-FREE RATIO N/A 2/19/2024    Procedure: Instantaneous Wave-Free Ratio;  Surgeon: Romero Garcia MD;  Location: [facility] CATH LAB CV     CV LEFT HEART CATH N/A 4/9/2021    Procedure: Left Heart Cath;  Surgeon: Júnior Santos MD;  Location:  HEART CARDIAC CATH LAB     CV RIGHT HEART CATH MEASUREMENTS RECORDED N/A 4/9/2021    Procedure: Right Heart Cath;  Surgeon: Júnior Santos MD;  Location:  HEART CARDIAC CATH LAB     CV TRANSCATHETER AORTIC VALVE REPLACEMENT N/A 5/19/2021    Procedure: Transfemoral Transcatheter Aortic Valve Replacement with 23 mm Little Ellie 3 Ultra Transcatheter Heart Valve, Transthoracic echocardiogram read by Dr. Farfan;  Surgeon: Harvinder Quick MD;  Location:  OR     EYE SURGERY       HEART CATH FEMORAL CANNULIZATION WITH OPEN STANDBY REPAIR AORTIC VALVE N/A 5/19/2021    Procedure: cardiopulmonary bypass on standby;  Surgeon: Riaz Fuller MD;  Location:  OR     Family History   Problem Relation Age of Onset     No Known Problems Sister      Alcoholism Brother      Myocardial Infarction Mother 51        HTN onset age 30, was a smoker     Myocardial Infarction Father 63        smoker     Myocardial Infarction Brother 39     No Known Problems Son      No Known Problems Daughter      CABG Brother      Heart Disease Brother      No Known Problems Sister      Cerebrovascular Disease Sister      Coronary Artery Disease Sister      No Known Problems Son         Social History     Socioeconomic History     Marital status:      Spouse name: Not on file     Number of children: Not on file     Years of education: Not on file     Highest education level: Not on file   Occupational History     Occupation: retired     Employer: [employer]     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 Not Asked   Social History Narrative    Single and lives with her son and brother.     Social Drivers of Health     Financial Resource Strain: Not on file   Food Insecurity: Not on file   Transportation Needs: Not on file   Physical Activity: Not on file   Stress: Not on file   Social Connections: Unknown (1/3/2024)    [facility]    Social Connections      Frequency of Communication with Friends and Family: Not on file   Interpersonal Safety: Not on file   Housing Stability: Not on file           Medications  Allergies   Current Outpatient Medications   Medication Sig Dispense Refill     ALPRAZolam (XANAX) 0.5 MG tablet Take 0.5 mg by mouth daily as needed for anxiety (prior to dental work)       amoxicillin (AMOXIL) 500 MG capsule Take 4 capsules (2,000 mg) by mouth once as needed (SBE prophylaxis - take 30-60 minutes prior to dental procedure or cleaning) 4 capsule 3     aspirin 81 MG tablet Take 1 tablet by mouth daily.       ezetimibe (ZETIA) 10 MG tablet Take 1 tablet (10 mg) by mouth daily 90 tablet 3     Glucos-Chond-Hyal Ac-Ca Fructo (MOVE FREE JOINT HEALTH ADVANCE) TABS Take 1 tablet by mouth daily       hydrochlorothiazide (HYDRODIURIL) 25 MG tablet TAKE 1 TABLET BY MOUTH EVERY DAY 90 tablet 0     Menatetrenone (VITAMIN K2) 100 MCG TABS Take 100 mcg by mouth daily       metoprolol succinate ER (TOPROL XL) 25 MG 24 hr tablet Take 1 tablet (25 mg) by mouth 2  "times daily. 180 tablet 3     multivitamin, therapeutic (THERA-VIT) TABS tablet Take 1 tablet by mouth daily       nitroGLYcerin (NITROSTAT) 0.4 MG sublingual tablet Place 0.4 mg under the tongue every 5 minutes as needed       ORDER FOR DME Use your CPAP device as directed by your provider.       rosuvastatin (CRESTOR) 40 MG tablet Take 40 mg by mouth daily       valsartan (DIOVAN) 160 MG tablet Take 1 tablet (160 mg) by mouth daily 90 tablet 3     Vitamin D3 (CHOLECALCIFEROL) 25 mcg (1000 units) tablet Take 2 tablets by mouth daily       ranolazine (RANEXA) 500 MG 12 hr tablet TAKE 1 TABLET BY MOUTH TWICE A  tablet 1       Allergies   Allergen Reactions     Calcium Citrate Unknown     Bloody nose per pt     Lipitor [Atorvastatin Calcium]      Myalgia       Pravastatin      Myalgia       Simvastatin      myalgia          Lab Results    Chemistry/lipid CBC Cardiac Enzymes/BNP/TSH/INR   Recent Labs   Lab Test 06/24/24  1152   CHOL 186   HDL 56   LDL 96   TRIG 171*     Recent Labs   Lab Test 06/24/24  1152 03/18/24  1047 05/30/23  1013   LDL 96 103* 98     Recent Labs   Lab Test 02/19/24  0507      POTASSIUM 4.0   CHLORIDE 107   CO2 26   *   BUN 12.9   CR 0.98*   GFRESTIMATED 58*   TERRI 8.6*     Recent Labs   Lab Test 02/19/24  0507 02/18/24  1156 05/30/23  1013   CR 0.98* 1.14* 1.16*     Recent Labs   Lab Test 02/18/24  1156   A1C 6.1*          Recent Labs   Lab Test 02/20/24  1348   WBC 14.3*   HGB 12.3   HCT 37.8   MCV 86   *     Recent Labs   Lab Test 02/20/24  1348 02/19/24  0507 02/18/24  1156   HGB 12.3 12.4 13.3    No results for input(s): \"TROPONINI\" in the last [id number] hours.  Recent Labs   Lab Test 12/22/22  1923   NTBNPI 70     Recent Labs   Lab Test 02/18/24  1156   TSH 2.17     Recent Labs   Lab Test 05/17/21  1246   INR 1.04          Ashley Dent PA-C                                         Thank you for allowing me to participate in the care of your patient.      Sincerely, "     Ashley Dent PA-C     [facility]  cc:   Ashley Dent PA-C  [address] [place] [address]

## 2025-01-12 RX ORDER — HYDROCHLOROTHIAZIDE 25 MG/1
25 TABLET ORAL DAILY
Qty: 90 TABLET | Refills: 0 | OUTPATIENT
Start: 2025-01-12

## 2025-01-29 ENCOUNTER — OFFICE VISIT (OUTPATIENT)
Dept: SLEEP MEDICINE | Facility: CLINIC | Age: 80
End: 2025-01-29
Payer: COMMERCIAL

## 2025-01-29 VITALS
OXYGEN SATURATION: 95 % | HEIGHT: 62 IN | DIASTOLIC BLOOD PRESSURE: 60 MMHG | HEART RATE: 60 BPM | WEIGHT: 224 LBS | SYSTOLIC BLOOD PRESSURE: 131 MMHG | BODY MASS INDEX: 41.22 KG/M2 | RESPIRATION RATE: 17 BRPM

## 2025-01-29 DIAGNOSIS — G47.34 NOCTURNAL HYPOXEMIA: ICD-10-CM

## 2025-01-29 DIAGNOSIS — G47.33 OSA ON CPAP: Primary | ICD-10-CM

## 2025-01-29 DIAGNOSIS — I10 HYPERTENSION, UNSPECIFIED TYPE: ICD-10-CM

## 2025-01-29 PROBLEM — J44.9 COPD (CHRONIC OBSTRUCTIVE PULMONARY DISEASE) (H): Status: RESOLVED | Noted: 2017-05-11 | Resolved: 2025-01-29

## 2025-01-29 PROCEDURE — 99215 OFFICE O/P EST HI 40 MIN: CPT | Performed by: STUDENT IN AN ORGANIZED HEALTH CARE EDUCATION/TRAINING PROGRAM

## 2025-01-29 RX ORDER — VALSARTAN 160 MG/1
160 TABLET ORAL DAILY
Qty: 90 TABLET | Refills: 1 | Status: SHIPPED | OUTPATIENT
Start: 2025-01-29

## 2025-01-29 ASSESSMENT — SLEEP AND FATIGUE QUESTIONNAIRES
HOW LIKELY ARE YOU TO NOD OFF OR FALL ASLEEP IN A CAR, WHILE STOPPED FOR A FEW MINUTES IN TRAFFIC: WOULD NEVER DOZE
HOW LIKELY ARE YOU TO NOD OFF OR FALL ASLEEP WHILE LYING DOWN TO REST IN THE AFTERNOON WHEN CIRCUMSTANCES PERMIT: MODERATE CHANCE OF DOZING
HOW LIKELY ARE YOU TO NOD OFF OR FALL ASLEEP WHILE SITTING QUIETLY AFTER LUNCH WITHOUT ALCOHOL: MODERATE CHANCE OF DOZING
HOW LIKELY ARE YOU TO NOD OFF OR FALL ASLEEP WHEN YOU ARE A PASSENGER IN A CAR FOR AN HOUR WITHOUT A BREAK: WOULD NEVER DOZE
HOW LIKELY ARE YOU TO NOD OFF OR FALL ASLEEP WHILE SITTING AND READING: SLIGHT CHANCE OF DOZING
HOW LIKELY ARE YOU TO NOD OFF OR FALL ASLEEP WHILE SITTING AND TALKING TO SOMEONE: WOULD NEVER DOZE
HOW LIKELY ARE YOU TO NOD OFF OR FALL ASLEEP WHILE WATCHING TV: WOULD NEVER DOZE
HOW LIKELY ARE YOU TO NOD OFF OR FALL ASLEEP WHILE SITTING INACTIVE IN A PUBLIC PLACE: WOULD NEVER DOZE

## 2025-01-29 NOTE — ASSESSMENT & PLAN NOTE
Reba Juarez has Severe Sleep apnea. She is tolerating PAP well and reports adequate compliance with PAP therapy. Daytime symptoms are improved and reports positive benefits with PAP use.   MAYKEL is adequately controlled with Auto CPAP at the current settings per compliance DL. Any residual apnea occurs when PAP pressure is maxed out. 50% pressure is 12 cm H2O, 95% pressure is 14.6 cm H2O  Increased PAP pressure from 11-14 cm H2O to 12-15 cm H2O   Prescription provided for renewal of PAP supplies.  Recommended him/her to continue using the CPAP regularly during sleep and instructed  to get  the supplies for the PAP replaced regularly.    Patient instructed to remember to bring PAP with him/her if hospitalized and if anticipating procedure that requires sedation/surgery to be sure to discuss with the provider/surgeon that he/she has sleep apnea and uses PAP therapy.  Patient concerned about nocturnal palpitation is related to hypoxemia; will order overnight oximetry while on CPAP to evaluate for sleep related hypoxemia

## 2025-01-29 NOTE — NURSING NOTE
"Chief Complaint   Patient presents with    CPAP Follow Up     CPAP Compliance       Initial /60   Pulse 60   Resp 17   Ht 1.575 m (5' 2\")   Wt 101.6 kg (224 lb)   SpO2 95%   BMI 40.97 kg/m   Estimated body mass index is 40.97 kg/m  as calculated from the following:    Height as of this encounter: 1.575 m (5' 2\").    Weight as of this encounter: 101.6 kg (224 lb).    Medication Reconciliation: complete      DME: GIBSON Ayers      "

## 2025-01-29 NOTE — ASSESSMENT & PLAN NOTE
Overnight oximetry performed on CPAP 10/2/2013  Time below 88% 0.9 minutes     Overnight oximetry performed on CPAP 07/22/2015  Time below 88% 0.6 minutes    Overnight oximetry performed on CPAP 03/23/2023  Time below 88% 0.2 minutes     Overnight oximetry performed on CPAP and room air 12/20/2024  Time at or below 88% oxygen saturation 18.8 Minutes  Detailed report from night of IAN reviewed and periods hypoxia correlate to episodes of high leak     Low concern for nocturnal hypoxemia while on CPAP   Recommend continued CPAP use at current settings

## 2025-01-29 NOTE — PATIENT INSTRUCTIONS
MY INFORMATION ON SLEEP APNEA-  Reba Juarez    DOCTOR : Yordan Jack MD  SLEEP CENTER :      MY CONTACT NUMBER:    [facility]  [phone]  [facility]      For general sleep health questions:   http://sleepeducation.org    For tips about PAP and COVID-19:  https://www.thoracic.org/patients/patient-resources/resources/covid-19-and-home-pap-therapy.pdf    For general info about COVID-19 including vaccines:  https://[employer].org/covid19      Continue PAP therapy every night, for all hours that you are sleeping (including naps.)  As always, try to get at least 8 hours of sleep or more each day, keep a regular sleep schedule, and avoid sleep deprivation. Avoid alcohol.  Reasons that you might need a change to your pressure therapy would be weight gain or loss, waking having inadvertently removed your PAP overnight, having previously felt refreshed by sleep with CPAP use and now waking un-refreshed, and return of daytime sleepiness. Also, the development of new medical problems  (such as heart failure, stroke, medications such as narcotics) can sometimes affect breathing at night and change your PAP therapy needs.  Please bring PAP with you if you are hospitalized.  If anticipating surgery be sure to discuss with your surgeon that you have sleep apnea and use PAP therapy.    Maintain your equipment as recommended which includes routine cleaning and replacement of supplies.      Call DME for any questions regarding supplies or maintenance.    [facility], [facility] [phone]    Do not drive on engage in potentially dangerous activities if feeling sleepy.  Please follow up in sleep clinic again in 12 months.        Tips for your PAP use-    Mask fitting tips  Mask fitting exercise:    To improve your mask seal and your mobility at night, put mask on and secure in place.  Lie down in bed with full pressure  and roll to one side, adjust headgear while in that position to eliminate any leaks. Repeat process rolling to other side.     The mask seal does not have to be perfect:   CPAP machines are designed to make up for small leaks. However, you will not tolerate leaks blowing in your eyes so you will need to adjust.   Any leak should only be near or at the bottom of the mask.  We expect your mask to leak slightly at night.    Do not over-tighten the headgear straps, tighter IS NOT better, we expect minimal leak.    First try re-positioning the mask or headgear before tightening the headgear straps.  Mask leaks are expected due to changing sleeping positions. Try pulling the mask away from your skin allowing the cushion to re-inflate will minimize the leak.  If you struggle for a good fit, try turning the CPAP off and then readjust the mask by pulling it away from your face and then turning back on the CPAP.        Humidifier tips  Humidifiers can be adjusted to increase or decrease the amount of moisture according to your comfort level. You may need to adjust this frequently at first, but then might only change it with seasonal weather changes.     Try INCREASING the humidity if:  You experience a dry, irritated nasal passage or throat.  You have a runny, drippy nose or sneezing fits after using CPAP.  You experience nasal congestion during or after CPAP use.    Try DECREASING the humidity if:  You have excessive condensation or  rain out  in the tubing or mask.  Otherwise keep the tubing warm during the night by running it underneath the blankets or pillow.      Clinic visit after initial PAP set-up   Bring your equipment with you to your 5-8 week follow up clinic visit.  We will be extracting your data from the machine if not available from the cloud based [employer].        Travel  Always take your equipment with you when you travel.  If you fly with your equipment bring it on with you as a carry on.  Medical equipment  does not count as a carry on.    If you travel international the machines take 110-240v.  The only adapter needed is the adapter that will fit into the receptacle (outlet).    You may also want to bring an extension cord as many hotel rooms have limited outlets at the bedside.  Do not travel with water in your humidifier chamber.     Cleaning and Maintenance Guidelines    Equipment Frequency Cleaning Method   Mask First Day    Daily      Weekly Soak mask in hot soapy water for 30 minutes, rinse and air dry.  Wipe nasal cushion with a hot soapy (Ivory, baby shampoo) cloth and rinse.  Baby wipes may also be used.  Do not use anti-bacterial soaps,Cleo  liquid soap, rubbing alcohol, bleach or ammonia.  Wash frame in hot soapy water (Ivory, baby shampoo) rinse and let air dry   Headgear Biweekly Wash in hot soapy water, rinse and air dry   Reusable Gray Filter Weekly Wash in hot soapy water, rinse, put in towel squeeze moisture out, let air dry   Disposable White Filter Check Weekly Replace when brown or gray in color; at least every 2 to 3 months   Humidifier Chamber Daily    Weekly Empty distilled water from humidifier and let air dry    Hand wash in hot soapy water, rinse and air dry   Tubing Weekly Wash in hot soapy water, rinse and let air dry   Mask, Tubing and Humidifier Chamber As needed Disinfect: Soak in 1 part distilled white vinegar to 3 parts hot water for 30 minutes, rinse well and air dry  Not the material headgear        MASK AND SUPPLY REORDERING and EQUIPMENT NEEDS through your DME and per your insurance  Reminder: Most insurance companies will allow for a new mask, headgear, tubing, and reusable gray filter every six months.  Disposable white ultra-fine filters are covered monthly.      HOME AND SAFETY INSTRUCTIONS  Do not use frayed or cracked electrical cords, multi plug adaptors, or switched receptacles  Do not immerse electrical equipment into water  Assure that electrical cords do not become a  tripping hazard

## 2025-01-29 NOTE — PROGRESS NOTES
[facility]     Sleep clinic follow up visit note    Date on this visit: 1/29/2025    Primary Physician: Dayana Kruse     History of present illness:  Reba Juarez is a 79 year old female patient with HTN, T2DM, HLD, CAD, severe aortic stenosis s/p TAVR, history of paroxysmal SVT who presents for CPAP Follow Up (CPAP Compliance)     She has severe sleep apnea with an AHI of 55.2, managed with CPAP.     Do you use a CPAP Machine at home: (Patient-Rptd) Yes  [facility]  Overall, on a scale of 0-10 how would you rate your CPAP (0 poor, 10 great): (Patient-Rptd) 9    What type of mask do you use: (Patient-Rptd) Full Face Mask  Is your mask comfortable: (Patient-Rptd) Yes  If not, why:      Is your mask leaking: (Patient-Rptd) No  If yes, where do you feel it:    How many night per week does the mask leak (0-7):      Do you notice snoring with mask on: (Patient-Rptd) No  Do you notice gasping arousals with mask on: (Patient-Rptd) No  Are you having significant oral or nasal dryness: (Patient-Rptd) Yes  Is the pressure setting comfortable: (Patient-Rptd) Yes  If not, why:      What is your typical bedtime: (Patient-Rptd) varies  How long does it take you to go to sleep on PAP therapy: (Patient-Rptd) 30 minutes I think  What time do you typically get out of bed for the day: (Patient-Rptd) varies  How many hours on average per night are you using PAP therapy: (Patient-Rptd) 6  How many hours are you sleeping per night: (Patient-Rptd) 6  Do you feel well rested in the morning: (Patient-Rptd) Yes    [employer]Med   Auto-PAP 11.0 - 14.0 cmH2O 30 day usage data:    93% of days with > 4 hours of use. 0/30 days with no use.   Average use 422 minutes per day.   95%ile Leak 13.63 L/min.   CPAP 95% pressure 13.5 cm.   AHI 2.36 events per hour.          Assessment and Plan:  Problem List Items Addressed This Visit       MAYKEL on CPAP - Primary     Reba Juarez has Severe Sleep apnea. She  is tolerating PAP well and reports adequate compliance with PAP therapy. Daytime symptoms are improved and reports positive benefits with PAP use.   MAYKEL is adequately controlled with Auto CPAP at the current settings per compliance DL. Any residual apnea occurs when PAP pressure is maxed out. 50% pressure is 12 cm H2O, 95% pressure is 14.6 cm H2O  Increased PAP pressure from 11-14 cm H2O to 12-15 cm H2O   Prescription provided for renewal of PAP supplies.  Recommended him/her to continue using the CPAP regularly during sleep and instructed  to get  the supplies for the PAP replaced regularly.    Patient instructed to remember to bring PAP with him/her if hospitalized and if anticipating procedure that requires sedation/surgery to be sure to discuss with the provider/surgeon that he/she has sleep apnea and uses PAP therapy.  Patient concerned about nocturnal palpitation is related to hypoxemia; will order overnight oximetry while on CPAP to evaluate for sleep related hypoxemia         Relevant Orders    Comprehensive DME (Completed)    Nocturnal hypoxemia     Overnight oximetry performed on CPAP 10/2/2013  Time below 88% 0.9 minutes     Overnight oximetry performed on CPAP 07/22/2015  Time below 88% 0.6 minutes    Overnight oximetry performed on CPAP 03/23/2023  Time below 88% 0.2 minutes     Overnight oximetry performed on CPAP and room air 12/20/2024  Time at or below 88% oxygen saturation 18.8 Minutes  Detailed report from night of IAN reviewed and periods hypoxia correlate to episodes of high leak     Low concern for nocturnal hypoxemia while on CPAP   Recommend continued CPAP use at current settings             SCALES:  EPWORTH SLEEPINESS SCALE       1/29/2025    12:37 PM    [place] Sleepiness Scale ( REBECA Savage  [id number]<br>ESS - [place]/English - Final version - 21 Nov 07 - [facility] [place].)   Sitting and reading Slight chance of dozing   Watching TV Would never doze   Sitting, inactive in a public place  (e.g. a theatre or a meeting) Would never doze   As a passenger in a car for an hour without a break Would never doze   Lying down to rest in the afternoon when circumstances permit Moderate chance of dozing   Sitting and talking to someone Would never doze   Sitting quietly after a lunch without alcohol Moderate chance of dozing   In a car, while stopped for a few minutes in traffic Would never doze   Morris Score (MC) 5   Morris Score (Sleep) 5        Patient-reported       INSOMNIA SEVERITY INDEX (RON)        1/29/2025    12:31 PM   Insomnia Severity Index (RON)   Difficulty falling asleep 0   Difficulty staying asleep 0   Problems waking up too early 1   How SATISFIED/DISSATISFIED are you with your CURRENT sleep pattern? 1   How NOTICEABLE to others do you think your sleep problem is in terms of impairing the quality of your life? 0   How WORRIED/DISTRESSED are you about your current sleep problem? 0   To what extent do you consider your sleep problem to INTERFERE with your daily functioning (e.g. daytime fatigue, mood, ability to function at work/daily chores, concentration, memory, mood, etc.) CURRENTLY? 0   RON Total Score 2        Patient-reported     Guidelines for Scoring/Interpretation:  Total score categories:  0-7 = No clinically significant insomnia   8-14 = Subthreshold insomnia   15-21 = Clinical insomnia (moderate severity)  22-28 = Clinical insomnia (severe)  Used via courtesy of www.[employer].va.gov with permission from Paul Perdomo PhD., [facility]      Allergies:    Allergies   Allergen Reactions    Calcium Citrate Unknown     Bloody nose per pt    Lipitor [Atorvastatin Calcium]      Myalgia      Pravastatin      Myalgia      Simvastatin      myalgia       Medications:    Current Outpatient Medications   Medication Sig Dispense Refill    ALPRAZolam (XANAX) 0.5 MG tablet Take 0.5 mg by mouth daily as needed for anxiety (prior to dental work)      amoxicillin (AMOXIL) 500 MG capsule  Take 4 capsules (2,000 mg) by mouth once as needed (SBE prophylaxis - take 30-60 minutes prior to dental procedure or cleaning) 4 capsule 3    aspirin 81 MG tablet Take 1 tablet by mouth daily.      ezetimibe (ZETIA) 10 MG tablet Take 1 tablet (10 mg) by mouth daily 90 tablet 3    Glucos-Chond-Hyal Ac-Ca Fructo (MOVE FREE [facility] ADVANCE) TABS Take 1 tablet by mouth daily      hydrochlorothiazide (HYDRODIURIL) 25 MG tablet TAKE 1 TABLET BY MOUTH EVERY DAY 90 tablet 0    Menatetrenone (VITAMIN K2) 100 MCG TABS Take 100 mcg by mouth daily      metoprolol succinate ER (TOPROL XL) 25 MG 24 hr tablet Take 1 tablet (25 mg) by mouth 2 times daily. 180 tablet 3    multivitamin, therapeutic (THERA-VIT) TABS tablet Take 1 tablet by mouth daily      nitroGLYcerin (NITROSTAT) 0.4 MG sublingual tablet Place 0.4 mg under the tongue every 5 minutes as needed      ORDER FOR DME Use your CPAP device as directed by your provider.      ranolazine (RANEXA) 500 MG 12 hr tablet TAKE 1 TABLET BY MOUTH TWICE A  tablet 1    rosuvastatin (CRESTOR) 40 MG tablet Take 40 mg by mouth daily      valsartan (DIOVAN) 160 MG tablet TAKE 1 TABLET BY MOUTH EVERY DAY 90 tablet 1    Vitamin D3 (CHOLECALCIFEROL) 25 mcg (1000 units) tablet Take 2 tablets by mouth daily         Problem List:  Patient Active Problem List    Diagnosis Date Noted    Nocturnal hypoxemia 01/29/2025     Priority: Medium    Anemia 02/18/2024     Priority: Medium    Sinus tachycardia 02/18/2024     Priority: Medium    Atypical chest pain 02/18/2024     Priority: Medium    Pulmonary nodules 02/18/2024     Priority: Medium    NSTEMI (non-ST elevated myocardial infarction) (H) 02/18/2024     Priority: Medium    Pre-operative examination 05/20/2021     Priority: Medium    Aortic stenosis, severe 05/19/2021     Priority: Medium    Status post coronary angiogram 05/19/2021     Priority: Medium    Severe aortic stenosis 05/13/2021     Priority: Medium     Added automatically  from request for surgery [id number]      Nonrheumatic aortic valve stenosis 03/29/2021     Priority: Medium     Added automatically from request for surgery [id number]      Type 2 diabetes mellitus with other circulatory complication, without long-term current use of insulin (H) 02/02/2021     Priority: Medium    Palpitations 02/02/2021     Priority: Medium    CLL (chronic lymphocytic leukemia) (H) 02/02/2021     Priority: Medium    Aortic valve stenosis, etiology of cardiac valve disease unspecified 02/02/2021     Priority: Medium    Hypertension      Priority: Medium     onset age 50-55 approx      Class 2 severe obesity due to excess calories with serious comorbidity and body mass index (BMI) of 35.0 to 35.9 in adult (H)      Priority: Medium    Abnormal LFTs 06/04/2018     Priority: Medium    Anginal equivalent 05/11/2017     Priority: Medium    Family history of premature CAD 05/11/2017     Priority: Medium    History of PTCA 05/11/2017     Priority: Medium    Right carotid bruit 05/11/2017     Priority: Medium    Systolic murmur of aorta 05/11/2017     Priority: Medium    Hyperlipidemia LDL goal <70 10/18/2011     Priority: Medium    Coronary heart disease 10/18/2011     Priority: Medium    Internal derangement of knee 09/10/2009     Priority: Medium    MAYKEL on CPAP 01/01/2007     Priority: Medium     10/28/2007 [place]  BMI 39.1  AHI 55.2  REM 96.6  desats to 79%          Past Medical/Surgical History:  Past Medical History:   Diagnosis Date    Aortic valve sclerosis     echo 1-24-11 mild    Coronary artery disease 1995    PCI at [facility]. Repeat angiogram in 2007    Diabetes (H)     Gastric ulcer 1990    H/O exercise stress test 03/21/2011    No ischemia    Hyperlipidemia LDL goal < 70     identified 1990's    Hypertension     onset age 50-55 approx    MAYKEL (obstructive sleep apnea) 2007     Past Surgical History:   Procedure Laterality Date    ANGIOPLASTY  early 1990s    COLONOSCOPY  2010    CV  CORONARY ANGIOGRAM N/A 4/9/2021    Procedure: CV CORONARY ANGIOGRAM;  Surgeon: Júnior Santos MD;  Location: [facility] CARDIAC CATH LAB    CV CORONARY ANGIOGRAM N/A 2/19/2024    Procedure: Coronary Angiogram;  Surgeon: Romero Garcia MD;  Location: [facility] CATH LAB CV    CV FRACTIONAL FLOW RATIO WIRE N/A 2/19/2024    Procedure: Fractional Flow Ratio Wire;  Surgeon: Romero Garcia MD;  Location: [facility] CATH LAB CV    CV INSTANTANEOUS WAVE-FREE RATIO N/A 2/19/2024    Procedure: Instantaneous Wave-Free Ratio;  Surgeon: Romero Garcia MD;  Location: [facility] CATH LAB CV    CV LEFT HEART CATH N/A 4/9/2021    Procedure: Left Heart Cath;  Surgeon: Júnior Santos MD;  Location: [facility] CARDIAC CATH LAB    CV RIGHT HEART CATH MEASUREMENTS RECORDED N/A 4/9/2021    Procedure: Right Heart Cath;  Surgeon: Júnior Santos MD;  Location:  HEART CARDIAC CATH LAB    CV TRANSCATHETER AORTIC VALVE REPLACEMENT N/A 5/19/2021    Procedure: Transfemoral Transcatheter Aortic Valve Replacement with 23 mm Little Ellie 3 Ultra Transcatheter Heart Valve, Transthoracic echocardiogram read by Dr. 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 Not Asked   Social History Narrative    Single and lives with her son and brother.     Social Drivers of Health     Financial Resource Strain: Not on file   Food Insecurity: Not on file   Transportation Needs: Not on file   Physical Activity: Not on file   Stress: Not on file   Social Connections: Unknown (1/3/2024)    [facility] [employer] [employer]    Social Connections     Frequency of Communication with Friends and Family: Not on file   Interpersonal Safety: Not on file   Housing Stability: Not on file       Family History:  Family History   Problem Relation Age of Onset    No Known Problems Sister     Alcoholism Brother     Myocardial Infarction Mother 51        HTN onset age 30, was a smoker    Myocardial Infarction Father 63        smoker    Myocardial Infarction Brother 39    No Known Problems Son     No Known Problems Daughter     CABG Brother     Heart Disease Brother     No Known Problems Sister     Cerebrovascular Disease Sister     Coronary Artery Disease Sister     No Known Problems Son        Review of systems  A complete review of systems reviewed by me is negative with the exeption of what has been mentioned in the history of present illness.    Physical Examination:  Vitals: /60   Pulse 60   Resp 17   Ht 1.575 m (5' 2\")   Wt 101.6 kg (224 lb)   SpO2 95%   BMI 40.97 kg/m    BMI= Body mass index is 40.97 kg/m .     GENERAL: alert, no distress, and obese  EYES: Eyes grossly normal to inspection.  No discharge or erythema, or obvious scleral/conjunctival abnormalities.  RESP: No audible wheeze, cough, or visible cyanosis.  "   SKIN: Visible skin clear. No significant rash, abnormal pigmentation or lesions.  NEURO: Cranial nerves grossly intact.  Mentation and speech appropriate for age.  PSYCH: Appropriate affect, tone, and pace of words          Other tests/labs:   I have reviewed the labs and personally reviewed the imaging below and made my comment in the assessment and plan.        Patient was strongly advised to avoid driving, operating any heavy machinery or other hazardous situations while drowsy or sleepy.  Patient was counseled on the importance of driving while alert, to pull over if drowsy, or nap before getting into the vehicle if sleepy.      Plan is for Reba Juarez to follow up in about 12 month(s).     The above note was dictated using voice recognition software. Although reviewed after completion, some word and grammatical error may remain . Please contact the author for any clarifications.    Total time spent reviewing medical records, history and physical examination, review of previous testing and interpretation as well as documentation on this date, 01/29/25: 52 minutes    Yordan Jack MD on 01/29/25  [facility]   [address], [address]   [address] [place] [address]. [place] [address]   Appointments: [phone]     CC: Referred Self    96

## 2025-03-01 ENCOUNTER — HEALTH MAINTENANCE LETTER (OUTPATIENT)
Age: 80
End: 2025-03-01

## 2025-04-16 ENCOUNTER — TELEPHONE (OUTPATIENT)
Dept: CARDIOLOGY | Facility: CLINIC | Age: 80
End: 2025-04-16
Payer: COMMERCIAL

## 2025-04-16 NOTE — TELEPHONE ENCOUNTER
[facility] Call [facility]    Phone Message    May a detailed message be left on voicemail: yes     Reason for Call: Other: Pt would like a call back to make an appt with Carmel Weston as she would like to discuss a procedure her cardio team wants her to have from [place] and she is having a hard time breathing and they want her to have open heart surgery to have the breathing issue fixed and she would like to discuss with Carmel     Action Taken: Other: Cardio    Travel Screening: Not Applicable     Date of Service:

## 2025-04-17 NOTE — TELEPHONE ENCOUNTER
Discussed with Carmel Weston NP. Patient will need to schedule an appointment to get her questions answered.

## 2025-04-28 ENCOUNTER — OFFICE VISIT (OUTPATIENT)
Dept: CARDIOLOGY | Facility: CLINIC | Age: 80
End: 2025-04-28
Attending: STUDENT IN AN ORGANIZED HEALTH CARE EDUCATION/TRAINING PROGRAM
Payer: COMMERCIAL

## 2025-04-28 VITALS
HEART RATE: 68 BPM | WEIGHT: 229 LBS | DIASTOLIC BLOOD PRESSURE: 56 MMHG | RESPIRATION RATE: 14 BRPM | SYSTOLIC BLOOD PRESSURE: 122 MMHG | BODY MASS INDEX: 41.88 KG/M2

## 2025-04-28 DIAGNOSIS — Z95.2 STATUS POST AORTIC VALVE REPLACEMENT: ICD-10-CM

## 2025-04-28 DIAGNOSIS — R60.0 BILATERAL LOWER EXTREMITY EDEMA: ICD-10-CM

## 2025-04-28 DIAGNOSIS — R00.2 PALPITATIONS: ICD-10-CM

## 2025-04-28 DIAGNOSIS — I10 HYPERTENSION, UNSPECIFIED TYPE: ICD-10-CM

## 2025-04-28 DIAGNOSIS — I25.10 CORONARY ARTERY DISEASE INVOLVING NATIVE CORONARY ARTERY OF NATIVE HEART WITHOUT ANGINA PECTORIS: Primary | ICD-10-CM

## 2025-04-28 PROCEDURE — 3078F DIAST BP <80 MM HG: CPT | Performed by: INTERNAL MEDICINE

## 2025-04-28 PROCEDURE — 99214 OFFICE O/P EST MOD 30 MIN: CPT | Performed by: INTERNAL MEDICINE

## 2025-04-28 PROCEDURE — G2211 COMPLEX E/M VISIT ADD ON: HCPCS | Performed by: INTERNAL MEDICINE

## 2025-04-28 PROCEDURE — 3074F SYST BP LT 130 MM HG: CPT | Performed by: INTERNAL MEDICINE

## 2025-04-28 RX ORDER — ISOSORBIDE DINITRATE 10 MG/1
10 TABLET ORAL
Qty: 180 TABLET | Refills: 3 | Status: SHIPPED | OUTPATIENT
Start: 2025-04-28

## 2025-04-28 RX ORDER — VALSARTAN 80 MG/1
1 TABLET ORAL
COMMUNITY
Start: 2025-02-21

## 2025-04-28 NOTE — LETTER
"4/28/2025    Dayana Kruse NP  [facility] [address]  [facility] [address]    RE: Reba Juarez       Dear Colleague,     I had the pleasure of seeing Reba LEONOR Juarez in the [facility].         [facility] [address], [place] [address]   [email or web address]   OFFICE: [phone]            Impression and Plan     1.  Coronary artery disease.  Adamaris has known coronary artery disease.  Specifically, Adamaris underwent prior balloon angioplasty in the 1990s.       She underwent repeat coronary angiography 19 February 2024 that revealed \"borderline\" distal left main disease (FFR 0.8) with mild to moderate nonobstructive disease elsewhere.  Given the \"borderline\" nature of the stenosis, preserved left ventricular systolic performance, and lack of symptoms of exertional angina; medical therapy was advocated by her interventionalists, Dr. Romero Garcia. Dr. Garcia did indicate that alternatively; one could consider cardiothoracic surgery consultation for evaluation of surgical revascularization and if not a candidate then high risk, complex PCI is an option.     As noted below, she does report some subjective diminution in exercise tolerance and dyspnea with certain activities.  Symptoms are possibly ischemically mediated.  Continue current management, but will pursue a trial of 10 mg isosorbide dinitrate twice daily as well.     2.  Status post aortic valve replacement.  Adamaris underwent transcatheter aortic valve replacement 19 May 2021  documented 23 mm GUDELIA 3 Ultra valve).  Echocardiogram 24 October 2024 revealed normal prosthetic valve metrics with mean gradient of 15 mmHg (no aortic insufficiency had detected).     3.  Hypertension.  Blood pressure is reasonable in the office today at 122/56 mmHg.     4.  Dyslipidemia.  Lipid profile 24 June 2024 revealed LDL 96 mg/dL and HDL 56 mg/dL.  Continue " "rosuvastatin 40 mg daily.  Continue ezetimibe 10 mg daily.     5.  Palpitations.  Ambulatory monitor for 13  days February 2024 revealed occasional episodes of SVT though very fleeting duration with longest being 16 beats.  There were rare PACs and PVCs.  Continue metoprolol.     6.  Obstructive sleep apnea.  Adamaris utilizes CPAP.     7.  Lower extremity edema.  Adamaris does have mild bilateral lower extremity edema.  Suspect that this is primarily due to venous insufficiency as well as some impedance to venous return due to some central obesity.  This would seem fairly stable.     Plan on follow-up in 4 months.    35 minutes spent reviewing prior records (including documentation, laboratory studies, cardiac testing/imaging), interview with patient along with physical exam, planning, and subsequent documentation/crafting of note).     The longitudinal plan of care for coronary artery disease, post aortic valve replacement, hypertension, dyslipidemia, palpitations/PVCs, and lower extremity edema was addressed during this visit.?Due to the added complexity in care, I will continue to support Reba Juarez in the subsequent management of this condition(s) and with the ongoing continuity of care of this condition(s)\".           History of Present Illness    Once again I would like to thank you again for asking me to participate in the care of your patient, Reba Juarez.  As you know, but to reiterate for my own records, Reba Juarez is a 80 year old female with known coronary artery disease.  Specifically, Adamaris underwent prior balloon angioplasty in the 1990s.       She underwent repeat coronary angiography 19 February 2024 that revealed \"borderline\" distal left main disease (FFR 0.8) with mild to moderate nonobstructive disease elsewhere.  Given the \"borderline\" nature of the stenosis, preserved left ventricular systolic performance, and lack of symptoms of exertional angina; medical therapy was " "advocated by her interventionalists, Dr. Romero Garcia. Dr. Garcia did indicate that alternatively; one could consider cardiothoracic surgery consultation for evaluation of surgical revascularization and if not a candidate then high risk, complex PCI is an option.     On interview, Adamaris denies any chest pain or other symptoms concerning for angina.  She does report some shortness of breath with certain activities as well as a diminution in exercise tolerance.  She also reports some \"fatigue\" and listlessness.  Minimizes actual chest pain.    Further review of systems is otherwise negative/noncontributory (medical record and 13 point review of systems reviewed as well and pertinent positives noted).         Cardiac Diagnostics      Echocardiogram 24 October 2024:  Normal left ventricular size and systolic performance with ejection fraction of 65-70%.  There is a bioprosthetic aortic valve (documented 23 mm GUDELIA 3 Ultra valve).  Normal spectral Doppler metrics with mean gradient of 15 mmHg.  There is no significant aortic insufficiency.  Normal right ventricular size and systolic performance.  Normal atrial dimensions.    Echocardiogram 30 May 2023:  Normal left ventricular size and systolic performance with ejection fraction of 60-65%.  Mild increase in left ventricular wall thickness.  There is a bioprosthetic aortic valve (documented 23 mm GUDELIA 3 Ultra valve).  Normal spectral Doppler metrics with mean gradient of 15 mmHg.  \"Trivial\" aortic insufficiency.  Normal right ventricular size and systolic performance.  Normal atrial dimensions.    Coronary angiogram 19 February 2024:  Left main coronary artery: Normal caliber vessel that bifurcates into left anterior descending and left circumflex arteries.  Proximal left main has mild diffuse disease followed by 50% eccentric, calcific stenosis in the distal portion.  FFR of the left main to the LAD was 0.8.  Left anterior descending coronary artery: Large " vessel that gives rise to multiple medium size diagonal branches and septal perforators.  The LAD wraps around the apex distally.  The ostial LAD has 50 to 60% calcific stenosis followed by mild to moderate diffuse disease of the LAD and its branches distally.  Circumflex coronary artery: Large dominant vessel that gives rise to both small to medium size OM branches and terminates into the posterior descending artery and posterolateral system distally.  Left circumflex and its branches have mild diffuse disease.  Right coronary artery: Small, nondominant vessel with moderate disease.    Ambulatory monitor x 13  days February 2024:  Patient had a min HR of 47 bpm, max HR of 176 bpm, and avg HR of 72 bpm.  Predominant underlying rhythm was Sinus Rhythm.   Eight (8) Supraventricular Tachycardia runs occurred, the run with the fastest interval lasting 5 beats with a max rate of 176 bpm, the longest lasting 16 beats with an avg rate of 139 bpm.   Some episodes of Supraventricular Tachycardia may be possible Atrial Tachycardia with variable block.  Isolated SVEs were rare (<1.0%), SVE Triplets were rare (<1.0%), and no SVE Couplets were present.   Isolated VEs were rare (<1.0%, 4110), VE Couplets were rare (<1.0%, 4), and VE Triplets were rare (<1.0%, 1). Ventricular Bigeminy was            Physical Examination       /56 (BP Location: Left arm, Patient Position: Sitting, Cuff Size: Adult Large)   Pulse 68   Resp 14   Wt 103.9 kg (229 lb)   BMI 41.88 kg/m          Wt Readings from Last 3 Encounters:   04/28/25 103.9 kg (229 lb)   01/29/25 101.6 kg (224 lb)   01/07/25 99.3 kg (219 lb)       The patient is alert and oriented times three. Sclerae are anicteric. Mucosal membranes are moist. Jugular venous pressure is normal. No significant adenopathy/thyromegally appreciated. Lungs are clear with good expansion. On cardiovascular exam, the patient has a regular S1 and S2. Abdomen is soft and non-tender. Extremities  reveal no clubbing, cyanosis, with bilateral lower extremity edema.       Family History/Social History/Risk Factors   Patient does not smoke.  Family history reviewed, and family history includes Alcoholism in her brother; CABG in her brother; Cerebrovascular Disease in her sister; Coronary Artery Disease in her sister; Heart Disease in her brother; Myocardial Infarction (age of onset: 39) in her brother; Myocardial Infarction (age of onset: 51) in her mother; Myocardial Infarction (age of onset: 63) in her father; No Known Problems in her daughter, sister, sister, son, and son.          Medical History  Surgical History Family History Social History   Past Medical History:   Diagnosis Date     Aortic valve sclerosis     echo 1-24-11 mild     Coronary artery disease 1995    PCI at [facility]. Repeat angiogram in 2007     Diabetes (H)      Gastric ulcer 1990     H/O exercise stress test 03/21/2011    No ischemia     Hyperlipidemia LDL goal < 70     identified 1990's     Hypertension     onset age 50-55 approx     MAYKEL (obstructive sleep apnea) 2007     Past Surgical History:   Procedure Laterality Date     ANGIOPLASTY  early 1990s     COLONOSCOPY  2010     CV CORONARY ANGIOGRAM N/A 4/9/2021    Procedure: CV CORONARY ANGIOGRAM;  Surgeon: Júnior Santos MD;  Location: [facility] CARDIAC CATH LAB     CV CORONARY ANGIOGRAM N/A 2/19/2024    Procedure: Coronary Angiogram;  Surgeon: Romero Garcia MD;  Location: [facility] CATH LAB CV     CV FRACTIONAL FLOW RATIO WIRE N/A 2/19/2024    Procedure: Fractional Flow Ratio Wire;  Surgeon: Romero Garcia MD;  Location: [facility] CATH LAB CV     CV INSTANTANEOUS WAVE-FREE RATIO N/A 2/19/2024    Procedure: Instantaneous Wave-Free Ratio;  Surgeon: Romero Garcia MD;  Location: [facility] CATH LAB CV     CV LEFT HEART CATH N/A 4/9/2021    Procedure: Left Heart Cath;  Surgeon: Júnior Santos MD;  Location: [facility] CARDIAC CATH LAB     CV RIGHT HEART CATH  MEASUREMENTS RECORDED N/A 2021    Procedure: Right Heart Cath;  Surgeon: Júnior Santos MD;  Location:  HEART CARDIAC CATH LAB     CV TRANSCATHETER AORTIC VALVE REPLACEMENT N/A 2021    Procedure: Transfemoral Transcatheter Aortic Valve Replacement with 23 mm [place]ie 3 Ultra Transcatheter Heart Valve, Transthoracic echocardiogram read by Dr. Farfan;  Surgeon: Harvinder Quick MD;  Location:  OR     EYE SURGERY       HEART CATH FEMORAL CANNULIZATION WITH OPEN STANDBY REPAIR AORTIC VALVE N/A 2021    Procedure: cardiopulmonary bypass on standby;  Surgeon: Riaz Fuller MD;  Location:  OR     Family History   Problem Relation Age of Onset     No Known Problems Sister      Alcoholism Brother      Myocardial Infarction Mother 51        HTN onset age 30, was a smoker     Myocardial Infarction Father 63        smoker     Myocardial Infarction Brother 39     No Known Problems Son      No Known Problems Daughter      CABG Brother      Heart Disease Brother      No Known Problems Sister      Cerebrovascular Disease Sister      Coronary Artery Disease Sister      No Known Problems Son         Social History     Socioeconomic History     Marital status:      Spouse name: Not on file     Number of children: Not on file     Years of education: Not on file     Highest education level: Not on file   Occupational History     Occupation: retired     Employer: [email or web address]     Comment:  retired full time, still works a few days as biochemical pharmacist   Tobacco Use     Smoking status: Former     Current packs/day: 0.00     Average packs/day: 1 pack/day for 20.0 years (20.0 ttl pk-yrs)     Types: Cigarettes     Start date: 3/5/1972     Quit date: 3/5/1992     Years since quittin.1     Smokeless tobacco: Never   Vaping Use     Vaping status: Never Used   Substance and Sexual Activity     Alcohol use: No     Drug use: No     Sexual activity: Not on file   Other Topics Concern      Parent/sibling w/ CABG, MI or angioplasty before 65F 55M? Not Asked   Social History Narrative    Single and lives with her son and brother.     Social Drivers of Health     Financial Resource Strain: Not on file   Food Insecurity: Not on file   Transportation Needs: Not on file   Physical Activity: Not on file   Stress: Not on file   Social Connections: Unknown (1/3/2024)    [facility]    Social Connections      Frequency of Communication with Friends and Family: Not on file   Interpersonal Safety: Not on file   Housing Stability: Not on file           Medications  Allergies   Current Outpatient Medications   Medication Sig Dispense Refill     ALPRAZolam (XANAX) 0.5 MG tablet Take 0.5 mg by mouth daily as needed for anxiety (prior to dental work)       amoxicillin (AMOXIL) 500 MG capsule Take 4 capsules (2,000 mg) by mouth once as needed (SBE prophylaxis - take 30-60 minutes prior to dental procedure or cleaning) 4 capsule 3     aspirin 81 MG tablet Take 1 tablet by mouth daily.       ezetimibe (ZETIA) 10 MG tablet Take 1 tablet (10 mg) by mouth daily 90 tablet 3     Glucos-Chond-Hyal Ac-Ca Fructo (MOVE FREE JOINT HEALTH ADVANCE) TABS Take 1 tablet by mouth daily       hydrochlorothiazide (HYDRODIURIL) 25 MG tablet TAKE 1 TABLET BY MOUTH EVERY DAY 90 tablet 0     isosorbide dinitrate (ISORDIL) 10 MG tablet Take 1 tablet (10 mg) by mouth 2 times daily. 180 tablet 3     Menatetrenone (VITAMIN K2) 100 MCG TABS Take 100 mcg by mouth daily       metoprolol succinate ER (TOPROL XL) 25 MG 24 hr tablet Take 1 tablet (25 mg) by mouth 2 times daily. 180 tablet 3     multivitamin, therapeutic (THERA-VIT) TABS tablet Take 1 tablet by mouth daily       nitroGLYcerin (NITROSTAT) 0.4 MG sublingual tablet Place 0.4 mg under the tongue every 5 minutes as needed       ORDER FOR DME Use your CPAP device as directed by your provider.        "ranolazine (RANEXA) 500 MG 12 hr tablet TAKE 1 TABLET BY MOUTH TWICE A  tablet 1     rosuvastatin (CRESTOR) 40 MG tablet Take 40 mg by mouth daily       valsartan (DIOVAN) 80 MG tablet Take 1 tablet by mouth daily at 2 pm.       Vitamin D3 (CHOLECALCIFEROL) 25 mcg (1000 units) tablet Take 2 tablets by mouth daily         Allergies   Allergen Reactions     Calcium Citrate Unknown     Bloody nose per pt     Lipitor [Atorvastatin Calcium]      Myalgia       Pravastatin      Myalgia       Simvastatin      myalgia          Lab Results    Chemistry/lipid CBC Cardiac Enzymes/BNP/TSH/INR   Recent Labs   Lab Test 06/24/24  1152   CHOL 186   HDL 56   LDL 96   TRIG 171*     Recent Labs   Lab Test 06/24/24  1152 03/18/24  1047 05/30/23  1013   LDL 96 103* 98     Recent Labs   Lab Test 02/19/24  0507      POTASSIUM 4.0   CHLORIDE 107   CO2 26   *   BUN 12.9   CR 0.98*   GFRESTIMATED 58*   TERRI 8.6*     Recent Labs   Lab Test 02/19/24  0507 02/18/24  1156 05/30/23  1013   CR 0.98* 1.14* 1.16*     Recent Labs   Lab Test 02/18/24  1156   A1C 6.1*          Recent Labs   Lab Test 12/05/24  0836 02/20/24  1348   WBC  --  14.3*   HGB 12.2 12.3   HCT  --  37.8   MCV  --  86   PLT  --  149*     Recent Labs   Lab Test 12/05/24  0836 02/20/24  1348 02/19/24  0507   HGB 12.2 12.3 12.4    No results for input(s): \"TROPONINI\" in the last 60612 hours.  Recent Labs   Lab Test 12/22/22  1923   NTBNPI 70     Recent Labs   Lab Test 02/18/24  1156   TSH 2.17     Recent Labs   Lab Test 05/17/21  1246   INR 1.04          Medications  Allergies   Current Outpatient Medications   Medication Sig Dispense Refill     ALPRAZolam (XANAX) 0.5 MG tablet Take 0.5 mg by mouth daily as needed for anxiety (prior to dental work)       amoxicillin (AMOXIL) 500 MG capsule Take 4 capsules (2,000 mg) by mouth once as needed (SBE prophylaxis - take 30-60 minutes prior to dental procedure or cleaning) 4 capsule 3     aspirin 81 MG tablet Take 1 tablet " by mouth daily.       ezetimibe (ZETIA) 10 MG tablet Take 1 tablet (10 mg) by mouth daily 90 tablet 3     Glucos-Chond-Hyal Ac-Ca Fructo (MOVE FREE [facility] ADVANCE) TABS Take 1 tablet by mouth daily       hydrochlorothiazide (HYDRODIURIL) 25 MG tablet TAKE 1 TABLET BY MOUTH EVERY DAY 90 tablet 0     isosorbide dinitrate (ISORDIL) 10 MG tablet Take 1 tablet (10 mg) by mouth 2 times daily. 180 tablet 3     Menatetrenone (VITAMIN K2) 100 MCG TABS Take 100 mcg by mouth daily       metoprolol succinate ER (TOPROL XL) 25 MG 24 hr tablet Take 1 tablet (25 mg) by mouth 2 times daily. 180 tablet 3     multivitamin, therapeutic (THERA-VIT) TABS tablet Take 1 tablet by mouth daily       nitroGLYcerin (NITROSTAT) 0.4 MG sublingual tablet Place 0.4 mg under the tongue every 5 minutes as needed       ORDER FOR DME Use your CPAP device as directed by your provider.       ranolazine (RANEXA) 500 MG 12 hr tablet TAKE 1 TABLET BY MOUTH TWICE A  tablet 1     rosuvastatin (CRESTOR) 40 MG tablet Take 40 mg by mouth daily       valsartan (DIOVAN) 80 MG tablet Take 1 tablet by mouth daily at 2 pm.       Vitamin D3 (CHOLECALCIFEROL) 25 mcg (1000 units) tablet Take 2 tablets by mouth daily        Allergies   Allergen Reactions     Calcium Citrate Unknown     Bloody nose per pt     Lipitor [Atorvastatin Calcium]      Myalgia       Pravastatin      Myalgia       Simvastatin      myalgia          Lab Results   Lab Results   Component Value Date     02/19/2024     07/05/2021    CO2 26 02/19/2024    CO2 24 05/16/2022    CO2 27 07/05/2021    BUN 12.9 02/19/2024    BUN 14 05/16/2022    BUN 14 07/05/2021     Lab Results   Component Value Date    WBC 14.3 02/20/2024    WBC 12.8 07/05/2021    HGB 12.2 12/05/2024    HGB 12.3 02/20/2024    HGB 11.7 07/05/2021    HCT 37.8 02/20/2024    HCT 35.9 07/05/2021    MCV 86 02/20/2024    MCV 85 07/05/2021     02/20/2024     07/05/2021     Lab Results   Component Value Date  "   CHOL 186 06/24/2024    CHOL 198 03/29/2021    TRIG 171 06/24/2024    TRIG 159 03/29/2021    HDL 56 06/24/2024    HDL 64 03/29/2021     Lab Results   Component Value Date    INR 1.04 05/17/2021     No results found for: \"BNP\"  No results found for: \"CKTOTAL\", \"CKMB\", \"TROPONINI\"  Lab Results   Component Value Date    TSH 2.17 02/18/2024    TSH 1.38 08/30/2007                      Thank you for allowing me to participate in the care of your patient.      Sincerely,     Mary Jo Ball MD     [facility]  cc:   Ashley Dent PA-C  [address] [place] [address]      "

## 2025-04-28 NOTE — PROGRESS NOTES
" [facility] [address], [place] [address]   [email or web address]   OFFICE: [phone]            Impression and Plan     1.  Coronary artery disease.  Adamaris has known coronary artery disease.  Specifically, Adamaris underwent prior balloon angioplasty in the 1990s.       She underwent repeat coronary angiography 19 February 2024 that revealed \"borderline\" distal left main disease (FFR 0.8) with mild to moderate nonobstructive disease elsewhere.  Given the \"borderline\" nature of the stenosis, preserved left ventricular systolic performance, and lack of symptoms of exertional angina; medical therapy was advocated by her interventionalists, Dr. Romero Garcia. Dr. Garcia did indicate that alternatively; one could consider cardiothoracic surgery consultation for evaluation of surgical revascularization and if not a candidate then high risk, complex PCI is an option.     As noted below, she does report some subjective diminution in exercise tolerance and dyspnea with certain activities.  Symptoms are possibly ischemically mediated.  Continue current management, but will pursue a trial of 10 mg isosorbide dinitrate twice daily as well.     2.  Status post aortic valve replacement.  Adamaris underwent transcatheter aortic valve replacement 19 May 2021  documented 23 mm GUDELIA 3 Ultra valve).  Echocardiogram 24 October 2024 revealed normal prosthetic valve metrics with mean gradient of 15 mmHg (no aortic insufficiency had detected).     3.  Hypertension.  Blood pressure is reasonable in the office today at 122/56 mmHg.     4.  Dyslipidemia.  Lipid profile 24 June 2024 revealed LDL 96 mg/dL and HDL 56 mg/dL.  Continue rosuvastatin 40 mg daily.  Continue ezetimibe 10 mg daily.     5.  Palpitations.  Ambulatory monitor for 13  days February 2024 revealed occasional episodes of SVT though very fleeting duration with longest being 16 beats.  There were rare PACs and " "PVCs.  Continue metoprolol.     6.  Obstructive sleep apnea.  Adamaris utilizes CPAP.     7.  Lower extremity edema.  Adamaris does have mild bilateral lower extremity edema.  Suspect that this is primarily due to venous insufficiency as well as some impedance to venous return due to some central obesity.  This would seem fairly stable.     Plan on follow-up in 4 months.    35 minutes spent reviewing prior records (including documentation, laboratory studies, cardiac testing/imaging), interview with patient along with physical exam, planning, and subsequent documentation/crafting of note).     The longitudinal plan of care for coronary artery disease, post aortic valve replacement, hypertension, dyslipidemia, palpitations/PVCs, and lower extremity edema was addressed during this visit.?Due to the added complexity in care, I will continue to support Reba Juarez in the subsequent management of this condition(s) and with the ongoing continuity of care of this condition(s)\".           History of Present Illness    Once again I would like to thank you again for asking me to participate in the care of your patient, Reba Juarez.  As you know, but to reiterate for my own records, Reba Juarez is a 80 year old female with known coronary artery disease.  Specifically, Adamaris underwent prior balloon angioplasty in the 1990s.       She underwent repeat coronary angiography 19 February 2024 that revealed \"borderline\" distal left main disease (FFR 0.8) with mild to moderate nonobstructive disease elsewhere.  Given the \"borderline\" nature of the stenosis, preserved left ventricular systolic performance, and lack of symptoms of exertional angina; medical therapy was advocated by her interventionalists, Dr. Romero Garcia. Dr. Garcia did indicate that alternatively; one could consider cardiothoracic surgery consultation for evaluation of surgical revascularization and if not a candidate then high risk, complex PCI " "is an option.     On interview, Adamaris denies any chest pain or other symptoms concerning for angina.  She does report some shortness of breath with certain activities as well as a diminution in exercise tolerance.  She also reports some \"fatigue\" and listlessness.  Minimizes actual chest pain.    Further review of systems is otherwise negative/noncontributory (medical record and 13 point review of systems reviewed as well and pertinent positives noted).         Cardiac Diagnostics      Echocardiogram 24 October 2024:  Normal left ventricular size and systolic performance with ejection fraction of 65-70%.  There is a bioprosthetic aortic valve (documented 23 mm GUDELIA 3 Ultra valve).  Normal spectral Doppler metrics with mean gradient of 15 mmHg.  There is no significant aortic insufficiency.  Normal right ventricular size and systolic performance.  Normal atrial dimensions.    Echocardiogram 30 May 2023:  Normal left ventricular size and systolic performance with ejection fraction of 60-65%.  Mild increase in left ventricular wall thickness.  There is a bioprosthetic aortic valve (documented 23 mm GUDELIA 3 Ultra valve).  Normal spectral Doppler metrics with mean gradient of 15 mmHg.  \"Trivial\" aortic insufficiency.  Normal right ventricular size and systolic performance.  Normal atrial dimensions.    Coronary angiogram 19 February 2024:  Left main coronary artery: Normal caliber vessel that bifurcates into left anterior descending and left circumflex arteries.  Proximal left main has mild diffuse disease followed by 50% eccentric, calcific stenosis in the distal portion.  FFR of the left main to the LAD was 0.8.  Left anterior descending coronary artery: Large vessel that gives rise to multiple medium size diagonal branches and septal perforators.  The LAD wraps around the apex distally.  The ostial LAD has 50 to 60% calcific stenosis followed by mild to moderate diffuse disease of the LAD and its branches " distally.  Circumflex coronary artery: Large dominant vessel that gives rise to both small to medium size OM branches and terminates into the posterior descending artery and posterolateral system distally.  Left circumflex and its branches have mild diffuse disease.  Right coronary artery: Small, nondominant vessel with moderate disease.    Ambulatory monitor x 13  days February 2024:  Patient had a min HR of 47 bpm, max HR of 176 bpm, and avg HR of 72 bpm.  Predominant underlying rhythm was Sinus Rhythm.   Eight (8) Supraventricular Tachycardia runs occurred, the run with the fastest interval lasting 5 beats with a max rate of 176 bpm, the longest lasting 16 beats with an avg rate of 139 bpm.   Some episodes of Supraventricular Tachycardia may be possible Atrial Tachycardia with variable block.  Isolated SVEs were rare (<1.0%), SVE Triplets were rare (<1.0%), and no SVE Couplets were present.   Isolated VEs were rare (<1.0%, 4110), VE Couplets were rare (<1.0%, 4), and VE Triplets were rare (<1.0%, 1). Ventricular Bigeminy was            Physical Examination       /56 (BP Location: Left arm, Patient Position: Sitting, Cuff Size: Adult Large)   Pulse 68   Resp 14   Wt 103.9 kg (229 lb)   BMI 41.88 kg/m          Wt Readings from Last 3 Encounters:   04/28/25 103.9 kg (229 lb)   01/29/25 101.6 kg (224 lb)   01/07/25 99.3 kg (219 lb)       The patient is alert and oriented times three. Sclerae are anicteric. Mucosal membranes are moist. Jugular venous pressure is normal. No significant adenopathy/thyromegally appreciated. Lungs are clear with good expansion. On cardiovascular exam, the patient has a regular S1 and S2. Abdomen is soft and non-tender. Extremities reveal no clubbing, cyanosis, with bilateral lower extremity edema.       Family History/Social History/Risk Factors   Patient does not smoke.  Family history reviewed, and family history includes Alcoholism in her brother; CABG in her brother;  Cerebrovascular Disease in her sister; Coronary Artery Disease in her sister; Heart Disease in her brother; Myocardial Infarction (age of onset: 39) in her brother; Myocardial Infarction (age of onset: 51) in her mother; Myocardial Infarction (age of onset: 63) in her father; No Known Problems in her daughter, sister, sister, son, and son.          Medical History  Surgical History Family History Social History   Past Medical History:   Diagnosis Date    Aortic valve sclerosis     echo 1-24-11 mild    Coronary artery disease 1995    PCI at [facility]. Repeat angiogram in 2007    Diabetes (H)     Gastric ulcer 1990    H/O exercise stress test 03/21/2011    No ischemia    Hyperlipidemia LDL goal < 70     identified 1990's    Hypertension     onset age 50-55 approx    MAYKEL (obstructive sleep apnea) 2007     Past Surgical History:   Procedure Laterality Date    ANGIOPLASTY  early 1990s    COLONOSCOPY  2010    CV CORONARY ANGIOGRAM N/A 4/9/2021    Procedure: CV CORONARY ANGIOGRAM;  Surgeon: Júnior Santos MD;  Location: [facility] CARDIAC CATH LAB    CV CORONARY ANGIOGRAM N/A 2/19/2024    Procedure: Coronary Angiogram;  Surgeon: Romero Garcia MD;  Location: [facility] CATH LAB CV    CV FRACTIONAL FLOW RATIO WIRE N/A 2/19/2024    Procedure: Fractional Flow Ratio Wire;  Surgeon: Romero Garcia MD;  Location: [facility] CATH LAB CV    CV INSTANTANEOUS WAVE-FREE RATIO N/A 2/19/2024    Procedure: Instantaneous Wave-Free Ratio;  Surgeon: Romero Garcia MD;  Location: [facility] CATH LAB CV    CV LEFT HEART CATH N/A 4/9/2021    Procedure: Left Heart Cath;  Surgeon: Júnior Santos MD;  Location:  HEART CARDIAC CATH LAB    CV RIGHT HEART CATH MEASUREMENTS RECORDED N/A 4/9/2021    Procedure: Right Heart Cath;  Surgeon: Júnior Santos MD;  Location:  HEART CARDIAC CATH LAB    CV TRANSCATHETER AORTIC VALVE REPLACEMENT N/A 5/19/2021    Procedure: Transfemoral Transcatheter Aortic Valve Replacement with  23 mm [place] 3 Ultra Transcatheter Heart Valve, Transthoracic echocardiogram read by Dr. Farfan;  Surgeon: Harvinder Quick MD;  Location: [facility] OR    EYE SURGERY      HEART CATH FEMORAL CANNULIZATION WITH OPEN STANDBY REPAIR AORTIC VALVE N/A 2021    Procedure: cardiopulmonary bypass on standby;  Surgeon: Riaz Fuller MD;  Location: [facility] OR     Family History   Problem Relation Age of Onset    No Known Problems Sister     Alcoholism Brother     Myocardial Infarction Mother 51        HTN onset age 30, was a smoker    Myocardial Infarction Father 63        smoker    Myocardial Infarction Brother 39    No Known Problems Son     No Known Problems Daughter     CABG Brother     Heart Disease Brother     No Known Problems Sister     Cerebrovascular Disease Sister     Coronary Artery Disease Sister     No Known Problems Son         Social History     Socioeconomic History    Marital status:      Spouse name: Not on file    Number of children: Not on file    Years of education: Not on file    Highest education level: Not on file   Occupational History    Occupation: retired     Employer: [employer]     Comment:  retired full time, still works a few days as biochemical pharmacist   Tobacco Use    Smoking status: Former     Current packs/day: 0.00     Average packs/day: 1 pack/day for 20.0 years (20.0 ttl pk-yrs)     Types: Cigarettes     Start date: 3/5/1972     Quit date: 3/5/1992     Years since quittin.1    Smokeless tobacco: Never   Vaping Use    Vaping status: Never Used   Substance and Sexual Activity    Alcohol use: No    Drug use: No    Sexual activity: Not on file   Other Topics Concern    Parent/sibling w/ CABG, MI or angioplasty before 65F 55M? Not Asked   Social History Narrative    Single and lives with her son and brother.     Social Drivers of Health     Financial Resource Strain: Not on file   Food Insecurity: Not on file   Transportation Needs: Not on file   Physical Activity: Not  on file   Stress: Not on file   Social Connections: Unknown (1/3/2024)    [facility]    Social Connections     Frequency of Communication with Friends and Family: Not on file   Interpersonal Safety: Not on file   Housing Stability: Not on file           Medications  Allergies   Current Outpatient Medications   Medication Sig Dispense Refill    ALPRAZolam (XANAX) 0.5 MG tablet Take 0.5 mg by mouth daily as needed for anxiety (prior to dental work)      amoxicillin (AMOXIL) 500 MG capsule Take 4 capsules (2,000 mg) by mouth once as needed (SBE prophylaxis - take 30-60 minutes prior to dental procedure or cleaning) 4 capsule 3    aspirin 81 MG tablet Take 1 tablet by mouth daily.      ezetimibe (ZETIA) 10 MG tablet Take 1 tablet (10 mg) by mouth daily 90 tablet 3    Glucos-Chond-Hyal Ac-Ca Fructo (MOVE FREE [facility] ADVANCE) TABS Take 1 tablet by mouth daily      hydrochlorothiazide (HYDRODIURIL) 25 MG tablet TAKE 1 TABLET BY MOUTH EVERY DAY 90 tablet 0    isosorbide dinitrate (ISORDIL) 10 MG tablet Take 1 tablet (10 mg) by mouth 2 times daily. 180 tablet 3    Menatetrenone (VITAMIN K2) 100 MCG TABS Take 100 mcg by mouth daily      metoprolol succinate ER (TOPROL XL) 25 MG 24 hr tablet Take 1 tablet (25 mg) by mouth 2 times daily. 180 tablet 3    multivitamin, therapeutic (THERA-VIT) TABS tablet Take 1 tablet by mouth daily      nitroGLYcerin (NITROSTAT) 0.4 MG sublingual tablet Place 0.4 mg under the tongue every 5 minutes as needed      ORDER FOR DME Use your CPAP device as directed by your provider.      ranolazine (RANEXA) 500 MG 12 hr tablet TAKE 1 TABLET BY MOUTH TWICE A  tablet 1    rosuvastatin (CRESTOR) 40 MG tablet Take 40 mg by mouth daily      valsartan (DIOVAN) 80 MG tablet Take 1 tablet by mouth daily at 2 pm.      Vitamin D3 (CHOLECALCIFEROL) 25 mcg (1000 units) tablet Take 2 tablets by mouth daily    "      Allergies   Allergen Reactions    Calcium Citrate Unknown     Bloody nose per pt    Lipitor [Atorvastatin Calcium]      Myalgia      Pravastatin      Myalgia      Simvastatin      myalgia          Lab Results    Chemistry/lipid CBC Cardiac Enzymes/BNP/TSH/INR   Recent Labs   Lab Test 06/24/24  1152   CHOL 186   HDL 56   LDL 96   TRIG 171*     Recent Labs   Lab Test 06/24/24  1152 03/18/24  1047 05/30/23  1013   LDL 96 103* 98     Recent Labs   Lab Test 02/19/24  0507      POTASSIUM 4.0   CHLORIDE 107   CO2 26   *   BUN 12.9   CR 0.98*   GFRESTIMATED 58*   TERRI 8.6*     Recent Labs   Lab Test 02/19/24  0507 02/18/24  1156 05/30/23  1013   CR 0.98* 1.14* 1.16*     Recent Labs   Lab Test 02/18/24  1156   A1C 6.1*          Recent Labs   Lab Test 12/05/24  0836 02/20/24  1348   WBC  --  14.3*   HGB 12.2 12.3   HCT  --  37.8   MCV  --  86   PLT  --  149*     Recent Labs   Lab Test 12/05/24  0836 02/20/24  1348 02/19/24  0507   HGB 12.2 12.3 12.4    No results for input(s): \"TROPONINI\" in the last 10544 hours.  Recent Labs   Lab Test 12/22/22  1923   NTBNPI 70     Recent Labs   Lab Test 02/18/24  1156   TSH 2.17     Recent Labs   Lab Test 05/17/21  1246   INR 1.04          Medications  Allergies   Current Outpatient Medications   Medication Sig Dispense Refill    ALPRAZolam (XANAX) 0.5 MG tablet Take 0.5 mg by mouth daily as needed for anxiety (prior to dental work)      amoxicillin (AMOXIL) 500 MG capsule Take 4 capsules (2,000 mg) by mouth once as needed (SBE prophylaxis - take 30-60 minutes prior to dental procedure or cleaning) 4 capsule 3    aspirin 81 MG tablet Take 1 tablet by mouth daily.      ezetimibe (ZETIA) 10 MG tablet Take 1 tablet (10 mg) by mouth daily 90 tablet 3    Glucos-Chond-Hyal Ac-Ca Fructo (MOVE FREE [facility] ADVANCE) TABS Take 1 tablet by mouth daily      hydrochlorothiazide (HYDRODIURIL) 25 MG tablet TAKE 1 TABLET BY MOUTH EVERY DAY 90 tablet 0    isosorbide dinitrate " "(ISORDIL) 10 MG tablet Take 1 tablet (10 mg) by mouth 2 times daily. 180 tablet 3    Menatetrenone (VITAMIN K2) 100 MCG TABS Take 100 mcg by mouth daily      metoprolol succinate ER (TOPROL XL) 25 MG 24 hr tablet Take 1 tablet (25 mg) by mouth 2 times daily. 180 tablet 3    multivitamin, therapeutic (THERA-VIT) TABS tablet Take 1 tablet by mouth daily      nitroGLYcerin (NITROSTAT) 0.4 MG sublingual tablet Place 0.4 mg under the tongue every 5 minutes as needed      ORDER FOR DME Use your CPAP device as directed by your provider.      ranolazine (RANEXA) 500 MG 12 hr tablet TAKE 1 TABLET BY MOUTH TWICE A  tablet 1    rosuvastatin (CRESTOR) 40 MG tablet Take 40 mg by mouth daily      valsartan (DIOVAN) 80 MG tablet Take 1 tablet by mouth daily at 2 pm.      Vitamin D3 (CHOLECALCIFEROL) 25 mcg (1000 units) tablet Take 2 tablets by mouth daily        Allergies   Allergen Reactions    Calcium Citrate Unknown     Bloody nose per pt    Lipitor [Atorvastatin Calcium]      Myalgia      Pravastatin      Myalgia      Simvastatin      myalgia          Lab Results   Lab Results   Component Value Date     02/19/2024     07/05/2021    CO2 26 02/19/2024    CO2 24 05/16/2022    CO2 27 07/05/2021    BUN 12.9 02/19/2024    BUN 14 05/16/2022    BUN 14 07/05/2021     Lab Results   Component Value Date    WBC 14.3 02/20/2024    WBC 12.8 07/05/2021    HGB 12.2 12/05/2024    HGB 12.3 02/20/2024    HGB 11.7 07/05/2021    HCT 37.8 02/20/2024    HCT 35.9 07/05/2021    MCV 86 02/20/2024    MCV 85 07/05/2021     02/20/2024     07/05/2021     Lab Results   Component Value Date    CHOL 186 06/24/2024    CHOL 198 03/29/2021    TRIG 171 06/24/2024    TRIG 159 03/29/2021    HDL 56 06/24/2024    HDL 64 03/29/2021     Lab Results   Component Value Date    INR 1.04 05/17/2021     No results found for: \"BNP\"  No results found for: \"CKTOTAL\", \"CKMB\", \"TROPONINI\"  Lab Results   Component Value Date    TSH 2.17 02/18/2024 "    TSH 1.38 08/30/2007

## 2025-05-11 NOTE — PROGRESS NOTES
"    [facility]  CARDIOVASCULAR DIVISION    VALVE CLINIC RETURN VISIT    PRIMARY CARDIOLOGIST: Dr. Harris/Mireille Edwards NP       PERTINENT CLINICAL HISTORY:     Reba Juarez is a very pleasant 80 year old female who presents for annual cardiology follow-up. She has a history of HTN, HLD, T2DM, MAYKEL on CPAP, paroxysmal SVT, CAD with remote POBA history, non-obstructive CAD per angiogram 4/2021, NSTEMI 2/2024 with complex distal LM disease iFR 0.8 - recommended medical therapy if refractory angina high risk PCI vs CABG, HFpEF and severe aortic stenosis status-post TAVR with a 23 mm Little Ellie 3 Ultra on 5/19/21. Her POD#1 ECHO showed elevated mean PG of 34 mmHg thought related to hyperdynamic LV function. She received IVF and was started on metoprolol with improvement in mean PG to 24 mmHg on follow-up echo. She was evaluated 1 month post TAVR and echo showed mean PG 11 mmHg and Cardiac CT showed normal valve function. Echo at year s/p TAVR 5/2022 showed mean PG 18 mmHg without PVL.     Interval History 5/12/25  Patient had an NSTEMI 2/2024 in the setting of tachycardia and hypertension. Coronary angiogram showed complex distal LM disease ifR 0.8. Due to complex nature of lesion and normal EF, recommended medical therapy. Dr. Garcia did indicate that alternatively; one could consider cardiothoracic surgery consultation for evaluation of surgical revascularization and if not a candidate then high risk, complex PCI is an option\".     She did not tolerate Imdur but is on Ranexa with improvement in symptoms. She started feeling SOB again 12/2024. Saw pulmonology who stated \"PFTs were completely normal. She does NOT have COPD or any other obvious lung disease. CT chest from February was unremarkable. I believe her dyspnea is related to underlying coronary artery disease. She was apparently being evaluated for CABG vs. Complex PCI\".    She saw her primary cardiologist recently who started isordil in " addition to ranexa. She says angina has improved. No longer experiencing pain in upper chest, radiating up into neck, ears. She is still having SOB when climbing stairs though better than it was a year ago. Her symtpoms are still limiting her but minimally. Much better overall. Otherwise no orthopnea, PND< leg swelling. No lightheadedness, palpitations, syncope.     Currently on ASA 81, metoprolol XL 25 mg BID, valsartan 80 mg daily, Isordil 10 mg BID, hydrochlorothiazide 25 mg daily, Ranexa 500 mg BID, Zetia 10 mg daily, Crestor 40 mg daily.      PAST MEDICAL HISTORY:   TAVR 5/2021  CAD w/NSTEMI 2/2024 found to have dLM disease medically managed  HTN  HLD  pSVT  DM II     PAST SURGICAL HISTORY:     Past Surgical History:   Procedure Laterality Date    ANGIOPLASTY  early 1990s    COLONOSCOPY  2010    CV CORONARY ANGIOGRAM N/A 4/9/2021    Procedure: CV CORONARY ANGIOGRAM;  Surgeon: Júnior Santos MD;  Location: [facility] CARDIAC CATH LAB    CV CORONARY ANGIOGRAM N/A 2/19/2024    Procedure: Coronary Angiogram;  Surgeon: Romero Garcia MD;  Location: [facility] CATH LAB CV    CV FRACTIONAL FLOW RATIO WIRE N/A 2/19/2024    Procedure: Fractional Flow Ratio Wire;  Surgeon: Romero Garcia MD;  Location: [facility] CATH LAB CV    CV INSTANTANEOUS WAVE-FREE RATIO N/A 2/19/2024    Procedure: Instantaneous Wave-Free Ratio;  Surgeon: Romero Garcia MD;  Location: [facility] CATH LAB CV    CV LEFT HEART CATH N/A 4/9/2021    Procedure: Left Heart Cath;  Surgeon: Júnior Santos MD;  Location: [facility] CARDIAC CATH LAB    CV RIGHT HEART CATH MEASUREMENTS RECORDED N/A 4/9/2021    Procedure: Right Heart Cath;  Surgeon: Júnior Santos MD;  Location: [facility] CARDIAC CATH LAB    CV TRANSCATHETER AORTIC VALVE REPLACEMENT N/A 5/19/2021    Procedure: Transfemoral Transcatheter Aortic Valve Replacement with 23 mm Little Ellie 3 Ultra Transcatheter Heart Valve, Transthoracic echocardiogram read by Dr. Farfan;  Surgeon:  Harvinder Quick MD;  Location: [facility] OR    EYE SURGERY      HEART CATH FEMORAL CANNULIZATION WITH OPEN STANDBY REPAIR AORTIC VALVE N/A 5/19/2021    Procedure: cardiopulmonary bypass on standby;  Surgeon: Riaz Fuller MD;  Location:  OR        CURRENT MEDICATIONS:     Current Outpatient Medications   Medication Sig Dispense Refill    ALPRAZolam (XANAX) 0.5 MG tablet Take 0.5 mg by mouth daily as needed for anxiety (prior to dental work)      amoxicillin (AMOXIL) 500 MG capsule Take 4 capsules (2,000 mg) by mouth once as needed (SBE prophylaxis - take 30-60 minutes prior to dental procedure or cleaning) 4 capsule 3    aspirin 81 MG tablet Take 1 tablet by mouth daily.      ezetimibe (ZETIA) 10 MG tablet Take 1 tablet (10 mg) by mouth daily 90 tablet 3    Glucos-Chond-Hyal Ac-Ca Fructo (MOVE FREE JOINT HEALTH ADVANCE) TABS Take 1 tablet by mouth daily      hydrochlorothiazide (HYDRODIURIL) 25 MG tablet TAKE 1 TABLET BY MOUTH EVERY DAY 90 tablet 0    isosorbide dinitrate (ISORDIL) 10 MG tablet Take 1 tablet (10 mg) by mouth 2 times daily. 180 tablet 3    Menatetrenone (VITAMIN K2) 100 MCG TABS Take 100 mcg by mouth daily      metoprolol succinate ER (TOPROL XL) 25 MG 24 hr tablet Take 1 tablet (25 mg) by mouth 2 times daily. 180 tablet 3    multivitamin, therapeutic (THERA-VIT) TABS tablet Take 1 tablet by mouth daily      nitroGLYcerin (NITROSTAT) 0.4 MG sublingual tablet Place 0.4 mg under the tongue every 5 minutes as needed      ORDER FOR DME Use your CPAP device as directed by your provider.      ranolazine (RANEXA) 500 MG 12 hr tablet TAKE 1 TABLET BY MOUTH TWICE A  tablet 1    rosuvastatin (CRESTOR) 40 MG tablet Take 40 mg by mouth daily      valsartan (DIOVAN) 80 MG tablet Take 1 tablet by mouth daily at 2 pm.      Vitamin D3 (CHOLECALCIFEROL) 25 mcg (1000 units) tablet Take 2 tablets by mouth daily          ALLERGIES:     Allergies   Allergen Reactions    Calcium Citrate Unknown     Bloody nose per  pt    Lipitor [Atorvastatin Calcium]      Myalgia      Pravastatin      Myalgia      Simvastatin      myalgia        FAMILY HISTORY:     Family History   Problem Relation Age of Onset    No Known Problems Sister     Alcoholism Brother     Myocardial Infarction Mother 51        HTN onset age 30, was a smoker    Myocardial Infarction Father 63        smoker    Myocardial Infarction Brother 39    No Known Problems Son     No Known Problems Daughter     CABG Brother     Heart Disease Brother     No Known Problems Sister     Cerebrovascular Disease Sister     Coronary Artery Disease Sister     No Known Problems Son         SOCIAL HISTORY:     Social History     Socioeconomic History    Marital status:      Spouse name: Not on file    Number of children: Not on file    Years of education: Not on file    Highest education level: Not on file   Occupational History    Occupation: retired     Employer: [employer]     Comment:  retired full time, still works a few days as biochemical pharmacist   Social Needs    Financial resource strain: Not on file    Food insecurity     Worry: Not on file     Inability: Not on file    Transportation needs     Medical: Not on file     Non-medical: Not on file   Tobacco Use    Smoking status: Former Smoker     Packs/day: 1.00     Years: 20.00     Pack years: 20.00     Quit date: 3/5/1992     Years since quittin.2    Smokeless tobacco: Never Used   Substance and Sexual Activity    Alcohol use: No    Drug use: No    Sexual activity: Not on file   Lifestyle    Physical activity     Days per week: Not on file     Minutes per session: Not on file    Stress: Not on file   Relationships    Social connections     Talks on phone: Not on file     Gets together: Not on file     Attends Gnosticist service: Not on file     Active member of club or organization: Not on file     Attends meetings of clubs or organizations: Not on file     Relationship status: Not on file    Intimate partner  violence     Fear of current or ex partner: Not on file     Emotionally abused: Not on file     Physically abused: Not on file     Forced sexual activity: Not on file   Other Topics Concern    Parent/sibling w/ CABG, MI or angioplasty before 65F 55M? Not Asked   Social History Narrative    Single and lives with her son and brother.        REVIEW OF SYSTEMS:     Constitutional: No fevers or chills  Skin: No new rash or itching  Eyes: No acute change in vision  Ears/Nose/Throat: No purulent rhinorrhea, new hearing loss, or new vertigo  Respiratory: No cough or hemoptysis  Cardiovascular: See HPI  Gastrointestinal: No change in appetite, vomiting, hematemesis or diarrhea  Genitourinary: No dysuria or hematuria  Musculoskeletal: No new back pain, neck pain or muscle pain  Neurologic: No new headaches, focal weakness or behavior changes  Psychiatric: No hallucinations, excessive alcohol consumption or illegal drug usage  Hematologic/Lymphatic/Immunologic: No bleeding, chills, fever, night sweats or weight loss  Endocrine: No new cold intolerance, heat intolerance, polyphagia, polydipsia or polyuria      PHYSICAL EXAMINATION:     /61 (BP Location: Right arm, Patient Position: Chair, Cuff Size: Adult Large)   Pulse 65   Wt 102.2 kg (225 lb 3.2 oz)   SpO2 95%   BMI 41.19 kg/m      GENERAL: No acute distress.  HEENT: EOMI. Sclerae white, not injected. Nares clear. Pharynx without erythema or exudate.   Neck: No adenopathy. No thyromegaly. No jugular venous distension.   Heart: Regular rate and rhythm. Soft 2/6 DALIA murmur RUSB.   Lungs: Clear to auscultation. No ronchi, wheezes, rales.   Abdomen: Soft, nontender, nondistended. Bowel sounds present.  Extremities: No clubbing, cyanosis, or edema.   Neurologic: Alert and oriented to person/place/time, normal speech and affect. No focal deficits.  Skin: No petechiae, purpura or rash.     LABORATORY DATA:   Personally reviewed and interpreted lab results.   CBC  RESULTS:  Lab Results   Component Value Date    WBC 14.3 (H) 02/20/2024    WBC 12.8 (H) 07/05/2021    RBC 4.40 02/20/2024    RBC 4.25 07/05/2021    HGB 12.2 12/05/2024    HGB 12.3 02/20/2024    HGB 11.7 07/05/2021    HCT 37.8 02/20/2024    HCT 35.9 07/05/2021    MCV 86 02/20/2024    MCV 85 07/05/2021    MCH 28.0 02/20/2024    MCH 27.5 07/05/2021    MCHC 32.5 02/20/2024    MCHC 32.6 07/05/2021    RDW 14.7 02/20/2024    RDW 14.8 07/05/2021     (L) 02/20/2024     07/05/2021       BMP RESULTS:  Lab Results   Component Value Date     02/19/2024     07/05/2021    POTASSIUM 4.0 02/19/2024    POTASSIUM 4.1 05/16/2022    POTASSIUM 4.2 07/05/2021    CHLORIDE 107 02/19/2024    CHLORIDE 112 (H) 05/16/2022    CHLORIDE 108 07/05/2021    CO2 26 02/19/2024    CO2 24 05/16/2022    CO2 27 07/05/2021    ANIONGAP 7 02/19/2024    ANIONGAP 7 05/16/2022    ANIONGAP 3 07/05/2021     (H) 02/19/2024     (H) 05/16/2022     (H) 07/05/2021    BUN 12.9 02/19/2024    BUN 14 05/16/2022    BUN 14 07/05/2021    CR 0.98 (H) 02/19/2024    CR 0.86 07/05/2021    GFRESTIMATED 58 (L) 02/19/2024    GFRESTIMATED 65 07/05/2021    GFRESTBLACK 76 07/05/2021    TERRI 8.6 (L) 02/19/2024    TERRI 8.6 07/05/2021         PROCEDURES & FURTHER ASSESSMENTS:       ECHO 10/2024:     The visual ejection fraction is 65-70%.  Right ventricular function cannot be assessed due to poor image quality.  There is a GUDELIA 3 bioprosthetic valve present in aortic valve position. Mean  gradient is 14.5mmHg. There is no paravalvular regurgitation present.  Compared to the prior study dated 3/29/2024, there have been no changes.  ______________________________________________________________________________  Left Ventricle  The left ventricle is normal in size. There is normal left ventricular wall  thickness. The visual ejection fraction is 65-70%. No regional wall motion  abnormalities noted.     Right Ventricle  The right ventricle is  normal size. Right ventricular function cannot be  assessed due to poor image quality.     Atria  Normal left atrial size. Right atrial size is normal. There is no color  Doppler evidence of an atrial shunt.     Mitral Valve  There is mild mitral annular calcification. Mitral valve leaflets appear  normal. There is mild (1+) mitral regurgitation. There is mild mitral  stenosis. The mean mitral valve gradient is 3mmHg.     Tricuspid Valve  Tricuspid valve leaflets appear normal. There is no evidence of tricuspid  stenosis or clinically significant tricuspid regurgitation.     Aortic Valve  There is a GUDELIA 3 bioprosthetic valve present in aortic valve position. The  prosthetic valve gradients are normal . Mean gradient is 14.5mmHg. There is no  paravalvular regurgitation present.     Pulmonic Valve  The pulmonic valve is not well visualized. There is no pulmonic valvular  regurgitation.     Vessels  Normal size ascending aorta. IVC diameter <2.1 cm collapsing >50% with sniff  suggests a normal RA pressure of 3 mmHg.     Cardiac monitor 3/2024:  Sinus rhythm, average 72 bpm, range  bpm.  PACs/PVCs <1%.  Brief runs of PACs, longest 16 beats, no symptoms.  Patient triggered events correlated with sinus rhythm and sinus tachycardia.       CORS 2/2024:  Coronary Angiography:  LEFT MAIN: Normal caliber vessel that bifurcates into left anterior descending and left circumflex arteries.  Proximal left main has mild diffuse disease followed by 50% eccentric, calcific stenosis in the distal portion.  FFR of the left main to the LAD was 0.8.  LEFT ANTERIOR DESCENDING: Large vessel that gives rise to multiple medium size diagonal branches and septal perforators.  The LAD wraps around the apex distally.  The ostial LAD has 50 to 60% calcific stenosis followed by mild to moderate diffuse disease of the LAD and its branches distally.  LEFT CIRCUMFLEX: Large dominant vessel that gives rise to both small to medium size OM  branches and terminates into the posterior descending artery and posterolateral system distally.  Left circumflex and its branches have mild diffuse disease.  RIGHT CORONARY ARTERY: Small, nondominant vessel with moderate disease.     ECHO 5/30/2023:  Interpretation Summary  Global and regional left ventricular function is normal with an EF of 60-65%.  Global right ventricular function is normal.  S/P TAVR with 23 mm ES3 Ultra valve. Mean aortic gradient 15 mmHg, stable from  prior study (18 mmHg) Trivial paravalvular aortic insufficiency.  No pericardial effusion is present.  ______________________________________________________________________________  Left Ventricle  Global and regional left ventricular function is normal with an EF of 60-65%.  Left ventricular size is normal. Mild concentric wall thickening consistent  with left ventricular hypertrophy is present.     Right Ventricle  The right ventricle is normal size. Global right ventricular function is  normal.     Atria  Both atria appear normal.     Mitral Valve  Mild mitral annular calcification is present. Mild mitral insufficiency is  present.     Aortic Valve  S/P TAVR with 23 mm ES3 Ultra valve. Mean aortic gradient 15 mmHg, stable from  prior study (18 mmHg) Trivial paravalvular aortic insufficiency. The valve  leaflets are not well visualized.     Tricuspid Valve  The tricuspid valve is normal. Mild tricuspid insufficiency is present. The  right ventricular systolic pressure is approximated at 28.9 mmHg plus the  right atrial pressure. Pulmonary artery systolic pressure is normal.     Pulmonic Valve  The pulmonic valve is normal.     Vessels  The inferior vena cava was normal in size with preserved respiratory  variability.     Pericardium  No pericardial effusion is present.     Compared to Previous Study  This study was compared with the study from 5/16/2022 . No significant  changes  noted.  ______________________________________________________________________________  MMode/2D Measurements & Calculations  IVSd: 1.2 cm     LVIDd: 4.5 cm  LVIDs: 2.9 cm  LVPWd: 1.00 cm  FS: 36.9 %  LV mass(C)d: 171.7 grams  LV mass(C)dI: 86.3 grams/m2  LA dimension: 3.8 cm  asc Aorta Diam: 3.1 cm  LVOT diam: 1.9 cm  LVOT area: 2.8 cm2  LA Volume (BP): 43.0 ml  LA Volume Index (BP): 21.6 ml/m2  RWT: 0.44     Doppler Measurements & Calculations  MV E max delbert: 85.7 cm/sec  MV A max delbert: 120.8 cm/sec  MV E/A: 0.71  MV dec slope: 312.0 cm/sec2  MV dec time: 0.27 sec  Ao V2 max: 263.3 cm/sec  Ao max P.7 mmHg  Ao V2 mean: 175.9 cm/sec  Ao mean P.9 mmHg  Ao V2 VTI: 63.7 cm  TONI(I,D): 2.3 cm2  TONI(V,D): 2.2 cm2  LV V1 max P.6 mmHg  LV V1 max: 203.7 cm/sec  LV V1 VTI: 51.8 cm  SV(LVOT): 146.7 ml  SI(LVOT): 73.7 ml/m2  TR max delbert: 268.8 cm/sec  TR max P.9 mmHg  AV Delbert Ratio (DI): 0.77  TONI Index (cm2/m2): 1.2  E/E' av.0  Lateral E/e': 10.5  Medial E/e': 11.4  RV S Delbert: 12.5 cm/sec        ECHO 22:  Interpretation Summary  S/P TAVR with 23 ES3 Ultra valve. Mean aortic gradient 18mmHg..Trivial  paravalvular AI.Aortic mean gradient was 11mmHg during previous study but the  study before that was 24mmHg.  ______________________________________________________________________________  Left Ventricle  Global and regional left ventricular function is normal with an EF of 60-65%.  Left ventricular size is normal. Mild concentric wall thickening consistent  with left ventricular hypertrophy is present. Left ventricular diastolic  function is normal. No regional wall motion abnormalities are seen.     Right Ventricle  Right ventricular function, chamber size, wall motion, and thickness are  normal.     Atria  Both atria appear normal.     Mitral Valve  Mild mitral annular calcification is present. Mild mitral insufficiency is  present.     Aortic Valve  S/P TAVR with 23 ES3 Ultra valve. Mean  aortic gradient 18mmHg..Trivial  paravalvular AI.Aortic mean gradient was 11mmHg during previous study but the  study before that was 24mmHg. A bioprosthetic aortic valve is present.     Tricuspid Valve  The tricuspid valve is normal. Mild tricuspid insufficiency is present. The  right ventricular systolic pressure is approximated at 28.0 mmHg plus the  right atrial pressure.     Pulmonic Valve  The pulmonic valve is normal.     Vessels  The thoracic aorta is normal. The pulmonary artery and bifurcation cannot be  assessed. The inferior vena cava is normal.     Pericardium  No pericardial effusion is present.     EKG 5/16/22:  Personally reviewed and interpreted  NSR HR 72    EKG 7/5/21:  Personally reviewed and interpreted by me  NSR HR 76, QRS duration 76    ECHO 7/5/21:  Mean gradient 11 mmHg no AI  Mean gradient 24 on previous study    CT 7/5/21:  Normal leaflet opening no HALT     ECG 5/20/21:  Personally reviewed and interpreted by me  NSR HR 98, QRS 70 ms,     Echocardiogram 5/20/21:  Interpretation Summary  Global and regional left ventricular function is hyperkinetic with an EF >70%.  TAVR with Little S3 23 mm valve. No aortic regurgitation is present.The mean  gradient is elevated at 34 mmHg. The aortic valve area is normal at 1.7 cm^2.  IVC diameter <2.1 cm collapsing >50% with sniff suggests a normal RA pressure  of 3 mmHg.  No pericardial effusion is present.     TAVR gr is likely elevated due to hyperdynamic LV fxn. Valve appearance and  TONI are normal. Recommend short term echo follow-up.    NYHA Class: I     CLINICAL IMPRESSION:     Reba Juarez is a very pleasant 80 year old female with HTN, HLD, T2DM, CAD with remote POBA history w/ non-obstructive CAD per angiogram 4/2021, HFpEF, and severe symptomatic aortic stenosis status-post TAVR with a 23 mm Little Ellie 3 Ultra on 5/19/21 by Gucci Meek and Jonny who presents for annual cardiology f/up.     1. Aortic stenosis s/p  TAVR:  She had significantly increased gradient of 34 mmHg on POD#1 thought related to hyperdynamic LV function --> improved to 24 mmHg 1 week post TAVR--> down to 11 mmHg at 1 month post TAVR.  CT did not show any evidence of leaflet thickening. Recent ECHO 10/2024 showed normal TAVR function mean PG 14 without PVL. Continue ASA 81 mg lifelong and SBE prophylaxis lifelong. Repeat echo in 1 year.     2. CAD: Remote history of POBA. NSTEMI 2/2024 showed dLM disease ifR 0.8. Due to complex nature of lesion and normal EF, recommended medical therapy. Dr. Garcia did indicate that alternatively; one could consider cardiothoracic surgery consultation for evaluation of surgical revascularization and if not a candidate then high risk, complex PCI is an option. Currently doing well with improvement in symptoms on Ranexa 500 mg BID and Isordil 10 mg BID. Plan to uptitrate anti anginal therapy, we have plenty of room. In the meantime, I will review cath with Dr. Shine to discuss feasibility of PCI. Increase Isordil to 20 mg BID. Continue Ranexa 500 mg BID. Continue ASA 81, high intensity statin and Zetia.      3. Tachycardia: History of intermittent tachy/palpitation. Cardiac monitor in 2023 with pSVT, no AF. Repeat monitor in 2024 with NSR. CTM.     4. HFpEF: Continue hydrochlorothiazide 25 mg daily. Recommend avoid use of Aleve, follow 2 G NA restriction and 2 L fluid restriction. Call with significant weight gain, SOB or leg swelling.     5. HTN: Reviewed home BP measurements, BP at goal. Continue valsartan 80 mg daily, hydrochlorothiazide 25 and metoprolol XL 25 mg BID.    6. HLD: Continue high intensity statin therapy and Zetia. Pharmacy referral for PCSK9 due to suboptimal lipids.     7. Obesity: Pharmacy referral for GLP-1 discussion.     RTC: Follow-up 1 year with echo prior.     ANGELA Elmore, CNP  [facility] Cardiology Team      CC  Patient Care Team:  Dayana Kruse NP as PCP - General (Nurse  Practitioner)  eCli Louie PA-C as Physician Assistant (Neurological Surgery)  Whitley Denson PA-C as Assigned Neuroscience Provider  Ashley Dent PA-C as Assigned Heart and Vascular Provider  Nando Rueda MD as Hospitalist (Critical Care)  Nando Rueda MD as Assigned Pulmonology Provider

## 2025-05-13 ENCOUNTER — OFFICE VISIT (OUTPATIENT)
Dept: CARDIOLOGY | Facility: CLINIC | Age: 80
End: 2025-05-13
Attending: NURSE PRACTITIONER
Payer: COMMERCIAL

## 2025-05-13 ENCOUNTER — LAB (OUTPATIENT)
Dept: LAB | Facility: CLINIC | Age: 80
End: 2025-05-13
Payer: COMMERCIAL

## 2025-05-13 VITALS
SYSTOLIC BLOOD PRESSURE: 139 MMHG | DIASTOLIC BLOOD PRESSURE: 61 MMHG | OXYGEN SATURATION: 95 % | HEART RATE: 65 BPM | WEIGHT: 225.2 LBS | BODY MASS INDEX: 41.19 KG/M2

## 2025-05-13 DIAGNOSIS — E66.813 CLASS 3 SEVERE OBESITY WITH SERIOUS COMORBIDITY IN ADULT, UNSPECIFIED BMI, UNSPECIFIED OBESITY TYPE (H): ICD-10-CM

## 2025-05-13 DIAGNOSIS — E78.5 HYPERLIPIDEMIA LDL GOAL <70: ICD-10-CM

## 2025-05-13 DIAGNOSIS — I25.10 CORONARY ARTERY DISEASE INVOLVING NATIVE CORONARY ARTERY OF NATIVE HEART WITHOUT ANGINA PECTORIS: Primary | ICD-10-CM

## 2025-05-13 DIAGNOSIS — I25.10 CORONARY ARTERY DISEASE INVOLVING NATIVE CORONARY ARTERY OF NATIVE HEART WITHOUT ANGINA PECTORIS: ICD-10-CM

## 2025-05-13 LAB
ALBUMIN SERPL BCG-MCNC: 4.3 G/DL (ref 3.5–5.2)
ALP SERPL-CCNC: 82 U/L (ref 40–150)
ALT SERPL W P-5'-P-CCNC: 19 U/L (ref 0–50)
ANION GAP SERPL CALCULATED.3IONS-SCNC: 11 MMOL/L (ref 7–15)
AST SERPL W P-5'-P-CCNC: 26 U/L (ref 0–45)
BILIRUB SERPL-MCNC: 0.4 MG/DL
BUN SERPL-MCNC: 17.5 MG/DL (ref 8–23)
CALCIUM SERPL-MCNC: 9.4 MG/DL (ref 8.8–10.4)
CHLORIDE SERPL-SCNC: 108 MMOL/L (ref 98–107)
CHOLEST SERPL-MCNC: 168 MG/DL
CREAT SERPL-MCNC: 1.35 MG/DL (ref 0.51–0.95)
EGFRCR SERPLBLD CKD-EPI 2021: 39 ML/MIN/1.73M2
ERYTHROCYTE [DISTWIDTH] IN BLOOD BY AUTOMATED COUNT: 14.7 % (ref 10–15)
FASTING STATUS PATIENT QL REPORTED: YES
FASTING STATUS PATIENT QL REPORTED: YES
GLUCOSE SERPL-MCNC: 100 MG/DL (ref 70–99)
HCO3 SERPL-SCNC: 21 MMOL/L (ref 22–29)
HCT VFR BLD AUTO: 36.5 % (ref 35–47)
HDLC SERPL-MCNC: 59 MG/DL
HGB BLD-MCNC: 12.2 G/DL (ref 11.7–15.7)
LDLC SERPL CALC-MCNC: 79 MG/DL
MCH RBC QN AUTO: 28.4 PG (ref 26.5–33)
MCHC RBC AUTO-ENTMCNC: 33.4 G/DL (ref 31.5–36.5)
MCV RBC AUTO: 85 FL (ref 78–100)
NONHDLC SERPL-MCNC: 109 MG/DL
PLATELET # BLD AUTO: 183 10E3/UL (ref 150–450)
POTASSIUM SERPL-SCNC: 4.1 MMOL/L (ref 3.4–5.3)
PROT SERPL-MCNC: 7 G/DL (ref 6.4–8.3)
RBC # BLD AUTO: 4.29 10E6/UL (ref 3.8–5.2)
SODIUM SERPL-SCNC: 140 MMOL/L (ref 135–145)
TRIGL SERPL-MCNC: 150 MG/DL
WBC # BLD AUTO: 16.9 10E3/UL (ref 4–11)

## 2025-05-13 PROCEDURE — 80061 LIPID PANEL: CPT | Performed by: PATHOLOGY

## 2025-05-13 PROCEDURE — 36415 COLL VENOUS BLD VENIPUNCTURE: CPT | Performed by: PATHOLOGY

## 2025-05-13 PROCEDURE — G0463 HOSPITAL OUTPT CLINIC VISIT: HCPCS | Performed by: NURSE PRACTITIONER

## 2025-05-13 PROCEDURE — 80053 COMPREHEN METABOLIC PANEL: CPT | Performed by: PATHOLOGY

## 2025-05-13 PROCEDURE — 85027 COMPLETE CBC AUTOMATED: CPT | Performed by: PATHOLOGY

## 2025-05-13 RX ORDER — ISOSORBIDE DINITRATE 20 MG/1
20 TABLET ORAL
Qty: 90 TABLET | Refills: 3 | Status: SHIPPED | OUTPATIENT
Start: 2025-05-13

## 2025-05-13 ASSESSMENT — PAIN SCALES - GENERAL: PAINLEVEL_OUTOF10: NO PAIN (0)

## 2025-05-13 NOTE — LETTER
"5/13/2025      RE: Reba Juarez  1974 [address] [facility] [id number]       Dear Colleague,    Thank you for the opportunity to participate in the care of your patient, Reba Juarez, at the [facility] HEART CLINIC [place] at [facility]. Please see a copy of my visit note below.        [facility] HEART CARE  CARDIOVASCULAR DIVISION    VALVE CLINIC RETURN VISIT    PRIMARY CARDIOLOGIST: Dr. Harris/Mireille Edwards NP       PERTINENT CLINICAL HISTORY:     Reba Juarez is a very pleasant 80 year old female who presents for annual cardiology follow-up. She has a history of HTN, HLD, T2DM, MAYKEL on CPAP, paroxysmal SVT, CAD with remote POBA history, non-obstructive CAD per angiogram 4/2021, NSTEMI 2/2024 with complex distal LM disease iFR 0.8 - recommended medical therapy if refractory angina high risk PCI vs CABG, HFpEF and severe aortic stenosis status-post TAVR with a 23 mm Little Ellie 3 Ultra on 5/19/21. Her POD#1 ECHO showed elevated mean PG of 34 mmHg thought related to hyperdynamic LV function. She received IVF and was started on metoprolol with improvement in mean PG to 24 mmHg on follow-up echo. She was evaluated 1 month post TAVR and echo showed mean PG 11 mmHg and Cardiac CT showed normal valve function. Echo at year s/p TAVR 5/2022 showed mean PG 18 mmHg without PVL.     Interval History 5/12/25  Patient had an NSTEMI 2/2024 in the setting of tachycardia and hypertension. Coronary angiogram showed complex distal LM disease ifR 0.8. Due to complex nature of lesion and normal EF, recommended medical therapy. Dr. Garcia did indicate that alternatively; one could consider cardiothoracic surgery consultation for evaluation of surgical revascularization and if not a candidate then high risk, complex PCI is an option\".     She did not tolerate Imdur but is on Ranexa with improvement in symptoms. She started feeling SOB again 12/2024. Saw " "pulmonology who stated \"PFTs were completely normal. She does NOT have COPD or any other obvious lung disease. CT chest from February was unremarkable. I believe her dyspnea is related to underlying coronary artery disease. She was apparently being evaluated for CABG vs. Complex PCI\".    She saw her primary cardiologist recently who started isordil in addition to ranexa. She says angina has improved. No longer experiencing pain in upper chest, radiating up into neck, ears. She is still having SOB when climbing stairs though better than it was a year ago. Her symtpoms are still limiting her but minimally. Much better overall. Otherwise no orthopnea, PND< leg swelling. No lightheadedness, palpitations, syncope.     Currently on ASA 81, metoprolol XL 25 mg BID, valsartan 80 mg daily, Isordil 10 mg BID, hydrochlorothiazide 25 mg daily, Ranexa 500 mg BID, Zetia 10 mg daily, Crestor 40 mg daily.      PAST MEDICAL HISTORY:   TAVR 5/2021  CAD w/NSTEMI 2/2024 found to have dLM disease medically managed  HTN  HLD  pSVT  DM II     PAST SURGICAL HISTORY:     Past Surgical History:   Procedure Laterality Date     ANGIOPLASTY  early 1990s     COLONOSCOPY  2010     CV CORONARY ANGIOGRAM N/A 4/9/2021    Procedure: CV CORONARY ANGIOGRAM;  Surgeon: Júnior Santos MD;  Location: [facility] CARDIAC CATH LAB     CV CORONARY ANGIOGRAM N/A 2/19/2024    Procedure: Coronary Angiogram;  Surgeon: Romero Garcia MD;  Location: [facility] CATH LAB CV     CV FRACTIONAL FLOW RATIO WIRE N/A 2/19/2024    Procedure: Fractional Flow Ratio Wire;  Surgeon: Romero Garcia MD;  Location: [facility] CATH LAB CV     CV INSTANTANEOUS WAVE-FREE RATIO N/A 2/19/2024    Procedure: Instantaneous Wave-Free Ratio;  Surgeon: Romero Garcia MD;  Location: [facility] CATH LAB CV     CV LEFT HEART CATH N/A 4/9/2021    Procedure: Left Heart Cath;  Surgeon: Júnior Santos MD;  Location: [facility] CARDIAC CATH LAB     CV RIGHT HEART CATH MEASUREMENTS RECORDED " N/A 4/9/2021    Procedure: Right Heart Cath;  Surgeon: Júnior Santos MD;  Location:  HEART CARDIAC CATH LAB     CV TRANSCATHETER AORTIC VALVE REPLACEMENT N/A 5/19/2021    Procedure: Transfemoral Transcatheter Aortic Valve Replacement with 23 mm Little Ellie 3 Ultra Transcatheter Heart Valve, Transthoracic echocardiogram read by Dr. Farfan;  Surgeon: Harvinder Quick MD;  Location:  OR     EYE SURGERY       HEART CATH FEMORAL CANNULIZATION WITH OPEN STANDBY REPAIR AORTIC VALVE N/A 5/19/2021    Procedure: cardiopulmonary bypass on standby;  Surgeon: Riaz Fuller MD;  Location:  OR        CURRENT MEDICATIONS:     Current Outpatient Medications   Medication Sig Dispense Refill     ALPRAZolam (XANAX) 0.5 MG tablet Take 0.5 mg by mouth daily as needed for anxiety (prior to dental work)       amoxicillin (AMOXIL) 500 MG capsule Take 4 capsules (2,000 mg) by mouth once as needed (SBE prophylaxis - take 30-60 minutes prior to dental procedure or cleaning) 4 capsule 3     aspirin 81 MG tablet Take 1 tablet by mouth daily.       ezetimibe (ZETIA) 10 MG tablet Take 1 tablet (10 mg) by mouth daily 90 tablet 3     Glucos-Chond-Hyal Ac-Ca Fructo (MOVE FREE JOINT HEALTH ADVANCE) TABS Take 1 tablet by mouth daily       hydrochlorothiazide (HYDRODIURIL) 25 MG tablet TAKE 1 TABLET BY MOUTH EVERY DAY 90 tablet 0     isosorbide dinitrate (ISORDIL) 10 MG tablet Take 1 tablet (10 mg) by mouth 2 times daily. 180 tablet 3     Menatetrenone (VITAMIN K2) 100 MCG TABS Take 100 mcg by mouth daily       metoprolol succinate ER (TOPROL XL) 25 MG 24 hr tablet Take 1 tablet (25 mg) by mouth 2 times daily. 180 tablet 3     multivitamin, therapeutic (THERA-VIT) TABS tablet Take 1 tablet by mouth daily       nitroGLYcerin (NITROSTAT) 0.4 MG sublingual tablet Place 0.4 mg under the tongue every 5 minutes as needed       ORDER FOR DME Use your CPAP device as directed by your provider.       ranolazine (RANEXA) 500 MG 12 hr tablet TAKE  1 TABLET BY MOUTH TWICE A  tablet 1     rosuvastatin (CRESTOR) 40 MG tablet Take 40 mg by mouth daily       valsartan (DIOVAN) 80 MG tablet Take 1 tablet by mouth daily at 2 pm.       Vitamin D3 (CHOLECALCIFEROL) 25 mcg (1000 units) tablet Take 2 tablets by mouth daily          ALLERGIES:     Allergies   Allergen Reactions     Calcium Citrate Unknown     Bloody nose per pt     Lipitor [Atorvastatin Calcium]      Myalgia       Pravastatin      Myalgia       Simvastatin      myalgia        FAMILY HISTORY:     Family History   Problem Relation Age of Onset     No Known Problems Sister      Alcoholism Brother      Myocardial Infarction Mother 51        HTN onset age 30, was a smoker     Myocardial Infarction Father 63        smoker     Myocardial Infarction Brother 39     No Known Problems Son      No Known Problems Daughter      CABG Brother      Heart Disease Brother      No Known Problems Sister      Cerebrovascular Disease Sister      Coronary Artery Disease Sister      No Known Problems Son         SOCIAL HISTORY:     Social History     Socioeconomic History     Marital status:      Spouse name: Not on file     Number of children: Not on file     Years of education: Not on file     Highest education level: Not on file   Occupational History     Occupation: retired     Employer: [employer]     Comment:  retired full time, still works a few days as biochemical pharmacist   Social Needs     Financial resource strain: Not on file     Food insecurity     Worry: Not on file     Inability: Not on file     Transportation needs     Medical: Not on file     Non-medical: Not on file   Tobacco Use     Smoking status: Former Smoker     Packs/day: 1.00     Years: 20.00     Pack years: 20.00     Quit date: 3/5/1992     Years since quittin.2     Smokeless tobacco: Never Used   Substance and Sexual Activity     Alcohol use: No     Drug use: No     Sexual activity: Not on file   Lifestyle     Physical activity      Days per week: Not on file     Minutes per session: Not on file     Stress: Not on file   Relationships     Social connections     Talks on phone: Not on file     Gets together: Not on file     Attends [employer] service: Not on file     Active member of club or organization: Not on file     Attends meetings of clubs or organizations: Not on file     Relationship status: Not on file     Intimate partner violence     Fear of current or ex partner: Not on file     Emotionally abused: Not on file     Physically abused: Not on file     Forced sexual activity: Not on file   Other Topics Concern     Parent/sibling w/ CABG, MI or angioplasty before 65F 55M? Not Asked   Social History Narrative    Single and lives with her son and brother.        REVIEW OF SYSTEMS:     Constitutional: No fevers or chills  Skin: No new rash or itching  Eyes: No acute change in vision  Ears/Nose/Throat: No purulent rhinorrhea, new hearing loss, or new vertigo  Respiratory: No cough or hemoptysis  Cardiovascular: See HPI  Gastrointestinal: No change in appetite, vomiting, hematemesis or diarrhea  Genitourinary: No dysuria or hematuria  Musculoskeletal: No new back pain, neck pain or muscle pain  Neurologic: No new headaches, focal weakness or behavior changes  Psychiatric: No hallucinations, excessive alcohol consumption or illegal drug usage  Hematologic/Lymphatic/Immunologic: No bleeding, chills, fever, night sweats or weight loss  Endocrine: No new cold intolerance, heat intolerance, polyphagia, polydipsia or polyuria      PHYSICAL EXAMINATION:     /61 (BP Location: Right arm, Patient Position: Chair, Cuff Size: Adult Large)   Pulse 65   Wt 102.2 kg (225 lb 3.2 oz)   SpO2 95%   BMI 41.19 kg/m      GENERAL: No acute distress.  HEENT: EOMI. Sclerae white, not injected. Nares clear. Pharynx without erythema or exudate.   Neck: No adenopathy. No thyromegaly. No jugular venous distension.   Heart: Regular rate and rhythm. Soft 2/6  DALIA murmur RUSB.   Lungs: Clear to auscultation. No ronchi, wheezes, rales.   Abdomen: Soft, nontender, nondistended. Bowel sounds present.  Extremities: No clubbing, cyanosis, or edema.   Neurologic: Alert and oriented to person/place/time, normal speech and affect. No focal deficits.  Skin: No petechiae, purpura or rash.     LABORATORY DATA:   Personally reviewed and interpreted lab results.   CBC RESULTS:  Lab Results   Component Value Date    WBC 14.3 (H) 02/20/2024    WBC 12.8 (H) 07/05/2021    RBC 4.40 02/20/2024    RBC 4.25 07/05/2021    HGB 12.2 12/05/2024    HGB 12.3 02/20/2024    HGB 11.7 07/05/2021    HCT 37.8 02/20/2024    HCT 35.9 07/05/2021    MCV 86 02/20/2024    MCV 85 07/05/2021    MCH 28.0 02/20/2024    MCH 27.5 07/05/2021    MCHC 32.5 02/20/2024    MCHC 32.6 07/05/2021    RDW 14.7 02/20/2024    RDW 14.8 07/05/2021     (L) 02/20/2024     07/05/2021       BMP RESULTS:  Lab Results   Component Value Date     02/19/2024     07/05/2021    POTASSIUM 4.0 02/19/2024    POTASSIUM 4.1 05/16/2022    POTASSIUM 4.2 07/05/2021    CHLORIDE 107 02/19/2024    CHLORIDE 112 (H) 05/16/2022    CHLORIDE 108 07/05/2021    CO2 26 02/19/2024    CO2 24 05/16/2022    CO2 27 07/05/2021    ANIONGAP 7 02/19/2024    ANIONGAP 7 05/16/2022    ANIONGAP 3 07/05/2021     (H) 02/19/2024     (H) 05/16/2022     (H) 07/05/2021    BUN 12.9 02/19/2024    BUN 14 05/16/2022    BUN 14 07/05/2021    CR 0.98 (H) 02/19/2024    CR 0.86 07/05/2021    GFRESTIMATED 58 (L) 02/19/2024    GFRESTIMATED 65 07/05/2021    GFRESTBLACK 76 07/05/2021    TERRI 8.6 (L) 02/19/2024    TERRI 8.6 07/05/2021         PROCEDURES & FURTHER ASSESSMENTS:       ECHO 10/2024:     The visual ejection fraction is 65-70%.  Right ventricular function cannot be assessed due to poor image quality.  There is a GUDELIA 3 bioprosthetic valve present in aortic valve position. Mean  gradient is 14.5mmHg. There is no paravalvular  regurgitation present.  Compared to the prior study dated 3/29/2024, there have been no changes.  ______________________________________________________________________________  Left Ventricle  The left ventricle is normal in size. There is normal left ventricular wall  thickness. The visual ejection fraction is 65-70%. No regional wall motion  abnormalities noted.     Right Ventricle  The right ventricle is normal size. Right ventricular function cannot be  assessed due to poor image quality.     Atria  Normal left atrial size. Right atrial size is normal. There is no color  Doppler evidence of an atrial shunt.     Mitral Valve  There is mild mitral annular calcification. Mitral valve leaflets appear  normal. There is mild (1+) mitral regurgitation. There is mild mitral  stenosis. The mean mitral valve gradient is 3mmHg.     Tricuspid Valve  Tricuspid valve leaflets appear normal. There is no evidence of tricuspid  stenosis or clinically significant tricuspid regurgitation.     Aortic Valve  There is a GUDELIA 3 bioprosthetic valve present in aortic valve position. The  prosthetic valve gradients are normal . Mean gradient is 14.5mmHg. There is no  paravalvular regurgitation present.     Pulmonic Valve  The pulmonic valve is not well visualized. There is no pulmonic valvular  regurgitation.     Vessels  Normal size ascending aorta. IVC diameter <2.1 cm collapsing >50% with sniff  suggests a normal RA pressure of 3 mmHg.     Cardiac monitor 3/2024:  Sinus rhythm, average 72 bpm, range  bpm.  PACs/PVCs <1%.  Brief runs of PACs, longest 16 beats, no symptoms.  Patient triggered events correlated with sinus rhythm and sinus tachycardia.       CORS 2/2024:  Coronary Angiography:  LEFT MAIN: Normal caliber vessel that bifurcates into left anterior descending and left circumflex arteries.  Proximal left main has mild diffuse disease followed by 50% eccentric, calcific stenosis in the distal portion.  FFR of the left  main to the LAD was 0.8.  LEFT ANTERIOR DESCENDING: Large vessel that gives rise to multiple medium size diagonal branches and septal perforators.  The LAD wraps around the apex distally.  The ostial LAD has 50 to 60% calcific stenosis followed by mild to moderate diffuse disease of the LAD and its branches distally.  LEFT CIRCUMFLEX: Large dominant vessel that gives rise to both small to medium size OM branches and terminates into the posterior descending artery and posterolateral system distally.  Left circumflex and its branches have mild diffuse disease.  RIGHT CORONARY ARTERY: Small, nondominant vessel with moderate disease.     ECHO 5/30/2023:  Interpretation Summary  Global and regional left ventricular function is normal with an EF of 60-65%.  Global right ventricular function is normal.  S/P TAVR with 23 mm ES3 Ultra valve. Mean aortic gradient 15 mmHg, stable from  prior study (18 mmHg) Trivial paravalvular aortic insufficiency.  No pericardial effusion is present.  ______________________________________________________________________________  Left Ventricle  Global and regional left ventricular function is normal with an EF of 60-65%.  Left ventricular size is normal. Mild concentric wall thickening consistent  with left ventricular hypertrophy is present.     Right Ventricle  The right ventricle is normal size. Global right ventricular function is  normal.     Atria  Both atria appear normal.     Mitral Valve  Mild mitral annular calcification is present. Mild mitral insufficiency is  present.     Aortic Valve  S/P TAVR with 23 mm ES3 Ultra valve. Mean aortic gradient 15 mmHg, stable from  prior study (18 mmHg) Trivial paravalvular aortic insufficiency. The valve  leaflets are not well visualized.     Tricuspid Valve  The tricuspid valve is normal. Mild tricuspid insufficiency is present. The  right ventricular systolic pressure is approximated at 28.9 mmHg plus the  right atrial pressure. Pulmonary  artery systolic pressure is normal.     Pulmonic Valve  The pulmonic valve is normal.     Vessels  The inferior vena cava was normal in size with preserved respiratory  variability.     Pericardium  No pericardial effusion is present.     Compared to Previous Study  This study was compared with the study from 2022 . No significant changes  noted.  ______________________________________________________________________________  MMode/2D Measurements & Calculations  IVSd: 1.2 cm     LVIDd: 4.5 cm  LVIDs: 2.9 cm  LVPWd: 1.00 cm  FS: 36.9 %  LV mass(C)d: 171.7 grams  LV mass(C)dI: 86.3 grams/m2  LA dimension: 3.8 cm  asc Aorta Diam: 3.1 cm  LVOT diam: 1.9 cm  LVOT area: 2.8 cm2  LA Volume (BP): 43.0 ml  LA Volume Index (BP): 21.6 ml/m2  RWT: 0.44     Doppler Measurements & Calculations  MV E max delbert: 85.7 cm/sec  MV A max delbert: 120.8 cm/sec  MV E/A: 0.71  MV dec slope: 312.0 cm/sec2  MV dec time: 0.27 sec  Ao V2 max: 263.3 cm/sec  Ao max P.7 mmHg  Ao V2 mean: 175.9 cm/sec  Ao mean P.9 mmHg  Ao V2 VTI: 63.7 cm  TONI(I,D): 2.3 cm2  TONI(V,D): 2.2 cm2  LV V1 max P.6 mmHg  LV V1 max: 203.7 cm/sec  LV V1 VTI: 51.8 cm  SV(LVOT): 146.7 ml  SI(LVOT): 73.7 ml/m2  TR max delbert: 268.8 cm/sec  TR max P.9 mmHg  AV Delbert Ratio (DI): 0.77  TONI Index (cm2/m2): 1.2  E/E' av.0  Lateral E/e': 10.5  Medial E/e': 11.4  RV S Delbert: 12.5 cm/sec        ECHO 22:  Interpretation Summary  S/P TAVR with 23 ES3 Ultra valve. Mean aortic gradient 18mmHg..Trivial  paravalvular AI.Aortic mean gradient was 11mmHg during previous study but the  study before that was 24mmHg.  ______________________________________________________________________________  Left Ventricle  Global and regional left ventricular function is normal with an EF of 60-65%.  Left ventricular size is normal. Mild concentric wall thickening consistent  with left ventricular hypertrophy is present. Left ventricular diastolic  function is normal. No regional  wall motion abnormalities are seen.     Right Ventricle  Right ventricular function, chamber size, wall motion, and thickness are  normal.     Atria  Both atria appear normal.     Mitral Valve  Mild mitral annular calcification is present. Mild mitral insufficiency is  present.     Aortic Valve  S/P TAVR with 23 ES3 Ultra valve. Mean aortic gradient 18mmHg..Trivial  paravalvular AI.Aortic mean gradient was 11mmHg during previous study but the  study before that was 24mmHg. A bioprosthetic aortic valve is present.     Tricuspid Valve  The tricuspid valve is normal. Mild tricuspid insufficiency is present. The  right ventricular systolic pressure is approximated at 28.0 mmHg plus the  right atrial pressure.     Pulmonic Valve  The pulmonic valve is normal.     Vessels  The thoracic aorta is normal. The pulmonary artery and bifurcation cannot be  assessed. The inferior vena cava is normal.     Pericardium  No pericardial effusion is present.     EKG 5/16/22:  Personally reviewed and interpreted  NSR HR 72    EKG 7/5/21:  Personally reviewed and interpreted by me  NSR HR 76, QRS duration 76    ECHO 7/5/21:  Mean gradient 11 mmHg no AI  Mean gradient 24 on previous study    CT 7/5/21:  Normal leaflet opening no HALT     ECG 5/20/21:  Personally reviewed and interpreted by me  NSR HR 98, QRS 70 ms,     Echocardiogram 5/20/21:  Interpretation Summary  Global and regional left ventricular function is hyperkinetic with an EF >70%.  TAVR with Little S3 23 mm valve. No aortic regurgitation is present.The mean  gradient is elevated at 34 mmHg. The aortic valve area is normal at 1.7 cm^2.  IVC diameter <2.1 cm collapsing >50% with sniff suggests a normal RA pressure  of 3 mmHg.  No pericardial effusion is present.     TAVR gr is likely elevated due to hyperdynamic LV fxn. Valve appearance and  TONI are normal. Recommend short term echo follow-up.    NYHA Class: I     CLINICAL IMPRESSION:     Reba Juarez is a very  pleasant 80 year old female with HTN, HLD, T2DM, CAD with remote POBA history w/ non-obstructive CAD per angiogram 4/2021, HFpEF, and severe symptomatic aortic stenosis status-post TAVR with a 23 mm Little Ellie 3 Ultra on 5/19/21 by Gucci Meek and Jonny who presents for annual cardiology f/up.     1. Aortic stenosis s/p TAVR:  She had significantly increased gradient of 34 mmHg on POD#1 thought related to hyperdynamic LV function --> improved to 24 mmHg 1 week post TAVR--> down to 11 mmHg at 1 month post TAVR.  CT did not show any evidence of leaflet thickening. Recent ECHO 10/2024 showed normal TAVR function mean PG 14 without PVL. Continue ASA 81 mg lifelong and SBE prophylaxis lifelong. Repeat echo in 1 year.     2. CAD: Remote history of POBA. NSTEMI 2/2024 showed dLM disease ifR 0.8. Due to complex nature of lesion and normal EF, recommended medical therapy. Dr. Garcia did indicate that alternatively; one could consider cardiothoracic surgery consultation for evaluation of surgical revascularization and if not a candidate then high risk, complex PCI is an option. Currently doing well with improvement in symptoms on Ranexa 500 mg BID and Isordil 10 mg BID. Plan to uptitrate anti anginal therapy, we have plenty of room. In the meantime, I will review cath with Dr. Shine to discuss feasibility of PCI. Increase Isordil to 20 mg BID. Continue Ranexa 500 mg BID. Continue ASA 81, high intensity statin and Zetia.      3. Tachycardia: History of intermittent tachy/palpitation. Cardiac monitor in 2023 with pSVT, no AF. Repeat monitor in 2024 with NSR. CTM.     4. HFpEF: Continue hydrochlorothiazide 25 mg daily. Recommend avoid use of Aleve, follow 2 G NA restriction and 2 L fluid restriction. Call with significant weight gain, SOB or leg swelling.     5. HTN: Reviewed home BP measurements, BP at goal. Continue valsartan 80 mg daily, hydrochlorothiazide 25 and metoprolol XL 25 mg BID.    6. HLD:  Continue high intensity statin therapy and Zetia. Pharmacy referral for PCSK9 due to suboptimal lipids.     7. Obesity: Pharmacy referral for GLP-1 discussion.     RTC: Follow-up 1 year with echo prior.     ANGELA Elmore, CNP  [facility] Cardiology Team      CC  Patient Care Team:  Dayana Kruse NP as PCP - General (Nurse Practitioner)  Celi Louie PA-C as Physician Assistant (Neurological Surgery)  Whitley Denson PA-C as Assigned Neuroscience Provider  Ashley Dent PA-C as Assigned Heart and Vascular Provider  Nando Rueda MD as Hospitalist (Critical Care)  Nando Rueda MD as Assigned Pulmonology Provider                Please do not hesitate to contact me if you have any questions/concerns.     Sincerely,     Carmel Weston NP

## 2025-05-13 NOTE — NURSING NOTE
Chief Complaint   Patient presents with    Follow Up     5/13/2025 visit with Carmel Weston, NP for RETURN CARDIOLOGY - per pt:discuss CABG that Loman recommended         Vitals were taken and medications reconciled.    Blood pressure (!) 163/71, pulse 65, weight 102.2 kg (225 lb 3.2 oz), SpO2 95%.    Shay Gar, EMT  1:49 PM

## 2025-05-14 ENCOUNTER — RESULTS FOLLOW-UP (OUTPATIENT)
Dept: CARDIOLOGY | Facility: CLINIC | Age: 80
End: 2025-05-14

## 2025-05-14 NOTE — PROGRESS NOTES
Medication Therapy Management (MTM) Encounter    ASSESSMENT:                            Medication Adherence/Access: See below for considerations.      Hyperlipidemia   LDL is above goal of <70 on rosuvastatin and ezetimibe. Patient may benefit from additional LDL lowering agent such a Repatha 140 mg subcutaneous every 14 days.   Discussed mechanism of action, side effects, warnings, and efficacy. Answered patient questions. Patient agreeable to starting Repatha. PharmD with will confirm with cardiology team they would like medication initiation prior to placing orders, initiating prior authorization, and patient assistance program/[employer]. Would recommend rechecking lipids 3 months after initiation of Repatha.     Addendum 5/19: Okay per Carmel Weston (NP) to proceed with Repatha 140 mg subcutaneous every 14 days with lipid recheck in 3 months     Hypertension /CAD/HFpEF  Stable. Continue current medication.        Anxiety  Stable. Continue current medication.    Weight management/MAYKEL  Given patient's current BMI of 41.19 kg/m  and MAYKEL, patient may benefit from weight loss and initiation of GLP-1 agonist. Briefly reviewed some cost and insurance barriers of these medications with patient. Deferred detailed discussion at this time due to time. Will follow up at next visit.       Supplements   Stable. Continue current medication.       PLAN:                            Pharmacist will confirm with your cardiology team they would like you to start Repatha 140 mg subcutaneous every 2 weeks.   Once confirmed, I will initiate prior authorization process.   Repatha patient assistance program would allow you to receive the drug from the manufacture for free if you qualify. Once accepted the drug manufacture would ship the medication to your house. The application can be access at the link below:   "  [email or web address]  Will check lipid labs 3 months after starting Repatha.       Follow-up: 6/2/25 at 9am     SUBJECTIVE/OBJECTIVE:                          Adamaris Juarez is a 80 year old female seen for an initial visit. She was referred to me from Carmel Weston (GARO).      Reason for visit: initiation PCSK9 inhibitor and discuss GLP-1 initiation for weight loss.    Allergies/ADRs: Reviewed in chart  Past Medical History: Reviewed in chart  Tobacco: She reports that she quit smoking about 33 years ago. Her smoking use included cigarettes. She started smoking about 53 years ago. She has a 20 pack-year smoking history. She has never used smokeless tobacco.  Alcohol: only at [place]     Medication Adherence/Access: no issues reported.      Hyperlipidemia:  Rosuvastatin 40 mg daily   Ezetimibe 10 mg once daily  Patient reports no significant myalgias or other side effects.     Patient had some concerns about side effects she has heard of about Repatha. She was concerned about the expected co-pay with her Medicare insurance. She thinks she would be eligible to patient assistance program for Repatha through the drug manufacture given her annual income.        Hypertension /CAD/HFpEF  Metoprolol 25 mg twice a day  Ranolazine 500 mg twice a day  Isosorbide dinitrate 20 mg twice a day  Hydrochlorothiazide 25 mg daily  Valsartan 80 mg daily   Aspirin 81 mg daily   Nitroglycerin 0.4 mg SL as needed  Amoxicillin as needed before dental procedures/cleanings     Patient reports her dose of isosorbide dinitrate was recently increased last week. On Sunday she reported she had \"funny feeling\" in her head, but report this has resolved since then. She think this could have also been from heartburn from eating onion rings.   Reports some dizziness yesterday but improved and has resolved since. Patient self monitors blood pressure.  Home BP monitoring " "this morning was 132/66. Yesterday 120/55.  Yesterday during dizziness episode BP was 110/50.    No signs or symptoms of bleeding from aspirin.    BP Readings from Last 3 Encounters:   05/13/25 139/61   04/28/25 122/56   01/29/25 131/60        Anxiety:   Alprazolam 0.5 mg as needed before dental work  No concerns     Weight Management /MAYKEL  Not on any weight loss medications right now.   She is interested in losing weight.   Current patient reported weight is 225 lbs. She does intermittent fasting. She makes her own yogurt, but ended up gaining weight on this.   Patient reports she has a diagnosis of MAYKEL.     Wt Readings from Last 4 Encounters:   05/13/25 225 lb 3.2 oz (102.2 kg)   04/28/25 229 lb (103.9 kg)   01/29/25 224 lb (101.6 kg)   01/07/25 219 lb (99.3 kg)     Estimated body mass index is 41.19 kg/m  as calculated from the following:    Height as of 1/29/25: 5' 2\" (1.575 m).    Weight as of 5/13/25: 225 lb 3.2 oz (102.2 kg).    Supplements   Move Free Easy Vino daily   Vitamin K2 100 mcg daily   Multivitamin daily  Vitamin D 50 mcg tablets: takes two in morning and two at night   iodine supplement 325 mcg once a day   No concerns        ----------------  I spent 30 minutes with this patient today. I offer these suggestions for consideration by Carmel Weston (GARO).     A summary of these recommendations was sent via [employer].    Magalys Beasley, PharmD  Medication Therapy Management Pharmacist   [facility]      Telemedicine Visit Details  The patient's medications can be safely assessed via a telemedicine encounter.  Type of service:  Telephone visit  Originating Location (pt. Location): Home    Distant Location (provider location):  Off-site  Start Time: 9:00 AM  End Time: 9:30 AM  "

## 2025-05-19 ENCOUNTER — VIRTUAL VISIT (OUTPATIENT)
Facility: CLINIC | Age: 80
End: 2025-05-19
Attending: NURSE PRACTITIONER
Payer: COMMERCIAL

## 2025-05-19 ENCOUNTER — TELEPHONE (OUTPATIENT)
Facility: CLINIC | Age: 80
End: 2025-05-19
Payer: COMMERCIAL

## 2025-05-19 DIAGNOSIS — E78.5 HYPERLIPIDEMIA LDL GOAL <70: Primary | ICD-10-CM

## 2025-05-19 DIAGNOSIS — I10 PRIMARY HYPERTENSION: ICD-10-CM

## 2025-05-19 RX ORDER — CHOLECALCIFEROL (VITAMIN D3) 50 MCG
2 TABLET ORAL 2 TIMES DAILY
COMMUNITY

## 2025-05-19 NOTE — Clinical Note
Terry Burt,   My name is Magalys and I'm a new [facility] pharmacist for the cardiology team. I spoke with Adamaris following your referral for PCSK9 inhibitor initiation. She is agreeable to starting Repatha 140 mg subcutaneous every 14 days with follow up lipid panel in 3 months after initiation. Please let me know if you'd like me to place those orders on your behalf!   After placing order I will start patient assistance process to help reduce her cost of Repatha.   Magalys Beasley, PharmD Medication Therapy Management Pharmacist  [facility]

## 2025-05-19 NOTE — PATIENT INSTRUCTIONS
"Recommendations from today's MTM visit:                                                         Pharmacist will confirm with your cardiology team they would like you to start Repatha 140 mg subcutaneous every 2 weeks.   Once confirmed, I will initiate prior authorization process.   Repatha patient assistance program would allow you to receive the drug from the manufacture for free if you qualify. Once accepted the drug manufacture would ship the medication to your house. The application can be access at the link below:    [email or web address]  Will check lipid labs 3 months after starting Repatha.     Follow-up: 6/2/25 at 9 am     It was great speaking with you today.  I value your experience and would be very thankful for your time in providing feedback in our clinic survey. In the next few days, you may receive an email or text message from [employer] with a link to a survey related to your  clinical pharmacist.\"     To schedule another MTM appointment, please call the clinic directly or you may call the MTM scheduling line at [phone].    My Clinical Pharmacist's contact information:                                                      Please feel free to contact me with any questions or concerns you have.      Magalys Beasley, PharmD  Medication Therapy Management Pharmacist   [facility]     "

## 2025-05-19 NOTE — TELEPHONE ENCOUNTER
Terry Joyce,     Patient will be starting Repatha. I believe she may be eligable for PAP or [employer] [facility] kyler. I would appreciate your assistance in getting this process started.     Thank you,   Magalys Beasley, PharmD  Medication Therapy Management Pharmacist   [facility]

## 2025-05-29 NOTE — PROGRESS NOTES
"Medication Therapy Management (MTM) Encounter    ASSESSMENT:                            Medication Adherence/Access: See below for considerations.    Weight Management /MAYKEL  Given BMI of 41.19 kg/m  and MAYKEL, patient may benefit from weight loss and initiation of Zepbound 2.5 mg subcutaneous weekly. Discussed mechanism of action, side effects, warnings, and efficacy. Answered patient questions.     Addendum 6/2: Carmel Weston (NP) okay with patient starting Zepbound 2.5 mg subcutaneous weekly.      Hyperlipidemia   LDL is above goal of <70 on rosuvastatin and ezetimibe. Patient may benefit from additional LDL lowering agent such a Repatha. Patient wishes to pursue weight loss medication prior to starting Repatha. PharmD will revisit Repatha at future visit.       PLAN:                            Start Zepbound 2.5 mg subcutaneous weekly     Follow-up: 6/30/25 at 9am via telephone     SUBJECTIVE/OBJECTIVE:                          Adamaris Juarez is a 80 year old female seen for a follow-up visit.       Reason for visit: discuss GLP-1 initiation for weight loss.    Allergies/ADRs: Reviewed in chart  Past Medical History: Reviewed in chart  Tobacco: She reports that she quit smoking about 33 years ago. Her smoking use included cigarettes. She started smoking about 53 years ago. She has a 20 pack-year smoking history. She has never used smokeless tobacco.  Alcohol: only at [place]     Medication Adherence/Access: no issues reported.    Weight Management /MAYKEL  Not on any weight loss medications right now.   She is interested in losing weight.  Patient reports she has a diagnosis of MAYKEL, and uses a CPAP.     Diet: has tried fasting in the past, and has tried eating \"better\"  Exercise: couldn't do much in the past due to angina, but this is improving is able to more     Patient denies history of pancreatitis. Patient denies personal or family history of medullary thyroid cancer or multiple endocrine neoplasia.         Wt " "Readings from Last 4 Encounters:   05/13/25 225 lb 3.2 oz (102.2 kg)   04/28/25 229 lb (103.9 kg)   01/29/25 224 lb (101.6 kg)   01/07/25 219 lb (99.3 kg)     Estimated body mass index is 41.19 kg/m  as calculated from the following:    Height as of 1/29/25: 5' 2\" (1.575 m).    Weight as of 5/13/25: 225 lb 3.2 oz (102.2 kg).    Hyperlipidemia   Has not started Repatha, patient wants to try starting weight loss medication first. Per chart review, referring provider is okay with this.     ----------------  I spent 30 minutes with this patient today. I offer these suggestions for consideration by Carmel Weston (GARO).    A summary of these recommendations was sent via [employer].    Magalys Beasley, PharmD  Medication Therapy Management Pharmacist   [facility]    Telemedicine Visit Details  The patient's medications can be safely assessed via a telemedicine encounter.  Type of service:  Telephone visit  Originating Location (pt. Location): Home    Distant Location (provider location):  Off-site  Start Time: 9:00 AM  End Time: 9:30 AM     Medication Therapy Recommendations  No medication therapy recommendations to display   "

## 2025-06-02 ENCOUNTER — TELEPHONE (OUTPATIENT)
Dept: CARDIOLOGY | Facility: CLINIC | Age: 80
End: 2025-06-02
Payer: COMMERCIAL

## 2025-06-02 ENCOUNTER — VIRTUAL VISIT (OUTPATIENT)
Facility: CLINIC | Age: 80
End: 2025-06-02
Attending: NURSE PRACTITIONER
Payer: COMMERCIAL

## 2025-06-02 DIAGNOSIS — E66.01 CLASS 2 SEVERE OBESITY DUE TO EXCESS CALORIES WITH SERIOUS COMORBIDITY AND BODY MASS INDEX (BMI) OF 35.0 TO 35.9 IN ADULT (H): ICD-10-CM

## 2025-06-02 DIAGNOSIS — E66.812 CLASS 2 SEVERE OBESITY DUE TO EXCESS CALORIES WITH SERIOUS COMORBIDITY AND BODY MASS INDEX (BMI) OF 35.0 TO 35.9 IN ADULT (H): ICD-10-CM

## 2025-06-02 DIAGNOSIS — G47.33 OSA ON CPAP: Primary | ICD-10-CM

## 2025-06-02 NOTE — TELEPHONE ENCOUNTER
Prior Authorization Approval    Medication: ZEPBOUND 2.5 MG/0.5ML SC SOAJ  Authorization Effective Date: 6/2/2025  Authorization Expiration Date: 6/2/2026  Approved Dose/Quantity: 2ml per 28 days  Reference #: [id number]   Insurance Company: [employer] - Phone [phone] Fax [phone]  Expected CoPay: $ 100  CoPay Card Available:      Financial Assistance Needed:   Which Pharmacy is filling the prescription:    Pharmacy Notified: yes  Patient Notified: yes

## 2025-06-02 NOTE — Clinical Note
Terry Burt,   I spoke to Adamaris following your referral for GLP1. She is eager to start Zepbound 2.5 mg subcutaneous weekly.  Please let me know if you are okay with her starting this and I can place this order on your behalf.  Thank you! Magalys Beasley, PharmD Medication Therapy Management Pharmacist  [facility]

## 2025-06-02 NOTE — TELEPHONE ENCOUNTER
Hello team,    Forwarding encounter as a request to initiate prior authorization for Zepbound 2.5 mg subcutaneous weekly and subsequent dose titrations. Please let me know if you need any additional details or information.    Thank you for your help,  Magalys

## 2025-06-02 NOTE — TELEPHONE ENCOUNTER
PA Initiation    Medication: ZEPBOUND 2.5 MG/0.5ML SC SOAJ  Insurance Company: [employer] - Phone [phone] Fax [phone]  Pharmacy Filling the Rx:    Filling Pharmacy Phone:    Filling Pharmacy Fax:    Start Date: 6/2/2025     Key [id number]

## 2025-06-02 NOTE — PATIENT INSTRUCTIONS
"Recommendations from today's MTM visit:                                                      Start Zepbound 2.5 mg subcutaneous weekly     Follow-up: 6/30/25 at 9am via telephone     It was great speaking with you today.  I value your experience and would be very thankful for your time in providing feedback in our clinic survey. In the next few days, you may receive an email or text message from [facility] [employer] with a link to a survey related to your  clinical pharmacist.\"     To schedule another MTM appointment, please call the clinic directly or you may call the MTM scheduling line at [phone].    My Clinical Pharmacist's contact information:                                                      Please feel free to contact me with any questions or concerns you have.      Magalys Beasley, PharmD  Medication Therapy Management Pharmacist   [facility]     "

## 2025-06-13 DIAGNOSIS — E78.5 HYPERLIPIDEMIA LDL GOAL <70: ICD-10-CM

## 2025-06-21 ENCOUNTER — HEALTH MAINTENANCE LETTER (OUTPATIENT)
Age: 80
End: 2025-06-21

## 2025-06-24 DIAGNOSIS — I25.10 CORONARY ARTERY DISEASE INVOLVING NATIVE CORONARY ARTERY OF NATIVE HEART WITHOUT ANGINA PECTORIS: ICD-10-CM

## 2025-06-24 DIAGNOSIS — I20.89 ATYPICAL ANGINA: ICD-10-CM

## 2025-06-24 RX ORDER — RANOLAZINE 500 MG/1
500 TABLET, EXTENDED RELEASE ORAL 2 TIMES DAILY
Qty: 180 TABLET | Refills: 1 | Status: SHIPPED | OUTPATIENT
Start: 2025-06-24

## 2025-06-26 NOTE — PROGRESS NOTES
"Medication Therapy Management (MTM) Encounter    ASSESSMENT:                            Medication Adherence/Access: No issues identified.    Weight Management/MAYKEL  Given BMI of 41.19 kg/m  and MAYKEL, patient was started on Zepbound 2.5 mg subcutaneous once a week. Patient tolerating medication well without side effects. Has had minimal weight loss on current dose, would recommend increasing Zepbound to 5 mg subcutaneous once a week.       PLAN:                            Increase Zepbound to 5 mg subcutaneous once a week.     Follow-up: 7/28/25 at 11 am via telephone    SUBJECTIVE/OBJECTIVE:                          Adamaris Juarez is a 80 year old female seen for a follow-up visit.       Reason for visit: Zepbound follow-up.    Allergies/ADRs: Reviewed in chart  Past Medical History: Reviewed in chart  Tobacco: She reports that she quit smoking about 33 years ago. Her smoking use included cigarettes. She started smoking about 53 years ago. She has a 20 pack-year smoking history. She has never used smokeless tobacco.  Alcohol: only at [place]     Medication Adherence/Access: no issues reported.    Weight Management /MAYKEL  Zepbound 2.5 mg subcutaneous once a week. She has completed all four doses.     Patient denies any side effects from Zepbound including GI upset (nausea, vomiting, diarrhea, constipation).  She has lost about 7 pounds since starting Zepbound  She has no concerns about access or cost of medication at this time.        Wt Readings from Last 4 Encounters:   05/13/25 225 lb 3.2 oz (102.2 kg)   04/28/25 229 lb (103.9 kg)   01/29/25 224 lb (101.6 kg)   01/07/25 219 lb (99.3 kg)     Estimated body mass index is 41.19 kg/m  as calculated from the following:    Height as of 1/29/25: 5' 2\" (1.575 m).    Weight as of 5/13/25: 225 lb 3.2 oz (102.2 kg).      ----------------  I spent 10 minutes with this patient today. I offer these suggestions for consideration by Carmel Weston (GARO).     A summary of these " recommendations was sent via [employer].    Magalys Beasley, PharmD  Medication Therapy Management Pharmacist   [facility]      Telemedicine Visit Details  The patient's medications can be safely assessed via a telemedicine encounter.  Type of service:  Telephone visit  Originating Location (pt. Location): Home    Distant Location (provider location):  On-site  Start Time: 9:00 AM  End Time: 9:10 AM     Medication Therapy Recommendations  No medication therapy recommendations to display

## 2025-06-30 ENCOUNTER — VIRTUAL VISIT (OUTPATIENT)
Facility: CLINIC | Age: 80
End: 2025-06-30
Attending: NURSE PRACTITIONER
Payer: COMMERCIAL

## 2025-06-30 DIAGNOSIS — E66.01 CLASS 2 SEVERE OBESITY DUE TO EXCESS CALORIES WITH SERIOUS COMORBIDITY AND BODY MASS INDEX (BMI) OF 35.0 TO 35.9 IN ADULT (H): ICD-10-CM

## 2025-06-30 DIAGNOSIS — E66.812 CLASS 2 SEVERE OBESITY DUE TO EXCESS CALORIES WITH SERIOUS COMORBIDITY AND BODY MASS INDEX (BMI) OF 35.0 TO 35.9 IN ADULT (H): ICD-10-CM

## 2025-06-30 DIAGNOSIS — G47.33 OSA ON CPAP: Primary | ICD-10-CM

## 2025-06-30 NOTE — Clinical Note
Terry Burt,   Spoke with Adamaris following initiation of Zepbound. She is tolerating it well, without any side effects. Planning on increasing to 5 mg subcutaneous once a week.   Magalys Beasley, PharmD Medication Therapy Management Pharmacist  [facility]

## 2025-07-07 ENCOUNTER — MYC MEDICAL ADVICE (OUTPATIENT)
Dept: CARDIOLOGY | Facility: CLINIC | Age: 80
End: 2025-07-07
Payer: COMMERCIAL

## 2025-07-07 DIAGNOSIS — I25.10 CORONARY ARTERY DISEASE INVOLVING NATIVE CORONARY ARTERY OF NATIVE HEART WITHOUT ANGINA PECTORIS: ICD-10-CM

## 2025-07-08 RX ORDER — EVOLOCUMAB 140 MG/ML
INJECTION, SOLUTION SUBCUTANEOUS
Refills: 1 | OUTPATIENT
Start: 2025-07-08

## 2025-07-09 RX ORDER — ISOSORBIDE DINITRATE 20 MG/1
TABLET ORAL
Qty: 270 TABLET | Refills: 3 | Status: SHIPPED | OUTPATIENT
Start: 2025-07-09

## 2025-07-21 ENCOUNTER — TELEPHONE (OUTPATIENT)
Dept: CARDIOLOGY | Facility: CLINIC | Age: 80
End: 2025-07-21
Payer: COMMERCIAL

## 2025-07-21 NOTE — TELEPHONE ENCOUNTER
----- Message from Saniya GALEANA sent at 7/21/2025  8:27 AM CDT -----  Regarding: closed 8/1 clinic  Carmel Weston closed her 8/1 clinic. I need help rescheduling  this patient.   Please offer Carmel's 7/25 clinic if possible. If needed can offer 7/28, 7/31, 8/8, or 8/11.      Patient Contacted for the patient to call back and schedule the following:    Appointment type: return cardio  Provider: Enrico  Return date: 07/25  Specialty phone number: [phone] xt. [phone]  Additional appointment(s) needed: n/a  Additonal Notes: pt scheduled, EC    initiation of breastfeeding/breast milk feeding

## 2025-07-24 NOTE — PROGRESS NOTES
"Medication Therapy Management (MTM) Encounter    ASSESSMENT:                            Medication Adherence/Access: No issues identified.    Weight Management/MAYKEL  Given BMI of 41.19 kg/m  and MAYKEL, patient was started on Zepbound. Patient continues to lose weight with Zepbound without side effects other than manageable heartburn. Plan to continue current dose of Zepbound.       PLAN:                            Continue Zepbound 5 mg subcutaneous once a week.     Follow-up: 10/20/25 at 11am via telephone     SUBJECTIVE/OBJECTIVE:                          Adamaris Juarez is a 80 year old female seen for a follow-up visit.       Reason for visit: Zepbound follow-up.    Allergies/ADRs: Reviewed in chart  Past Medical History: Reviewed in chart  Tobacco: She reports that she quit smoking about 33 years ago. Her smoking use included cigarettes. She started smoking about 53 years ago. She has a 20 pack-year smoking history. She has never used smokeless tobacco.  Alcohol: on at [place]    Medication Adherence/Access: no issues reported.    Weight Management /MAYKEL  Zepbound 5 mg subcutaneous once a week.   Prior auth for Zepbound approved through 6/2/2026     Denies nausea, vomiting, diarrhea or constipation. She said she has some heartburn at night, but she said it's easily manageable.   She still notices changes in her appetite.   She has lost about 2-2.5 lbs each week of the past few weeks. She weighs her self every Saturday. This recent Saturday her weight was 206.5 lbs. She said she has lost a total of 16.5 lbs since starting Zepbound.          Wt Readings from Last 4 Encounters:   07/25/25 211 lb 6.4 oz (95.9 kg)   05/13/25 225 lb 3.2 oz (102.2 kg)   04/28/25 229 lb (103.9 kg)   01/29/25 224 lb (101.6 kg)     Estimated body mass index is 41.19 kg/m  as calculated from the following:    Height as of 1/29/25: 5' 2\" (1.575 m).    Weight as of 5/13/25: 225 lb 3.2 oz (102.2 kg).    ----------------  I spent 10 minutes with " this patient today. I offer these suggestions for consideration by Carmel Weston (NP).     A summary of these recommendations was sent via [employer].    Magalys Beasley, PharmD  Medication Therapy Management Pharmacist   [facility]    Telemedicine Visit Details  The patient's medications can be safely assessed via a telemedicine encounter.  Type of service:  Telephone visit  Originating Location (pt. Location): Home    Distant Location (provider location):  On-site  Start Time: 11:00 AM  End Time: 11:10 AM     Medication Therapy Recommendations  No medication therapy recommendations to display

## 2025-07-24 NOTE — PROGRESS NOTES
[facility] HEART [facility]  CARDIOVASCULAR DIVISION    VALVE CLINIC RETURN VISIT    PRIMARY CARDIOLOGIST: Dr. Harris/Mireille Edwards NP       PERTINENT CLINICAL HISTORY:     Reba Juarez is a very pleasant 80 year old female who presents for routine CAD follow-up. She has a history of HTN, HLD, T2DM, MAYKEL on CPAP, paroxysmal SVT, CAD with remote POBA history, non-obstructive CAD per angiogram 4/2021, NSTEMI 2/2024 with complex distal LM disease iFR 0.8 - recommended medical therapy if refractory angina high risk PCI vs CABG, HFpEF and severe aortic stenosis status-post TAVR with a 23 mm Little Ellie 3 Ultra on 5/19/21. Her POD#1 ECHO showed elevated mean PG of 34 mmHg thought related to hyperdynamic LV function. She received IVF and was started on metoprolol with improvement in mean PG to 24 mmHg on follow-up echo. CT shows normal valve function without thrombus. Last echo 10/2024 showed mean PG 15 mmHg without PVL.     Interval History 5/12/25  Patient had an NSTEMI 2/2024 in the setting of tachycardia and hypertension. Coronary angiogram showed complex distal LM disease ifR 0.8. Due to complex nature of lesion and normal EF, Dr. Garcia recommended medical therapy. If refractory angina, could consider CV surgery consultation vs complex PCI. She saw her primary cardiologist recently who started isordil in addition to ranexa with improvement in angina. No longer experiencing pain in upper chest, radiating up into neck, ears. She is still having SOB when climbing stairs though better than it was a year ago. Her symtpoms are still limiting her but minimally. Much better overall. Otherwise no orthopnea, PND< leg swelling. No lightheadedness, palpitations, syncope. Currently on ASA 81, metoprolol XL 25 mg BID, valsartan 80 mg daily, Isordil 10 mg BID, hydrochlorothiazide 25 mg daily, Ranexa 500 mg BID, Zetia 10 mg daily, Crestor 40 mg daily.     Interval history 7/25/2025:  Patient presents to clinic today with  improved chest anginal symptoms after starting Isordil and Ranexa.  Patient's blood pressures at home have been stable 120s 130s after reducing valsartan to 40 mg as requested by the patient.  Patient started on Zepbound which she has lost approximately 14 pounds and is now having decreased swelling in her lower extremities.  Patient is able to exercise at home as result but had a recent MSK injury which has left her slightly limited at this time.  Previous BMP showed slight elevation in creatinine, repeat BMP was  ordered.  Now holding HCTZ with continually elevated creatinine.       PAST MEDICAL HISTORY:   TAVR 5/2021  CAD w/NSTEMI 2/2024 found to have dLM disease medically managed  HTN  HLD  pSVT  DM II     PAST SURGICAL HISTORY:     Past Surgical History:   Procedure Laterality Date    ANGIOPLASTY  early 1990s    COLONOSCOPY  2010    CV CORONARY ANGIOGRAM N/A 4/9/2021    Procedure: CV CORONARY ANGIOGRAM;  Surgeon: Júnior Santos MD;  Location: [facility] CARDIAC CATH LAB    CV CORONARY ANGIOGRAM N/A 2/19/2024    Procedure: Coronary Angiogram;  Surgeon: Romero Garcia MD;  Location: [facility] CATH LAB CV    CV FRACTIONAL FLOW RATIO WIRE N/A 2/19/2024    Procedure: Fractional Flow Ratio Wire;  Surgeon: Romero Garcia MD;  Location: [facility] CATH LAB CV    CV INSTANTANEOUS WAVE-FREE RATIO N/A 2/19/2024    Procedure: Instantaneous Wave-Free Ratio;  Surgeon: Romero Garcia MD;  Location: [facility] CATH LAB CV    CV LEFT HEART CATH N/A 4/9/2021    Procedure: Left Heart Cath;  Surgeon: Júnior Santos MD;  Location:  HEART CARDIAC CATH LAB    CV RIGHT HEART CATH MEASUREMENTS RECORDED N/A 4/9/2021    Procedure: Right Heart Cath;  Surgeon: Júnior Santos MD;  Location:  HEART CARDIAC CATH LAB    CV TRANSCATHETER AORTIC VALVE REPLACEMENT N/A 5/19/2021    Procedure: Transfemoral Transcatheter Aortic Valve Replacement with 23 mm Little Ellie 3 Ultra Transcatheter Heart Valve, Transthoracic  echocardiogram read by Dr. Farfan;  Surgeon: Harvinder Quick MD;  Location: [facility] OR    EYE SURGERY      HEART CATH FEMORAL CANNULIZATION WITH OPEN STANDBY REPAIR AORTIC VALVE N/A 5/19/2021    Procedure: cardiopulmonary bypass on standby;  Surgeon: Riaz Fuller MD;  Location: [facility] OR        CURRENT MEDICATIONS:     Current Outpatient Medications   Medication Sig Dispense Refill    ALPRAZolam (XANAX) 0.5 MG tablet Take 0.5 mg by mouth daily as needed for anxiety (prior to dental work)      amoxicillin (AMOXIL) 500 MG capsule Take 4 capsules (2,000 mg) by mouth once as needed (SBE prophylaxis - take 30-60 minutes prior to dental procedure or cleaning) 4 capsule 3    aspirin 81 MG tablet Take 1 tablet by mouth daily.      ezetimibe (ZETIA) 10 MG tablet Take 1 tablet (10 mg) by mouth daily 90 tablet 3    Glucos-Chond-Hyal Ac-Ca Fructo (MOVE FREE [facility] HEALTH ADVANCE) TABS Take 1 tablet by mouth daily      hydrochlorothiazide (HYDRODIURIL) 25 MG tablet TAKE 1 TABLET BY MOUTH EVERY DAY 90 tablet 0    isosorbide dinitrate (ISORDIL) 20 MG tablet Take 40 mg in the morning and 20 mg in the afternoon 270 tablet 3    Menatetrenone (VITAMIN K2) 100 MCG TABS Take 100 mcg by mouth daily      metoprolol succinate ER (TOPROL XL) 25 MG 24 hr tablet Take 1 tablet (25 mg) by mouth 2 times daily. 180 tablet 3    multivitamin, therapeutic (THERA-VIT) TABS tablet Take 1 tablet by mouth daily      nitroGLYcerin (NITROSTAT) 0.4 MG sublingual tablet Place 0.4 mg under the tongue every 5 minutes as needed      ORDER FOR DME Use your CPAP device as directed by your provider.      ranolazine (RANEXA) 500 MG 12 hr tablet TAKE 1 TABLET BY MOUTH TWICE A  tablet 1    rosuvastatin (CRESTOR) 40 MG tablet Take 40 mg by mouth daily      tirzepatide-Weight Management (ZEPBOUND) 2.5 MG/0.5ML prefilled pen Inject 0.5 mLs (2.5 mg) subcutaneously every 7 days. 2 mL 0    tirzepatide-Weight Management (ZEPBOUND) 5 MG/0.5ML prefilled pen Inject 0.5 mLs  (5 mg) subcutaneously every 7 days. 2 mL 0    UNABLE TO FIND Take by mouth. MEDICATION NAME: iodine supplement 325 mcg by mouth daily per patient      valsartan (DIOVAN) 40 MG tablet Take 1 tablet (40 mg) by mouth daily at 2 pm. 90 tablet 3    vitamin D3 (CHOLECALCIFEROL) 50 mcg (2000 units) tablet Take 2 tablets by mouth 2 times daily.          ALLERGIES:     Allergies   Allergen Reactions    Calcium Citrate Unknown     Bloody nose per pt    Lipitor [Atorvastatin Calcium]      Myalgia      Pravastatin      Myalgia      Simvastatin      myalgia        FAMILY HISTORY:     Family History   Problem Relation Age of Onset    No Known Problems Sister     Alcoholism Brother     Myocardial Infarction Mother 51        HTN onset age 30, was a smoker    Myocardial Infarction Father 63        smoker    Myocardial Infarction Brother 39    No Known Problems Son     No Known Problems Daughter     CABG Brother     Heart Disease Brother     No Known Problems Sister     Cerebrovascular Disease Sister     Coronary Artery Disease Sister     No Known Problems Son         SOCIAL HISTORY:     Social History     Socioeconomic History    Marital status:      Spouse name: Not on file    Number of children: Not on file    Years of education: Not on file    Highest education level: Not on file   Occupational History    Occupation: retired     Employer: [employer]     Comment:  retired full time, still works a few days as biochemical pharmacist   Social Needs    Financial resource strain: Not on file    Food insecurity     Worry: Not on file     Inability: Not on file    Transportation needs     Medical: Not on file     Non-medical: Not on file   Tobacco Use    Smoking status: Former Smoker     Packs/day: 1.00     Years: 20.00     Pack years: 20.00     Quit date: 3/5/1992     Years since quittin.2    Smokeless tobacco: Never Used   Substance and Sexual Activity    Alcohol use: No    Drug use: No    Sexual activity: Not on file    Lifestyle    Physical activity     Days per week: Not on file     Minutes per session: Not on file    Stress: Not on file   Relationships    Social connections     Talks on phone: Not on file     Gets together: Not on file     Attends Faith service: Not on file     Active member of club or organization: Not on file     Attends meetings of clubs or organizations: Not on file     Relationship status: Not on file    Intimate partner violence     Fear of current or ex partner: Not on file     Emotionally abused: Not on file     Physically abused: Not on file     Forced sexual activity: Not on file   Other Topics Concern    Parent/sibling w/ CABG, MI or angioplasty before 65F 55M? Not Asked   Social History Narrative    Single and lives with her son and brother.        REVIEW OF SYSTEMS:     Constitutional: No fevers or chills  Skin: No new rash or itching  Eyes: No acute change in vision  Ears/Nose/Throat: No purulent rhinorrhea, new hearing loss, or new vertigo  Respiratory: No cough or hemoptysis  Cardiovascular: See HPI  Gastrointestinal: No change in appetite, vomiting, hematemesis or diarrhea  Genitourinary: No dysuria or hematuria  Musculoskeletal: No new back pain, neck pain or muscle pain  Neurologic: No new headaches, focal weakness or behavior changes  Psychiatric: No hallucinations, excessive alcohol consumption or illegal drug usage  Hematologic/Lymphatic/Immunologic: No bleeding, chills, fever, night sweats or weight loss  Endocrine: No new cold intolerance, heat intolerance, polyphagia, polydipsia or polyuria      PHYSICAL EXAMINATION:     /70 (BP Location: Right arm, Patient Position: Chair, Cuff Size: Adult Regular)   Pulse 74   Wt 95.9 kg (211 lb 6.4 oz)   SpO2 95%   BMI 38.67 kg/m      GENERAL: No acute distress.  HEENT: EOMI. Sclerae white, not injected. Nares clear. Pharynx without erythema or exudate.   Neck: No adenopathy. No thyromegaly. No jugular venous distension.   Heart:  Regular rate and rhythm. Soft 2/6 DALIA murmur RUSB.   Lungs: Clear to auscultation. No ronchi, wheezes, rales.   Abdomen: Soft, nontender, nondistended. Bowel sounds present.  Extremities: No clubbing, cyanosis, or edema.   Neurologic: Alert and oriented to person/place/time, normal speech and affect. No focal deficits.  Skin: No petechiae, purpura or rash.     LABORATORY DATA:   Personally reviewed and interpreted lab results.   CBC RESULTS:  Lab Results   Component Value Date    WBC 16.9 (H) 05/13/2025    WBC 12.8 (H) 07/05/2021    RBC 4.29 05/13/2025    RBC 4.25 07/05/2021    HGB 12.2 05/13/2025    HGB 11.7 07/05/2021    HCT 36.5 05/13/2025    HCT 35.9 07/05/2021    MCV 85 05/13/2025    MCV 85 07/05/2021    MCH 28.4 05/13/2025    MCH 27.5 07/05/2021    MCHC 33.4 05/13/2025    MCHC 32.6 07/05/2021    RDW 14.7 05/13/2025    RDW 14.8 07/05/2021     05/13/2025     07/05/2021       BMP RESULTS:  Lab Results   Component Value Date     05/13/2025     07/05/2021    POTASSIUM 4.1 05/13/2025    POTASSIUM 4.1 05/16/2022    POTASSIUM 4.2 07/05/2021    CHLORIDE 108 (H) 05/13/2025    CHLORIDE 112 (H) 05/16/2022    CHLORIDE 108 07/05/2021    CO2 21 (L) 05/13/2025    CO2 24 05/16/2022    CO2 27 07/05/2021    ANIONGAP 11 05/13/2025    ANIONGAP 7 05/16/2022    ANIONGAP 3 07/05/2021     (H) 05/13/2025     (H) 05/16/2022     (H) 07/05/2021    BUN 17.5 05/13/2025    BUN 14 05/16/2022    BUN 14 07/05/2021    CR 1.35 (H) 05/13/2025    CR 0.86 07/05/2021    GFRESTIMATED 39 (L) 05/13/2025    GFRESTIMATED 65 07/05/2021    GFRESTBLACK 76 07/05/2021    TERRI 9.4 05/13/2025    TERRI 8.6 07/05/2021         PROCEDURES & FURTHER ASSESSMENTS:       ECHO 10/2024:     The visual ejection fraction is 65-70%.  Right ventricular function cannot be assessed due to poor image quality.  There is a GUDELIA 3 bioprosthetic valve present in aortic valve position. Mean  gradient is 14.5mmHg. There is no paravalvular  regurgitation present.  Compared to the prior study dated 3/29/2024, there have been no changes.  ______________________________________________________________________________  Left Ventricle  The left ventricle is normal in size. There is normal left ventricular wall  thickness. The visual ejection fraction is 65-70%. No regional wall motion  abnormalities noted.     Right Ventricle  The right ventricle is normal size. Right ventricular function cannot be  assessed due to poor image quality.     Atria  Normal left atrial size. Right atrial size is normal. There is no color  Doppler evidence of an atrial shunt.     Mitral Valve  There is mild mitral annular calcification. Mitral valve leaflets appear  normal. There is mild (1+) mitral regurgitation. There is mild mitral  stenosis. The mean mitral valve gradient is 3mmHg.     Tricuspid Valve  Tricuspid valve leaflets appear normal. There is no evidence of tricuspid  stenosis or clinically significant tricuspid regurgitation.     Aortic Valve  There is a GUDELIA 3 bioprosthetic valve present in aortic valve position. The  prosthetic valve gradients are normal . Mean gradient is 14.5mmHg. There is no  paravalvular regurgitation present.     Pulmonic Valve  The pulmonic valve is not well visualized. There is no pulmonic valvular  regurgitation.     Vessels  Normal size ascending aorta. IVC diameter <2.1 cm collapsing >50% with sniff  suggests a normal RA pressure of 3 mmHg.     Cardiac monitor 3/2024:  Sinus rhythm, average 72 bpm, range  bpm.  PACs/PVCs <1%.  Brief runs of PACs, longest 16 beats, no symptoms.  Patient triggered events correlated with sinus rhythm and sinus tachycardia.       CORS 2/2024:  Coronary Angiography:  LEFT MAIN: Normal caliber vessel that bifurcates into left anterior descending and left circumflex arteries.  Proximal left main has mild diffuse disease followed by 50% eccentric, calcific stenosis in the distal portion.  FFR of the left  main to the LAD was 0.8.  LEFT ANTERIOR DESCENDING: Large vessel that gives rise to multiple medium size diagonal branches and septal perforators.  The LAD wraps around the apex distally.  The ostial LAD has 50 to 60% calcific stenosis followed by mild to moderate diffuse disease of the LAD and its branches distally.  LEFT CIRCUMFLEX: Large dominant vessel that gives rise to both small to medium size OM branches and terminates into the posterior descending artery and posterolateral system distally.  Left circumflex and its branches have mild diffuse disease.  RIGHT CORONARY ARTERY: Small, nondominant vessel with moderate disease.     ECHO 5/30/2023:  Interpretation Summary  Global and regional left ventricular function is normal with an EF of 60-65%.  Global right ventricular function is normal.  S/P TAVR with 23 mm ES3 Ultra valve. Mean aortic gradient 15 mmHg, stable from  prior study (18 mmHg) Trivial paravalvular aortic insufficiency.  No pericardial effusion is present.  ______________________________________________________________________________  Left Ventricle  Global and regional left ventricular function is normal with an EF of 60-65%.  Left ventricular size is normal. Mild concentric wall thickening consistent  with left ventricular hypertrophy is present.     Right Ventricle  The right ventricle is normal size. Global right ventricular function is  normal.     Atria  Both atria appear normal.     Mitral Valve  Mild mitral annular calcification is present. Mild mitral insufficiency is  present.     Aortic Valve  S/P TAVR with 23 mm ES3 Ultra valve. Mean aortic gradient 15 mmHg, stable from  prior study (18 mmHg) Trivial paravalvular aortic insufficiency. The valve  leaflets are not well visualized.     Tricuspid Valve  The tricuspid valve is normal. Mild tricuspid insufficiency is present. The  right ventricular systolic pressure is approximated at 28.9 mmHg plus the  right atrial pressure. Pulmonary  artery systolic pressure is normal.     Pulmonic Valve  The pulmonic valve is normal.     Vessels  The inferior vena cava was normal in size with preserved respiratory  variability.     Pericardium  No pericardial effusion is present.     Compared to Previous Study  This study was compared with the study from 2022 . No significant changes  noted.  ______________________________________________________________________________  MMode/2D Measurements & Calculations  IVSd: 1.2 cm     LVIDd: 4.5 cm  LVIDs: 2.9 cm  LVPWd: 1.00 cm  FS: 36.9 %  LV mass(C)d: 171.7 grams  LV mass(C)dI: 86.3 grams/m2  LA dimension: 3.8 cm  asc Aorta Diam: 3.1 cm  LVOT diam: 1.9 cm  LVOT area: 2.8 cm2  LA Volume (BP): 43.0 ml  LA Volume Index (BP): 21.6 ml/m2  RWT: 0.44     Doppler Measurements & Calculations  MV E max delbert: 85.7 cm/sec  MV A max delbert: 120.8 cm/sec  MV E/A: 0.71  MV dec slope: 312.0 cm/sec2  MV dec time: 0.27 sec  Ao V2 max: 263.3 cm/sec  Ao max P.7 mmHg  Ao V2 mean: 175.9 cm/sec  Ao mean P.9 mmHg  Ao V2 VTI: 63.7 cm  TONI(I,D): 2.3 cm2  TONI(V,D): 2.2 cm2  LV V1 max P.6 mmHg  LV V1 max: 203.7 cm/sec  LV V1 VTI: 51.8 cm  SV(LVOT): 146.7 ml  SI(LVOT): 73.7 ml/m2  TR max delbert: 268.8 cm/sec  TR max P.9 mmHg  AV Delbert Ratio (DI): 0.77  TONI Index (cm2/m2): 1.2  E/E' av.0  Lateral E/e': 10.5  Medial E/e': 11.4  RV S Delbert: 12.5 cm/sec        ECHO 22:  Interpretation Summary  S/P TAVR with 23 ES3 Ultra valve. Mean aortic gradient 18mmHg..Trivial  paravalvular AI.Aortic mean gradient was 11mmHg during previous study but the  study before that was 24mmHg.  ______________________________________________________________________________  Left Ventricle  Global and regional left ventricular function is normal with an EF of 60-65%.  Left ventricular size is normal. Mild concentric wall thickening consistent  with left ventricular hypertrophy is present. Left ventricular diastolic  function is normal. No regional  wall motion abnormalities are seen.     Right Ventricle  Right ventricular function, chamber size, wall motion, and thickness are  normal.     Atria  Both atria appear normal.     Mitral Valve  Mild mitral annular calcification is present. Mild mitral insufficiency is  present.     Aortic Valve  S/P TAVR with 23 ES3 Ultra valve. Mean aortic gradient 18mmHg..Trivial  paravalvular AI.Aortic mean gradient was 11mmHg during previous study but the  study before that was 24mmHg. A bioprosthetic aortic valve is present.     Tricuspid Valve  The tricuspid valve is normal. Mild tricuspid insufficiency is present. The  right ventricular systolic pressure is approximated at 28.0 mmHg plus the  right atrial pressure.     Pulmonic Valve  The pulmonic valve is normal.     Vessels  The thoracic aorta is normal. The pulmonary artery and bifurcation cannot be  assessed. The inferior vena cava is normal.     Pericardium  No pericardial effusion is present.     EKG 5/16/22:  Personally reviewed and interpreted  NSR HR 72    EKG 7/5/21:  Personally reviewed and interpreted by me  NSR HR 76, QRS duration 76    ECHO 7/5/21:  Mean gradient 11 mmHg no AI  Mean gradient 24 on previous study    CT 7/5/21:  Normal leaflet opening no HALT     ECG 5/20/21:  Personally reviewed and interpreted by me  NSR HR 98, QRS 70 ms,     Echocardiogram 5/20/21:  Interpretation Summary  Global and regional left ventricular function is hyperkinetic with an EF >70%.  TAVR with Little S3 23 mm valve. No aortic regurgitation is present.The mean  gradient is elevated at 34 mmHg. The aortic valve area is normal at 1.7 cm^2.  IVC diameter <2.1 cm collapsing >50% with sniff suggests a normal RA pressure  of 3 mmHg.  No pericardial effusion is present.     TAVR gr is likely elevated due to hyperdynamic LV fxn. Valve appearance and  TONI are normal. Recommend short term echo follow-up.    NYHA Class: I     CLINICAL IMPRESSION:     Reba Juarez is a very  pleasant 80 year old female with HTN, HLD, T2DM, CAD with remote POBA history w/ non-obstructive CAD per angiogram 4/2021, HFpEF, and severe symptomatic aortic stenosis status-post TAVR with a 23 mm Little Ellie 3 Ultra on 5/19/21 by Gucci Meek and Jonny who presents for routine CAD follow-up.    1. Aortic stenosis s/p TAVR:  She had significantly increased gradient of 34 mmHg on POD#1 thought related to hyperdynamic LV function --> improved to 24 mmHg 1 week post TAVR--> down to 11 mmHg at 1 month post TAVR.  CT did not show any evidence of leaflet thickening. Recent ECHO 10/2024 showed normal TAVR function mean PG 14 without PVL.   Plan:  -Continue ASA 81 mg lifelong and   -SBE prophylaxis lifelong.   -Repeat echo in 6 months.     2. CAD: Remote history of POBA. NSTEMI 2/2024 showed dLM disease ifR 0.8. Due to complex nature of lesion and normal EF, recommended medical therapy. Dr. Garcia did indicate that alternatively; one could consider cardiothoracic surgery consultation for evaluation of surgical revascularization and if not a candidate then high risk, complex PCI is an option. Currently doing well with improvement in symptoms on Ranexa 500 mg BID and Isordil 20 mg BID.   Plan:   - Continue Isordil to 20 mg BID.   - Continue Ranexa 500 mg BID.   - Continue high intensity statin and Zetia  - Repeat CMP, Lipids 6 months    3. Tachycardia: History of intermittent tachy/palpitation. Cardiac monitor in 2023 with pSVT, no AF. Repeat monitor in 2024 with NSR.   - CTM.     4. HFpEF:   Has decreased foot swelling since losing weight  - discontinue hydrochlorothiazide due to CARMEL possibly related to volume depletion  - Continue Zepbound     5. HTN:  Home Blood pressures improved since weight loss, BP at goal with home readings.   - Continue valsartan 40 mg daily and metoprolol XL 25 mg BID, stop hydrochlorothiazide     6. Obesity: Continue GLP-1     7. CARMEL on CKD:   Baseline creatinine 1.1 and GFR 48.  Now creat increased to 1.7 and GFR down to 30.   Possibly volume depletion?  Hold hydrochlorothiazide and repeat BMP 1 week     RTC: Follow-up 6 months with labs and echo prior.     ANGELA Elmore, CNP  [facility] Cardiology Team      CC  Patient Care Team:  Dayana Kruse, NP as PCP - General (Nurse Practitioner)  Celi Louie PA-C as Physician Assistant (Neurological Surgery)  Whitley Denson PA-C as Assigned Neuroscience Provider  Nando Rueda MD as Hospitalist (Critical Care)  Nando Rueda MD as Assigned Pulmonology Provider  Magalys Beasley RPH as Pharmacist (Pharmacy)  Mary Jo Ball MD as Assigned Heart and Vascular Provider  Magalys Beasley RPH as Assigned MTM Pharmacist

## 2025-07-25 ENCOUNTER — OFFICE VISIT (OUTPATIENT)
Dept: CARDIOLOGY | Facility: CLINIC | Age: 80
End: 2025-07-25
Attending: NURSE PRACTITIONER
Payer: COMMERCIAL

## 2025-07-25 VITALS
HEART RATE: 74 BPM | SYSTOLIC BLOOD PRESSURE: 130 MMHG | WEIGHT: 211.4 LBS | DIASTOLIC BLOOD PRESSURE: 70 MMHG | BODY MASS INDEX: 38.67 KG/M2 | OXYGEN SATURATION: 95 %

## 2025-07-25 DIAGNOSIS — I10 HYPERTENSION, UNSPECIFIED TYPE: ICD-10-CM

## 2025-07-25 DIAGNOSIS — I25.10 CORONARY ARTERY DISEASE INVOLVING NATIVE CORONARY ARTERY OF NATIVE HEART WITHOUT ANGINA PECTORIS: Primary | ICD-10-CM

## 2025-07-25 DIAGNOSIS — E66.813 CLASS 3 SEVERE OBESITY WITH SERIOUS COMORBIDITY IN ADULT, UNSPECIFIED BMI, UNSPECIFIED OBESITY TYPE (H): ICD-10-CM

## 2025-07-25 DIAGNOSIS — N18.30 STAGE 3 CHRONIC KIDNEY DISEASE, UNSPECIFIED WHETHER STAGE 3A OR 3B CKD (H): ICD-10-CM

## 2025-07-25 DIAGNOSIS — I25.9 CHEST PAIN DUE TO MYOCARDIAL ISCHEMIA, UNSPECIFIED ISCHEMIC CHEST PAIN TYPE: ICD-10-CM

## 2025-07-25 DIAGNOSIS — E78.5 DYSLIPIDEMIA: ICD-10-CM

## 2025-07-25 PROCEDURE — 3075F SYST BP GE 130 - 139MM HG: CPT | Performed by: NURSE PRACTITIONER

## 2025-07-25 PROCEDURE — 3078F DIAST BP <80 MM HG: CPT | Performed by: NURSE PRACTITIONER

## 2025-07-25 PROCEDURE — 1126F AMNT PAIN NOTED NONE PRSNT: CPT | Performed by: NURSE PRACTITIONER

## 2025-07-25 PROCEDURE — G0463 HOSPITAL OUTPT CLINIC VISIT: HCPCS | Performed by: NURSE PRACTITIONER

## 2025-07-25 PROCEDURE — 99214 OFFICE O/P EST MOD 30 MIN: CPT | Performed by: NURSE PRACTITIONER

## 2025-07-25 RX ORDER — FAMOTIDINE 40 MG/1
40 TABLET, FILM COATED ORAL PRN
COMMUNITY
Start: 2025-07-19

## 2025-07-25 ASSESSMENT — PAIN SCALES - GENERAL: PAINLEVEL_OUTOF10: NO PAIN (0)

## 2025-07-25 NOTE — LETTER
7/25/2025      RE: Reba Juarez  1974 [address] [facility] [address]       Dear Colleague,    Thank you for the opportunity to participate in the care of your patient, Reba Juarez, at the [facility] HEART CLINIC [place] at [facility]. Please see a copy of my visit note below.        [facility] HEART CARE  CARDIOVASCULAR DIVISION    VALVE CLINIC RETURN VISIT    PRIMARY CARDIOLOGIST: Dr. Harris/Mireille Edwards NP       PERTINENT CLINICAL HISTORY:     Reba Juarez is a very pleasant 80 year old female who presents for routine CAD follow-up. She has a history of HTN, HLD, T2DM, MAYKEL on CPAP, paroxysmal SVT, CAD with remote POBA history, non-obstructive CAD per angiogram 4/2021, NSTEMI 2/2024 with complex distal LM disease iFR 0.8 - recommended medical therapy if refractory angina high risk PCI vs CABG, HFpEF and severe aortic stenosis status-post TAVR with a 23 mm Little Ellie 3 Ultra on 5/19/21. Her POD#1 ECHO showed elevated mean PG of 34 mmHg thought related to hyperdynamic LV function. She received IVF and was started on metoprolol with improvement in mean PG to 24 mmHg on follow-up echo. CT shows normal valve function without thrombus. Last echo 10/2024 showed mean PG 15 mmHg without PVL.     Interval History 5/12/25  Patient had an NSTEMI 2/2024 in the setting of tachycardia and hypertension. Coronary angiogram showed complex distal LM disease ifR 0.8. Due to complex nature of lesion and normal EF, Dr. Garcia recommended medical therapy. If refractory angina, could consider CV surgery consultation vs complex PCI. She saw her primary cardiologist recently who started isordil in addition to ranexa with improvement in angina. No longer experiencing pain in upper chest, radiating up into neck, ears. She is still having SOB when climbing stairs though better than it was a year ago. Her symtpoms are still limiting her but minimally. Much better  overall. Otherwise no orthopnea, PND< leg swelling. No lightheadedness, palpitations, syncope. Currently on ASA 81, metoprolol XL 25 mg BID, valsartan 80 mg daily, Isordil 10 mg BID, hydrochlorothiazide 25 mg daily, Ranexa 500 mg BID, Zetia 10 mg daily, Crestor 40 mg daily.     Interval history 7/25/2025:  Patient presents to clinic today with improved chest anginal symptoms after starting Isordil and Ranexa.  Patient's blood pressures at home have been stable 120s 130s after reducing valsartan to 40 mg as requested by the patient.  Patient started on Zepbound which she has lost approximately 14 pounds and is now having decreased swelling in her lower extremities.  Patient is able to exercise at home as result but had a recent MSK injury which has left her slightly limited at this time.  Previous BMP showed slight elevation in creatinine, repeat BMP was  ordered.  Now holding HCTZ with continually elevated creatinine.       PAST MEDICAL HISTORY:   TAVR 5/2021  CAD w/NSTEMI 2/2024 found to have dLM disease medically managed  HTN  HLD  pSVT  DM II     PAST SURGICAL HISTORY:     Past Surgical History:   Procedure Laterality Date     ANGIOPLASTY  early 1990s     COLONOSCOPY  2010     CV CORONARY ANGIOGRAM N/A 4/9/2021    Procedure: CV CORONARY ANGIOGRAM;  Surgeon: Júnior Santos MD;  Location: [facility] CARDIAC CATH LAB     CV CORONARY ANGIOGRAM N/A 2/19/2024    Procedure: Coronary Angiogram;  Surgeon: Romero Garcia MD;  Location: [facility] CATH LAB CV     CV FRACTIONAL FLOW RATIO WIRE N/A 2/19/2024    Procedure: Fractional Flow Ratio Wire;  Surgeon: Romero Garcia MD;  Location: [facility] CATH LAB CV     CV INSTANTANEOUS WAVE-FREE RATIO N/A 2/19/2024    Procedure: Instantaneous Wave-Free Ratio;  Surgeon: Romero Garcia MD;  Location: [facility] CATH LAB CV     CV LEFT HEART CATH N/A 4/9/2021    Procedure: Left Heart Cath;  Surgeon: Júnior Santos MD;  Location: [facility] CARDIAC CATH LAB     CV RIGHT  HEART CATH MEASUREMENTS RECORDED N/A 4/9/2021    Procedure: Right Heart Cath;  Surgeon: Júnior Santos MD;  Location:  HEART CARDIAC CATH LAB     CV TRANSCATHETER AORTIC VALVE REPLACEMENT N/A 5/19/2021    Procedure: Transfemoral Transcatheter Aortic Valve Replacement with 23 mm Little Ellie 3 Ultra Transcatheter Heart Valve, Transthoracic echocardiogram read by Dr. Farfan;  Surgeon: Harvinder Quick MD;  Location:  OR     EYE SURGERY       HEART CATH FEMORAL CANNULIZATION WITH OPEN STANDBY REPAIR AORTIC VALVE N/A 5/19/2021    Procedure: cardiopulmonary bypass on standby;  Surgeon: Riaz Fuller MD;  Location:  OR        CURRENT MEDICATIONS:     Current Outpatient Medications   Medication Sig Dispense Refill     ALPRAZolam (XANAX) 0.5 MG tablet Take 0.5 mg by mouth daily as needed for anxiety (prior to dental work)       amoxicillin (AMOXIL) 500 MG capsule Take 4 capsules (2,000 mg) by mouth once as needed (SBE prophylaxis - take 30-60 minutes prior to dental procedure or cleaning) 4 capsule 3     aspirin 81 MG tablet Take 1 tablet by mouth daily.       ezetimibe (ZETIA) 10 MG tablet Take 1 tablet (10 mg) by mouth daily 90 tablet 3     Glucos-Chond-Hyal Ac-Ca Fructo (MOVE FREE JOINT HEALTH ADVANCE) TABS Take 1 tablet by mouth daily       hydrochlorothiazide (HYDRODIURIL) 25 MG tablet TAKE 1 TABLET BY MOUTH EVERY DAY 90 tablet 0     isosorbide dinitrate (ISORDIL) 20 MG tablet Take 40 mg in the morning and 20 mg in the afternoon 270 tablet 3     Menatetrenone (VITAMIN K2) 100 MCG TABS Take 100 mcg by mouth daily       metoprolol succinate ER (TOPROL XL) 25 MG 24 hr tablet Take 1 tablet (25 mg) by mouth 2 times daily. 180 tablet 3     multivitamin, therapeutic (THERA-VIT) TABS tablet Take 1 tablet by mouth daily       nitroGLYcerin (NITROSTAT) 0.4 MG sublingual tablet Place 0.4 mg under the tongue every 5 minutes as needed       ORDER FOR DME Use your CPAP device as directed by your provider.        ranolazine (RANEXA) 500 MG 12 hr tablet TAKE 1 TABLET BY MOUTH TWICE A  tablet 1     rosuvastatin (CRESTOR) 40 MG tablet Take 40 mg by mouth daily       tirzepatide-Weight Management (ZEPBOUND) 2.5 MG/0.5ML prefilled pen Inject 0.5 mLs (2.5 mg) subcutaneously every 7 days. 2 mL 0     tirzepatide-Weight Management (ZEPBOUND) 5 MG/0.5ML prefilled pen Inject 0.5 mLs (5 mg) subcutaneously every 7 days. 2 mL 0     UNABLE TO FIND Take by mouth. MEDICATION NAME: iodine supplement 325 mcg by mouth daily per patient       valsartan (DIOVAN) 40 MG tablet Take 1 tablet (40 mg) by mouth daily at 2 pm. 90 tablet 3     vitamin D3 (CHOLECALCIFEROL) 50 mcg (2000 units) tablet Take 2 tablets by mouth 2 times daily.          ALLERGIES:     Allergies   Allergen Reactions     Calcium Citrate Unknown     Bloody nose per pt     Lipitor [Atorvastatin Calcium]      Myalgia       Pravastatin      Myalgia       Simvastatin      myalgia        FAMILY HISTORY:     Family History   Problem Relation Age of Onset     No Known Problems Sister      Alcoholism Brother      Myocardial Infarction Mother 51        HTN onset age 30, was a smoker     Myocardial Infarction Father 63        smoker     Myocardial Infarction Brother 39     No Known Problems Son      No Known Problems Daughter      CABG Brother      Heart Disease Brother      No Known Problems Sister      Cerebrovascular Disease Sister      Coronary Artery Disease Sister      No Known Problems Son         SOCIAL HISTORY:     Social History     Socioeconomic History     Marital status:      Spouse name: Not on file     Number of children: Not on file     Years of education: Not on file     Highest education level: Not on file   Occupational History     Occupation: retired     Employer: [employer]     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, still works a few days as biochemical pharmacist   Social Needs     Financial resource strain: Not on file     Food insecurity     Worry: Not on file      Inability: Not on file     Transportation needs     Medical: Not on file     Non-medical: Not on file   Tobacco Use     Smoking status: Former Smoker     Packs/day: 1.00     Years: 20.00     Pack years: 20.00     Quit date: 3/5/1992     Years since quittin.2     Smokeless tobacco: Never Used   Substance and Sexual Activity     Alcohol use: No     Drug use: No     Sexual activity: Not on file   Lifestyle     Physical activity     Days per week: Not on file     Minutes per session: Not on file     Stress: Not on file   Relationships     Social connections     Talks on phone: Not on file     Gets together: Not on file     Attends [employer] service: Not on file     Active member of club or organization: Not on file     Attends meetings of clubs or organizations: Not on file     Relationship status: Not on file     Intimate partner violence     Fear of current or ex partner: Not on file     Emotionally abused: Not on file     Physically abused: Not on file     Forced sexual activity: Not on file   Other Topics Concern     Parent/sibling w/ CABG, MI or angioplasty before 65F 55M? Not Asked   Social History Narrative    Single and lives with her son and brother.        REVIEW OF SYSTEMS:     Constitutional: No fevers or chills  Skin: No new rash or itching  Eyes: No acute change in vision  Ears/Nose/Throat: No purulent rhinorrhea, new hearing loss, or new vertigo  Respiratory: No cough or hemoptysis  Cardiovascular: See HPI  Gastrointestinal: No change in appetite, vomiting, hematemesis or diarrhea  Genitourinary: No dysuria or hematuria  Musculoskeletal: No new back pain, neck pain or muscle pain  Neurologic: No new headaches, focal weakness or behavior changes  Psychiatric: No hallucinations, excessive alcohol consumption or illegal drug usage  Hematologic/Lymphatic/Immunologic: No bleeding, chills, fever, night sweats or weight loss  Endocrine: No new cold intolerance, heat intolerance, polyphagia, polydipsia  or polyuria      PHYSICAL EXAMINATION:     /70 (BP Location: Right arm, Patient Position: Chair, Cuff Size: Adult Regular)   Pulse 74   Wt 95.9 kg (211 lb 6.4 oz)   SpO2 95%   BMI 38.67 kg/m      GENERAL: No acute distress.  HEENT: EOMI. Sclerae white, not injected. Nares clear. Pharynx without erythema or exudate.   Neck: No adenopathy. No thyromegaly. No jugular venous distension.   Heart: Regular rate and rhythm. Soft 2/6 DALIA murmur RUSB.   Lungs: Clear to auscultation. No ronchi, wheezes, rales.   Abdomen: Soft, nontender, nondistended. Bowel sounds present.  Extremities: No clubbing, cyanosis, or edema.   Neurologic: Alert and oriented to person/place/time, normal speech and affect. No focal deficits.  Skin: No petechiae, purpura or rash.     LABORATORY DATA:   Personally reviewed and interpreted lab results.   CBC RESULTS:  Lab Results   Component Value Date    WBC 16.9 (H) 05/13/2025    WBC 12.8 (H) 07/05/2021    RBC 4.29 05/13/2025    RBC 4.25 07/05/2021    HGB 12.2 05/13/2025    HGB 11.7 07/05/2021    HCT 36.5 05/13/2025    HCT 35.9 07/05/2021    MCV 85 05/13/2025    MCV 85 07/05/2021    MCH 28.4 05/13/2025    MCH 27.5 07/05/2021    MCHC 33.4 05/13/2025    MCHC 32.6 07/05/2021    RDW 14.7 05/13/2025    RDW 14.8 07/05/2021     05/13/2025     07/05/2021       BMP RESULTS:  Lab Results   Component Value Date     05/13/2025     07/05/2021    POTASSIUM 4.1 05/13/2025    POTASSIUM 4.1 05/16/2022    POTASSIUM 4.2 07/05/2021    CHLORIDE 108 (H) 05/13/2025    CHLORIDE 112 (H) 05/16/2022    CHLORIDE 108 07/05/2021    CO2 21 (L) 05/13/2025    CO2 24 05/16/2022    CO2 27 07/05/2021    ANIONGAP 11 05/13/2025    ANIONGAP 7 05/16/2022    ANIONGAP 3 07/05/2021     (H) 05/13/2025     (H) 05/16/2022     (H) 07/05/2021    BUN 17.5 05/13/2025    BUN 14 05/16/2022    BUN 14 07/05/2021    CR 1.35 (H) 05/13/2025    CR 0.86 07/05/2021    GFRESTIMATED 39 (L) 05/13/2025     GFRESTIMATED 65 07/05/2021    GFRESTBLACK 76 07/05/2021    TERRI 9.4 05/13/2025    TERRI 8.6 07/05/2021         PROCEDURES & FURTHER ASSESSMENTS:       ECHO 10/2024:     The visual ejection fraction is 65-70%.  Right ventricular function cannot be assessed due to poor image quality.  There is a GUDELIA 3 bioprosthetic valve present in aortic valve position. Mean  gradient is 14.5mmHg. There is no paravalvular regurgitation present.  Compared to the prior study dated 3/29/2024, there have been no changes.  ______________________________________________________________________________  Left Ventricle  The left ventricle is normal in size. There is normal left ventricular wall  thickness. The visual ejection fraction is 65-70%. No regional wall motion  abnormalities noted.     Right Ventricle  The right ventricle is normal size. Right ventricular function cannot be  assessed due to poor image quality.     Atria  Normal left atrial size. Right atrial size is normal. There is no color  Doppler evidence of an atrial shunt.     Mitral Valve  There is mild mitral annular calcification. Mitral valve leaflets appear  normal. There is mild (1+) mitral regurgitation. There is mild mitral  stenosis. The mean mitral valve gradient is 3mmHg.     Tricuspid Valve  Tricuspid valve leaflets appear normal. There is no evidence of tricuspid  stenosis or clinically significant tricuspid regurgitation.     Aortic Valve  There is a GUDELIA 3 bioprosthetic valve present in aortic valve position. The  prosthetic valve gradients are normal . Mean gradient is 14.5mmHg. There is no  paravalvular regurgitation present.     Pulmonic Valve  The pulmonic valve is not well visualized. There is no pulmonic valvular  regurgitation.     Vessels  Normal size ascending aorta. IVC diameter <2.1 cm collapsing >50% with sniff  suggests a normal RA pressure of 3 mmHg.     Cardiac monitor 3/2024:  Sinus rhythm, average 72 bpm, range  bpm.  PACs/PVCs  <1%.  Brief runs of PACs, longest 16 beats, no symptoms.  Patient triggered events correlated with sinus rhythm and sinus tachycardia.       CORS 2/2024:  Coronary Angiography:  LEFT MAIN: Normal caliber vessel that bifurcates into left anterior descending and left circumflex arteries.  Proximal left main has mild diffuse disease followed by 50% eccentric, calcific stenosis in the distal portion.  FFR of the left main to the LAD was 0.8.  LEFT ANTERIOR DESCENDING: Large vessel that gives rise to multiple medium size diagonal branches and septal perforators.  The LAD wraps around the apex distally.  The ostial LAD has 50 to 60% calcific stenosis followed by mild to moderate diffuse disease of the LAD and its branches distally.  LEFT CIRCUMFLEX: Large dominant vessel that gives rise to both small to medium size OM branches and terminates into the posterior descending artery and posterolateral system distally.  Left circumflex and its branches have mild diffuse disease.  RIGHT CORONARY ARTERY: Small, nondominant vessel with moderate disease.     ECHO 5/30/2023:  Interpretation Summary  Global and regional left ventricular function is normal with an EF of 60-65%.  Global right ventricular function is normal.  S/P TAVR with 23 mm ES3 Ultra valve. Mean aortic gradient 15 mmHg, stable from  prior study (18 mmHg) Trivial paravalvular aortic insufficiency.  No pericardial effusion is present.  ______________________________________________________________________________  Left Ventricle  Global and regional left ventricular function is normal with an EF of 60-65%.  Left ventricular size is normal. Mild concentric wall thickening consistent  with left ventricular hypertrophy is present.     Right Ventricle  The right ventricle is normal size. Global right ventricular function is  normal.     Atria  Both atria appear normal.     Mitral Valve  Mild mitral annular calcification is present. Mild mitral insufficiency  is  present.     Aortic Valve  S/P TAVR with 23 mm ES3 Ultra valve. Mean aortic gradient 15 mmHg, stable from  prior study (18 mmHg) Trivial paravalvular aortic insufficiency. The valve  leaflets are not well visualized.     Tricuspid Valve  The tricuspid valve is normal. Mild tricuspid insufficiency is present. The  right ventricular systolic pressure is approximated at 28.9 mmHg plus the  right atrial pressure. Pulmonary artery systolic pressure is normal.     Pulmonic Valve  The pulmonic valve is normal.     Vessels  The inferior vena cava was normal in size with preserved respiratory  variability.     Pericardium  No pericardial effusion is present.     Compared to Previous Study  This study was compared with the study from 2022 . No significant changes  noted.  ______________________________________________________________________________  MMode/2D Measurements & Calculations  IVSd: 1.2 cm     LVIDd: 4.5 cm  LVIDs: 2.9 cm  LVPWd: 1.00 cm  FS: 36.9 %  LV mass(C)d: 171.7 grams  LV mass(C)dI: 86.3 grams/m2  LA dimension: 3.8 cm  asc Aorta Diam: 3.1 cm  LVOT diam: 1.9 cm  LVOT area: 2.8 cm2  LA Volume (BP): 43.0 ml  LA Volume Index (BP): 21.6 ml/m2  RWT: 0.44     Doppler Measurements & Calculations  MV E max delbert: 85.7 cm/sec  MV A max delbert: 120.8 cm/sec  MV E/A: 0.71  MV dec slope: 312.0 cm/sec2  MV dec time: 0.27 sec  Ao V2 max: 263.3 cm/sec  Ao max P.7 mmHg  Ao V2 mean: 175.9 cm/sec  Ao mean P.9 mmHg  Ao V2 VTI: 63.7 cm  TONI(I,D): 2.3 cm2  TONI(V,D): 2.2 cm2  LV V1 max P.6 mmHg  LV V1 max: 203.7 cm/sec  LV V1 VTI: 51.8 cm  SV(LVOT): 146.7 ml  SI(LVOT): 73.7 ml/m2  TR max delbert: 268.8 cm/sec  TR max P.9 mmHg  AV Delbert Ratio (DI): 0.77  TONI Index (cm2/m2): 1.2  E/E' av.0  Lateral E/e': 10.5  Medial E/e': 11.4  RV S Delbert: 12.5 cm/sec        ECHO 22:  Interpretation Summary  S/P TAVR with 23 ES3 Ultra valve. Mean aortic gradient 18mmHg..Trivial  paravalvular AI.Aortic mean gradient was  11mmHg during previous study but the  study before that was 24mmHg.  ______________________________________________________________________________  Left Ventricle  Global and regional left ventricular function is normal with an EF of 60-65%.  Left ventricular size is normal. Mild concentric wall thickening consistent  with left ventricular hypertrophy is present. Left ventricular diastolic  function is normal. No regional wall motion abnormalities are seen.     Right Ventricle  Right ventricular function, chamber size, wall motion, and thickness are  normal.     Atria  Both atria appear normal.     Mitral Valve  Mild mitral annular calcification is present. Mild mitral insufficiency is  present.     Aortic Valve  S/P TAVR with 23 ES3 Ultra valve. Mean aortic gradient 18mmHg..Trivial  paravalvular AI.Aortic mean gradient was 11mmHg during previous study but the  study before that was 24mmHg. A bioprosthetic aortic valve is present.     Tricuspid Valve  The tricuspid valve is normal. Mild tricuspid insufficiency is present. The  right ventricular systolic pressure is approximated at 28.0 mmHg plus the  right atrial pressure.     Pulmonic Valve  The pulmonic valve is normal.     Vessels  The thoracic aorta is normal. The pulmonary artery and bifurcation cannot be  assessed. The inferior vena cava is normal.     Pericardium  No pericardial effusion is present.     EKG 5/16/22:  Personally reviewed and interpreted  NSR HR 72    EKG 7/5/21:  Personally reviewed and interpreted by me  NSR HR 76, QRS duration 76    ECHO 7/5/21:  Mean gradient 11 mmHg no AI  Mean gradient 24 on previous study    CT 7/5/21:  Normal leaflet opening no HALT     ECG 5/20/21:  Personally reviewed and interpreted by me  NSR HR 98, QRS 70 ms,     Echocardiogram 5/20/21:  Interpretation Summary  Global and regional left ventricular function is hyperkinetic with an EF >70%.  TAVR with Little S3 23 mm valve. No aortic regurgitation is  present.The mean  gradient is elevated at 34 mmHg. The aortic valve area is normal at 1.7 cm^2.  IVC diameter <2.1 cm collapsing >50% with sniff suggests a normal RA pressure  of 3 mmHg.  No pericardial effusion is present.     TAVR gr is likely elevated due to hyperdynamic LV fxn. Valve appearance and  TONI are normal. Recommend short term echo follow-up.    NYHA Class: I     CLINICAL IMPRESSION:     Reba Juarez is a very pleasant 80 year old female with HTN, HLD, T2DM, CAD with remote POBA history w/ non-obstructive CAD per angiogram 4/2021, HFpEF, and severe symptomatic aortic stenosis status-post TAVR with a 23 mm Little Ellie 3 Ultra on 5/19/21 by Gucci Meek and Jonny who presents for routine CAD follow-up.    1. Aortic stenosis s/p TAVR:  She had significantly increased gradient of 34 mmHg on POD#1 thought related to hyperdynamic LV function --> improved to 24 mmHg 1 week post TAVR--> down to 11 mmHg at 1 month post TAVR.  CT did not show any evidence of leaflet thickening. Recent ECHO 10/2024 showed normal TAVR function mean PG 14 without PVL.   Plan:  -Continue ASA 81 mg lifelong and   -SBE prophylaxis lifelong.   -Repeat echo in 6 months.     2. CAD: Remote history of POBA. NSTEMI 2/2024 showed dLM disease ifR 0.8. Due to complex nature of lesion and normal EF, recommended medical therapy. Dr. Garcia did indicate that alternatively; one could consider cardiothoracic surgery consultation for evaluation of surgical revascularization and if not a candidate then high risk, complex PCI is an option. Currently doing well with improvement in symptoms on Ranexa 500 mg BID and Isordil 20 mg BID.   Plan:   - Continue Isordil to 20 mg BID.   - Continue Ranexa 500 mg BID.   - Continue high intensity statin and Zetia  - Repeat CMP, Lipids 6 months    3. Tachycardia: History of intermittent tachy/palpitation. Cardiac monitor in 2023 with pSVT, no AF. Repeat monitor in 2024 with NSR.   - CTM.      4. HFpEF:   Has decreased foot swelling since losing weight  - discontinue hydrochlorothiazide due to CARMEL possibly related to volume depletion  - Continue Zepbound     5. HTN:  Home Blood pressures improved since weight loss, BP at goal with home readings.   - Continue valsartan 40 mg daily and metoprolol XL 25 mg BID, stop hydrochlorothiazide     6. Obesity: Continue GLP-1     7. CARMEL on CKD:   Baseline creatinine 1.1 and GFR 48. Now creat increased to 1.7 and GFR down to 30.   Possibly volume depletion?  Hold hydrochlorothiazide and repeat BMP 1 week     RTC: Follow-up 6 months with labs and echo prior.     ANGELA Elmore, CNP  [facility] Cardiology Team      CC  Patient Care Team:  Dayana Kruse NP as PCP - General (Nurse Practitioner)  Celi Louie PA-C as Physician Assistant (Neurological Surgery)  Whitley Denson PA-C as Assigned Neuroscience Provider  Nando Rueda MD as Hospitalist (Critical Care)  Nando Rueda MD as Assigned Pulmonology Provider  Magalys Beasley RPH as Pharmacist (Pharmacy)  Mary Jo Ball MD as Assigned Heart and Vascular Provider  Magalys Beasley RPH as Assigned MTM Pharmacist                Please do not hesitate to contact me if you have any questions/concerns.     Sincerely,     Carmel Weston NP

## 2025-07-25 NOTE — NURSING NOTE
Chief Complaint   Patient presents with    Follow Up     Reason for visit: General Cardiac clinic for Follow-up       Vitals were taken, medications reconciled.    JONAH Jim    11:08 AM

## 2025-07-25 NOTE — PATIENT INSTRUCTIONS
You were seen today in the Cardiovascular Clinic at the [facility] by:       ANGELA BAI CNP     Your visit summary and instructions are as follows:     Continue current medications  Lab work , BMP today  Focus on water intake and hydration  Follow up in 6 months with cardiology K[facility], Lab and echo prior        Thank you for your visit today!      Please MyChart message me or call my nurse if you have any questions or concerns.     During Business Hours:   [phone], Structural Heart  Zamzam Muñiz Weathers      After hours, weekends or holidays:   [phone], Option #4  Ask to speak to the On-Call Cardiologist. Inform them you are a cardiology patient at the [facility].

## 2025-07-26 DIAGNOSIS — E66.812 CLASS 2 SEVERE OBESITY DUE TO EXCESS CALORIES WITH SERIOUS COMORBIDITY AND BODY MASS INDEX (BMI) OF 35.0 TO 35.9 IN ADULT (H): ICD-10-CM

## 2025-07-26 DIAGNOSIS — G47.33 OSA ON CPAP: ICD-10-CM

## 2025-07-26 DIAGNOSIS — E66.01 CLASS 2 SEVERE OBESITY DUE TO EXCESS CALORIES WITH SERIOUS COMORBIDITY AND BODY MASS INDEX (BMI) OF 35.0 TO 35.9 IN ADULT (H): ICD-10-CM

## 2025-07-28 ENCOUNTER — VIRTUAL VISIT (OUTPATIENT)
Facility: CLINIC | Age: 80
End: 2025-07-28
Attending: NURSE PRACTITIONER
Payer: COMMERCIAL

## 2025-07-28 DIAGNOSIS — E66.9 OBESITY: ICD-10-CM

## 2025-07-28 DIAGNOSIS — G47.33 OSA ON CPAP: Primary | ICD-10-CM

## 2025-07-28 DIAGNOSIS — E66.01 CLASS 2 SEVERE OBESITY DUE TO EXCESS CALORIES WITH SERIOUS COMORBIDITY AND BODY MASS INDEX (BMI) OF 35.0 TO 35.9 IN ADULT (H): ICD-10-CM

## 2025-07-28 DIAGNOSIS — E66.812 CLASS 2 SEVERE OBESITY DUE TO EXCESS CALORIES WITH SERIOUS COMORBIDITY AND BODY MASS INDEX (BMI) OF 35.0 TO 35.9 IN ADULT (H): ICD-10-CM

## 2025-07-28 RX ORDER — TIRZEPATIDE 5 MG/.5ML
INJECTION, SOLUTION SUBCUTANEOUS
OUTPATIENT
Start: 2025-07-28

## 2025-07-28 NOTE — PATIENT INSTRUCTIONS
"Recommendations from today's MTM visit:                                                      Continue Zepbound 5 mg subcutaneous once a week.      Follow-up: 10/20/25 at 11am via telephone     It was great speaking with you today.  I value your experience and would be very thankful for your time in providing feedback in our clinic survey. In the next few days, you may receive an email or text message from [facility] [employer] with a link to a survey related to your  clinical pharmacist.\"     To schedule another MTM appointment, please call the clinic directly or you may call the MTM scheduling line at [phone].    My Clinical Pharmacist's contact information:                                                      Please feel free to contact me with any questions or concerns you have.      Magalys Beasley, PharmD  Medication Therapy Management Pharmacist   [facility]     "

## 2025-07-28 NOTE — Clinical Note
Terry Burt,   Sending as NEDRA. Follow-up with Adamaris after increasing Zepbound dose to 5 mg subcutaneous once a week. Minimal side effects and continues to lose weight on this dose. Will continue current dose for next 3 month and check in with her again at that time.   Magalys Craig, PharmD Medication Therapy Management Pharmacist  [facility]

## (undated) DEVICE — SHTH INTRO 0.021IN ID 6FR DIA

## (undated) DEVICE — INTRO TERUMO 7FRX25CM W/MARKER RSB703

## (undated) DEVICE — Device

## (undated) DEVICE — SYR ANGIOGRAPHY MULTIUSE KIT ACIST 014612

## (undated) DEVICE — INTRO SHEATH 6FRX25CM PINNACLE RSS606

## (undated) DEVICE — PACK HEART LEFT CUSTOM

## (undated) DEVICE — TUBING PRESSURE 30"

## (undated) DEVICE — WIRE GUIDE 0.035"X150CM EMERALD STR 502542

## (undated) DEVICE — SUCTION MANIFOLD NEPTUNE 2 SYS 4 PORT 0702-020-000

## (undated) DEVICE — GUIDEWIRE VASCULAR 0.035IN DIA 145CML 15CML/6CML STAINLESS

## (undated) DEVICE — KIT HAND CONTROL ACIST 014644 AR-P54

## (undated) DEVICE — SLEEVE TR BAND RADIAL COMPRESSION DEVICE 24CM TRB24-REG

## (undated) DEVICE — 0.035IN X 260CM, EMERALD DIAGNOSTIC GUIDEWIRE, FIXED-CORE PTFE COATED, STANDARD, 3MM EXCHANGE J-TIP (EA/1)

## (undated) DEVICE — CUSTOM PACK CORONARY SAN5BCRHEA

## (undated) DEVICE — PACK GOWN 3/PK DISP XL SBA32GPFCB

## (undated) DEVICE — VALVE HEMOSTASIS .115" DUOSTAT ADAPTER 23245

## (undated) DEVICE — TAPE MEDIPORE 4"X2YD 2864

## (undated) DEVICE — DRAPE CV SPLIT II 147X106" 9158

## (undated) DEVICE — LINEN TOWEL PACK X30 5481

## (undated) DEVICE — SET INTRODUCER SHEATH 14FR 914ES

## (undated) DEVICE — MANIFOLD KIT ANGIO AUTOMATED 014613

## (undated) DEVICE — DRSG PRIMAPORE 02X3" 7133

## (undated) DEVICE — NDL 27GA 1.25" 305136

## (undated) DEVICE — FASTENER CATH BALLOON CLAMPX2 STATLOCK 0684-00-493

## (undated) DEVICE — LIGHT HANDLE X1 31140133

## (undated) DEVICE — ADH SKIN CLOSURE PREMIERPRO EXOFIN 1.0ML 3470

## (undated) DEVICE — INFLATION DEVICE ATRION QL2530

## (undated) DEVICE — CATH ANGIO INFINITI PIGTAIL 145 6 SH 6FRX110CM  534-652S

## (undated) DEVICE — DRAPE IOBAN INCISE 23X17" 6650EZ

## (undated) DEVICE — SOL NACL 0.9% IRRIG 1000ML BOTTLE 2F7124

## (undated) DEVICE — CATH EP PACEL 5FRX110CM 1MM RIGHT HEART CURVE 401763

## (undated) DEVICE — COVER ULTRASOUND PROBE W/GEL FLEXI-FEEL 6"X58" LF  25-FF658

## (undated) DEVICE — TUBING SUCTION 10'X3/16" N510

## (undated) DEVICE — RAD CLOSURE ANGIOSEAL 8FR  610131

## (undated) DEVICE — CATH ANGIO SUPERTORQUE AL1 6FRX100CM 532-645

## (undated) DEVICE — BASIN EMESIS STERILE  SSK9005A

## (undated) DEVICE — CATH ANGIO JUDKINS R4 6FRX100CM INFINITI 534621T

## (undated) DEVICE — DRAPE SHEET REV FOLD 3/4 9349

## (undated) DEVICE — ESU GROUND PAD ADULT REM W/15' CORD E7507DB

## (undated) DEVICE — GLOVE PROTEXIS POWDER FREE 7.5 ORTHOPEDIC 2D73ET75

## (undated) DEVICE — DRSG TEGADERM 4X4 3/4" 1626W

## (undated) DEVICE — WIRE GUIDE LUNDERQUIST 0.035"X260CM DBL CVD

## (undated) DEVICE — GUIDEWIRE VASC SAFARI2 0.035X275CM H74939406XS1

## (undated) DEVICE — SPONGE RAY-TEC 4X8" 7318

## (undated) DEVICE — CONNECTOR STOPCOCK 3 WAY MALE LL HI-FLO MX9311L

## (undated) DEVICE — SLEEVE TR BAND RADIAL COMPRESSION DEVICE 29CM XX-RF06L

## (undated) DEVICE — SYR 50ML LL W/O NDL 309653

## (undated) DEVICE — DRSG TEGADERM 8X12" 1629

## (undated) DEVICE — CONNECTOR STOPCOCK 3-WAY HIGH PRESSURE (NAMIC) H965700550091

## (undated) DEVICE — VALVE HEMOSTASIS .096" COPILOT MECH 1003331

## (undated) DEVICE — PREP CHLORAPREP 26ML TINTED ORANGE  260815

## (undated) DEVICE — CLOSURE DEVICE 6FR VASC PROGLIDE MEDICATED SUTURE 12673-03

## (undated) DEVICE — BOWL STERILE 32OZ DYND50320

## (undated) DEVICE — WIRE GUIDE 0.035"X150CM EMERALD J TIP 502521

## (undated) DEVICE — GUIDEWIRE VASCULAR COMET II 185CML PRESSURE H74939359110

## (undated) DEVICE — SYR 10ML LL W/O NDL 302995

## (undated) DEVICE — DEFIB PADPRO CONMED ADULT/CHILD 2001Z-C

## (undated) DEVICE — RAD KNIFE HANDLE W/11 BLADE DISPOSABLE 371611

## (undated) DEVICE — DRAPE STERI FLUOROSCOPE 35X43"1012 LATEX FREE

## (undated) DEVICE — ELECTRODE DEFIB CADENCE 22550R

## (undated) DEVICE — ANGIOTOUCH KIT

## (undated) DEVICE — GUIDEWIRE OPTOWIRE 3 W/O GAUGE FACTOR CONNECTOR F1032

## (undated) RX ORDER — SODIUM CHLORIDE 9 MG/ML
INJECTION, SOLUTION INTRAVENOUS
Status: DISPENSED
Start: 2021-05-19

## (undated) RX ORDER — NITROGLYCERIN 5 MG/ML
VIAL (ML) INTRAVENOUS
Status: DISPENSED
Start: 2021-04-09

## (undated) RX ORDER — CEFAZOLIN SODIUM 2 G/100ML
INJECTION, SOLUTION INTRAVENOUS
Status: DISPENSED
Start: 2021-05-19

## (undated) RX ORDER — PROTAMINE SULFATE 10 MG/ML
INJECTION, SOLUTION INTRAVENOUS
Status: DISPENSED
Start: 2021-05-19

## (undated) RX ORDER — LIDOCAINE HYDROCHLORIDE 10 MG/ML
INJECTION, SOLUTION EPIDURAL; INFILTRATION; INTRACAUDAL; PERINEURAL
Status: DISPENSED
Start: 2021-05-19

## (undated) RX ORDER — HEPARIN SODIUM 1000 [USP'U]/ML
INJECTION, SOLUTION INTRAVENOUS; SUBCUTANEOUS
Status: DISPENSED
Start: 2021-05-19

## (undated) RX ORDER — FENTANYL CITRATE 50 UG/ML
INJECTION, SOLUTION INTRAMUSCULAR; INTRAVENOUS
Status: DISPENSED
Start: 2021-04-09

## (undated) RX ORDER — SODIUM CHLORIDE 9 MG/ML
INJECTION, SOLUTION INTRAVENOUS
Status: DISPENSED
Start: 2021-04-09

## (undated) RX ORDER — LIDOCAINE HYDROCHLORIDE 10 MG/ML
INJECTION, SOLUTION EPIDURAL; INFILTRATION; INTRACAUDAL; PERINEURAL
Status: DISPENSED
Start: 2024-02-19

## (undated) RX ORDER — PROPOFOL 10 MG/ML
INJECTION, EMULSION INTRAVENOUS
Status: DISPENSED
Start: 2021-05-19

## (undated) RX ORDER — LIDOCAINE 40 MG/G
CREAM TOPICAL
Status: DISPENSED
Start: 2021-04-09

## (undated) RX ORDER — VERAPAMIL HYDROCHLORIDE 2.5 MG/ML
INJECTION, SOLUTION INTRAVENOUS
Status: DISPENSED
Start: 2021-04-09

## (undated) RX ORDER — FENTANYL CITRATE 50 UG/ML
INJECTION, SOLUTION INTRAMUSCULAR; INTRAVENOUS
Status: DISPENSED
Start: 2024-02-19

## (undated) RX ORDER — BUPIVACAINE HYDROCHLORIDE 5 MG/ML
INJECTION, SOLUTION EPIDURAL; INTRACAUDAL
Status: DISPENSED
Start: 2021-05-19

## (undated) RX ORDER — FENTANYL CITRATE 50 UG/ML
INJECTION, SOLUTION INTRAMUSCULAR; INTRAVENOUS
Status: DISPENSED
Start: 2021-05-19

## (undated) RX ORDER — ADENOSINE 3 MG/ML
INJECTION, SOLUTION INTRAVENOUS
Status: DISPENSED
Start: 2024-02-19

## (undated) RX ORDER — HEPARIN SODIUM 1000 [USP'U]/ML
INJECTION, SOLUTION INTRAVENOUS; SUBCUTANEOUS
Status: DISPENSED
Start: 2021-04-09

## (undated) RX ORDER — REGADENOSON 0.08 MG/ML
INJECTION, SOLUTION INTRAVENOUS
Status: DISPENSED
Start: 2022-12-29

## (undated) RX ORDER — ASPIRIN 81 MG/1
TABLET ORAL
Status: DISPENSED
Start: 2021-05-19